# Patient Record
Sex: MALE | Race: WHITE | NOT HISPANIC OR LATINO | Employment: FULL TIME | URBAN - METROPOLITAN AREA
[De-identification: names, ages, dates, MRNs, and addresses within clinical notes are randomized per-mention and may not be internally consistent; named-entity substitution may affect disease eponyms.]

---

## 2017-01-09 ENCOUNTER — TELEPHONE (OUTPATIENT)
Dept: OPHTHALMOLOGY | Facility: CLINIC | Age: 52
End: 2017-01-09

## 2017-01-26 ENCOUNTER — TELEPHONE (OUTPATIENT)
Dept: INTERNAL MEDICINE | Facility: CLINIC | Age: 52
End: 2017-01-26

## 2017-01-26 DIAGNOSIS — M15.9 PRIMARY OSTEOARTHRITIS INVOLVING MULTIPLE JOINTS: ICD-10-CM

## 2017-01-26 DIAGNOSIS — F43.23 ADJUSTMENT DISORDER WITH MIXED ANXIETY AND DEPRESSED MOOD: ICD-10-CM

## 2017-01-26 RX ORDER — CELECOXIB 200 MG/1
200 CAPSULE ORAL DAILY
Qty: 90 CAPSULE | Refills: 1 | Status: SHIPPED | OUTPATIENT
Start: 2017-01-26 | End: 2017-09-25 | Stop reason: SDUPTHER

## 2017-01-26 RX ORDER — SERTRALINE HYDROCHLORIDE 25 MG/1
25 TABLET, FILM COATED ORAL DAILY
Qty: 90 TABLET | Refills: 3 | Status: SHIPPED | OUTPATIENT
Start: 2017-01-26 | End: 2017-12-29 | Stop reason: SDUPTHER

## 2017-01-26 NOTE — TELEPHONE ENCOUNTER
----- Message from Corazon Jones MD sent at 1/25/2017 12:40 PM CST -----  Hi  Will you please change my pharmacy information to the [facility] [address] and delete the [facility]?  Will you please also refill my Zoloft and Celebrex at the [facility] [address]  Thank you so much

## 2017-02-10 ENCOUNTER — HOSPITAL ENCOUNTER (OUTPATIENT)
Dept: RADIOLOGY | Facility: OTHER | Age: 52
Discharge: HOME | End: 2017-02-10
Attending: INTERNAL MEDICINE
Payer: COMMERCIAL

## 2017-02-10 DIAGNOSIS — R13.10 DYSPHAGIA: ICD-10-CM

## 2017-02-10 PROCEDURE — 74220 X-RAY XM ESOPHAGUS 1CNTRST: CPT | Mod: TC

## 2017-02-10 PROCEDURE — 74220 X-RAY XM ESOPHAGUS 1CNTRST: CPT | Mod: 26,,, | Performed by: INTERNAL MEDICINE

## 2017-05-28 RX ORDER — AZITHROMYCIN 250 MG/1
TABLET, FILM COATED ORAL
Qty: 6 TABLET | Refills: 0 | Status: SHIPPED | OUTPATIENT
Start: 2017-05-28 | End: 2018-04-17 | Stop reason: SDUPTHER

## 2017-09-25 ENCOUNTER — OFFICE VISIT (OUTPATIENT)
Dept: INTERNAL MEDICINE | Facility: CLINIC | Age: 52
End: 2017-09-25
Attending: INTERNAL MEDICINE
Payer: COMMERCIAL

## 2017-09-25 ENCOUNTER — TELEPHONE (OUTPATIENT)
Dept: INTERNAL MEDICINE | Facility: CLINIC | Age: 52
End: 2017-09-25

## 2017-09-25 VITALS
SYSTOLIC BLOOD PRESSURE: 124 MMHG | OXYGEN SATURATION: 98 % | HEIGHT: 70 IN | WEIGHT: 238.13 LBS | HEART RATE: 70 BPM | DIASTOLIC BLOOD PRESSURE: 86 MMHG | BODY MASS INDEX: 34.09 KG/M2

## 2017-09-25 DIAGNOSIS — M15.9 PRIMARY OSTEOARTHRITIS INVOLVING MULTIPLE JOINTS: ICD-10-CM

## 2017-09-25 DIAGNOSIS — K58.9 IRRITABLE BOWEL SYNDROME, UNSPECIFIED TYPE: ICD-10-CM

## 2017-09-25 DIAGNOSIS — K21.9 GASTROESOPHAGEAL REFLUX DISEASE WITHOUT ESOPHAGITIS: ICD-10-CM

## 2017-09-25 DIAGNOSIS — D72.820 LYMPHOCYTOSIS: ICD-10-CM

## 2017-09-25 DIAGNOSIS — M79.18 MUSCULOSKELETAL PAIN: ICD-10-CM

## 2017-09-25 DIAGNOSIS — Z00.00 ANNUAL PHYSICAL EXAM: Primary | ICD-10-CM

## 2017-09-25 DIAGNOSIS — R10.84 ABDOMINAL PAIN, GENERALIZED: Primary | ICD-10-CM

## 2017-09-25 DIAGNOSIS — E73.9 LACTOSE INTOLERANCE: ICD-10-CM

## 2017-09-25 PROCEDURE — 99214 OFFICE O/P EST MOD 30 MIN: CPT | Mod: S$GLB,,, | Performed by: INTERNAL MEDICINE

## 2017-09-25 PROCEDURE — 3008F BODY MASS INDEX DOCD: CPT | Mod: S$GLB,,, | Performed by: INTERNAL MEDICINE

## 2017-09-25 PROCEDURE — 99999 PR PBB SHADOW E&M-EST. PATIENT-LVL III: CPT | Mod: PBBFAC,,, | Performed by: INTERNAL MEDICINE

## 2017-09-25 RX ORDER — CELECOXIB 200 MG/1
200 CAPSULE ORAL DAILY
Qty: 90 CAPSULE | Refills: 1 | Status: SHIPPED | OUTPATIENT
Start: 2017-09-25 | End: 2017-12-29 | Stop reason: SDUPTHER

## 2017-09-25 NOTE — TELEPHONE ENCOUNTER
Right flank pain, on going for 1 month. Awaken pt at night. Denies any other concerning s/s. Pt scheduled for UC appt today. Patient has no further questions or concerns.

## 2017-09-25 NOTE — PROGRESS NOTES
"Subjective:       Patient ID: Corazon Jones is a 52 y.o. male.    Chief Complaint: Flank Pain     Corazon Jones is a 52 y.o.  male who presents for Flank Pain  .  Patient Active Problem List   Diagnosis    Abdominal pain, generalized    Constipation, acute    Lactose intolerance    Chronic familial neutropenia    OTTONIEL (obstructive sleep apnea)    Hyperlipemia    History of Clostridium difficile colitis     Right flank pain, 4 weeks ago, after lifting a desk at home.  Initially sharp pain for 24-48 hours.  Days later noticed pain in same area, worse with certain positions when supine. Currently 1-2/10. Occurs every night.  Worse after meals, especially fatty meals.  Hx of abdominal bloating and GERD.  Hx of treated h pylori years ago.      Knows foods to avoid but does not avoid them. Reports eating fatyt brisket last night and having pale and "fatty" loose stool  This morning. Not oily or floating. GI issues are long standing, no significant changes.    Has a history of intermittent musculoskeletal pain which he treats with prn celebrex.  He has tried ibuprofen, mobic and alleve but all caused GI upset.  His pain is controled with celebrex and he has no nausea or gastritis with this medication. He request a refill.      Health Maintenance       Date Due Completion Date    TETANUS VACCINE 09/13/1982 ---    Influenza Vaccine 08/01/2017 12/8/2014 (Done)    Override on 12/8/2014: Done    Lipid Panel 04/20/2020 4/20/2015    Colonoscopy 05/14/2025 5/14/2015          Review of Systems   Constitutional: Negative for chills, fever and unexpected weight change.   HENT: Negative for rhinorrhea and sore throat.    Respiratory: Negative for cough and shortness of breath.    Cardiovascular: Negative for chest pain and palpitations.   Gastrointestinal: Positive for abdominal distention and abdominal pain. Negative for blood in stool, constipation, diarrhea, nausea and vomiting.   Genitourinary: Positive for flank pain. Negative " "for dysuria and hematuria.   Musculoskeletal: Positive for back pain. Negative for arthralgias and joint swelling.   Skin: Negative for color change and rash.   Neurological: Negative for weakness and numbness.   Psychiatric/Behavioral: Negative for agitation and dysphoric mood.         Past Medical History:   Diagnosis Date    History of Clostridium difficile colitis     treated 2014    History of sarcoidosis     dx 1991, resolved    Hyperlipemia        Past Surgical History:   Procedure Laterality Date    APPENDECTOMY      HERNIA REPAIR      orthoscopy      shoulder and knee       Family History   Problem Relation Age of Onset    Diabetes Mellitus Maternal Grandmother     Diabetes Mellitus Maternal Uncle     Diabetes Mellitus Paternal Uncle     Prostate cancer Maternal Uncle     Endometrial cancer Paternal Grandmother        Social History   Substance Use Topics    Smoking status: Never Smoker    Smokeless tobacco: Never Used    Alcohol use No             Objective:   Blood pressure 124/86, pulse 70, height 5' 10" (1.778 m), weight 108 kg (238 lb 1.6 oz), SpO2 98 %.     Physical Exam   Constitutional: He is oriented to person, place, and time. He appears well-developed and well-nourished. No distress.   HENT:   Head: Normocephalic and atraumatic.   Right Ear: External ear normal.   Left Ear: External ear normal.   Eyes: Conjunctivae are normal. No scleral icterus.   Neck: No JVD present. No thyromegaly present.   Cardiovascular: Normal heart sounds.  Exam reveals no gallop and no friction rub.    No murmur heard.  Pulmonary/Chest: Effort normal and breath sounds normal. He has no wheezes. He has no rales.   Abdominal: Soft. Bowel sounds are normal. He exhibits no distension and no mass. There is tenderness (diffuse). There is no rebound and no guarding.   Musculoskeletal: He exhibits no edema or tenderness.   Lymphadenopathy:     He has no cervical adenopathy.   Neurological: He is alert and oriented " to person, place, and time.   Skin: Skin is warm and dry.   Psychiatric: He has a normal mood and affect. Thought content normal.       Prior labs reviewed  Assessment/Plan:        Corazon was seen today for flank pain.    Diagnoses and all orders for this visit:    Abdominal pain, generalized  -     US Abdomen Complete; Future  -     Ambulatory consult to Gastroenterology  Suspect IBS.Will check Labs and recommend compliance with dietary modifications.  Consider evaluation for specific food intolerance.    Gastroesophageal reflux disease without esophagitis  -     H. pylori antigen, stool; Future    Primary osteoarthritis involving multiple joints  -    refill celecoxib (CELEBREX) 200 MG capsule; Take 1 capsule (200 mg total) by mouth once daily.    Musculoskeletal pain  -    Suspect this is source of initial flank pain  - prn  celecoxib (CELEBREX) 200 MG capsule; Take 1 capsule (200 mg total) by mouth once daily.    Lactose intolerance  -     Ambulatory consult to Gastroenterology  Avoid dairy  Use of lactaid prn    Irritable bowel syndrome, unspecified type  -     Ambulatory consult to Gastroenterology

## 2017-09-26 ENCOUNTER — LAB VISIT (OUTPATIENT)
Dept: LAB | Facility: OTHER | Age: 52
End: 2017-09-26
Attending: INTERNAL MEDICINE
Payer: COMMERCIAL

## 2017-09-26 DIAGNOSIS — Z00.00 ANNUAL PHYSICAL EXAM: ICD-10-CM

## 2017-09-26 DIAGNOSIS — D72.820 LYMPHOCYTOSIS: ICD-10-CM

## 2017-09-26 LAB
ALBUMIN SERPL BCP-MCNC: 3.7 G/DL
ALP SERPL-CCNC: 78 U/L
ALT SERPL W/O P-5'-P-CCNC: 30 U/L
ANION GAP SERPL CALC-SCNC: 10 MMOL/L
AST SERPL-CCNC: 20 U/L
BASOPHILS # BLD AUTO: ABNORMAL K/UL
BASOPHILS NFR BLD: 0 %
BILIRUB SERPL-MCNC: 0.7 MG/DL
BUN SERPL-MCNC: 15 MG/DL
CALCIUM SERPL-MCNC: 9.6 MG/DL
CHLORIDE SERPL-SCNC: 105 MMOL/L
CHOLEST SERPL-MCNC: 269 MG/DL
CHOLEST/HDLC SERPL: 5.7 {RATIO}
CO2 SERPL-SCNC: 27 MMOL/L
COMPLEXED PSA SERPL-MCNC: 0.28 NG/ML
CREAT SERPL-MCNC: 0.9 MG/DL
DIFFERENTIAL METHOD: ABNORMAL
EOSINOPHIL # BLD AUTO: ABNORMAL K/UL
EOSINOPHIL NFR BLD: 3 %
ERYTHROCYTE [DISTWIDTH] IN BLOOD BY AUTOMATED COUNT: 13.5 %
ERYTHROCYTE [SEDIMENTATION RATE] IN BLOOD BY WESTERGREN METHOD: 3 MM/HR
EST. GFR  (AFRICAN AMERICAN): >60 ML/MIN/1.73 M^2
EST. GFR  (NON AFRICAN AMERICAN): >60 ML/MIN/1.73 M^2
ESTIMATED AVG GLUCOSE: 108 MG/DL
GLUCOSE SERPL-MCNC: 102 MG/DL
HBA1C MFR BLD HPLC: 5.4 %
HCT VFR BLD AUTO: 48.7 %
HDLC SERPL-MCNC: 47 MG/DL
HDLC SERPL: 17.5 %
HGB BLD-MCNC: 16.2 G/DL
LDLC SERPL CALC-MCNC: 200.8 MG/DL
LYMPHOCYTES # BLD AUTO: ABNORMAL K/UL
LYMPHOCYTES NFR BLD: 66 %
MCH RBC QN AUTO: 29.8 PG
MCHC RBC AUTO-ENTMCNC: 33.3 G/DL
MCV RBC AUTO: 90 FL
MONOCYTES # BLD AUTO: ABNORMAL K/UL
MONOCYTES NFR BLD: 13 %
NEUTROPHILS NFR BLD: 18 %
NONHDLC SERPL-MCNC: 222 MG/DL
NRBC BLD-RTO: 1 /100 WBC
PATH REV BLD -IMP: NORMAL
PLATELET # BLD AUTO: 226 K/UL
PLATELET BLD QL SMEAR: ABNORMAL
PMV BLD AUTO: 10.4 FL
POTASSIUM SERPL-SCNC: 4.6 MMOL/L
PROT SERPL-MCNC: 7.4 G/DL
RBC # BLD AUTO: 5.44 M/UL
SODIUM SERPL-SCNC: 142 MMOL/L
TRIGL SERPL-MCNC: 106 MG/DL
TSH SERPL DL<=0.005 MIU/L-ACNC: 1.92 UIU/ML
WBC # BLD AUTO: 3.28 K/UL

## 2017-09-26 PROCEDURE — 84153 ASSAY OF PSA TOTAL: CPT

## 2017-09-26 PROCEDURE — 80053 COMPREHEN METABOLIC PANEL: CPT

## 2017-09-26 PROCEDURE — 85651 RBC SED RATE NONAUTOMATED: CPT

## 2017-09-26 PROCEDURE — 36415 COLL VENOUS BLD VENIPUNCTURE: CPT

## 2017-09-26 PROCEDURE — 84443 ASSAY THYROID STIM HORMONE: CPT

## 2017-09-26 PROCEDURE — 80061 LIPID PANEL: CPT

## 2017-09-26 PROCEDURE — 83036 HEMOGLOBIN GLYCOSYLATED A1C: CPT

## 2017-09-26 PROCEDURE — 85025 COMPLETE CBC W/AUTO DIFF WBC: CPT

## 2017-09-26 PROCEDURE — 85060 BLOOD SMEAR INTERPRETATION: CPT | Mod: ,,, | Performed by: PATHOLOGY

## 2017-09-27 ENCOUNTER — HOSPITAL ENCOUNTER (OUTPATIENT)
Dept: RADIOLOGY | Facility: OTHER | Age: 52
Discharge: HOME | End: 2017-09-27
Attending: INTERNAL MEDICINE
Payer: COMMERCIAL

## 2017-09-27 DIAGNOSIS — R10.84 ABDOMINAL PAIN, GENERALIZED: ICD-10-CM

## 2017-09-27 LAB — PATH REV BLD -IMP: NORMAL

## 2017-09-27 PROCEDURE — 76700 US EXAM ABDOM COMPLETE: CPT | Mod: TC

## 2017-09-27 PROCEDURE — 76700 US EXAM ABDOM COMPLETE: CPT | Mod: 26,,, | Performed by: RADIOLOGY

## 2017-10-05 ENCOUNTER — TELEPHONE (OUTPATIENT)
Dept: INTERNAL MEDICINE | Facility: CLINIC | Age: 52
End: 2017-10-05

## 2017-10-09 ENCOUNTER — TELEPHONE (OUTPATIENT)
Dept: INTERNAL MEDICINE | Facility: CLINIC | Age: 52
End: 2017-10-09

## 2017-10-09 DIAGNOSIS — D72.820 LYMPHOCYTOSIS: Primary | ICD-10-CM

## 2017-10-16 ENCOUNTER — TELEPHONE (OUTPATIENT)
Dept: INTERNAL MEDICINE | Facility: CLINIC | Age: 52
End: 2017-10-16

## 2017-10-25 ENCOUNTER — LAB VISIT (OUTPATIENT)
Dept: LAB | Facility: OTHER | Age: 52
End: 2017-10-25
Attending: INTERNAL MEDICINE
Payer: COMMERCIAL

## 2017-10-25 DIAGNOSIS — M25.50 ARTHRALGIA, UNSPECIFIED JOINT: ICD-10-CM

## 2017-10-25 DIAGNOSIS — M25.50 ARTHRALGIA, UNSPECIFIED JOINT: Primary | ICD-10-CM

## 2017-10-25 LAB — URATE SERPL-MCNC: 5.7 MG/DL

## 2017-10-25 PROCEDURE — 84550 ASSAY OF BLOOD/URIC ACID: CPT

## 2017-10-25 PROCEDURE — 36415 COLL VENOUS BLD VENIPUNCTURE: CPT

## 2017-10-25 NOTE — PROGRESS NOTES
Called pt and left vm stating that  rec that pt completes an uric aci lab.  Left office number for pt to return call to schedule appt .

## 2017-11-22 ENCOUNTER — OFFICE VISIT (OUTPATIENT)
Dept: SPORTS MEDICINE | Facility: CLINIC | Age: 52
End: 2017-11-22
Payer: COMMERCIAL

## 2017-11-22 VITALS
BODY MASS INDEX: 34.09 KG/M2 | HEIGHT: 70 IN | WEIGHT: 238.13 LBS | DIASTOLIC BLOOD PRESSURE: 77 MMHG | SYSTOLIC BLOOD PRESSURE: 133 MMHG | HEART RATE: 82 BPM

## 2017-11-22 DIAGNOSIS — S83.282S ACUTE LATERAL MENISCUS TEAR OF LEFT KNEE, SEQUELA: Primary | ICD-10-CM

## 2017-11-22 PROCEDURE — 99024 POSTOP FOLLOW-UP VISIT: CPT | Mod: S$GLB,,, | Performed by: ORTHOPAEDIC SURGERY

## 2017-11-22 PROCEDURE — 99999 PR PBB SHADOW E&M-EST. PATIENT-LVL III: CPT | Mod: PBBFAC,,, | Performed by: ORTHOPAEDIC SURGERY

## 2017-11-22 NOTE — PROGRESS NOTES
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      + mechanical symptoms, no instability    Is affecting ADLs.      Review of Systems   Constitution: Negative. Negative for chills, fever and night sweats.   HENT: Negative for congestion and headaches.    Eyes: Negative for blurred vision, left vision loss and right vision loss.   Cardiovascular: Negative for chest pain and syncope.   Respiratory: Negative for cough and shortness of breath.    Endocrine: Negative for polydipsia, polyphagia and polyuria.   Hematologic/Lymphatic: Negative for bleeding problem. Does not bruise/bleed easily.   Skin: Negative for dry skin, itching and rash.   Musculoskeletal: Negative for falls. Positive for knee pain and muscle weakness.   Gastrointestinal: Negative for abdominal pain and bowel incontinence.   Genitourinary: Negative for bladder incontinence and nocturia.   Neurological: Negative for disturbances in coordination, loss of balance and seizures.   Psychiatric/Behavioral: Negative for depression. The patient does not have insomnia.    Allergic/Immunologic: Negative for hives and persistent infections.     PAST MEDICAL HISTORY:   Past Medical History:   Diagnosis Date    History of Clostridium difficile colitis     treated 2014    History of sarcoidosis     dx 1991, resolved    Hyperlipemia      PAST SURGICAL HISTORY:   Past Surgical History:   Procedure Laterality Date    APPENDECTOMY      HERNIA REPAIR      orthoscopy      shoulder and knee     FAMILY HISTORY:   Family History   Problem Relation Age of Onset    Diabetes Mellitus Maternal Grandmother     Diabetes Mellitus Maternal Uncle     Diabetes Mellitus Paternal Uncle     Prostate cancer Maternal Uncle     Endometrial cancer Paternal Grandmother      SOCIAL HISTORY:   Social History     Social History    Marital status:      Spouse name: N/A  "   Number of children: 3    Years of education: N/A     Occupational History    physician [employer]     Social History Main Topics    Smoking status: Never Smoker    Smokeless tobacco: Never Used    Alcohol use No    Drug use: No    Sexual activity: Yes     Partners: Female     Other Topics Concern    Not on file     Social History Narrative    Intermittent exercise       MEDICATIONS:   Current Outpatient Prescriptions:     azithromycin (Z-ANNY) 250 MG tablet, Take 2 tablets by mouth on day 1; Take 1 tablet by mouth on days 2-5, Disp: 6 tablet, Rfl: 0    celecoxib (CELEBREX) 200 MG capsule, Take 1 capsule (200 mg total) by mouth once daily., Disp: 90 capsule, Rfl: 1    metaxalone (SKELAXIN) 800 MG tablet, Take 1 tablet (800 mg total) by mouth 3 (three) times daily., Disp: 90 tablet, Rfl: 0    polyethylene glycol (GLYCOLAX) 17 gram PwPk, Take 17 g by mouth once daily. 17 g of powder (~1 heaping tablespoon) dissolved in 4-8 ounces of beverage, once daily; do not use for >1 week, Disp: 14 each, Rfl: 0    sertraline (ZOLOFT) 25 MG tablet, Take 1 tablet (25 mg total) by mouth once daily., Disp: 90 tablet, Rfl: 3    zolpidem (AMBIEN) 5 MG Tab, Take 1 tablet (5 mg total) by mouth nightly as needed., Disp: 30 tablet, Rfl: 0  ALLERGIES:   Review of patient's allergies indicates:   Allergen Reactions    Percocet [oxycodone-acetaminophen]     Adhesive Rash       VITAL SIGNS: /77   Pulse 82   Ht 5' 10" (1.778 m)   Wt 108 kg (238 lb 1.6 oz)   BMI 34.16 kg/m²      PHYSICAL EXAMINATION  VITAL SIGNS: /77   Pulse 82   Ht 5' 10" (1.778 m)   Wt 108 kg (238 lb 1.6 oz)   BMI 34.16 kg/m²    General:  The patient is alert and oriented x 3.  Mood is pleasant.  Observation of ears, eyes and nose reveal no gross abnormalities.  HEENT: NCAT, sclera nonicteric  Lungs: Respirations are equal and unlabored.    Left KNEE EXAMINATION     OBSERVATION / INSPECTION   Gait:   Nonantalgic "   Alignment:  Neutral   Scars:   None   Muscle atrophy: Mild  Effusion:  None   Warmth:  None   Discoloration:   none     TENDERNESS / CREPITUS (T / C):          T / C      T / C   Patella   - / -   Lateral joint line   + / -    Peripatellar medial  -  Medial joint line    + / -    Peripatellar lateral -  Medial plica   - / -    Patellar tendon -   Popliteal fossa  - / -    Quad tendon   -   Gastrocnemius   -   Prepatellar Bursa - / -   Quadricep   -   Tibial tubercle  -  Thigh/hamstring  -   Pes anserine/HS -  Fibula    -   ITB   - / -  Tibia     -   Tib/fib joint  - / -  LCL    -     MFC   - / -   MCL: Proximal  -    LFC   - / -    Distal   -          ROM: (* = pain)  PASSIVE   ACTIVE    Left :   5 / 0 / 145   5 / 0 / 145     Right :    5 / 0 / 145   5 / 0 / 145    Patellofemoral examination:  See above noted areas of tenderness.   Patella position    Subluxation / dislocation: Centered           Sup. / Inf;   Normal   Crepitus (PF):    Absent   Patellar Mobility:       Medial-lateral:   Normal    Superior-inferior:  Normal    Inferior tilt   Normal    Patellar tendon:  Normal   Lateral tilt:    Normal   J-sign:     None   Patellofemoral grind:   No pain       MENISCAL SIGNS:     Pain on terminal extension:  +  Pain on terminal flexion:  +  Geralds maneuver:  + for pain  Squat     + posterior joint pain    LIGAMENT EXAMINATION:  ACL / Lachman:  normal (-1 to 2mm)    PCL-Post.  drawer: normal 0 to 2mm  MCL- Valgus:  normal 0 to 2mm  LCL- Varus:  normal 0 to 2mm  Pivot shift: normal (Equal)   Dial Test: difference c/w other side   At 30° flexion: normal (< 5°)    At 90° flexion: normal (< 5°)   Reverse Pivot Shift:   normal (Equal)     STRENGTH: (* = with pain) PAINFUL SIDE   Quadricep   5/5   Hamstrin/5    EXTREMITY NEURO-VASCULAR EXAMINATION:   Sensation:  Grossly intact to light touch all dermatomal regions.   Motor Function:  Fully intact motor function at hip, knee, foot and ankle    DTRs;   quadriceps and  achilles 2+.  No clonus and downgoing Babinski.    Vascular status:  DP and PT pulses 2+, brisk capillary refill, symmetric.     Other Findings:       X-rays:  including standing, weight bearing AP and flexion bilateral knees, lateral and merchant views ordered and images reviewed by me show:  No fracture, dislocation     ASSESSMENT:    Left Knee  Probable Meniscus tear  medial and lateral       PLAN:   MRI Left knee  Hold out of sports until MRI  All questions were answered, pt will contact us for questions or concerns in the interim.

## 2017-11-24 ENCOUNTER — HOSPITAL ENCOUNTER (OUTPATIENT)
Dept: RADIOLOGY | Facility: OTHER | Age: 52
Discharge: HOME | End: 2017-11-24
Attending: ORTHOPAEDIC SURGERY
Payer: COMMERCIAL

## 2017-11-24 DIAGNOSIS — S83.282S ACUTE LATERAL MENISCUS TEAR OF LEFT KNEE, SEQUELA: ICD-10-CM

## 2017-11-24 DIAGNOSIS — M25.561 CHRONIC PAIN OF BOTH KNEES: Primary | ICD-10-CM

## 2017-11-24 DIAGNOSIS — M25.562 CHRONIC PAIN OF BOTH KNEES: Primary | ICD-10-CM

## 2017-11-24 DIAGNOSIS — G89.29 CHRONIC PAIN OF BOTH KNEES: Primary | ICD-10-CM

## 2017-11-24 PROCEDURE — 73721 MRI JNT OF LWR EXTRE W/O DYE: CPT | Mod: 26,LT,, | Performed by: RADIOLOGY

## 2017-11-24 PROCEDURE — 73721 MRI JNT OF LWR EXTRE W/O DYE: CPT | Mod: TC,LT

## 2017-11-27 ENCOUNTER — TELEPHONE (OUTPATIENT)
Dept: SPORTS MEDICINE | Facility: CLINIC | Age: 52
End: 2017-11-27

## 2017-11-27 NOTE — TELEPHONE ENCOUNTER
I spoke with the patient and he notes that his knee is feeling better and that he will call the office if and when he is ready to proceed with an injection

## 2017-11-27 NOTE — TELEPHONE ENCOUNTER
----- Message from Laura Wisdom MD sent at 11/26/2017  1:27 PM CST -----  MRI reviewed personally by me, and discussed:     Left knee MRI: no meniscus tear, + chondromalacia, + arthritis.    Proceed with cortisone injection and then possible synvisc series.

## 2017-12-29 ENCOUNTER — TELEPHONE (OUTPATIENT)
Dept: INTERNAL MEDICINE | Facility: CLINIC | Age: 52
End: 2017-12-29

## 2017-12-29 DIAGNOSIS — M79.18 MUSCULOSKELETAL PAIN: ICD-10-CM

## 2017-12-29 DIAGNOSIS — F43.23 ADJUSTMENT DISORDER WITH MIXED ANXIETY AND DEPRESSED MOOD: ICD-10-CM

## 2017-12-29 DIAGNOSIS — M15.9 PRIMARY OSTEOARTHRITIS INVOLVING MULTIPLE JOINTS: ICD-10-CM

## 2017-12-29 RX ORDER — CELECOXIB 200 MG/1
200 CAPSULE ORAL DAILY
Qty: 90 CAPSULE | Refills: 2 | Status: SHIPPED | OUTPATIENT
Start: 2017-12-29 | End: 2019-04-10

## 2017-12-29 RX ORDER — SERTRALINE HYDROCHLORIDE 25 MG/1
25 TABLET, FILM COATED ORAL DAILY
Qty: 90 TABLET | Refills: 3 | Status: SHIPPED | OUTPATIENT
Start: 2017-12-29 | End: 2017-12-29 | Stop reason: SDUPTHER

## 2017-12-29 RX ORDER — CELECOXIB 200 MG/1
200 CAPSULE ORAL DAILY
Qty: 90 CAPSULE | Refills: 2 | Status: SHIPPED | OUTPATIENT
Start: 2017-12-29 | End: 2017-12-29 | Stop reason: SDUPTHER

## 2017-12-29 RX ORDER — SERTRALINE HYDROCHLORIDE 25 MG/1
25 TABLET, FILM COATED ORAL DAILY
Qty: 90 TABLET | Refills: 3 | Status: SHIPPED | OUTPATIENT
Start: 2017-12-29 | End: 2019-04-12

## 2017-12-29 NOTE — TELEPHONE ENCOUNTER
----- Message from Tasha Santos MD sent at 12/29/2017  4:25 PM CST -----  [facility] call [facility] and cancel celebrex and zoloft- sent in error  Resent to [facility] per pt request

## 2018-03-06 ENCOUNTER — CLINICAL SUPPORT (OUTPATIENT)
Dept: OTOLARYNGOLOGY | Facility: CLINIC | Age: 53
End: 2018-03-06
Payer: COMMERCIAL

## 2018-03-06 ENCOUNTER — OFFICE VISIT (OUTPATIENT)
Dept: OTOLARYNGOLOGY | Facility: CLINIC | Age: 53
End: 2018-03-06
Payer: COMMERCIAL

## 2018-03-06 VITALS
BODY MASS INDEX: 34.09 KG/M2 | SYSTOLIC BLOOD PRESSURE: 112 MMHG | TEMPERATURE: 98 F | HEART RATE: 79 BPM | WEIGHT: 238.13 LBS | HEIGHT: 70 IN | DIASTOLIC BLOOD PRESSURE: 80 MMHG

## 2018-03-06 DIAGNOSIS — J30.2 ACUTE SEASONAL ALLERGIC RHINITIS, UNSPECIFIED TRIGGER: ICD-10-CM

## 2018-03-06 DIAGNOSIS — J34.2 NASAL SEPTAL DEVIATION: ICD-10-CM

## 2018-03-06 DIAGNOSIS — H93.13 TINNITUS OF BOTH EARS: Primary | ICD-10-CM

## 2018-03-06 DIAGNOSIS — H93.12 TINNITUS, LEFT: Primary | ICD-10-CM

## 2018-03-06 DIAGNOSIS — H69.93 DYSFUNCTION OF BOTH EUSTACHIAN TUBES: ICD-10-CM

## 2018-03-06 DIAGNOSIS — H90.3 SENSORINEURAL HEARING LOSS (SNHL) OF BOTH EARS: ICD-10-CM

## 2018-03-06 DIAGNOSIS — H90.A22 SENSORINEURAL HEARING LOSS (SNHL) OF LEFT EAR WITH RESTRICTED HEARING OF RIGHT EAR: ICD-10-CM

## 2018-03-06 DIAGNOSIS — G47.33 OSA (OBSTRUCTIVE SLEEP APNEA): ICD-10-CM

## 2018-03-06 PROCEDURE — 99204 OFFICE O/P NEW MOD 45 MIN: CPT | Mod: S$GLB,,, | Performed by: SPECIALIST

## 2018-03-06 PROCEDURE — 92567 TYMPANOMETRY: CPT | Mod: S$GLB,,, | Performed by: AUDIOLOGIST

## 2018-03-06 PROCEDURE — 92557 COMPREHENSIVE HEARING TEST: CPT | Mod: S$GLB,,, | Performed by: AUDIOLOGIST

## 2018-03-06 RX ORDER — FLUTICASONE PROPIONATE 50 MCG
2 SPRAY, SUSPENSION (ML) NASAL DAILY
Qty: 1 BOTTLE | Refills: 12 | Status: SHIPPED | OUTPATIENT
Start: 2018-03-06 | End: 2021-03-31

## 2018-03-06 NOTE — PATIENT INSTRUCTIONS
Tinnitus (Ringing in the Ears)     Treatment may include maskers and hearing aids.     Tinnitus is the term for a noise in your ear not caused by an outside sound. The noise might be a ringing, clicking, hiss, or roar. It can vary in pitch and may be soft or quite loud. For some people, tinnitus is a minor nuisance. But for others, the noise can make it hard to hear, work, and even sleep. When tinnitus can't be cured, a number of treatments may offer relief.  What causes tinnitus?  Loud noises, hearing loss, and ear wax can cause tinnitus. So can certain medicines. Large amounts of aspirin or caffeine are sometimes to blame. In many cases, the exact cause of tinnitus is unknown.  How is tinnitus treated?  Identifying and removing the cause is the best way to treat tinnitus. For that reason, your healthcare provider may refer you to an otolaryngologist (ear, nose, and throat doctor). Your hearing may also be checked by an audiologist (hearing specialist). If you have hearing loss, wearing a hearing aid may help your tinnitus. When the cause can't be found, the tinnitus itself may be treated. Some of the treatments are listed below, and your healthcare provider can tell you more about them:  · Maskers are small devices that look like hearing aids. They emit a pleasant sound that helps cover up the ringing in your ears. Hearing aids and maskers are sometimes used together.  · Medicines that treat anxiety and depression may ease tinnitus in some people.  · Hypnosis or relaxation therapy may help head noise seem less severe.  · Tinnitus retraining therapy combines counseling and maskers. Both can help take your mind off the tinnitus.  For more information  · American Speech-Hearing-Language Association [phone] www.andrea.org  · American Tinnitus Association [phone] www.jessica.org  · National [place] on Deafness and other Communication Disorders 471-667-7651 www.nidcd.nih.gov   Date Last Reviewed: 7/1/2016  ©  1006-1071 [employer]. [address] [place] [address], [place] [address]. All rights reserved. This information is not intended as a substitute for professional medical care. Always follow your healthcare professional's instructions.

## 2018-03-07 NOTE — PROGRESS NOTES
Subjective:       Patient ID: Corazon Jones is a 52 y.o. male.    Chief Complaint: Tinnitus and Dizziness    The patient is having high pitched electric static type tinnitus bilaterally.  He feels like it localizes to the left of midline in his head.  He does occasionally wake him up.  He has associated sensitivity to high pitched noise and loud noise.  He occasionally has some mild imbalance that is always associated with changes of position.  He has a teenage daughter who speaks in a very loud voice and he has difficulty understanding her more often not.  He does have some ALLERGY/sinus problems for which she takes Claritin.  He does have obstructive sleep apnea for which he uses CPAP with good results.      Review of Systems   Constitutional: Negative for activity change, appetite change, chills, fatigue, fever and unexpected weight change.   HENT: Positive for congestion, hearing loss, postnasal drip, sinus pressure, sore throat and tinnitus. Negative for ear discharge, ear pain, facial swelling, mouth sores, rhinorrhea, sinus pain, sneezing, trouble swallowing and voice change.    Eyes: Negative for photophobia, pain, discharge, redness, itching and visual disturbance.   Respiratory: Positive for cough. Negative for apnea, choking, shortness of breath and wheezing.    Cardiovascular: Negative for chest pain and palpitations.   Gastrointestinal: Negative for abdominal distention, abdominal pain, nausea and vomiting.   Musculoskeletal: Negative for arthralgias, myalgias, neck pain and neck stiffness.   Skin: Negative.  Negative for color change, pallor and rash.   Allergic/Immunologic: Negative for environmental allergies, food allergies and immunocompromised state.   Neurological: Positive for headaches. Negative for dizziness, facial asymmetry, speech difficulty, weakness, light-headedness and numbness.   Hematological: Negative for adenopathy. Does not bruise/bleed easily.   Psychiatric/Behavioral: Negative for  agitation, confusion, decreased concentration and sleep disturbance.       Objective:      Physical Exam   Constitutional: He is oriented to person, place, and time. He appears well-developed and well-nourished. He is cooperative.   HENT:   Head: Normocephalic.   Right Ear: Tympanic membrane, external ear and ear canal normal.   Left Ear: Tympanic membrane, external ear and ear canal normal.   Nose: Mucosal edema (cyanotic, boggy inferior turbinates bilaterally), rhinorrhea (clear mucus bilaterally) and septal deviation (to the right) present.   Mouth/Throat: Uvula is midline, oropharynx is clear and moist and mucous membranes are normal. No oral lesions.   Mouth-Monroe class II, long thick palate and uvula, enlarged base of tongue with narrow oropharyngeal inlet   Eyes: EOM and lids are normal. Pupils are equal, round, and reactive to light. Right eye exhibits no discharge and no exudate. Left eye exhibits no discharge and no exudate. Right conjunctiva is injected. Left conjunctiva is injected.   Neck: Trachea normal and normal range of motion. No muscular tenderness present. No tracheal deviation present. No thyroid mass and no thyromegaly present.   Cardiovascular: Normal rate, regular rhythm, normal heart sounds and normal pulses.    Pulmonary/Chest: Effort normal and breath sounds normal. No stridor. He has no wheezes. He has no rhonchi. He has no rales.   Abdominal: Soft. Bowel sounds are normal. There is no tenderness.   Musculoskeletal: Normal range of motion.   Lymphadenopathy:        Head (right side): No submental, no submandibular, no preauricular, no posterior auricular and no occipital adenopathy present.        Head (left side): No submental, no submandibular, no preauricular, no posterior auricular and no occipital adenopathy present.     He has no cervical adenopathy.   Neurological: He is alert and oriented to person, place, and time. He has normal strength. No cranial nerve deficit or sensory  deficit. Gait normal.   Skin: Skin is warm and dry. No petechiae and no rash noted. No cyanosis. Nails show no clubbing.   Psychiatric: He has a normal mood and affect. His speech is normal and behavior is normal. Judgment and thought content normal. Cognition and memory are normal.             Assessment:       1. Tinnitus of both ears    2. Acute seasonal allergic rhinitis, unspecified trigger    3. Nasal septal deviation    4. Sensorineural hearing loss (SNHL) of left ear with restricted hearing of right ear    5. Sensorineural hearing loss (SNHL) of both ears    6. Dysfunction of both eustachian tubes    7. OTTONIEL (obstructive sleep apnea)        Plan:       I will schedule patient for an MRI of the brain and internal auditory canals with without IV contrast.  I'm also starting him on daily use of nasal steroid in addition to the Claritin.  I will recheck him in 4 weeks.  If there are significant abnormalities on MRI have him come in sooner

## 2018-03-08 NOTE — PROGRESS NOTES
Normal hearing in the right ear with type A tympanogram.  Normal to moderate sensorineural hearing loss in the left ear with hypermobile tympanogram.

## 2018-03-12 DIAGNOSIS — H93.13 TINNITUS OF BOTH EARS: ICD-10-CM

## 2018-03-13 ENCOUNTER — HOSPITAL ENCOUNTER (OUTPATIENT)
Dept: RADIOLOGY | Facility: HOSPITAL | Age: 53
Discharge: HOME | End: 2018-03-13
Attending: SPECIALIST
Payer: COMMERCIAL

## 2018-03-13 DIAGNOSIS — H93.13 TINNITUS OF BOTH EARS: ICD-10-CM

## 2018-03-13 PROCEDURE — 25500020 PHARM REV CODE 255: Performed by: SPECIALIST

## 2018-03-13 PROCEDURE — 70553 MRI BRAIN STEM W/O & W/DYE: CPT | Mod: 26,,, | Performed by: RADIOLOGY

## 2018-03-13 PROCEDURE — A9585 GADOBUTROL INJECTION: HCPCS | Performed by: SPECIALIST

## 2018-03-13 PROCEDURE — 70553 MRI BRAIN STEM W/O & W/DYE: CPT | Mod: TC

## 2018-03-13 RX ORDER — GADOBUTROL 604.72 MG/ML
10 INJECTION INTRAVENOUS
Status: COMPLETED | OUTPATIENT
Start: 2018-03-13 | End: 2018-03-13

## 2018-03-13 RX ADMIN — GADOBUTROL 10 ML: 604.72 INJECTION INTRAVENOUS at 07:03

## 2018-04-17 ENCOUNTER — OFFICE VISIT (OUTPATIENT)
Dept: OTOLARYNGOLOGY | Facility: CLINIC | Age: 53
End: 2018-04-17
Payer: COMMERCIAL

## 2018-04-17 VITALS
TEMPERATURE: 97 F | HEIGHT: 70 IN | HEART RATE: 86 BPM | BODY MASS INDEX: 32.93 KG/M2 | SYSTOLIC BLOOD PRESSURE: 120 MMHG | DIASTOLIC BLOOD PRESSURE: 83 MMHG | WEIGHT: 230 LBS

## 2018-04-17 DIAGNOSIS — H93.13 TINNITUS OF BOTH EARS: ICD-10-CM

## 2018-04-17 DIAGNOSIS — J34.2 NASAL SEPTAL DEVIATION: ICD-10-CM

## 2018-04-17 DIAGNOSIS — R51.9 NONINTRACTABLE EPISODIC HEADACHE, UNSPECIFIED HEADACHE TYPE: ICD-10-CM

## 2018-04-17 DIAGNOSIS — J01.90 ACUTE SINUSITIS, RECURRENCE NOT SPECIFIED, UNSPECIFIED LOCATION: Primary | ICD-10-CM

## 2018-04-17 DIAGNOSIS — J30.2 ACUTE SEASONAL ALLERGIC RHINITIS, UNSPECIFIED TRIGGER: ICD-10-CM

## 2018-04-17 DIAGNOSIS — R05.9 COUGH: ICD-10-CM

## 2018-04-17 PROCEDURE — 99214 OFFICE O/P EST MOD 30 MIN: CPT | Mod: 25,S$GLB,, | Performed by: SPECIALIST

## 2018-04-17 PROCEDURE — 96372 THER/PROPH/DIAG INJ SC/IM: CPT | Mod: 59,S$GLB,, | Performed by: SPECIALIST

## 2018-04-17 RX ORDER — LIDOCAINE HYDROCHLORIDE 10 MG/ML
1 INJECTION INFILTRATION; PERINEURAL ONCE
Status: COMPLETED | OUTPATIENT
Start: 2018-04-17 | End: 2018-04-17

## 2018-04-17 RX ORDER — BETAMETHASONE SODIUM PHOSPHATE AND BETAMETHASONE ACETATE 3; 3 MG/ML; MG/ML
6 INJECTION, SUSPENSION INTRA-ARTICULAR; INTRALESIONAL; INTRAMUSCULAR; SOFT TISSUE
Status: COMPLETED | OUTPATIENT
Start: 2018-04-17 | End: 2018-04-17

## 2018-04-17 RX ORDER — CEFTRIAXONE 1 G/1
0.5 INJECTION, POWDER, FOR SOLUTION INTRAMUSCULAR; INTRAVENOUS ONCE
Status: COMPLETED | OUTPATIENT
Start: 2018-04-17 | End: 2018-04-17

## 2018-04-17 RX ORDER — AZITHROMYCIN 500 MG/1
500 TABLET, FILM COATED ORAL DAILY
Qty: 6 TABLET | Refills: 1 | Status: SHIPPED | OUTPATIENT
Start: 2018-04-17 | End: 2018-04-23

## 2018-04-17 RX ORDER — AZITHROMYCIN 250 MG/1
TABLET, FILM COATED ORAL
Qty: 6 TABLET | Refills: 0 | Status: SHIPPED | OUTPATIENT
Start: 2018-04-17 | End: 2018-04-17 | Stop reason: CLARIF

## 2018-04-17 RX ORDER — BENZONATATE 200 MG/1
200 CAPSULE ORAL 3 TIMES DAILY PRN
Qty: 30 CAPSULE | Refills: 3 | Status: SHIPPED | OUTPATIENT
Start: 2018-04-17 | End: 2018-04-27

## 2018-04-17 RX ADMIN — BETAMETHASONE SODIUM PHOSPHATE AND BETAMETHASONE ACETATE 6 MG: 3; 3 INJECTION, SUSPENSION INTRA-ARTICULAR; INTRALESIONAL; INTRAMUSCULAR; SOFT TISSUE at 04:04

## 2018-04-17 RX ADMIN — LIDOCAINE HYDROCHLORIDE 1 ML: 10 INJECTION INFILTRATION; PERINEURAL at 04:04

## 2018-04-17 RX ADMIN — CEFTRIAXONE 0.5 G: 1 INJECTION, POWDER, FOR SOLUTION INTRAMUSCULAR; INTRAVENOUS at 04:04

## 2018-04-18 NOTE — PROGRESS NOTES
Subjective:       Patient ID: Corazon Jones is a 52 y.o. male.    Chief Complaint: Cough    The patient is returning for a follow-up visit.  He continues to have tinnitus.  In the interim he is developed an upper respiratory infection that is cost him to have paroxysms of coughing.  He gets severe headaches during these coughing spells.  He has found that the tinnitus has increased significantly with the development of the upper respiratory infection.  He is not having fever.  Nasal secretions are light yellow.  His been using Claritin and Tylenol.  He has some Tylenol with Codeine that he use last night to control her coughing.  It was effective and he was able to rest better.      Review of Systems   Constitutional: Positive for fatigue. Negative for activity change, appetite change, chills, fever and unexpected weight change.   HENT: Positive for congestion, postnasal drip, sinus pressure, sore throat and tinnitus. Negative for drooling, ear discharge, ear pain, hearing loss, mouth sores, rhinorrhea, sinus pain, sneezing, trouble swallowing and voice change.    Eyes: Negative for photophobia, pain, discharge, redness, itching and visual disturbance.   Respiratory: Positive for cough and shortness of breath. Negative for apnea, choking and wheezing.    Cardiovascular: Negative for chest pain and palpitations.   Gastrointestinal: Negative for abdominal distention, abdominal pain, nausea and vomiting.   Musculoskeletal: Negative for arthralgias, myalgias, neck pain and neck stiffness.   Skin: Negative.  Negative for color change, pallor and rash.   Allergic/Immunologic: Negative for environmental allergies, food allergies and immunocompromised state.   Neurological: Positive for headaches. Negative for dizziness, seizures, facial asymmetry, speech difficulty, weakness, light-headedness and numbness.   Hematological: Negative for adenopathy. Does not bruise/bleed easily.   Psychiatric/Behavioral: Negative for  agitation, confusion, decreased concentration and sleep disturbance.       Objective:      Physical Exam   Constitutional: He is oriented to person, place, and time. He appears well-developed and well-nourished.   HENT:   Head: Normocephalic.   Right Ear: External ear and ear canal normal. Tympanic membrane is retracted. Tympanic membrane mobility is abnormal.   Left Ear: External ear and ear canal normal. Tympanic membrane is retracted. Tympanic membrane mobility is abnormal.   Nose: Mucosal edema (with inflamed turbinates bilaterall), rhinorrhea (yellow pus bilaterally) and septal deviation (the right) present. No nasal deformity.   Mouth/Throat: Uvula is midline and mucous membranes are normal. No oropharyngeal exudate (erythematurbid mucus from the nasopharynx) or posterior oropharyngeal erythema. No tonsillar exudate.   Eyes: EOM and lids are normal. Pupils are equal, round, and reactive to light. Right eye exhibits no discharge. Left eye exhibits no discharge. Right conjunctiva is injected. Left conjunctiva is injected.   Neck: Trachea normal, normal range of motion, full passive range of motion without pain and phonation normal. Neck supple. No neck rigidity. No thyroid mass and no thyromegaly present.   Cardiovascular: Normal rate, regular rhythm and normal heart sounds.    Pulmonary/Chest: No respiratory distress. He has decreased breath sounds ( Diffusely, coughs with deep inspiration ). He has no wheezes. He has no rhonchi. He has no rales.   Abdominal: Soft. Bowel sounds are normal. There is no tenderness.   Musculoskeletal: Normal range of motion.        Right shoulder: Normal.   Lymphadenopathy:        Head (right side): No occipital adenopathy present.        Head (left side): No occipital adenopathy present.     He has no cervical adenopathy.   Neurological: He is alert and oriented to person, place, and time. He has normal strength. No cranial nerve deficit or sensory deficit. Gait normal.   Skin:  Skin is warm and dry. No lesion, no petechiae and no rash noted. No cyanosis. Nails show no clubbing.   Psychiatric: He has a normal mood and affect. His speech is normal and behavior is normal. Cognition and memory are normal.       Assessment:       1. Acute sinusitis, recurrence not specified, unspecified location    2. Acute seasonal allergic rhinitis, unspecified trigger    3. Cough    4. Nonintractable episodic headache, unspecified headache type    5. Nasal septal deviation    6. Tinnitus of both ears        Plan:       I will recheck the patient in 1 week on an as-needed basis regarding the sinusitis.  I will recheck him in 1 month regarding the tinnitus.

## 2018-08-31 ENCOUNTER — OFFICE VISIT (OUTPATIENT)
Dept: SPORTS MEDICINE | Facility: CLINIC | Age: 53
End: 2018-08-31
Payer: COMMERCIAL

## 2018-08-31 DIAGNOSIS — M25.511 ACUTE PAIN OF RIGHT SHOULDER: Primary | ICD-10-CM

## 2018-08-31 DIAGNOSIS — F40.240 CLAUSTROPHOBIA: ICD-10-CM

## 2018-08-31 PROCEDURE — 99999 PR PBB SHADOW E&M-EST. PATIENT-LVL II: CPT | Mod: PBBFAC,,, | Performed by: ORTHOPAEDIC SURGERY

## 2018-08-31 PROCEDURE — 99214 OFFICE O/P EST MOD 30 MIN: CPT | Mod: S$GLB,,, | Performed by: ORTHOPAEDIC SURGERY

## 2018-08-31 RX ORDER — DIAZEPAM 5 MG/1
5 TABLET ORAL ONCE
Qty: 2 TABLET | Refills: 0 | Status: SHIPPED | OUTPATIENT
Start: 2018-08-31 | End: 2018-08-31

## 2018-08-31 NOTE — PROGRESS NOTES
CC: right Shoulder pain    52 y.o. Male reports that the pain is severe and not responding to any conservative care.      He reports that the pain is worse with overhead activity. It also bothers him at night.      PAST MEDICAL HISTORY:   Past Medical History:   Diagnosis Date    History of Clostridium difficile colitis     treated 2014    History of sarcoidosis     dx 1991, resolved    Hyperlipemia      PAST SURGICAL HISTORY:   Past Surgical History:   Procedure Laterality Date    APPENDECTOMY      HERNIA REPAIR      orthoscopy      shoulder and knee     FAMILY HISTORY:   Family History   Problem Relation Age of Onset    Diabetes Mellitus Maternal Grandmother     Diabetes Mellitus Maternal Uncle     Diabetes Mellitus Paternal Uncle     Prostate cancer Maternal Uncle     Endometrial cancer Paternal Grandmother      SOCIAL HISTORY:   Social History     Socioeconomic History    Marital status:      Spouse name: Not on file    Number of children: 3    Years of education: Not on file    Highest education level: Not on file   Social Needs    Financial resource strain: Not on file    Food insecurity - worry: Not on file    Food insecurity - inability: Not on file    Transportation needs - medical: Not on file    Transportation needs - non-medical: Not on file   Occupational History    Occupation: physician     Employer: [employer]   Tobacco Use    Smoking status: Never Smoker    Smokeless tobacco: Never Used   Substance and Sexual Activity    Alcohol use: No    Drug use: No    Sexual activity: Yes     Partners: Female   Other Topics Concern    Not on file   Social History Narrative    Intermittent exercise       MEDICATIONS:   Current Outpatient Medications:     celecoxib (CELEBREX) 200 MG capsule, Take 1 capsule (200 mg total) by mouth once daily., Disp: 90 capsule, Rfl: 2    fluticasone (FLONASE) 50 mcg/actuation nasal spray, 2 sprays (100 mcg total) by Each Nare  route once daily., Disp: 1 Bottle, Rfl: 12    sertraline (ZOLOFT) 25 MG tablet, Take 1 tablet (25 mg total) by mouth once daily., Disp: 90 tablet, Rfl: 3    zolpidem (AMBIEN) 5 MG Tab, Take 1 tablet (5 mg total) by mouth nightly as needed., Disp: 30 tablet, Rfl: 0  ALLERGIES:   Review of patient's allergies indicates:   Allergen Reactions    Percocet [oxycodone-acetaminophen]     Adhesive Rash       VITAL SIGNS: There were no vitals taken for this visit.     Review of Systems   Constitution: Negative. Negative for chills, fever and night sweats.   HENT: Negative for congestion and headaches.    Eyes: Negative for blurred vision, left vision loss and right vision loss.   Cardiovascular: Negative for chest pain and syncope.   Respiratory: Negative for cough and shortness of breath.    Endocrine: Negative for polydipsia, polyphagia and polyuria.   Hematologic/Lymphatic: Negative for bleeding problem. Does not bruise/bleed easily.   Skin: Negative for dry skin, itching and rash.   Musculoskeletal: Negative for falls and muscle weakness.   Gastrointestinal: Negative for abdominal pain and bowel incontinence.   Genitourinary: Negative for bladder incontinence and nocturia.   Neurological: Negative for disturbances in coordination, loss of balance and seizures.   Psychiatric/Behavioral: Negative for depression. The patient does not have insomnia.    Allergic/Immunologic: Negative for hives and persistent infections.       PHYSICAL EXAMINATION:  General:  The patient is alert and oriented x 3.  Mood is pleasant.  Observation of ears, eyes and nose reveal no gross abnormalities.  HEENT: NCAT, sclera nonicteric  Lungs: Respirations are equal and unlabored.  Gait is coordinated. Patient can toe walk and heel walk without difficulty.  Cardiovascular: There are no swelling or varicosities present.   Lymphatic: Negative for adenopathy       right Shoulder / Upper Extremity Exam    OBSERVATION:      Swelling  none  Deformity  none   Discoloration  none   Scapular winging none   Scars   none  Atrophy  none    TENDERNESS / CREPITUS (T/C):          T/C      T/C   Clavicle   -/-  SUPRAspinatus    -/-   AC Jt.    +/-  INFRAspinatus  -/-   SC Jt.    -/-  Deltoid    -/-   G. Tuberosity  -/-  LH BICEP groove  +/-   Acromion:  -/-  Midline Neck   -/-   Scapular Spine -/-  Trapezium   -/-   SMA Scapula  -/-  GH jt. line - post  -/-   Scapulothoracic  -/-         ROM: (* = with pain)  Right shoulder   Left shoulder        AROM (PROM)   AROM (PROM)   FE    170° (175°)     170° (175°)     ER at 0°    60°  (65°)    60°  (65°)   ER at 90° ABD  90°  (90°)    90°  (90°)   IR at 90°  ABD   NA  (40°)     NA  (40°)      IR (spine level)   T10     T10    STRENGTH: (* = with pain) RIGHT SHOULDER  LEFT SHOULDER   SCAPTION at 0   4/5    5/5    SCAPTION at 30   5/5    5/5    IR    5/5    5/5   ER    5/5    5/5   BICEPS   5/5    5/5   Deltoid    5/5    5/5     SIGNS:  Painful side       NEER   +    ODAISYS  +   WILSON   +    SPEEDS  Neg   DROP ARM   neg   BELLY PRESS Neg   Superior escape none    LIFT-OFF  Neg   X-Body ADD    neg    MOVING VALGUS Neg      STABILITY TESTING    RIGHT SHOULDER   LEFT SHOULDER       Translation    Anterior  up face     up face    Posterior  up face    up face    Sulcus   < 10mm    < 10 mm    Signs    Apprehension   neg      Neg    Relocation   no change     no change    Jerk test  neg     Neg      EXTREMITY NEURO-VASCULAR EXAM    Sensation grossly intact to light touch all dermatomal regions.    DTR 2+ Biceps, Triceps, BR and Negative Jamins sign   Grossly intact motor function at Elbow, Wrist and Hand   Distal pulses radial and ulnar 2+, brisk cap refill, symmetric.      NECK:  Painless FROM and spinous processes non-tender. Negative Spurlings sign.      OTHER FINDINGS:      1. Shoulder pain, right     Plan:       ASSESSMENT:  shoulder pain.    I do think that this is likely a rotator cuff  tear.    I have recommended we check an MRI of the shoulder to evaluate this.    Scapular dyskinesia  Scapular stabilization - [place] protocol, 1-3x/week x 6-8 weeks with HEP

## 2018-09-06 ENCOUNTER — HOSPITAL ENCOUNTER (OUTPATIENT)
Dept: RADIOLOGY | Facility: HOSPITAL | Age: 53
Discharge: HOME | End: 2018-09-06
Attending: ORTHOPAEDIC SURGERY
Payer: COMMERCIAL

## 2018-09-06 DIAGNOSIS — M25.511 ACUTE PAIN OF RIGHT SHOULDER: ICD-10-CM

## 2018-09-08 ENCOUNTER — CLINICAL SUPPORT (OUTPATIENT)
Dept: REHABILITATION | Facility: HOSPITAL | Age: 53
End: 2018-09-08
Attending: ORTHOPAEDIC SURGERY
Payer: COMMERCIAL

## 2018-09-08 DIAGNOSIS — M62.81 MUSCLE WEAKNESS OF RIGHT UPPER EXTREMITY: ICD-10-CM

## 2018-09-08 DIAGNOSIS — M25.611 DECREASED RANGE OF MOTION OF RIGHT SHOULDER: ICD-10-CM

## 2018-09-08 DIAGNOSIS — M25.511 ACUTE PAIN OF RIGHT SHOULDER: ICD-10-CM

## 2018-09-08 PROCEDURE — 97110 THERAPEUTIC EXERCISES: CPT | Performed by: PHYSICAL THERAPIST

## 2018-09-08 PROCEDURE — 97161 PT EVAL LOW COMPLEX 20 MIN: CPT | Performed by: PHYSICAL THERAPIST
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
## 2018-09-14 ENCOUNTER — TELEPHONE (OUTPATIENT)
Dept: PAIN MEDICINE | Facility: CLINIC | Age: 53
End: 2018-09-14

## 2018-11-27 ENCOUNTER — HOSPITAL ENCOUNTER (OUTPATIENT)
Dept: RADIOLOGY | Facility: OTHER | Age: 53
Discharge: HOME | End: 2018-11-27
Attending: PHYSICIAN ASSISTANT
Payer: COMMERCIAL

## 2018-11-27 DIAGNOSIS — M79.641 PAIN OF RIGHT HAND: ICD-10-CM

## 2018-11-27 DIAGNOSIS — M79.641 PAIN OF RIGHT HAND: Primary | ICD-10-CM

## 2018-11-27 PROCEDURE — 73130 X-RAY EXAM OF HAND: CPT | Mod: TC,FY,RT

## 2018-11-27 PROCEDURE — 73130 X-RAY EXAM OF HAND: CPT | Mod: 26,RT,, | Performed by: RADIOLOGY

## 2018-12-07 ENCOUNTER — HOSPITAL ENCOUNTER (OUTPATIENT)
Dept: RADIOLOGY | Facility: HOSPITAL | Age: 53
Discharge: HOME | End: 2018-12-07
Attending: PHYSICIAN ASSISTANT
Payer: COMMERCIAL

## 2018-12-07 ENCOUNTER — TELEPHONE (OUTPATIENT)
Dept: ORTHOPEDICS | Facility: CLINIC | Age: 53
End: 2018-12-07

## 2018-12-07 DIAGNOSIS — M79.641 RIGHT HAND PAIN: ICD-10-CM

## 2018-12-07 DIAGNOSIS — M79.641 RIGHT HAND PAIN: Primary | ICD-10-CM

## 2018-12-07 PROCEDURE — 73218 MRI UPPER EXTREMITY W/O DYE: CPT | Mod: 26,RT,, | Performed by: RADIOLOGY

## 2018-12-07 PROCEDURE — 73218 MRI UPPER EXTREMITY W/O DYE: CPT | Mod: TC,RT

## 2018-12-10 ENCOUNTER — TELEPHONE (OUTPATIENT)
Dept: ORTHOPEDICS | Facility: CLINIC | Age: 53
End: 2018-12-10

## 2019-02-08 ENCOUNTER — DOCUMENTATION ONLY (OUTPATIENT)
Dept: PAIN MEDICINE | Facility: CLINIC | Age: 54
End: 2019-02-08

## 2019-02-08 ENCOUNTER — HOSPITAL ENCOUNTER (OUTPATIENT)
Dept: RADIOLOGY | Facility: HOSPITAL | Age: 54
Discharge: HOME | End: 2019-02-08
Attending: NURSE PRACTITIONER
Payer: COMMERCIAL

## 2019-02-08 DIAGNOSIS — M25.561 ACUTE PAIN OF RIGHT KNEE: ICD-10-CM

## 2019-02-08 DIAGNOSIS — M25.461 SWELLING OF KNEE JOINT, RIGHT: Primary | ICD-10-CM

## 2019-02-08 DIAGNOSIS — M25.461 SWELLING OF KNEE JOINT, RIGHT: ICD-10-CM

## 2019-02-08 PROCEDURE — 73721 MRI KNEE WITHOUT CONTRAST RIGHT: ICD-10-PCS | Mod: 26,RT,, | Performed by: RADIOLOGY

## 2019-02-08 PROCEDURE — 73721 MRI JNT OF LWR EXTRE W/O DYE: CPT | Mod: TC,RT

## 2019-02-08 PROCEDURE — 73721 MRI JNT OF LWR EXTRE W/O DYE: CPT | Mod: 26,RT,, | Performed by: RADIOLOGY

## 2019-02-08 NOTE — PROGRESS NOTES
The patient reports right knee pain that has bee present for approximately 6 months.  He has performed guided PT exercises during this time with no benefit.  He has been taking Ibuprofen, Tylenol and Celebrex with limited benefit.  His pain is worsened with walking and activity.  It improves with rest.  Ice nor heat help.  He feels a clicking when he walks.  His physical exam reveals a positive Gerald's on the right.  There is mild swelling of the right knee.

## 2019-02-11 DIAGNOSIS — S83.203A OTHER TEAR OF MENISCUS OF RIGHT KNEE, UNSPECIFIED MENISCUS, UNSPECIFIED WHETHER OLD OR CURRENT TEAR, INITIAL ENCOUNTER: Primary | ICD-10-CM

## 2019-04-10 DIAGNOSIS — M79.18 MUSCULOSKELETAL PAIN: ICD-10-CM

## 2019-04-10 DIAGNOSIS — M15.9 PRIMARY OSTEOARTHRITIS INVOLVING MULTIPLE JOINTS: ICD-10-CM

## 2019-04-11 RX ORDER — CELECOXIB 200 MG/1
200 CAPSULE ORAL DAILY
Qty: 90 CAPSULE | Refills: 2 | Status: SHIPPED | OUTPATIENT
Start: 2019-04-11 | End: 2020-04-09

## 2019-04-12 DIAGNOSIS — F43.23 ADJUSTMENT DISORDER WITH MIXED ANXIETY AND DEPRESSED MOOD: ICD-10-CM

## 2019-04-15 RX ORDER — SERTRALINE HYDROCHLORIDE 25 MG/1
25 TABLET, FILM COATED ORAL DAILY
Qty: 90 TABLET | Refills: 3 | Status: SHIPPED | OUTPATIENT
Start: 2019-04-15 | End: 2019-11-06

## 2019-11-06 ENCOUNTER — HOSPITAL ENCOUNTER (OUTPATIENT)
Dept: CARDIOLOGY | Facility: OTHER | Age: 54
Discharge: HOME | End: 2019-11-06
Attending: INTERNAL MEDICINE
Payer: COMMERCIAL

## 2019-11-06 ENCOUNTER — OFFICE VISIT (OUTPATIENT)
Dept: INTERNAL MEDICINE | Facility: CLINIC | Age: 54
End: 2019-11-06
Attending: INTERNAL MEDICINE
Payer: COMMERCIAL

## 2019-11-06 VITALS
WEIGHT: 234.38 LBS | BODY MASS INDEX: 33.55 KG/M2 | DIASTOLIC BLOOD PRESSURE: 92 MMHG | HEART RATE: 80 BPM | OXYGEN SATURATION: 97 % | SYSTOLIC BLOOD PRESSURE: 128 MMHG | HEIGHT: 70 IN

## 2019-11-06 DIAGNOSIS — E78.5 HYPERLIPIDEMIA, UNSPECIFIED HYPERLIPIDEMIA TYPE: ICD-10-CM

## 2019-11-06 DIAGNOSIS — F43.23 ADJUSTMENT DISORDER WITH MIXED ANXIETY AND DEPRESSED MOOD: ICD-10-CM

## 2019-11-06 DIAGNOSIS — S16.1XXA STRAIN OF NECK MUSCLE, INITIAL ENCOUNTER: ICD-10-CM

## 2019-11-06 DIAGNOSIS — Z00.00 ANNUAL PHYSICAL EXAM: Primary | ICD-10-CM

## 2019-11-06 DIAGNOSIS — R03.0 ELEVATED BLOOD PRESSURE READING: ICD-10-CM

## 2019-11-06 DIAGNOSIS — Z00.00 ANNUAL PHYSICAL EXAM: ICD-10-CM

## 2019-11-06 DIAGNOSIS — Z91.89 AT RISK FOR CORONARY ARTERY DISEASE: ICD-10-CM

## 2019-11-06 PROCEDURE — 99999 PR PBB SHADOW E&M-EST. PATIENT-LVL IV: ICD-10-PCS | Mod: PBBFAC,,, | Performed by: INTERNAL MEDICINE

## 2019-11-06 PROCEDURE — 99396 PREV VISIT EST AGE 40-64: CPT | Mod: S$GLB,,, | Performed by: INTERNAL MEDICINE

## 2019-11-06 PROCEDURE — 93010 EKG 12-LEAD: ICD-10-PCS | Mod: ,,, | Performed by: INTERNAL MEDICINE

## 2019-11-06 PROCEDURE — 93005 ELECTROCARDIOGRAM TRACING: CPT

## 2019-11-06 PROCEDURE — 93010 ELECTROCARDIOGRAM REPORT: CPT | Mod: ,,, | Performed by: INTERNAL MEDICINE

## 2019-11-06 PROCEDURE — 99999 PR PBB SHADOW E&M-EST. PATIENT-LVL IV: CPT | Mod: PBBFAC,,, | Performed by: INTERNAL MEDICINE

## 2019-11-06 PROCEDURE — 99396 PR PREVENTIVE VISIT,EST,40-64: ICD-10-PCS | Mod: S$GLB,,, | Performed by: INTERNAL MEDICINE

## 2019-11-06 RX ORDER — PRAVASTATIN SODIUM 20 MG/1
20 TABLET ORAL DAILY
Qty: 90 TABLET | Refills: 3 | Status: SHIPPED | OUTPATIENT
Start: 2019-11-06 | End: 2019-11-18 | Stop reason: ALTCHOICE

## 2019-11-06 RX ORDER — CYCLOBENZAPRINE HCL 5 MG
5 TABLET ORAL 3 TIMES DAILY PRN
Qty: 90 TABLET | Refills: 1 | Status: SHIPPED | OUTPATIENT
Start: 2019-11-06 | End: 2020-11-12

## 2019-11-06 RX ORDER — SERTRALINE HYDROCHLORIDE 50 MG/1
50 TABLET, FILM COATED ORAL DAILY
Qty: 90 TABLET | Refills: 3 | Status: SHIPPED | OUTPATIENT
Start: 2019-11-06 | End: 2020-10-12 | Stop reason: SDUPTHER

## 2019-11-06 NOTE — PATIENT INSTRUCTIONS
Head Tilt / Upper Trapezius Stretch (Flexibility)    1. Sit up straight in a chair with your head and neck in a neutral position, ears in line with shoulders. Hold the edge of your chair seat with your right hand. Tuck your chin in slightly.  2. Tilt your head to the left, while looking straight ahead.  3. Put your left hand on the right side of your head. Gently pull your head to the left. Hold for 30 to 60 seconds. Use gentle pressure to increase the stretch. Dont force your head into position.  4. Return your head and neck to the neutral position.  5. Repeat this exercise 2 times, or as instructed.  6. Switch sides and repeat 2 times, or as instructed.  Challenge yourself  Tuck one end of a towel under your left arm. Then bring the other end over your right shoulder. Pull the towel down on your right shoulder with both hands as you side-bend your head to the left. Repeat with the other side.   Date Last Reviewed: 3/10/2016  © 8620-3016 [employer]. [address] [place] [id number]. All rights reserved. This information is not intended as a substitute for professional medical care. Always follow your healthcare professional's instructions.

## 2019-11-06 NOTE — PROGRESS NOTES
Subjective:       Patient ID: Corazon Jones is a 54 y.o. male.    Chief Complaint: Annual Exam     Corazon Jones is a 54 y.o.  male who presents for Annual Exam  .  Patient Active Problem List   Diagnosis    Abdominal pain, generalized    Constipation, acute    Lactose intolerance    Chronic familial neutropenia    OTTONIEL (obstructive sleep apnea)    Hyperlipemia    History of Clostridium difficile colitis    Tinnitus of both ears    Acute seasonal allergic rhinitis    Nasal septal deviation    Acute pain of right shoulder    Decreased range of motion of right shoulder    Muscle weakness of right upper extremity    Acute pain of right knee    Elevated blood pressure reading    Neck strain      Corazon Jones is a 54 y.o.  male who presents for Annual Exam  .  Pt reports hitting his head walking under a low hanging beam across his forehead about three months ago.  Suffered with some piosterior neck strain for about a week which he treated with celebrex. Resolved spontaneously within two weeks.     About three weeks ago started with posterior lower occipital headache extending to upper neck.  Spreading down to between shoulder blades and between scapula.  Worse at night, very bothersome in the morning.  Worse with turning head, worse on left.  No photophobia, unchanged tinnitus.  No fevers or chills.  Concerned for meningitis.   Pain does improve with two tylenol or a celebrex for about 12 hours but returns.  He did this for two weeks.  Stopped about 4-5 days ago.  + muscle strain in neck and traps.    Has worked out during these times and pain improved with movement. Did not worsen the pain. Wears a lead apron at work, which is about 6 weeks old. Worse when apron is pulling him forward. Pain is 3/10.     + personal stressors     Reports he usually has frontal sinus headaches during this time of year, dull mild.      Started intermittent fasting about three months ago.  Has been drinking more  caffeine lately. Stopped caffeine three days ago. No nsaids in three days either.     Taking his sertraline on a prn basis. States it makes him sleep well when he needs it. Wants to continue doing this despite knowledge of proper use of this medicaiton.    The 10-year ASCVD risk score (Jordy GOETZ Jr., et al., 2013) is: 8.9%    Values used to calculate the score:      Age: 54 years      Sex: Male      Is Non- : No      Diabetic: No      Tobacco smoker: No      Systolic Blood Pressure: 134 mmHg      Is BP treated: No      HDL Cholesterol: 47 mg/dL      Total Cholesterol: 269 mg/dL      Health Maintenance       Date Due Completion Date    TETANUS VACCINE 09/13/1982 ---    Pneumococcal Vaccine (Highest Risk) (1 of 3 - PCV13) 09/13/1983 ---    Shingles Vaccine (1 of 2) 09/13/2014 ---    Influenza Vaccine (1) 09/01/2019 ---    Lipid Panel 09/26/2022 9/26/2017    Colonoscopy 05/14/2025 5/14/2015          Review of Systems   Constitutional: Negative for chills, fever and unexpected weight change.   HENT: Positive for postnasal drip. Negative for rhinorrhea, sinus pressure, sinus pain and sore throat.    Eyes: Negative for photophobia and visual disturbance.   Respiratory: Negative for cough and shortness of breath.    Cardiovascular: Negative for chest pain and palpitations.   Gastrointestinal: Negative for blood in stool, constipation, nausea and vomiting.   Genitourinary: Negative for dysuria and hematuria.   Musculoskeletal: Positive for neck pain. Negative for arthralgias, back pain and neck stiffness.   Skin: Negative for color change and rash.   Neurological: Negative for weakness and numbness.   Psychiatric/Behavioral: Negative for agitation and dysphoric mood.         Past Medical History:   Diagnosis Date    History of Clostridium difficile colitis     treated 2014    History of sarcoidosis     dx 1991, resolved    Hyperlipemia        Past Surgical History:   Procedure Laterality Date     "APPENDECTOMY      HERNIA REPAIR      orthoscopy      shoulder and knee       Family History   Problem Relation Age of Onset    Diabetes Mellitus Maternal Grandmother     Diabetes Mellitus Maternal Uncle     Diabetes Mellitus Paternal Uncle     Prostate cancer Maternal Uncle     Endometrial cancer Paternal Grandmother        Social History     Tobacco Use    Smoking status: Never Smoker    Smokeless tobacco: Never Used   Substance Use Topics    Alcohol use: No    Drug use: No             Objective:   Blood pressure (!) 128/92, pulse 80, height 5' 10" (1.778 m), weight 106.3 kg (234 lb 5.6 oz), SpO2 97 %.     Physical Exam   Constitutional: He is oriented to person, place, and time. He appears well-developed and well-nourished. No distress.   HENT:   Head: Normocephalic and atraumatic.   Right Ear: External ear normal.   Left Ear: External ear normal.   Eyes: Conjunctivae are normal. No scleral icterus.   Neck: No JVD present. No thyromegaly present.   Cardiovascular: Normal heart sounds. Exam reveals no gallop and no friction rub.   No murmur heard.  Pulmonary/Chest: Effort normal and breath sounds normal. He has no wheezes. He has no rales.   Abdominal: Soft. Bowel sounds are normal. He exhibits no distension. There is no tenderness.   Musculoskeletal: He exhibits no edema or tenderness.   Lymphadenopathy:     He has no cervical adenopathy.   Neurological: He is alert and oriented to person, place, and time. No cranial nerve deficit. He exhibits normal muscle tone.   No neck stiffness or pain with flexion   Skin: Skin is warm and dry.   Psychiatric: He has a normal mood and affect. Thought content normal.       Prior labs reviewed  Assessment/Plan:        Corazon was seen today for annual exam.    Diagnoses and all orders for this visit:    Annual physical exam  -     Comprehensive metabolic panel; Future  -     Lipid panel; Future  -     PSA, Screening; Future  -     Hemoglobin A1c; Future  -     CBC auto " differential; Future  -     Sedimentation rate; Future  -     C-reactive protein; Future  -     Hepatitis panel, acute; Future  -     EKG 12-lead; Future  Recommend daily sunscreen, cardiovascular exercise min 30 min 5 days per week. Seatbelts routinely.    Hyperlipidemia, unspecified hyperlipidemia type  -     Lipid panel; Future  -     Comprehensive metabolic panel; Future  Increased 10 year risk of CVD  Check labs now and start low dose pravastatin, hx of increased lft's on lipitor  Heart healthy diet, exercise    At risk for coronary artery disease  -     CT Cardiac Scoring; Future    Adjustment disorder with mixed anxiety and depressed mood  -     Recommend regular dosing of sertraline (ZOLOFT) 50 MG tablet; Take 1 tablet (50 mg total) by mouth once daily.  Recommend counseling regarding personal stressors    Elevated blood pressure reading  Low sodium diet  Recheck in one week    Strain of neck muscle, initial encounter  -     Ambulatory consult to [facility]  Recommend gentle stretching, massage, proper posture and stress reduction  -     cyclobenzaprine (FLEXERIL) 5 MG tablet; Take 1 tablet (5 mg total) by mouth 3 (three) times daily as needed for Muscle spasms.    -     pravastatin (PRAVACHOL) 20 MG tablet; Take 1 tablet (20 mg total) by mouth once daily.        Medication List with Changes/Refills   New Medications    CYCLOBENZAPRINE (FLEXERIL) 5 MG TABLET    Take 1 tablet (5 mg total) by mouth 3 (three) times daily as needed for Muscle spasms.    PRAVASTATIN (PRAVACHOL) 20 MG TABLET    Take 1 tablet (20 mg total) by mouth once daily.   Current Medications    CELECOXIB (CELEBREX) 200 MG CAPSULE    Take 1 capsule (200 mg total) by mouth once daily.    DIAZEPAM (VALIUM) 5 MG TABLET     take 1 tablet 1 hour prior to study, may repeat if necessary 1 hour prior for 1 dose.    FLUTICASONE (FLONASE) 50 MCG/ACTUATION NASAL SPRAY    2 sprays (100 mcg total) by Each Nare route once daily.    ZOLPIDEM  (AMBIEN) 5 MG TAB    Take 1 tablet (5 mg total) by mouth nightly as needed.   Changed and/or Refilled Medications    Modified Medication Previous Medication    SERTRALINE (ZOLOFT) 50 MG TABLET sertraline (ZOLOFT) 25 MG tablet       Take 1 tablet (50 mg total) by mouth once daily.    Take 1 tablet (25 mg total) by mouth once daily.

## 2019-11-15 ENCOUNTER — HOSPITAL ENCOUNTER (OUTPATIENT)
Dept: RADIOLOGY | Facility: HOSPITAL | Age: 54
Discharge: HOME | End: 2019-11-15
Attending: INTERNAL MEDICINE
Payer: COMMERCIAL

## 2019-11-15 DIAGNOSIS — Z91.89 AT RISK FOR CORONARY ARTERY DISEASE: ICD-10-CM

## 2019-11-15 PROCEDURE — 75571 CT HRT W/O DYE W/CA TEST: CPT | Mod: 26,,, | Performed by: RADIOLOGY

## 2019-11-15 PROCEDURE — 75571 CT HRT W/O DYE W/CA TEST: CPT | Mod: TC

## 2019-11-15 PROCEDURE — 75571 CT CALCIUM SCORING CARDIAC: ICD-10-PCS | Mod: 26,,, | Performed by: RADIOLOGY

## 2019-11-18 ENCOUNTER — HOSPITAL ENCOUNTER (OUTPATIENT)
Dept: RADIOLOGY | Facility: OTHER | Age: 54
Discharge: HOME | End: 2019-11-18
Attending: NURSE PRACTITIONER
Payer: COMMERCIAL

## 2019-11-18 ENCOUNTER — PATIENT MESSAGE (OUTPATIENT)
Dept: INTERNAL MEDICINE | Facility: CLINIC | Age: 54
End: 2019-11-18

## 2019-11-18 DIAGNOSIS — R93.89 ABNORMAL CHEST CT: Primary | ICD-10-CM

## 2019-11-18 DIAGNOSIS — M54.2 NECK PAIN: ICD-10-CM

## 2019-11-18 DIAGNOSIS — M54.2 NECK PAIN: Primary | ICD-10-CM

## 2019-11-18 DIAGNOSIS — R91.1 LUNG NODULE: ICD-10-CM

## 2019-11-18 PROCEDURE — 72070 X-RAY EXAM THORAC SPINE 2VWS: CPT | Mod: 26,,, | Performed by: RADIOLOGY

## 2019-11-18 PROCEDURE — 72052 X-RAY EXAM NECK SPINE 6/>VWS: CPT | Mod: TC,FY

## 2019-11-18 PROCEDURE — 72070 X-RAY EXAM THORAC SPINE 2VWS: CPT | Mod: TC,FY

## 2019-11-18 PROCEDURE — 72052 X-RAY EXAM NECK SPINE 6/>VWS: CPT | Mod: 26,,, | Performed by: RADIOLOGY

## 2019-11-18 PROCEDURE — 72070 XR THORACIC SPINE AP LATERAL: ICD-10-PCS | Mod: 26,,, | Performed by: RADIOLOGY

## 2019-11-18 PROCEDURE — 72052 XR CERVICAL SPINE 5 VIEW WITH FLEX AND EXT: ICD-10-PCS | Mod: 26,,, | Performed by: RADIOLOGY

## 2019-11-18 RX ORDER — ROSUVASTATIN CALCIUM 40 MG/1
40 TABLET, COATED ORAL NIGHTLY
Qty: 90 TABLET | Refills: 3 | Status: SHIPPED | OUTPATIENT
Start: 2019-11-18 | End: 2020-02-17 | Stop reason: SDUPTHER

## 2019-11-20 ENCOUNTER — CLINICAL SUPPORT (OUTPATIENT)
Dept: REHABILITATION | Facility: OTHER | Age: 54
End: 2019-11-20
Attending: INTERNAL MEDICINE
Payer: COMMERCIAL

## 2019-11-20 DIAGNOSIS — M54.2 NECK PAIN: ICD-10-CM

## 2019-11-20 PROCEDURE — 97110 THERAPEUTIC EXERCISES: CPT

## 2019-11-20 PROCEDURE — 97161 PT EVAL LOW COMPLEX 20 MIN: CPT

## 2019-11-22 PROBLEM — M54.2 NECK PAIN: Status: ACTIVE | Noted: 2019-11-22
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
## 2019-11-25 ENCOUNTER — CLINICAL SUPPORT (OUTPATIENT)
Dept: REHABILITATION | Facility: OTHER | Age: 54
End: 2019-11-25
Attending: INTERNAL MEDICINE
Payer: COMMERCIAL

## 2019-11-25 DIAGNOSIS — M50.30 DDD (DEGENERATIVE DISC DISEASE), CERVICAL: Primary | ICD-10-CM

## 2019-11-25 PROCEDURE — 97110 THERAPEUTIC EXERCISES: CPT

## 2019-11-25 NOTE — PROGRESS NOTES
[facility]      Name: Corzaon Velez  Clinic Number: [id number]    Therapy Diagnosis: No diagnosis found.  Physician: Tasha Santos MD    Visit Date: 2019    Physician: Tasha Santos MD     Physician Orders: PT Eval and Treat   Medical Diagnosis from Referral:   S16.1XXA (ICD-10-CM) - Strain of neck muscle, initial encounter   Evaluation Date: 2019  Authorization Period Expiration: 2019   Plan of Care Expiration: 2019   Reassessment Due: 2019  Visit # / Visits authorized:      Time In: 4:05  Time Out: 5:15  Total Billable Time: 50 minutes     Precautions: Standard; L RTC repair       Pattern of pain determined: 1 REP         Subjective   Corazon reports minimal neck pain. He reports the shield around his neck has he wears during procedures gives him neck pain.     Patient reports tolerating previous visit well with minimal soreness.   Patient reports their pain to be 1/10 on a 0-10 scale with 0 being no pain and 10 being the worst pain imaginable.  Pain Location: B neck     Occupation: Pain management MD   Leisure: Going out on the water, jet ski       Pts goals: Not to wake up in pain        Objective     Baseline Isometric Testing on Med X equipment:  Testing administered by PT  Date of testin2019  ROM 12-87 deg   Max Peak Torque 374    Min Peak Torque 245    Flex/Ext Ratio 1.5:1   % below normative data +6%          Treatment    Pt was instructed in and performed the following:     Corazon received therapeutic exercises to develop/improved posture, cardiovascular endurance, muscular endurance, lumbar/cervical ROM, strength and muscular endurance for 70 minutes including the following exercises:       [facility] 2019   Visit Number 2   VAS Pain Rating 1   Treadmill Time (in min.) 10   Cervical Weight 199   Repetitions 18   Rating of Perceived Exertion 4   Ice - Z Lie (in min.) 10       Scapular Retractions  10x  Upper Trap stretch 5x hold 5-10 secs  Levator stretch 5x 5-10 sec  Cervical Retractions 10x    Peripheral muscle strengthening which included 1 set of 15-20 repetitions at a slow, controlled 10-13 second per rep pace focused on strengthening supporting musculature for improved body mechanics and functional mobility.  Pt and therapist focused on proper form during treatment to ensure optimal strengthening of each targeted muscle group.  Machines were utilized including torso rotation, leg extension, leg curl, chest press, upright row. Tricep extension, bicep curl, leg press, and hip abduction added visit 3    Corazon received the following manual therapy techniques: STM and stretch was applied to cervical area in neutral, side bend and rotation for 10 minutes.         Home Exercises Provided and Patient Education Provided     Education provided:   - Educated pt on the importance of daily stretch to increase the benefit of program and positive results.   Educated him on importance of use of lumbar roll when sitting.  Written Home Exercises Provided: Patient instructed to cont prior HEP.  Exercises were reviewed and Corazon was able to demonstrate them prior to the end of the session.  Corazon demonstrated good  understanding of the education provided.     See EMR under Patient Instructions for exercises provided prior visit.          Assessment   Pt with minimal neck paint that decrease with session.  Reviewed HEP with mod vc for tech. Introduced pt to Scapular Retractions he can do at work. Educated him on importance of use of lumbar roll when sitting. Pt understood. Pt demo well. Pt tolerated the cervical medx machine with starting weight less than 75% due to  pts max peak torque high with no c/o pain. Pt felt starting weight was appropriate at 199in lbs and RPE a 4. Pt tolerated the medx machines well to the upright row with no c/o increased cervical pain or any limb pain.        Patient is making good progress  towards established goals.  Pt will continue to benefit from skilled outpatient physical therapy to address the deficits stated in the impairment chart, provide pt/family education and to maximize pt's level of independence in the home and community environment.     Anticipated Barriers for therapy: none  Pt's spiritual, cultural and educational needs considered and pt agreeable to plan of care and goals as stated below:             Goals:     GOALS: Pt is in agreement with the following goals.     Short term goals: 6 weeks or 10 visits   1.  Pt will demonstratte increased cervical ROM as measured by med ex by 10 degrees from initial test which results in improved  ROM of neck for ease with ADLs and driving  2. Pt will demonstrate independence with reducing or controlling symptoms with ther ex, movement, or position independently, able to reduce pain 1-2 points on pain scale using strategies taught in therapy  3. Pt will demonstrate increased maximum isometric torque value by 10% when compared to the initial value resulting in improved ability to perform bending, lifting, and carrying activities safely, confidently.           Long term goals: 10 weeks or 20 visits  1. Pt will demonstratte increased cervical ROM as measured by med ex by 18 degrees from initial test which results in functional ROM of neck for ease with ADLs and driving  2. Pt will demonstrate increased isometric torque by 15% from initial test to improve ability to lift and carry, and sustain good posture while performing ADL's  3.Pt will demonstrate reduced pain and improved functional outcomes as reported on the FOTO by reaching a score of CH = 0 % impaired, limited or restricted or less in order to demonstrate subjective improvement in pt's condition.    4. Pt will demonstrate independence with reducing or controlling symptoms with ther ex, movement, or position independently, able to reduce pain 2-4 points on pain scale using strategies taught in  therapy  5. Pt will demonstrate independence with the HEP at discharge.   6.  Pt will present with subjective reports of increased neck strength.     Plan   Continue with established Plan of Care towards established PT goals.

## 2019-11-29 ENCOUNTER — CLINICAL SUPPORT (OUTPATIENT)
Dept: REHABILITATION | Facility: OTHER | Age: 54
End: 2019-11-29
Attending: INTERNAL MEDICINE
Payer: COMMERCIAL

## 2019-11-29 DIAGNOSIS — M54.2 NECK PAIN: ICD-10-CM

## 2019-11-29 PROCEDURE — 97110 THERAPEUTIC EXERCISES: CPT

## 2019-11-29 NOTE — PROGRESS NOTES
[facility]      Name: Corazon Maldonado [facility]  Clinic Number: [id number]    Therapy Diagnosis:   Encounter Diagnosis   Name Primary?    Neck pain      Physician: Tasha Santos MD    Visit Date: 2019    Physician: Tasha Santos MD     Physician Orders: PT Eval and Treat   Medical Diagnosis from Referral:   S16.1XXA (ICD-10-CM) - Strain of neck muscle, initial encounter   Evaluation Date: 2019  Authorization Period Expiration: 2019   Plan of Care Expiration: 2019   Reassessment Due: 2019  Visit # / Visits authorized: 3/ 20     Time In: 740  Time Out: 840  Total Billable Time: 50 minutes     Precautions: Standard; L RTC repair       Pattern of pain determined: 1 REP         Subjective   Corazon reports he is not feeling any neck pain today.   Patient reports tolerating previous visit well with minimal soreness.   Patient reports their pain to be 1/10 on a 0-10 scale with 0 being no pain and 10 being the worst pain imaginable.  Pain Location: B neck     Occupation: Pain management MD   Leisure: Going out on the water, jet ski       Pts goals: Not to wake up in pain        Objective     Baseline Isometric Testing on Med X equipment:  Testing administered by PT  Date of testin2019  ROM 12-87 deg   Max Peak Torque 374    Min Peak Torque 245    Flex/Ext Ratio 1.5:1   % below normative data +6%          Treatment    Pt was instructed in and performed the following:     Corazon received therapeutic exercises to develop/improved posture, cardiovascular endurance, muscular endurance, lumbar/cervical ROM, strength and muscular endurance for 70 minutes including the following exercises:   [facility] 2019   Visit Number 3   VAS Pain Rating 1   Treadmill Time (in min.) 10   Cervical Stretches - Retraction In Lying -   Retraction in Sitting -   Retraction with Extension -   Flexion -   Sidebending -   Rotation -   Scapular Retraction -    Cervical Extension Seat Pad -   Seat Adjustment -   Top Dead Center -   Counterweight -   Cervical Flexion -   Cervical Extension -   Cervical Peak Torque -   Cervical Weight 198   Repetitions 20   Rating of Perceived Exertion 4   Ice - Z Lie (in min.) 10       Exercises Completed today:  Scapular Retractions 10x  Upper Trap stretch 5x hold 5-10 secs  Levator stretch 5x 5-10 sec  Cervical Retractions 10x    Peripheral muscle strengthening which included 1 set of 15-20 repetitions at a slow, controlled 10-13 second per rep pace focused on strengthening supporting musculature for improved body mechanics and functional mobility.  Pt and therapist focused on proper form during treatment to ensure optimal strengthening of each targeted muscle group.  Machines were utilized including torso rotation, leg extension, leg curl, chest press, upright row. Tricep extension, bicep curl, leg press, and hip abduction added visit 3    Corazon received the following manual therapy techniques: STM and stretch was applied to cervical area in neutral, side bend and rotation for 10 minutes.         Home Exercises Provided and Patient Education Provided     Education provided:   - Educated pt on the importance of daily stretch to increase the benefit of program and positive results.   Educated him on importance of use of lumbar roll when sitting.  Written Home Exercises Provided: Patient instructed to cont prior HEP.  Exercises were reviewed and Corazon was able to demonstrate them prior to the end of the session.  Corazon demonstrated good  understanding of the education provided.     See EMR under Patient Instructions for exercises provided prior visit.          Assessment   Pt able to complete all components of session with no adverse effects. Increase resistance next session secondary to pt ability to complete 20 repetitions on medx with appropriate RPE. Improvements with retractions observed today.   Patient is making good progress  towards established goals.  Pt will continue to benefit from skilled outpatient physical therapy to address the deficits stated in the impairment chart, provide pt/family education and to maximize pt's level of independence in the home and community environment.     Anticipated Barriers for therapy: none  Pt's spiritual, cultural and educational needs considered and pt agreeable to plan of care and goals as stated below:             Goals:     GOALS: Pt is in agreement with the following goals.     Short term goals: 6 weeks or 10 visits   1.  Pt will demonstratte increased cervical ROM as measured by med ex by 10 degrees from initial test which results in improved  ROM of neck for ease with ADLs and driving  2. Pt will demonstrate independence with reducing or controlling symptoms with ther ex, movement, or position independently, able to reduce pain 1-2 points on pain scale using strategies taught in therapy  3. Pt will demonstrate increased maximum isometric torque value by 10% when compared to the initial value resulting in improved ability to perform bending, lifting, and carrying activities safely, confidently.           Long term goals: 10 weeks or 20 visits  1. Pt will demonstratte increased cervical ROM as measured by med ex by 18 degrees from initial test which results in functional ROM of neck for ease with ADLs and driving  2. Pt will demonstrate increased isometric torque by 15% from initial test to improve ability to lift and carry, and sustain good posture while performing ADL's  3.Pt will demonstrate reduced pain and improved functional outcomes as reported on the FOTO by reaching a score of CH = 0 % impaired, limited or restricted or less in order to demonstrate subjective improvement in pt's condition.    4. Pt will demonstrate independence with reducing or controlling symptoms with ther ex, movement, or position independently, able to reduce pain 2-4 points on pain scale using strategies taught in  therapy  5. Pt will demonstrate independence with the HEP at discharge.   6.  Pt will present with subjective reports of increased neck strength.     Plan   Continue with established Plan of Care towards established PT goals.

## 2019-12-04 ENCOUNTER — CLINICAL SUPPORT (OUTPATIENT)
Dept: REHABILITATION | Facility: OTHER | Age: 54
End: 2019-12-04
Attending: INTERNAL MEDICINE
Payer: COMMERCIAL

## 2019-12-04 DIAGNOSIS — M54.2 NECK PAIN: ICD-10-CM

## 2019-12-04 PROCEDURE — 97110 THERAPEUTIC EXERCISES: CPT

## 2019-12-04 NOTE — PROGRESS NOTES
[facility]      Name: Corazon Velez  Clinic Number: [id number]    Therapy Diagnosis:   Encounter Diagnosis   Name Primary?    Neck pain      Physician: Tasha Santos MD    Visit Date: 2019    Physician: Tasha Santos MD     Physician Orders: PT Eval and Treat   Medical Diagnosis from Referral:   S16.1XXA (ICD-10-CM) - Strain of neck muscle, initial encounter   Evaluation Date: 2019  Authorization Period Expiration: 2019   Plan of Care Expiration: 2019   Reassessment Due: 2019  Visit # / Visits authorized:      Time In: 420  Time Out: 520  Total Billable Time: 50 minutes     Precautions: Standard; L RTC repair       Pattern of pain determined: 1 REP         Subjective   Corazon reports he is feeling minimal discomfort today   Patient reports tolerating previous visit well with minimal soreness.   Patient reports their pain to be n/a/10 on a 0-10 scale with 0 being no pain and 10 being the worst pain imaginable.  Pain Location: B neck     Occupation: Pain management MD   Leisure: Going out on the water, jet ski       Pts goals: Not to wake up in pain        Objective     Baseline Isometric Testing on Med X equipment:  Testing administered by PT  Date of testin2019  ROM 12-87 deg   Max Peak Torque 374    Min Peak Torque 245    Flex/Ext Ratio 1.5:1   % below normative data +6%          Treatment    Pt was instructed in and performed the following:     Corazon received therapeutic exercises to develop/improved posture, cardiovascular endurance, muscular endurance, lumbar/cervical ROM, strength and muscular endurance for 70 minutes including the following exercises:   [facility] 2019   Visit Number 4   VAS Pain Rating 0   Treadmill Time (in min.) 10   Cervical Stretches - Retraction In Lying -   Retraction in Sitting -   Retraction with Extension -   Flexion -   Sidebending -   Rotation -   Scapular Retraction -    Cervical Extension Seat Pad -   Seat Adjustment -   Top Dead Center -   Counterweight -   Cervical Flexion -   Cervical Extension -   Cervical Peak Torque -   Cervical Weight 210   Repetitions 20   Rating of Perceived Exertion 3   Ice - Z Lie (in min.) 10     Exercises Completed today:  Scapular Retractions 10x  Upper Trap stretch 5x hold 5-10 secs  Levator stretch 5x 5-10 sec  Cervical Retractions 10x    Peripheral muscle strengthening which included 1 set of 15-20 repetitions at a slow, controlled 10-13 second per rep pace focused on strengthening supporting musculature for improved body mechanics and functional mobility.  Pt and therapist focused on proper form during treatment to ensure optimal strengthening of each targeted muscle group.  Machines were utilized including torso rotation, leg extension, leg curl, chest press, upright row. Tricep extension, bicep curl, leg press, and hip abduction added visit 3    Corazon received the following manual therapy techniques: STM and stretch was applied to cervical area in neutral, side bend and rotation for 10 minutes.         Home Exercises Provided and Patient Education Provided     Education provided:   - Educated pt on the importance of daily stretch to increase the benefit of program and positive results.   Educated him on importance of use of lumbar roll when sitting.  Written Home Exercises Provided: Patient instructed to cont prior HEP.  Exercises were reviewed and Corazon was able to demonstrate them prior to the end of the session.  Corazon demonstrated good  understanding of the education provided.     See EMR under Patient Instructions for exercises provided prior visit.    Assessment   Pt able to complete all components of session with no adverse effects. Increased resistance, pt remains able to complete 20 repetitions with appropriate RPE. Will continue to progress as tolerated.   Patient is making good progress towards established goals.  Pt will continue  to benefit from skilled outpatient physical therapy to address the deficits stated in the impairment chart, provide pt/family education and to maximize pt's level of independence in the home and community environment.     Anticipated Barriers for therapy: none  Pt's spiritual, cultural and educational needs considered and pt agreeable to plan of care and goals as stated below:             Goals:     GOALS: Pt is in agreement with the following goals.     Short term goals: 6 weeks or 10 visits   1.  Pt will demonstratte increased cervical ROM as measured by med ex by 10 degrees from initial test which results in improved  ROM of neck for ease with ADLs and driving  2. Pt will demonstrate independence with reducing or controlling symptoms with ther ex, movement, or position independently, able to reduce pain 1-2 points on pain scale using strategies taught in therapy  3. Pt will demonstrate increased maximum isometric torque value by 10% when compared to the initial value resulting in improved ability to perform bending, lifting, and carrying activities safely, confidently.           Long term goals: 10 weeks or 20 visits  1. Pt will demonstratte increased cervical ROM as measured by med ex by 18 degrees from initial test which results in functional ROM of neck for ease with ADLs and driving  2. Pt will demonstrate increased isometric torque by 15% from initial test to improve ability to lift and carry, and sustain good posture while performing ADL's  3.Pt will demonstrate reduced pain and improved functional outcomes as reported on the FOTO by reaching a score of CH = 0 % impaired, limited or restricted or less in order to demonstrate subjective improvement in pt's condition.    4. Pt will demonstrate independence with reducing or controlling symptoms with ther ex, movement, or position independently, able to reduce pain 2-4 points on pain scale using strategies taught in therapy  5. Pt will demonstrate independence  with the HEP at discharge.   6.  Pt will present with subjective reports of increased neck strength.     Plan   Continue with established Plan of Care towards established PT goals.

## 2019-12-06 ENCOUNTER — CLINICAL SUPPORT (OUTPATIENT)
Dept: REHABILITATION | Facility: OTHER | Age: 54
End: 2019-12-06
Attending: INTERNAL MEDICINE
Payer: COMMERCIAL

## 2019-12-06 DIAGNOSIS — M54.2 NECK PAIN: ICD-10-CM

## 2019-12-06 PROCEDURE — 97110 THERAPEUTIC EXERCISES: CPT

## 2019-12-06 NOTE — PROGRESS NOTES
[facility]      Name: Corazon Maldonado [facility]  Clinic Number: [id number]    Therapy Diagnosis:   Encounter Diagnosis   Name Primary?    Neck pain      Physician: Tasha Santos MD    Visit Date: 2019    Physician: Tasha Santos MD     Physician Orders: PT Eval and Treat   Medical Diagnosis from Referral:   S16.1XXA (ICD-10-CM) - Strain of neck muscle, initial encounter   Evaluation Date: 2019  Authorization Period Expiration: 2019   Plan of Care Expiration: 2019   Reassessment Due: 2019  Visit # / Visits authorized:      Time In: 740  Time Out: 830  Total Billable Time: 50 minutes     Precautions: Standard; L RTC repair       Pattern of pain determined: 1 REP         Subjective   Corazon reports he is not feeling any pain currently   Patient reports tolerating previous visit well with minimal soreness.   Patient reports their pain to be n/a/10 on a 0-10 scale with 0 being no pain and 10 being the worst pain imaginable.  Pain Location: B neck     Occupation: Pain management MD   Leisure: Going out on the water, jet ski       Pts goals: Not to wake up in pain        Objective     Baseline Isometric Testing on Med X equipment:  Testing administered by PT  Date of testin2019  ROM 12-87 deg   Max Peak Torque 374    Min Peak Torque 245    Flex/Ext Ratio 1.5:1   % below normative data +6%          Treatment    Pt was instructed in and performed the following:     Corazon received therapeutic exercises to develop/improved posture, cardiovascular endurance, muscular endurance, lumbar/cervical ROM, strength and muscular endurance for 70 minutes including the following exercises:     [facility] 2019   Visit Number 5   VAS Pain Rating 0   Treadmill Time (in min.) 10   Cervical Stretches - Retraction In Lying -   Retraction in Sitting -   Retraction with Extension -   Flexion -   Sidebending -   Rotation -   Scapular Retraction -    Cervical Extension Seat Pad -   Seat Adjustment -   Top Dead Center -   Counterweight -   Cervical Flexion -   Cervical Extension -   Cervical Peak Torque -   Cervical Weight 222   Repetitions 20   Rating of Perceived Exertion 3   Ice - Z Lie (in min.) -     Exercises Completed today:  Scapular Retractions 10x  Upper Trap stretch 5x hold 5-10 secs  Levator stretch 5x 5-10 sec  Cervical Retractions 10x  OB 10x    Peripheral muscle strengthening which included 1 set of 15-20 repetitions at a slow, controlled 10-13 second per rep pace focused on strengthening supporting musculature for improved body mechanics and functional mobility.  Pt and therapist focused on proper form during treatment to ensure optimal strengthening of each targeted muscle group.  Machines were utilized including torso rotation, leg extension, leg curl, chest press, upright row. Tricep extension, bicep curl, leg press, and hip abduction added visit 3    Corazon received the following manual therapy techniques: STM and stretch was applied to cervical area in neutral, side bend and rotation for 0 minutes.         Home Exercises Provided and Patient Education Provided     Education provided:   - Educated pt on the importance of daily stretch to increase the benefit of program and positive results.   Educated him on importance of use of lumbar roll when sitting.  Written Home Exercises Provided: Patient instructed to cont prior HEP.  Exercises were reviewed and Corazon was able to demonstrate them prior to the end of the session.  Corazon demonstrated good  understanding of the education provided.     See EMR under Patient Instructions for exercises provided prior visit.    Assessment   Pt able to complete all components of session with no adverse effects. Increased resistance secondary to performance at previous session. Able to complete 20 repetitions with appropriate RPE. Patient is making good progress towards established goals.  Pt will continue to  benefit from skilled outpatient physical therapy to address the deficits stated in the impairment chart, provide pt/family education and to maximize pt's level of independence in the home and community environment.     Anticipated Barriers for therapy: none  Pt's spiritual, cultural and educational needs considered and pt agreeable to plan of care and goals as stated below:             Goals:     GOALS: Pt is in agreement with the following goals.     Short term goals: 6 weeks or 10 visits   1.  Pt will demonstratte increased cervical ROM as measured by med ex by 10 degrees from initial test which results in improved  ROM of neck for ease with ADLs and driving  2. Pt will demonstrate independence with reducing or controlling symptoms with ther ex, movement, or position independently, able to reduce pain 1-2 points on pain scale using strategies taught in therapy  3. Pt will demonstrate increased maximum isometric torque value by 10% when compared to the initial value resulting in improved ability to perform bending, lifting, and carrying activities safely, confidently.           Long term goals: 10 weeks or 20 visits  1. Pt will demonstratte increased cervical ROM as measured by med ex by 18 degrees from initial test which results in functional ROM of neck for ease with ADLs and driving  2. Pt will demonstrate increased isometric torque by 15% from initial test to improve ability to lift and carry, and sustain good posture while performing ADL's  3.Pt will demonstrate reduced pain and improved functional outcomes as reported on the FOTO by reaching a score of CH = 0 % impaired, limited or restricted or less in order to demonstrate subjective improvement in pt's condition.    4. Pt will demonstrate independence with reducing or controlling symptoms with ther ex, movement, or position independently, able to reduce pain 2-4 points on pain scale using strategies taught in therapy  5. Pt will demonstrate independence  with the HEP at discharge.   6.  Pt will present with subjective reports of increased neck strength.     Plan   Continue with established Plan of Care towards established PT goals.

## 2019-12-10 ENCOUNTER — CLINICAL SUPPORT (OUTPATIENT)
Dept: REHABILITATION | Facility: OTHER | Age: 54
End: 2019-12-10
Attending: INTERNAL MEDICINE
Payer: COMMERCIAL

## 2019-12-10 DIAGNOSIS — M54.2 NECK PAIN: ICD-10-CM

## 2019-12-10 PROCEDURE — 97110 THERAPEUTIC EXERCISES: CPT

## 2019-12-10 NOTE — PROGRESS NOTES
[facility]      Name: Corazon Maldonado [facility]  Clinic Number: [id number]    Therapy Diagnosis:   Encounter Diagnosis   Name Primary?    Neck pain      Physician: Tasha Santos MD    Visit Date: 12/10/2019    Physician: Tasha Santos MD     Physician Orders: PT Eval and Treat   Medical Diagnosis from Referral:   S16.1XXA (ICD-10-CM) - Strain of neck muscle, initial encounter   Evaluation Date: 2019  Authorization Period Expiration: 2019   Plan of Care Expiration: 2019   Reassessment Due: 2019  Visit # / Visits authorized:      Time In: 300  Time Out: 400  Total Billable Time: 50 minutes     Precautions: Standard; L RTC repair       Pattern of pain determined: 1 REP         Subjective   Corazon reports he is experiencing minimal pain recently. Able to work out at home with no pain. States he's no longer experiencing any pain on R side of neck, intermittent pain on L side of neck  Patient reports tolerating previous visit well with minimal soreness.   Patient reports their pain to be n/a/10 on a 0-10 scale with 0 being no pain and 10 being the worst pain imaginable.  Pain Location: B neck     Occupation: Pain management MD   Leisure: Going out on the water, jet ski       Pts goals: Not to wake up in pain        Objective     Baseline Isometric Testing on Med X equipment:  Testing administered by PT  Date of testin2019  ROM 12-87 deg   Max Peak Torque 374    Min Peak Torque 245    Flex/Ext Ratio 1.5:1   % below normative data +6%          Treatment    Pt was instructed in and performed the following:     Corazon received therapeutic exercises to develop/improved posture, cardiovascular endurance, muscular endurance, lumbar/cervical ROM, strength and muscular endurance for 70 minutes including the following exercises:     [facility] 12/10/2019   Visit Number 6   VAS Pain Rating 0   Treadmill Time (in min.) 10   Cervical Stretches -  Retraction In Lying -   Retraction in Sitting -   Retraction with Extension -   Flexion -   Sidebending -   Rotation -   Scapular Retraction -   Cervical Extension Seat Pad -   Seat Adjustment -   Top Dead Center -   Counterweight -   Cervical Flexion -   Cervical Extension -   Cervical Peak Torque -   Cervical Weight 230   Repetitions 20   Rating of Perceived Exertion 3   Ice - Z Lie (in min.) 10       Exercises Completed today:  Scapular Retractions 10x  Upper Trap stretch 5x hold 5-10 secs  Levator stretch 5x 5-10 sec  Cervical Retractions 10x  OB 10x    Peripheral muscle strengthening which included 1 set of 15-20 repetitions at a slow, controlled 10-13 second per rep pace focused on strengthening supporting musculature for improved body mechanics and functional mobility.  Pt and therapist focused on proper form during treatment to ensure optimal strengthening of each targeted muscle group.  Machines were utilized including torso rotation, leg extension, leg curl, chest press, upright row. Tricep extension, bicep curl, leg press, and hip abduction added visit 3    Corazon received the following manual therapy techniques: STM and stretch was applied to cervical area in neutral, side bend and rotation for 0 minutes.         Home Exercises Provided and Patient Education Provided     Education provided:   - Educated pt on the importance of daily stretch to increase the benefit of program and positive results.   Educated him on importance of use of lumbar roll when sitting.  Written Home Exercises Provided: Patient instructed to cont prior HEP.  Exercises were reviewed and Corazon was able to demonstrate them prior to the end of the session.  Corazon demonstrated good  understanding of the education provided.     See EMR under Patient Instructions for exercises provided prior visit.    Assessment   Pt able to complete all components of session with no adverse effects. Increased resistance secondary to performance at  previous session. Able to complete 20 repetitions with appropriate RPE. Patient is making excellent progress towards established goals.  Pt will continue to benefit from skilled outpatient physical therapy to address the deficits stated in the impairment chart, provide pt/family education and to maximize pt's level of independence in the home and community environment.     Anticipated Barriers for therapy: none  Pt's spiritual, cultural and educational needs considered and pt agreeable to plan of care and goals as stated below:             Goals:     GOALS: Pt is in agreement with the following goals.     Short term goals: 6 weeks or 10 visits   1.  Pt will demonstratte increased cervical ROM as measured by med ex by 10 degrees from initial test which results in improved  ROM of neck for ease with ADLs and driving  2. Pt will demonstrate independence with reducing or controlling symptoms with ther ex, movement, or position independently, able to reduce pain 1-2 points on pain scale using strategies taught in therapy  3. Pt will demonstrate increased maximum isometric torque value by 10% when compared to the initial value resulting in improved ability to perform bending, lifting, and carrying activities safely, confidently.           Long term goals: 10 weeks or 20 visits  1. Pt will demonstratte increased cervical ROM as measured by med ex by 18 degrees from initial test which results in functional ROM of neck for ease with ADLs and driving  2. Pt will demonstrate increased isometric torque by 15% from initial test to improve ability to lift and carry, and sustain good posture while performing ADL's  3.Pt will demonstrate reduced pain and improved functional outcomes as reported on the FOTO by reaching a score of CH = 0 % impaired, limited or restricted or less in order to demonstrate subjective improvement in pt's condition.    4. Pt will demonstrate independence with reducing or controlling symptoms with ther ex,  movement, or position independently, able to reduce pain 2-4 points on pain scale using strategies taught in therapy  5. Pt will demonstrate independence with the HEP at discharge.   6.  Pt will present with subjective reports of increased neck strength.     Plan   Continue with established Plan of Care towards established PT goals.

## 2019-12-13 ENCOUNTER — CLINICAL SUPPORT (OUTPATIENT)
Dept: REHABILITATION | Facility: OTHER | Age: 54
End: 2019-12-13
Attending: INTERNAL MEDICINE
Payer: COMMERCIAL

## 2019-12-13 DIAGNOSIS — M54.2 NECK PAIN: ICD-10-CM

## 2019-12-13 PROCEDURE — 97110 THERAPEUTIC EXERCISES: CPT

## 2019-12-13 NOTE — PROGRESS NOTES
[facility]      Name: Corazon Velez  Clinic Number: [id number]    Therapy Diagnosis:   Encounter Diagnosis   Name Primary?    Neck pain      Physician: Tasha Santos MD    Visit Date: 2019    Physician: Tasha Santos MD     Physician Orders: PT Eval and Treat   Medical Diagnosis from Referral:   S16.1XXA (ICD-10-CM) - Strain of neck muscle, initial encounter   Evaluation Date: 2019  Authorization Period Expiration: 2019   Plan of Care Expiration: 2019   Reassessment Due: 2019  Visit # / Visits authorized:      Time In: 830  Time Out: 915  Total Billable Time: 45 minutes     Precautions: Standard; L RTC repair       Pattern of pain determined: 1 REP         Subjective   Corazon reports no pain today. States he is no longer feeling pain .   Patient reports tolerating previous visit well with minimal soreness.   Patient reports their pain to be n/a/10 on a 0-10 scale with 0 being no pain and 10 being the worst pain imaginable.  Pain Location: B neck     Occupation: Pain management MD   Leisure: Going out on the water, jet ski       Pts goals: Not to wake up in pain        Objective     Baseline Isometric Testing on Med X equipment:  Testing administered by PT  Date of testin2019  ROM 12-87 deg   Max Peak Torque 374    Min Peak Torque 245    Flex/Ext Ratio 1.5:1   % below normative data +6%          Treatment    Pt was instructed in and performed the following:     Corazon received therapeutic exercises to develop/improved posture, cardiovascular endurance, muscular endurance, lumbar/cervical ROM, strength and muscular endurance for 70 minutes including the following exercises:     [facility] 2019   Visit Number 7   VAS Pain Rating 0   Treadmill Time (in min.) 10   Cervical Stretches - Retraction In Lying -   Retraction in Sitting -   Retraction with Extension -   Flexion -   Sidebending -   Rotation -   Scapular  Retraction -   Cervical Extension Seat Pad -   Seat Adjustment -   Top Dead Center -   Counterweight -   Cervical Flexion -   Cervical Extension -   Cervical Peak Torque -   Cervical Weight 252   Repetitions 20   Rating of Perceived Exertion 4   Ice - Z Lie (in min.) -       Exercises Completed today:  Scapular Retractions 10x  Upper Trap stretch 5x hold 5-10 secs  Levator stretch 5x 5-10 sec  Cervical Retractions 10x  OB 10x    Peripheral muscle strengthening which included 1 set of 15-20 repetitions at a slow, controlled 10-13 second per rep pace focused on strengthening supporting musculature for improved body mechanics and functional mobility.  Pt and therapist focused on proper form during treatment to ensure optimal strengthening of each targeted muscle group.  Machines were utilized including torso rotation, leg extension, leg curl, chest press, upright row. Tricep extension, bicep curl, leg press, and hip abduction added visit 3    Corazon received the following manual therapy techniques: STM and stretch was applied to cervical area in neutral, side bend and rotation for 0 minutes.         Home Exercises Provided and Patient Education Provided     Education provided:   - Educated pt on the importance of daily stretch to increase the benefit of program and positive results.   Educated him on importance of use of lumbar roll when sitting.  Written Home Exercises Provided: Patient instructed to cont prior HEP.  Exercises were reviewed and Corazon was able to demonstrate them prior to the end of the session.  Corazon demonstrated good  understanding of the education provided.     See EMR under Patient Instructions for exercises provided prior visit.    Assessment   Pt able to complete all components of session with no adverse effects. Increased resistance secondary to performance at previous session. Able to complete 20 repetitions with appropriate RPE. Pt cervical ROM is improved in all planes today, with no  limitations due to pain. Patient is making excellent progress towards established goals.  Pt will continue to benefit from skilled outpatient physical therapy to address the deficits stated in the impairment chart, provide pt/family education and to maximize pt's level of independence in the home and community environment.     Anticipated Barriers for therapy: none  Pt's spiritual, cultural and educational needs considered and pt agreeable to plan of care and goals as stated below:             Goals:     GOALS: Pt is in agreement with the following goals.     Short term goals: 6 weeks or 10 visits   1.  Pt will demonstratte increased cervical ROM as measured by med ex by 10 degrees from initial test which results in improved  ROM of neck for ease with ADLs and driving  2. Pt will demonstrate independence with reducing or controlling symptoms with ther ex, movement, or position independently, able to reduce pain 1-2 points on pain scale using strategies taught in therapy  3. Pt will demonstrate increased maximum isometric torque value by 10% when compared to the initial value resulting in improved ability to perform bending, lifting, and carrying activities safely, confidently.           Long term goals: 10 weeks or 20 visits  1. Pt will demonstratte increased cervical ROM as measured by med ex by 18 degrees from initial test which results in functional ROM of neck for ease with ADLs and driving  2. Pt will demonstrate increased isometric torque by 15% from initial test to improve ability to lift and carry, and sustain good posture while performing ADL's  3.Pt will demonstrate reduced pain and improved functional outcomes as reported on the FOTO by reaching a score of CH = 0 % impaired, limited or restricted or less in order to demonstrate subjective improvement in pt's condition.    4. Pt will demonstrate independence with reducing or controlling symptoms with ther ex, movement, or position independently, able to  reduce pain 2-4 points on pain scale using strategies taught in therapy  5. Pt will demonstrate independence with the HEP at discharge.   6.  Pt will present with subjective reports of increased neck strength.     Plan   Continue with established Plan of Care towards established PT goals.

## 2019-12-19 ENCOUNTER — OFFICE VISIT (OUTPATIENT)
Dept: PULMONOLOGY | Facility: CLINIC | Age: 54
End: 2019-12-19
Payer: COMMERCIAL

## 2019-12-19 VITALS
WEIGHT: 237.19 LBS | HEIGHT: 70 IN | SYSTOLIC BLOOD PRESSURE: 124 MMHG | DIASTOLIC BLOOD PRESSURE: 88 MMHG | OXYGEN SATURATION: 95 % | HEART RATE: 83 BPM | BODY MASS INDEX: 33.96 KG/M2

## 2019-12-19 DIAGNOSIS — D86.2 SARCOIDOSIS OF LUNG WITH SARCOIDOSIS OF LYMPH NODES: ICD-10-CM

## 2019-12-19 DIAGNOSIS — R91.1 LUNG NODULE: ICD-10-CM

## 2019-12-19 DIAGNOSIS — R93.89 ABNORMAL CHEST CT: Primary | ICD-10-CM

## 2019-12-19 PROCEDURE — 99999 PR PBB SHADOW E&M-EST. PATIENT-LVL III: ICD-10-PCS | Mod: PBBFAC,,, | Performed by: INTERNAL MEDICINE

## 2019-12-19 PROCEDURE — 99204 OFFICE O/P NEW MOD 45 MIN: CPT | Mod: S$GLB,,, | Performed by: INTERNAL MEDICINE

## 2019-12-19 PROCEDURE — 3008F BODY MASS INDEX DOCD: CPT | Mod: CPTII,S$GLB,, | Performed by: INTERNAL MEDICINE

## 2019-12-19 PROCEDURE — 3008F PR BODY MASS INDEX (BMI) DOCUMENTED: ICD-10-PCS | Mod: CPTII,S$GLB,, | Performed by: INTERNAL MEDICINE

## 2019-12-19 PROCEDURE — 99204 PR OFFICE/OUTPT VISIT, NEW, LEVL IV, 45-59 MIN: ICD-10-PCS | Mod: S$GLB,,, | Performed by: INTERNAL MEDICINE

## 2019-12-19 PROCEDURE — 99999 PR PBB SHADOW E&M-EST. PATIENT-LVL III: CPT | Mod: PBBFAC,,, | Performed by: INTERNAL MEDICINE

## 2019-12-19 NOTE — LETTER
December 30, 2019      Tasha Santos MD  [address]  [facility] [address]  [facility] [address]           [facility]  [address]  [facility] [address]  Phone: [phone]          Patient: Corazon Jones   MR Number: [id number]   YOB: 1965   Date of Visit: 12/19/2019       Dear Dr. Tasha Santos:    Thank you for referring Corazon Jones to me for evaluation. Attached you will find relevant portions of my assessment and plan of care.    If you have questions, please do not hesitate to call me. I look forward to following Corazon Jones along with you.    Sincerely,    Jeremiah Kamara MD    Enclosure  CC:  No Recipients    If you would like to receive this communication electronically, please contact [email or web address] or [phone] to request more information on [employer] access.    For providers and/or their staff who would like to refer a patient to [facility], please contact us through our one-stop-shop provider referral line, [facility], at [phone].    If you feel you have received this communication in error or would no longer like to receive these types of communications, please e-mail [email or web address]

## 2019-12-19 NOTE — ASSESSMENT & PLAN NOTE
Mr. Jones presents to clinic with incidental finding of left upper lobe lung nodule -0.8cm. He reports asymptomatic from pulmonary standpoint. Reports hx of sarcoidosis- no reoccurring issue in years. 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 Was able to review previous CTA of chest from 2005 for comparison to cardiac scoring CT. The CTA from 2005 suggested the presence of left upper lobe nodules at that time which appears to be similar in size. It is reasonable to consider no further imaging, as patient is low risk. An alternative conservative approach is repeat non contrast CT in 1 year.

## 2019-12-20 ENCOUNTER — CLINICAL SUPPORT (OUTPATIENT)
Dept: REHABILITATION | Facility: OTHER | Age: 54
End: 2019-12-20
Attending: INTERNAL MEDICINE
Payer: COMMERCIAL

## 2019-12-20 DIAGNOSIS — M54.2 NECK PAIN: ICD-10-CM

## 2019-12-20 PROCEDURE — 97110 THERAPEUTIC EXERCISES: CPT

## 2019-12-20 NOTE — PROGRESS NOTES
[facility]      Name: Corazon Velez  Clinic Number: [id number]    Therapy Diagnosis:   Encounter Diagnosis   Name Primary?    Neck pain      Physician: Tasha Santos MD    Visit Date: 2019    Physician: Tasha Santos MD     Physician Orders: PT Eval and Treat   Medical Diagnosis from Referral:   S16.1XXA (ICD-10-CM) - Strain of neck muscle, initial encounter   Evaluation Date: 2019  Authorization Period Expiration: 2019   Plan of Care Expiration: 2019   Reassessment Due: 2019  Visit # / Visits authorized:      Time In: 800  Time Out: 900   Total Billable Time: 45 minutes     Precautions: Standard; L RTC repair       Pattern of pain determined: 1 REP         Subjective   Corazon reports no pain today. States he is no longer feeling pain. Is interested in DC today and beginning Wellness   Patient reports tolerating previous visit well with minimal soreness.   Patient reports their pain to be n/a/10 on a 0-10 scale with 0 being no pain and 10 being the worst pain imaginable.  Pain Location: B neck     Occupation: Pain management MD   Leisure: Going out on the water, jet ski       Pts goals: Not to wake up in pain        Objective     Baseline Isometric Testing on Med X equipment:  Testing administered by PT  Date of testin2019  ROM 12-87 deg   Max Peak Torque 374    Min Peak Torque 245    Flex/Ext Ratio 1.5:1   % below normative data +6%          Treatment    Pt was instructed in and performed the following:     Corazon received therapeutic exercises to develop/improved posture, cardiovascular endurance, muscular endurance, lumbar/cervical ROM, strength and muscular endurance for 70 minutes including the following exercises:     [facility] 2019   Visit Number 8   VAS Pain Rating 0   Treadmill Time (in min.) 10   Cervical Stretches - Retraction In Lying -   Retraction in Sitting -   Retraction with Extension -    Flexion -   Sidebending -   Rotation -   Scapular Retraction -   Cervical Extension Seat Pad -   Seat Adjustment -   Top Dead Center -   Counterweight -   Cervical Flexion 87   Cervical Extension 12   Cervical Peak Torque 574   Cervical Weight -   Repetitions -   Rating of Perceived Exertion -   Ice - Z Lie (in min.) -     Exercises Completed today:  Scapular Retractions 10x  Upper Trap stretch 5x hold 5-10 secs  Levator stretch 5x 5-10 sec  Cervical Retractions 10x  OB 10x    Peripheral muscle strengthening which included 1 set of 15-20 repetitions at a slow, controlled 10-13 second per rep pace focused on strengthening supporting musculature for improved body mechanics and functional mobility.  Pt and therapist focused on proper form during treatment to ensure optimal strengthening of each targeted muscle group.  Machines were utilized including torso rotation, leg extension, leg curl, chest press, upright row. Tricep extension, bicep curl, leg press, and hip abduction added visit 3    Corazon received the following manual therapy techniques: STM and stretch was applied to cervical area in neutral, side bend and rotation for 0 minutes.         Home Exercises Provided and Patient Education Provided     Education provided:   - Educated pt on the importance of daily stretch to increase the benefit of program and positive results.   Educated him on importance of use of lumbar roll when sitting.  Written Home Exercises Provided: Patient instructed to cont prior HEP.  Exercises were reviewed and Corazon was able to demonstrate them prior to the end of the session.  Corazon demonstrated good  understanding of the education provided.     See EMR under Patient Instructions for exercises provided prior visit.    Assessment   Outpatient Physical Therapy Discharge Summary    Date: 12/20/2019      Date of PT eval: 11/20/2019  Number of PT visits: 8  Date of last visit: 12/20/2019    Assessment:  Pt demonstrates independence with  HEP, improved cervical ROM in all planes, increased cervical extension strength and decreased pain. Pt is appropriate for DC at this time and transition into wellness to maintain and continue progression of current gains.     Plan: Discharge from outpatient physical therapy due to max potential reached.       Anticipated Barriers for therapy: none  Pt's spiritual, cultural and educational needs considered and pt agreeable to plan of care and goals as stated below:       Goals:     GOALS: Pt is in agreement with the following goals.     Short term goals: 6 weeks or 10 visits   1.  Pt will demonstratte increased cervical ROM as measured by med ex by 10 degrees from initial test which results in improved  ROM of neck for ease with ADLs and driving Not MET  2. Pt will demonstrate independence with reducing or controlling symptoms with ther ex, movement, or position independently, able to reduce pain 1-2 points on pain scale using strategies taught in therapy  MET  3. Pt will demonstrate increased maximum isometric torque value by 10% when compared to the initial value resulting in improved ability to perform bending, lifting, and carrying activities safely, confidently. MET         Long term goals: 10 weeks or 20 visits  1. Pt will demonstratte increased cervical ROM as measured by med ex by 18 degrees from initial test which results in functional ROM of neck for ease with ADLs and driving Not MET  2. Pt will demonstrate increased isometric torque by 15% from initial test to improve ability to lift and carry, and sustain good posture while performing ADL's MET  3.Pt will demonstrate reduced pain and improved functional outcomes as reported on the FOTO by reaching a score of CH = 0 % impaired, limited or restricted or less in order to demonstrate subjective improvement in pt's condition.  MET  4. Pt will demonstrate independence with reducing or controlling symptoms with ther ex, movement, or position independently, able  to reduce pain 2-4 points on pain scale using strategies taught in therapyMET  5. Pt will demonstrate independence with the HEP at discharge. MET  6.  Pt will present with subjective reports of increased neck strength. MET    Plan   DC pt with most recently provided HEP. Pt is interested in continuing with Wellness.

## 2019-12-30 PROBLEM — D86.2 SARCOIDOSIS OF LUNG WITH SARCOIDOSIS OF LYMPH NODES: Status: ACTIVE | Noted: 2019-12-30

## 2019-12-30 PROBLEM — R93.89 ABNORMAL CHEST CT: Status: ACTIVE | Noted: 2019-12-30

## 2020-01-20 ENCOUNTER — PATIENT OUTREACH (OUTPATIENT)
Dept: ADMINISTRATIVE | Facility: HOSPITAL | Age: 55
End: 2020-01-20

## 2020-01-21 ENCOUNTER — PATIENT MESSAGE (OUTPATIENT)
Dept: INTERNAL MEDICINE | Facility: CLINIC | Age: 55
End: 2020-01-21

## 2020-01-21 ENCOUNTER — TELEPHONE (OUTPATIENT)
Dept: PHARMACY | Facility: CLINIC | Age: 55
End: 2020-01-21

## 2020-02-03 ENCOUNTER — OFFICE VISIT (OUTPATIENT)
Dept: INTERNAL MEDICINE | Facility: CLINIC | Age: 55
End: 2020-02-03
Attending: INTERNAL MEDICINE
Payer: COMMERCIAL

## 2020-02-03 ENCOUNTER — LAB VISIT (OUTPATIENT)
Dept: LAB | Facility: OTHER | Age: 55
End: 2020-02-03
Attending: INTERNAL MEDICINE
Payer: COMMERCIAL

## 2020-02-03 VITALS
DIASTOLIC BLOOD PRESSURE: 64 MMHG | SYSTOLIC BLOOD PRESSURE: 118 MMHG | HEIGHT: 70 IN | HEART RATE: 65 BPM | WEIGHT: 236.56 LBS | OXYGEN SATURATION: 96 % | BODY MASS INDEX: 33.87 KG/M2

## 2020-02-03 DIAGNOSIS — E78.2 MIXED HYPERLIPIDEMIA: ICD-10-CM

## 2020-02-03 DIAGNOSIS — F43.23 ADJUSTMENT DISORDER WITH MIXED ANXIETY AND DEPRESSED MOOD: ICD-10-CM

## 2020-02-03 DIAGNOSIS — E78.2 MIXED HYPERLIPIDEMIA: Primary | ICD-10-CM

## 2020-02-03 LAB
ALBUMIN SERPL BCP-MCNC: 4.4 G/DL (ref 3.5–5.2)
ALP SERPL-CCNC: 76 U/L (ref 55–135)
ALT SERPL W/O P-5'-P-CCNC: 33 U/L (ref 10–44)
AST SERPL-CCNC: 21 U/L (ref 10–40)
BILIRUB DIRECT SERPL-MCNC: 0.2 MG/DL (ref 0.1–0.3)
BILIRUB SERPL-MCNC: 0.6 MG/DL (ref 0.1–1)
CHOLEST SERPL-MCNC: 165 MG/DL (ref 120–199)
CHOLEST/HDLC SERPL: 3.6 {RATIO} (ref 2–5)
HDLC SERPL-MCNC: 46 MG/DL (ref 40–75)
HDLC SERPL: 27.9 % (ref 20–50)
LDLC SERPL CALC-MCNC: 103.4 MG/DL (ref 63–159)
NONHDLC SERPL-MCNC: 119 MG/DL
PROT SERPL-MCNC: 7.6 G/DL (ref 6–8.4)
TRIGL SERPL-MCNC: 78 MG/DL (ref 30–150)

## 2020-02-03 PROCEDURE — 99214 PR OFFICE/OUTPT VISIT, EST, LEVL IV, 30-39 MIN: ICD-10-PCS | Mod: S$GLB,,, | Performed by: INTERNAL MEDICINE

## 2020-02-03 PROCEDURE — 3008F BODY MASS INDEX DOCD: CPT | Mod: CPTII,S$GLB,, | Performed by: INTERNAL MEDICINE

## 2020-02-03 PROCEDURE — 36415 COLL VENOUS BLD VENIPUNCTURE: CPT

## 2020-02-03 PROCEDURE — 80076 HEPATIC FUNCTION PANEL: CPT

## 2020-02-03 PROCEDURE — 99999 PR PBB SHADOW E&M-EST. PATIENT-LVL III: ICD-10-PCS | Mod: PBBFAC,,, | Performed by: INTERNAL MEDICINE

## 2020-02-03 PROCEDURE — 3008F PR BODY MASS INDEX (BMI) DOCUMENTED: ICD-10-PCS | Mod: CPTII,S$GLB,, | Performed by: INTERNAL MEDICINE

## 2020-02-03 PROCEDURE — 80061 LIPID PANEL: CPT

## 2020-02-03 PROCEDURE — 99214 OFFICE O/P EST MOD 30 MIN: CPT | Mod: S$GLB,,, | Performed by: INTERNAL MEDICINE

## 2020-02-03 PROCEDURE — 99999 PR PBB SHADOW E&M-EST. PATIENT-LVL III: CPT | Mod: PBBFAC,,, | Performed by: INTERNAL MEDICINE

## 2020-02-03 NOTE — PROGRESS NOTES
Subjective:       Patient ID: Corazon Jones is a 54 y.o. male.    Chief Complaint: Follow-up     Corazon Jones is a 54 y.o.  male who presents for Follow-up  .  Patient Active Problem List   Diagnosis    Abdominal pain, generalized    Constipation, acute    Lactose intolerance    Chronic familial neutropenia    OTTONIEL (obstructive sleep apnea)    Hyperlipemia    History of Clostridium difficile colitis    Tinnitus of both ears    Acute seasonal allergic rhinitis    Nasal septal deviation    Acute pain of right shoulder    Decreased range of motion of right shoulder    Muscle weakness of right upper extremity    Acute pain of right knee    Elevated blood pressure reading    Neck strain    Neck pain    Lung nodule    Abnormal chest CT    Sarcoidosis of lung with sarcoidosis of lymph nodes    History of sarcoidosis    Adjustment disorder with mixed anxiety and depressed mood      Corazon Jones is a 54 y.o.  male who presents for Follow-up  .  Has been taking his zoloft routinely and his mood has improved.  Getting along well with family. Dynamic has changed with all of them which he feels may be realted tho his behavior change. Sleeping better.   Started crestor.   Patient has a history of hyperlipidemia. Pt is complaint with his medication. Denies muscle cramping and weakness. Pt attempts to follow a low cholesterol diet and exercise but has not been consistently successful. Has been intermittent fasting. No CP, SOB, orthopnea or PND.       Follow-up   Pertinent negatives include no arthralgias, chest pain, chills, coughing, fever, nausea, numbness, rash, sore throat, vomiting or weakness.     Health Maintenance       Date Due Completion Date    HIV Screening 09/13/1979 ---    Pneumococcal Vaccine (Highest Risk) (1 of 3 - PCV13) 09/13/1983 ---    Shingles Vaccine (1 of 2) 09/13/2014 ---    TETANUS VACCINE 11/15/2017 11/15/2007    PROSTATE-SPECIFIC ANTIGEN 11/06/2020 11/6/2019    Lipid  "Panel 11/06/2024 11/6/2019    Colonoscopy 05/14/2025 5/14/2015          Review of Systems   Constitutional: Negative for chills and fever.   HENT: Negative for rhinorrhea and sore throat.    Respiratory: Negative for cough and shortness of breath.    Cardiovascular: Negative for chest pain and palpitations.   Gastrointestinal: Negative for nausea and vomiting.   Genitourinary: Negative for dysuria and hematuria.   Musculoskeletal: Negative for arthralgias and back pain.   Skin: Negative for color change and rash.   Neurological: Negative for weakness and numbness.   Psychiatric/Behavioral: Negative for agitation and dysphoric mood.         Past Medical History:   Diagnosis Date    History of Clostridium difficile colitis     treated 2014    History of sarcoidosis     dx 1991, resolved    Hyperlipemia        Past Surgical History:   Procedure Laterality Date    APPENDECTOMY      HERNIA REPAIR      orthoscopy      shoulder and knee       Family History   Problem Relation Age of Onset    Diabetes Mellitus Maternal Grandmother     Diabetes Mellitus Maternal Uncle     Diabetes Mellitus Paternal Uncle     Prostate cancer Maternal Uncle     Endometrial cancer Paternal Grandmother        Social History     Tobacco Use    Smoking status: Never Smoker    Smokeless tobacco: Never Used   Substance Use Topics    Alcohol use: No    Drug use: No             Objective:   Blood pressure 118/64, pulse 65, height 5' 10" (1.778 m), weight 107.3 kg (236 lb 8.9 oz), SpO2 96 %.     Physical Exam   Constitutional: He is oriented to person, place, and time. He appears well-developed and well-nourished.   HENT:   Head: Normocephalic and atraumatic.   Eyes: Right eye exhibits no discharge. Left eye exhibits no discharge.   Cardiovascular: Normal rate and regular rhythm.   Pulmonary/Chest: Effort normal and breath sounds normal.   Neurological: He is alert and oriented to person, place, and time.   Skin: Skin is warm and dry.    "    Prior labs reviewed  Assessment/Plan:        Corazon was seen today for follow-up.    Diagnoses and all orders for this visit:    Mixed hyperlipidemia  -     Lipid panel; Future  -     Hepatic function panel; Future  Labs today  - cont current medication  - recommend low cholesterol diet and regular cardiovascular exercise to reduce risk of cardiovascular events  - repeat lipid profile and CMP annually    Adjustment disorder with mixed anxiety and depressed mood  Improved  Cont current medication  Recommend cont exercise, rest and dialog with family      Medication List with Changes/Refills   Current Medications    CELECOXIB (CELEBREX) 200 MG CAPSULE    Take 1 capsule (200 mg total) by mouth once daily.    DIAZEPAM (VALIUM) 5 MG TABLET     take 1 tablet 1 hour prior to study, may repeat if necessary 1 hour prior for 1 dose.    FLUTICASONE (FLONASE) 50 MCG/ACTUATION NASAL SPRAY    2 sprays (100 mcg total) by Each Nare route once daily.    ROSUVASTATIN (CRESTOR) 40 MG TAB    Take 1 tablet (40 mg total) by mouth every evening.    SERTRALINE (ZOLOFT) 50 MG TABLET    Take 1 tablet (50 mg total) by mouth once daily.    ZOLPIDEM (AMBIEN) 5 MG TAB    Take 1 tablet (5 mg total) by mouth nightly as needed.

## 2020-02-03 NOTE — Clinical Note
Please get his colonoscopy results from [facility] gi and link in , thanks!!

## 2020-02-04 ENCOUNTER — PATIENT OUTREACH (OUTPATIENT)
Dept: ADMINISTRATIVE | Facility: HOSPITAL | Age: 55
End: 2020-02-04

## 2020-02-09 ENCOUNTER — PATIENT MESSAGE (OUTPATIENT)
Dept: INTERNAL MEDICINE | Facility: CLINIC | Age: 55
End: 2020-02-09

## 2020-02-16 ENCOUNTER — PATIENT MESSAGE (OUTPATIENT)
Dept: INTERNAL MEDICINE | Facility: CLINIC | Age: 55
End: 2020-02-16

## 2020-02-16 DIAGNOSIS — E78.2 MIXED HYPERLIPIDEMIA: Primary | ICD-10-CM

## 2020-02-17 ENCOUNTER — PATIENT MESSAGE (OUTPATIENT)
Dept: INTERNAL MEDICINE | Facility: CLINIC | Age: 55
End: 2020-02-17

## 2020-02-17 RX ORDER — ROSUVASTATIN CALCIUM 40 MG/1
40 TABLET, COATED ORAL NIGHTLY
Qty: 90 TABLET | Refills: 3 | Status: SHIPPED | OUTPATIENT
Start: 2020-02-17 | End: 2021-05-06 | Stop reason: SDUPTHER

## 2020-03-05 ENCOUNTER — TELEPHONE (OUTPATIENT)
Dept: INTERNAL MEDICINE | Facility: CLINIC | Age: 55
End: 2020-03-05

## 2020-03-05 ENCOUNTER — CLINICAL SUPPORT (OUTPATIENT)
Dept: INTERNAL MEDICINE | Facility: CLINIC | Age: 55
End: 2020-03-05
Payer: COMMERCIAL

## 2020-03-05 VITALS — TEMPERATURE: 98 F

## 2020-03-05 DIAGNOSIS — R50.9 FEVER AND CHILLS: ICD-10-CM

## 2020-03-05 DIAGNOSIS — R52 BODY ACHES: ICD-10-CM

## 2020-03-05 DIAGNOSIS — J02.9 SORE THROAT: Primary | ICD-10-CM

## 2020-03-05 LAB
CTP QC/QA: YES
CTP QC/QA: YES
FLUAV AG NPH QL: NEGATIVE
FLUBV AG NPH QL: NEGATIVE
S PYO RRNA THROAT QL PROBE: NEGATIVE

## 2020-03-05 PROCEDURE — 87804 POCT INFLUENZA A/B: ICD-10-PCS | Mod: QW,S$GLB,, | Performed by: INTERNAL MEDICINE

## 2020-03-05 PROCEDURE — 87880 STREP A ASSAY W/OPTIC: CPT | Mod: QW,S$GLB,, | Performed by: INTERNAL MEDICINE

## 2020-03-05 PROCEDURE — 87804 INFLUENZA ASSAY W/OPTIC: CPT | Mod: QW,S$GLB,, | Performed by: INTERNAL MEDICINE

## 2020-03-05 PROCEDURE — 87880 POCT RAPID STREP A: ICD-10-PCS | Mod: QW,S$GLB,, | Performed by: INTERNAL MEDICINE

## 2020-03-05 NOTE — Clinical Note
Pt came in for a POCT strep testHe is negative and was notified. Pt complained that he feels he has a fever, has body aches.He has been taking tyelenol and celebrex. V/S Temp 98.0 FPt was administered a POCT influenza and is negative.

## 2020-03-05 NOTE — TELEPHONE ENCOUNTER
Called pt to tell him he is negative for flu based on POCT influenza,Pt has no concerns or questions. Pt was told to contact us if he has any concerns.

## 2020-03-05 NOTE — PROGRESS NOTES
Pt came in for a POCT strep test  He is negative and was notified.   Pt complained that he feels he has a fever, has body aches.  He has been taking tyelenol and celebrex. V/S Temp 98.0 F  Pt was administered a POCT influenza and is negative.

## 2020-04-09 DIAGNOSIS — M15.9 PRIMARY OSTEOARTHRITIS INVOLVING MULTIPLE JOINTS: ICD-10-CM

## 2020-04-09 DIAGNOSIS — M79.18 MUSCULOSKELETAL PAIN: ICD-10-CM

## 2020-04-10 RX ORDER — CELECOXIB 200 MG/1
200 CAPSULE ORAL DAILY
Qty: 90 CAPSULE | Refills: 2 | Status: SHIPPED | OUTPATIENT
Start: 2020-04-10 | End: 2021-03-30 | Stop reason: SDUPTHER

## 2020-04-21 DIAGNOSIS — Z01.84 ANTIBODY RESPONSE EXAMINATION: ICD-10-CM

## 2020-04-23 ENCOUNTER — LAB VISIT (OUTPATIENT)
Dept: LAB | Facility: OTHER | Age: 55
End: 2020-04-23
Attending: INTERNAL MEDICINE
Payer: COMMERCIAL

## 2020-04-23 DIAGNOSIS — Z01.84 ANTIBODY RESPONSE EXAMINATION: ICD-10-CM

## 2020-04-23 LAB — SARS-COV-2 IGG SERPL QL IA: NEGATIVE

## 2020-04-23 PROCEDURE — 36415 COLL VENOUS BLD VENIPUNCTURE: CPT

## 2020-04-23 PROCEDURE — 86769 SARS-COV-2 COVID-19 ANTIBODY: CPT

## 2020-10-12 DIAGNOSIS — F43.23 ADJUSTMENT DISORDER WITH MIXED ANXIETY AND DEPRESSED MOOD: ICD-10-CM

## 2020-10-12 RX ORDER — SERTRALINE HYDROCHLORIDE 50 MG/1
50 TABLET, FILM COATED ORAL DAILY
Qty: 90 TABLET | Refills: 3 | Status: SHIPPED | OUTPATIENT
Start: 2020-10-12 | End: 2021-11-04 | Stop reason: SDUPTHER

## 2020-11-11 ENCOUNTER — PATIENT OUTREACH (OUTPATIENT)
Dept: ADMINISTRATIVE | Facility: HOSPITAL | Age: 55
End: 2020-11-11

## 2020-11-19 ENCOUNTER — PATIENT OUTREACH (OUTPATIENT)
Dept: ADMINISTRATIVE | Facility: HOSPITAL | Age: 55
End: 2020-11-19

## 2020-11-20 LAB — HBV SURFACE AB SER-ACNC: NEGATIVE M[IU]/ML

## 2021-01-05 ENCOUNTER — IMMUNIZATION (OUTPATIENT)
Dept: INTERNAL MEDICINE | Facility: CLINIC | Age: 56
End: 2021-01-05
Payer: COMMERCIAL

## 2021-01-05 DIAGNOSIS — Z23 NEED FOR VACCINATION: ICD-10-CM

## 2021-01-05 PROCEDURE — 91300 COVID-19, MRNA, LNP-S, PF, 30 MCG/0.3 ML DOSE VACCINE: CPT | Mod: PBBFAC | Performed by: INTERNAL MEDICINE

## 2021-01-09 ENCOUNTER — TELEPHONE (OUTPATIENT)
Dept: PRIMARY CARE CLINIC | Facility: OTHER | Age: 56
End: 2021-01-09

## 2021-01-09 ENCOUNTER — CLINICAL SUPPORT (OUTPATIENT)
Dept: URGENT CARE | Facility: CLINIC | Age: 56
End: 2021-01-09
Payer: COMMERCIAL

## 2021-01-09 DIAGNOSIS — R51.9 NONINTRACTABLE HEADACHE, UNSPECIFIED CHRONICITY PATTERN, UNSPECIFIED HEADACHE TYPE: ICD-10-CM

## 2021-01-09 DIAGNOSIS — R23.1 CLAMMY SKIN: ICD-10-CM

## 2021-01-09 DIAGNOSIS — J02.9 SORE THROAT: Primary | ICD-10-CM

## 2021-01-09 DIAGNOSIS — J02.9 SORE THROAT: ICD-10-CM

## 2021-01-09 LAB
CTP QC/QA: YES
SARS-COV-2 RDRP RESP QL NAA+PROBE: NEGATIVE

## 2021-01-09 PROCEDURE — U0002 COVID-19 LAB TEST NON-CDC: HCPCS | Mod: QW,S$GLB,, | Performed by: INTERNAL MEDICINE

## 2021-01-09 PROCEDURE — U0002: ICD-10-PCS | Mod: QW,S$GLB,, | Performed by: INTERNAL MEDICINE

## 2021-01-26 ENCOUNTER — IMMUNIZATION (OUTPATIENT)
Dept: INTERNAL MEDICINE | Facility: CLINIC | Age: 56
End: 2021-01-26
Payer: COMMERCIAL

## 2021-01-26 DIAGNOSIS — Z23 NEED FOR VACCINATION: Primary | ICD-10-CM

## 2021-01-26 PROCEDURE — 0002A COVID-19, MRNA, LNP-S, PF, 30 MCG/0.3 ML DOSE VACCINE: CPT | Mod: PBBFAC | Performed by: INTERNAL MEDICINE

## 2021-01-26 PROCEDURE — 91300 COVID-19, MRNA, LNP-S, PF, 30 MCG/0.3 ML DOSE VACCINE: CPT | Mod: PBBFAC | Performed by: INTERNAL MEDICINE

## 2021-03-18 ENCOUNTER — PATIENT MESSAGE (OUTPATIENT)
Dept: RESEARCH | Facility: HOSPITAL | Age: 56
End: 2021-03-18

## 2021-03-26 ENCOUNTER — PATIENT MESSAGE (OUTPATIENT)
Dept: RESEARCH | Facility: HOSPITAL | Age: 56
End: 2021-03-26

## 2021-03-30 DIAGNOSIS — M79.18 MUSCULOSKELETAL PAIN: ICD-10-CM

## 2021-03-30 DIAGNOSIS — M15.9 PRIMARY OSTEOARTHRITIS INVOLVING MULTIPLE JOINTS: ICD-10-CM

## 2021-03-31 RX ORDER — CELECOXIB 200 MG/1
200 CAPSULE ORAL DAILY
Qty: 90 CAPSULE | Refills: 0 | Status: SHIPPED | OUTPATIENT
Start: 2021-03-31 | End: 2021-07-22 | Stop reason: SDUPTHER

## 2021-04-05 ENCOUNTER — PATIENT MESSAGE (OUTPATIENT)
Dept: INTERNAL MEDICINE | Facility: CLINIC | Age: 56
End: 2021-04-05

## 2021-04-05 ENCOUNTER — PATIENT MESSAGE (OUTPATIENT)
Dept: ADMINISTRATIVE | Facility: HOSPITAL | Age: 56
End: 2021-04-05

## 2021-04-13 DIAGNOSIS — M77.01 GOLFER'S ELBOW, RIGHT: Primary | ICD-10-CM

## 2021-04-20 ENCOUNTER — CLINICAL SUPPORT (OUTPATIENT)
Dept: REHABILITATION | Facility: HOSPITAL | Age: 56
End: 2021-04-20
Payer: COMMERCIAL

## 2021-04-20 DIAGNOSIS — R29.898 DECREASED GRIP STRENGTH OF RIGHT HAND: ICD-10-CM

## 2021-04-20 DIAGNOSIS — M25.521 ELBOW PAIN, RIGHT: ICD-10-CM

## 2021-04-20 DIAGNOSIS — M77.01 GOLFER'S ELBOW, RIGHT: ICD-10-CM

## 2021-04-20 PROCEDURE — 97165 OT EVAL LOW COMPLEX 30 MIN: CPT

## 2021-04-20 PROCEDURE — 97035 APP MDLTY 1+ULTRASOUND EA 15: CPT

## 2021-04-20 PROCEDURE — 97110 THERAPEUTIC EXERCISES: CPT

## 2021-04-27 ENCOUNTER — CLINICAL SUPPORT (OUTPATIENT)
Dept: REHABILITATION | Facility: HOSPITAL | Age: 56
End: 2021-04-27
Payer: COMMERCIAL

## 2021-04-27 DIAGNOSIS — M25.521 ELBOW PAIN, RIGHT: ICD-10-CM

## 2021-04-27 DIAGNOSIS — R29.898 DECREASED GRIP STRENGTH OF RIGHT HAND: ICD-10-CM

## 2021-04-27 PROCEDURE — 97035 APP MDLTY 1+ULTRASOUND EA 15: CPT

## 2021-04-27 PROCEDURE — 97140 MANUAL THERAPY 1/> REGIONS: CPT

## 2021-04-27 PROCEDURE — 97110 THERAPEUTIC EXERCISES: CPT

## 2021-05-04 ENCOUNTER — CLINICAL SUPPORT (OUTPATIENT)
Dept: REHABILITATION | Facility: HOSPITAL | Age: 56
End: 2021-05-04
Payer: COMMERCIAL

## 2021-05-04 DIAGNOSIS — M25.521 ELBOW PAIN, RIGHT: ICD-10-CM

## 2021-05-04 DIAGNOSIS — R29.898 DECREASED GRIP STRENGTH OF RIGHT HAND: ICD-10-CM

## 2021-05-04 DIAGNOSIS — E78.2 MIXED HYPERLIPIDEMIA: ICD-10-CM

## 2021-05-04 PROCEDURE — 97140 MANUAL THERAPY 1/> REGIONS: CPT

## 2021-05-04 PROCEDURE — 97035 APP MDLTY 1+ULTRASOUND EA 15: CPT

## 2021-05-04 RX ORDER — ROSUVASTATIN CALCIUM 40 MG/1
40 TABLET, COATED ORAL NIGHTLY
Qty: 90 TABLET | Refills: 3 | OUTPATIENT
Start: 2021-05-04 | End: 2022-04-29

## 2021-05-05 ENCOUNTER — PATIENT MESSAGE (OUTPATIENT)
Dept: INTERNAL MEDICINE | Facility: CLINIC | Age: 56
End: 2021-05-05

## 2021-05-05 DIAGNOSIS — E78.2 MIXED HYPERLIPIDEMIA: ICD-10-CM

## 2021-05-06 ENCOUNTER — LAB VISIT (OUTPATIENT)
Dept: LAB | Facility: OTHER | Age: 56
End: 2021-05-06
Attending: INTERNAL MEDICINE
Payer: COMMERCIAL

## 2021-05-06 ENCOUNTER — OFFICE VISIT (OUTPATIENT)
Dept: INTERNAL MEDICINE | Facility: CLINIC | Age: 56
End: 2021-05-06
Attending: INTERNAL MEDICINE
Payer: COMMERCIAL

## 2021-05-06 VITALS — OXYGEN SATURATION: 94 % | SYSTOLIC BLOOD PRESSURE: 120 MMHG | DIASTOLIC BLOOD PRESSURE: 91 MMHG | HEART RATE: 62 BPM

## 2021-05-06 DIAGNOSIS — E78.49 FAMILIAL HYPERLIPIDEMIA, HIGH LDL: ICD-10-CM

## 2021-05-06 DIAGNOSIS — Z00.00 ANNUAL PHYSICAL EXAM: ICD-10-CM

## 2021-05-06 DIAGNOSIS — E78.2 MIXED HYPERLIPIDEMIA: ICD-10-CM

## 2021-05-06 DIAGNOSIS — F43.23 ADJUSTMENT DISORDER WITH MIXED ANXIETY AND DEPRESSED MOOD: ICD-10-CM

## 2021-05-06 DIAGNOSIS — Z00.00 ANNUAL PHYSICAL EXAM: Primary | ICD-10-CM

## 2021-05-06 DIAGNOSIS — Z86.2 HISTORY OF SARCOIDOSIS: ICD-10-CM

## 2021-05-06 DIAGNOSIS — R93.89 ABNORMAL CHEST CT: ICD-10-CM

## 2021-05-06 DIAGNOSIS — R91.1 SOLITARY PULMONARY NODULE: ICD-10-CM

## 2021-05-06 LAB
ALBUMIN SERPL BCP-MCNC: 4.2 G/DL (ref 3.5–5.2)
ALP SERPL-CCNC: 77 U/L (ref 55–135)
ALT SERPL W/O P-5'-P-CCNC: 28 U/L (ref 10–44)
ANION GAP SERPL CALC-SCNC: 7 MMOL/L (ref 8–16)
AST SERPL-CCNC: 20 U/L (ref 10–40)
BASOPHILS # BLD AUTO: 0.02 K/UL (ref 0–0.2)
BASOPHILS NFR BLD: 0.7 % (ref 0–1.9)
BILIRUB SERPL-MCNC: 0.5 MG/DL (ref 0.1–1)
BUN SERPL-MCNC: 16 MG/DL (ref 6–20)
CALCIUM SERPL-MCNC: 9.5 MG/DL (ref 8.7–10.5)
CHLORIDE SERPL-SCNC: 105 MMOL/L (ref 95–110)
CHOLEST SERPL-MCNC: 162 MG/DL (ref 120–199)
CHOLEST/HDLC SERPL: 3.7 {RATIO} (ref 2–5)
CO2 SERPL-SCNC: 30 MMOL/L (ref 23–29)
COMPLEXED PSA SERPL-MCNC: 0.24 NG/ML (ref 0–4)
CREAT SERPL-MCNC: 0.8 MG/DL (ref 0.5–1.4)
DIFFERENTIAL METHOD: ABNORMAL
EOSINOPHIL # BLD AUTO: 0 K/UL (ref 0–0.5)
EOSINOPHIL NFR BLD: 1.4 % (ref 0–8)
ERYTHROCYTE [DISTWIDTH] IN BLOOD BY AUTOMATED COUNT: 13.2 % (ref 11.5–14.5)
EST. GFR  (AFRICAN AMERICAN): >60 ML/MIN/1.73 M^2
EST. GFR  (NON AFRICAN AMERICAN): >60 ML/MIN/1.73 M^2
ESTIMATED AVG GLUCOSE: 111 MG/DL (ref 68–131)
GLUCOSE SERPL-MCNC: 95 MG/DL (ref 70–110)
HBA1C MFR BLD: 5.5 % (ref 4–5.6)
HCT VFR BLD AUTO: 46.2 % (ref 40–54)
HDLC SERPL-MCNC: 44 MG/DL (ref 40–75)
HDLC SERPL: 27.2 % (ref 20–50)
HGB BLD-MCNC: 15.1 G/DL (ref 14–18)
IMM GRANULOCYTES # BLD AUTO: 0 K/UL (ref 0–0.04)
IMM GRANULOCYTES NFR BLD AUTO: 0 % (ref 0–0.5)
LDLC SERPL CALC-MCNC: 104.6 MG/DL (ref 63–159)
LYMPHOCYTES # BLD AUTO: 1.4 K/UL (ref 1–4.8)
LYMPHOCYTES NFR BLD: 47.8 % (ref 18–48)
MCH RBC QN AUTO: 29.8 PG (ref 27–31)
MCHC RBC AUTO-ENTMCNC: 32.7 G/DL (ref 32–36)
MCV RBC AUTO: 91 FL (ref 82–98)
MONOCYTES # BLD AUTO: 0.4 K/UL (ref 0.3–1)
MONOCYTES NFR BLD: 13.8 % (ref 4–15)
NEUTROPHILS # BLD AUTO: 1.1 K/UL (ref 1.8–7.7)
NEUTROPHILS NFR BLD: 36.3 % (ref 38–73)
NONHDLC SERPL-MCNC: 118 MG/DL
NRBC BLD-RTO: 0 /100 WBC
PLATELET # BLD AUTO: 233 K/UL (ref 150–450)
PMV BLD AUTO: 10.4 FL (ref 9.2–12.9)
POTASSIUM SERPL-SCNC: 3.9 MMOL/L (ref 3.5–5.1)
PROT SERPL-MCNC: 7.5 G/DL (ref 6–8.4)
RBC # BLD AUTO: 5.07 M/UL (ref 4.6–6.2)
SODIUM SERPL-SCNC: 142 MMOL/L (ref 136–145)
TRIGL SERPL-MCNC: 67 MG/DL (ref 30–150)
TSH SERPL DL<=0.005 MIU/L-ACNC: 1.17 UIU/ML (ref 0.4–4)
WBC # BLD AUTO: 2.89 K/UL (ref 3.9–12.7)

## 2021-05-06 PROCEDURE — 99396 PR PREVENTIVE VISIT,EST,40-64: ICD-10-PCS | Mod: S$GLB,,, | Performed by: INTERNAL MEDICINE

## 2021-05-06 PROCEDURE — 36415 COLL VENOUS BLD VENIPUNCTURE: CPT | Performed by: INTERNAL MEDICINE

## 2021-05-06 PROCEDURE — 84153 ASSAY OF PSA TOTAL: CPT | Performed by: INTERNAL MEDICINE

## 2021-05-06 PROCEDURE — 99396 PREV VISIT EST AGE 40-64: CPT | Mod: S$GLB,,, | Performed by: INTERNAL MEDICINE

## 2021-05-06 PROCEDURE — 80061 LIPID PANEL: CPT | Performed by: INTERNAL MEDICINE

## 2021-05-06 PROCEDURE — 86703 HIV-1/HIV-2 1 RESULT ANTBDY: CPT | Performed by: INTERNAL MEDICINE

## 2021-05-06 PROCEDURE — 99999 PR PBB SHADOW E&M-EST. PATIENT-LVL III: CPT | Mod: PBBFAC,,, | Performed by: INTERNAL MEDICINE

## 2021-05-06 PROCEDURE — 80053 COMPREHEN METABOLIC PANEL: CPT | Performed by: INTERNAL MEDICINE

## 2021-05-06 PROCEDURE — 83036 HEMOGLOBIN GLYCOSYLATED A1C: CPT | Performed by: INTERNAL MEDICINE

## 2021-05-06 PROCEDURE — 84443 ASSAY THYROID STIM HORMONE: CPT | Performed by: INTERNAL MEDICINE

## 2021-05-06 PROCEDURE — 1125F AMNT PAIN NOTED PAIN PRSNT: CPT | Mod: S$GLB,,, | Performed by: INTERNAL MEDICINE

## 2021-05-06 PROCEDURE — 99999 PR PBB SHADOW E&M-EST. PATIENT-LVL III: ICD-10-PCS | Mod: PBBFAC,,, | Performed by: INTERNAL MEDICINE

## 2021-05-06 PROCEDURE — 1125F PR PAIN SEVERITY QUANTIFIED, PAIN PRESENT: ICD-10-PCS | Mod: S$GLB,,, | Performed by: INTERNAL MEDICINE

## 2021-05-06 PROCEDURE — 85025 COMPLETE CBC W/AUTO DIFF WBC: CPT | Performed by: INTERNAL MEDICINE

## 2021-05-06 RX ORDER — ROSUVASTATIN CALCIUM 40 MG/1
40 TABLET, COATED ORAL NIGHTLY
Qty: 90 TABLET | Refills: 3 | Status: CANCELLED | OUTPATIENT
Start: 2021-05-06 | End: 2022-05-01

## 2021-05-06 RX ORDER — ROSUVASTATIN CALCIUM 40 MG/1
40 TABLET, COATED ORAL NIGHTLY
Qty: 90 TABLET | Refills: 0 | Status: CANCELLED | OUTPATIENT
Start: 2021-05-06 | End: 2022-05-01

## 2021-05-07 LAB — HIV 1+2 AB+HIV1 P24 AG SERPL QL IA: NEGATIVE

## 2021-05-07 RX ORDER — ROSUVASTATIN CALCIUM 40 MG/1
40 TABLET, COATED ORAL NIGHTLY
Qty: 90 TABLET | Refills: 3 | Status: SHIPPED | OUTPATIENT
Start: 2021-05-07 | End: 2022-01-06 | Stop reason: SDUPTHER

## 2021-05-11 ENCOUNTER — HOSPITAL ENCOUNTER (OUTPATIENT)
Dept: RADIOLOGY | Facility: OTHER | Age: 56
Discharge: HOME | End: 2021-05-11
Attending: INTERNAL MEDICINE
Payer: COMMERCIAL

## 2021-05-11 ENCOUNTER — TELEPHONE (OUTPATIENT)
Dept: INTERNAL MEDICINE | Facility: CLINIC | Age: 56
End: 2021-05-11

## 2021-05-11 ENCOUNTER — CLINICAL SUPPORT (OUTPATIENT)
Dept: INTERNAL MEDICINE | Facility: CLINIC | Age: 56
End: 2021-05-11
Payer: COMMERCIAL

## 2021-05-11 VITALS — DIASTOLIC BLOOD PRESSURE: 83 MMHG | SYSTOLIC BLOOD PRESSURE: 115 MMHG

## 2021-05-11 DIAGNOSIS — R91.1 SOLITARY PULMONARY NODULE: ICD-10-CM

## 2021-05-11 PROCEDURE — 71250 CT THORAX DX C-: CPT | Mod: TC

## 2021-05-11 PROCEDURE — 99999 PR PBB SHADOW E&M-EST. PATIENT-LVL I: ICD-10-PCS | Mod: PBBFAC,,,

## 2021-05-11 PROCEDURE — 99999 PR PBB SHADOW E&M-EST. PATIENT-LVL I: CPT | Mod: PBBFAC,,,

## 2021-05-11 PROCEDURE — 71250 CT CHEST WITHOUT CONTRAST: ICD-10-PCS | Mod: 26,,, | Performed by: RADIOLOGY

## 2021-05-11 PROCEDURE — 71250 CT THORAX DX C-: CPT | Mod: 26,,, | Performed by: RADIOLOGY

## 2021-05-26 ENCOUNTER — PATIENT MESSAGE (OUTPATIENT)
Dept: INTERNAL MEDICINE | Facility: CLINIC | Age: 56
End: 2021-05-26

## 2021-07-22 DIAGNOSIS — M79.18 MUSCULOSKELETAL PAIN: ICD-10-CM

## 2021-07-22 DIAGNOSIS — M15.9 PRIMARY OSTEOARTHRITIS INVOLVING MULTIPLE JOINTS: ICD-10-CM

## 2021-07-22 RX ORDER — CELECOXIB 200 MG/1
200 CAPSULE ORAL DAILY
Qty: 90 CAPSULE | Refills: 0 | Status: SHIPPED | OUTPATIENT
Start: 2021-07-22 | End: 2021-11-04 | Stop reason: SDUPTHER

## 2021-08-26 ENCOUNTER — OFFICE VISIT (OUTPATIENT)
Dept: SLEEP MEDICINE | Facility: CLINIC | Age: 56
End: 2021-08-26
Payer: COMMERCIAL

## 2021-08-26 VITALS
SYSTOLIC BLOOD PRESSURE: 120 MMHG | HEART RATE: 65 BPM | DIASTOLIC BLOOD PRESSURE: 90 MMHG | WEIGHT: 231 LBS | HEIGHT: 71 IN | BODY MASS INDEX: 32.34 KG/M2

## 2021-08-26 DIAGNOSIS — G47.33 OSA (OBSTRUCTIVE SLEEP APNEA): Primary | ICD-10-CM

## 2021-08-26 DIAGNOSIS — D86.2 SARCOIDOSIS OF LUNG WITH SARCOIDOSIS OF LYMPH NODES: ICD-10-CM

## 2021-08-26 PROCEDURE — 3080F PR MOST RECENT DIASTOLIC BLOOD PRESSURE >= 90 MM HG: ICD-10-PCS | Mod: CPTII,S$GLB,, | Performed by: INTERNAL MEDICINE

## 2021-08-26 PROCEDURE — 3044F PR MOST RECENT HEMOGLOBIN A1C LEVEL <7.0%: ICD-10-PCS | Mod: CPTII,S$GLB,, | Performed by: INTERNAL MEDICINE

## 2021-08-26 PROCEDURE — 1160F RVW MEDS BY RX/DR IN RCRD: CPT | Mod: CPTII,S$GLB,, | Performed by: INTERNAL MEDICINE

## 2021-08-26 PROCEDURE — 99202 OFFICE O/P NEW SF 15 MIN: CPT | Mod: S$GLB,,, | Performed by: INTERNAL MEDICINE

## 2021-08-26 PROCEDURE — 99202 PR OFFICE/OUTPT VISIT, NEW, LEVL II, 15-29 MIN: ICD-10-PCS | Mod: S$GLB,,, | Performed by: INTERNAL MEDICINE

## 2021-08-26 PROCEDURE — 1159F PR MEDICATION LIST DOCUMENTED IN MEDICAL RECORD: ICD-10-PCS | Mod: CPTII,S$GLB,, | Performed by: INTERNAL MEDICINE

## 2021-08-26 PROCEDURE — 1160F PR REVIEW ALL MEDS BY PRESCRIBER/CLIN PHARMACIST DOCUMENTED: ICD-10-PCS | Mod: CPTII,S$GLB,, | Performed by: INTERNAL MEDICINE

## 2021-08-26 PROCEDURE — 99999 PR PBB SHADOW E&M-EST. PATIENT-LVL III: ICD-10-PCS | Mod: PBBFAC,,, | Performed by: INTERNAL MEDICINE

## 2021-08-26 PROCEDURE — 3008F PR BODY MASS INDEX (BMI) DOCUMENTED: ICD-10-PCS | Mod: CPTII,S$GLB,, | Performed by: INTERNAL MEDICINE

## 2021-08-26 PROCEDURE — 3008F BODY MASS INDEX DOCD: CPT | Mod: CPTII,S$GLB,, | Performed by: INTERNAL MEDICINE

## 2021-08-26 PROCEDURE — 1126F AMNT PAIN NOTED NONE PRSNT: CPT | Mod: CPTII,S$GLB,, | Performed by: INTERNAL MEDICINE

## 2021-08-26 PROCEDURE — 3044F HG A1C LEVEL LT 7.0%: CPT | Mod: CPTII,S$GLB,, | Performed by: INTERNAL MEDICINE

## 2021-08-26 PROCEDURE — 1159F MED LIST DOCD IN RCRD: CPT | Mod: CPTII,S$GLB,, | Performed by: INTERNAL MEDICINE

## 2021-08-26 PROCEDURE — 3074F PR MOST RECENT SYSTOLIC BLOOD PRESSURE < 130 MM HG: ICD-10-PCS | Mod: CPTII,S$GLB,, | Performed by: INTERNAL MEDICINE

## 2021-08-26 PROCEDURE — 1126F PR PAIN SEVERITY QUANTIFIED, NO PAIN PRESENT: ICD-10-PCS | Mod: CPTII,S$GLB,, | Performed by: INTERNAL MEDICINE

## 2021-08-26 PROCEDURE — 3074F SYST BP LT 130 MM HG: CPT | Mod: CPTII,S$GLB,, | Performed by: INTERNAL MEDICINE

## 2021-08-26 PROCEDURE — 3080F DIAST BP >= 90 MM HG: CPT | Mod: CPTII,S$GLB,, | Performed by: INTERNAL MEDICINE

## 2021-08-26 PROCEDURE — 99999 PR PBB SHADOW E&M-EST. PATIENT-LVL III: CPT | Mod: PBBFAC,,, | Performed by: INTERNAL MEDICINE

## 2021-09-08 ENCOUNTER — TELEPHONE (OUTPATIENT)
Dept: SLEEP MEDICINE | Facility: OTHER | Age: 56
End: 2021-09-08

## 2021-09-14 ENCOUNTER — HOSPITAL ENCOUNTER (OUTPATIENT)
Dept: SLEEP MEDICINE | Facility: OTHER | Age: 56
Discharge: HOME | End: 2021-09-14
Attending: INTERNAL MEDICINE
Payer: COMMERCIAL

## 2021-09-14 DIAGNOSIS — G47.33 OSA (OBSTRUCTIVE SLEEP APNEA): ICD-10-CM

## 2021-09-14 PROCEDURE — 95800 SLP STDY UNATTENDED: CPT

## 2021-09-16 ENCOUNTER — PATIENT MESSAGE (OUTPATIENT)
Dept: SLEEP MEDICINE | Facility: CLINIC | Age: 56
End: 2021-09-16

## 2021-10-29 ENCOUNTER — IMMUNIZATION (OUTPATIENT)
Dept: INTERNAL MEDICINE | Facility: CLINIC | Age: 56
End: 2021-10-29
Payer: COMMERCIAL

## 2021-10-29 DIAGNOSIS — Z23 NEED FOR VACCINATION: Primary | ICD-10-CM

## 2021-10-29 PROCEDURE — 91300 COVID-19, MRNA, LNP-S, PF, 30 MCG/0.3 ML DOSE VACCINE: CPT | Mod: PBBFAC | Performed by: INTERNAL MEDICINE

## 2021-10-29 PROCEDURE — 0003A COVID-19, MRNA, LNP-S, PF, 30 MCG/0.3 ML DOSE VACCINE: CPT | Mod: PBBFAC | Performed by: INTERNAL MEDICINE

## 2021-11-04 DIAGNOSIS — F43.23 ADJUSTMENT DISORDER WITH MIXED ANXIETY AND DEPRESSED MOOD: ICD-10-CM

## 2021-11-04 DIAGNOSIS — M15.9 PRIMARY OSTEOARTHRITIS INVOLVING MULTIPLE JOINTS: ICD-10-CM

## 2021-11-04 DIAGNOSIS — M79.18 MUSCULOSKELETAL PAIN: ICD-10-CM

## 2021-11-04 RX ORDER — SERTRALINE HYDROCHLORIDE 50 MG/1
50 TABLET, FILM COATED ORAL DAILY
Qty: 90 TABLET | Refills: 3 | Status: SHIPPED | OUTPATIENT
Start: 2021-11-04 | End: 2022-01-06 | Stop reason: SDUPTHER

## 2021-11-04 RX ORDER — CELECOXIB 200 MG/1
200 CAPSULE ORAL DAILY
Qty: 90 CAPSULE | Refills: 0 | Status: SHIPPED | OUTPATIENT
Start: 2021-11-04 | End: 2022-01-06 | Stop reason: SDUPTHER

## 2021-11-11 RX ORDER — SCOLOPAMINE TRANSDERMAL SYSTEM 1 MG/1
1 PATCH, EXTENDED RELEASE TRANSDERMAL
Qty: 10 PATCH | Refills: 0 | Status: SHIPPED | OUTPATIENT
Start: 2021-11-11 | End: 2021-12-16 | Stop reason: SDUPTHER

## 2022-01-06 ENCOUNTER — TELEPHONE (OUTPATIENT)
Dept: INTERNAL MEDICINE | Facility: CLINIC | Age: 57
End: 2022-01-06
Payer: COMMERCIAL

## 2022-01-06 DIAGNOSIS — M79.18 MUSCULOSKELETAL PAIN: ICD-10-CM

## 2022-01-06 DIAGNOSIS — R42 VERTIGO: ICD-10-CM

## 2022-01-06 DIAGNOSIS — E78.2 MIXED HYPERLIPIDEMIA: ICD-10-CM

## 2022-01-06 DIAGNOSIS — T75.3XXS MOTION SICKNESS, SEQUELA: ICD-10-CM

## 2022-01-06 DIAGNOSIS — K21.9 GASTROESOPHAGEAL REFLUX DISEASE, UNSPECIFIED WHETHER ESOPHAGITIS PRESENT: Primary | ICD-10-CM

## 2022-01-06 DIAGNOSIS — M15.9 PRIMARY OSTEOARTHRITIS INVOLVING MULTIPLE JOINTS: ICD-10-CM

## 2022-01-06 DIAGNOSIS — I10 PRIMARY HYPERTENSION: ICD-10-CM

## 2022-01-06 DIAGNOSIS — F43.23 ADJUSTMENT DISORDER WITH MIXED ANXIETY AND DEPRESSED MOOD: ICD-10-CM

## 2022-01-06 RX ORDER — LOSARTAN POTASSIUM AND HYDROCHLOROTHIAZIDE 12.5; 1 MG/1; MG/1
1 TABLET ORAL DAILY
Qty: 30 TABLET | Refills: 3 | Status: SHIPPED | OUTPATIENT
Start: 2022-01-06 | End: 2022-03-24 | Stop reason: SDUPTHER

## 2022-01-06 RX ORDER — CELECOXIB 200 MG/1
200 CAPSULE ORAL DAILY
Qty: 90 CAPSULE | Refills: 0 | Status: SHIPPED | OUTPATIENT
Start: 2022-01-06 | End: 2022-06-15 | Stop reason: SDUPTHER

## 2022-01-06 RX ORDER — ROSUVASTATIN CALCIUM 40 MG/1
40 TABLET, COATED ORAL NIGHTLY
Qty: 90 TABLET | Refills: 3 | Status: SHIPPED | OUTPATIENT
Start: 2022-01-06 | End: 2022-12-27 | Stop reason: SDUPTHER

## 2022-01-06 RX ORDER — SERTRALINE HYDROCHLORIDE 50 MG/1
50 TABLET, FILM COATED ORAL DAILY
Qty: 90 TABLET | Refills: 3 | Status: SHIPPED | OUTPATIENT
Start: 2022-01-06 | End: 2022-12-27 | Stop reason: SDUPTHER

## 2022-01-06 RX ORDER — SCOLOPAMINE TRANSDERMAL SYSTEM 1 MG/1
PATCH, EXTENDED RELEASE TRANSDERMAL
Qty: 8 PATCH | Refills: 0 | Status: SHIPPED | OUTPATIENT
Start: 2022-01-06 | End: 2022-03-24

## 2022-01-06 RX ORDER — OMEPRAZOLE 40 MG/1
CAPSULE, DELAYED RELEASE ORAL
Qty: 90 CAPSULE | Refills: 3 | Status: SHIPPED | OUTPATIENT
Start: 2022-01-06 | End: 2022-12-27 | Stop reason: SDUPTHER

## 2022-01-06 NOTE — TELEPHONE ENCOUNTER
Spoke to Mr. Jones and informed him per Dr. 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  Patient states understanding.

## 2022-01-06 NOTE — TELEPHONE ENCOUNTER
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    JL

## 2022-01-20 ENCOUNTER — CLINICAL SUPPORT (OUTPATIENT)
Dept: INTERNAL MEDICINE | Facility: CLINIC | Age: 57
End: 2022-01-20
Payer: COMMERCIAL

## 2022-01-20 VITALS — DIASTOLIC BLOOD PRESSURE: 82 MMHG | OXYGEN SATURATION: 96 % | SYSTOLIC BLOOD PRESSURE: 114 MMHG | HEART RATE: 73 BPM

## 2022-01-20 PROCEDURE — 99999 PR PBB SHADOW E&M-EST. PATIENT-LVL I: CPT | Mod: PBBFAC,,,

## 2022-01-20 PROCEDURE — 99999 PR PBB SHADOW E&M-EST. PATIENT-LVL I: ICD-10-PCS | Mod: PBBFAC,,,

## 2022-01-20 NOTE — Clinical Note
Does patient have record of home blood pressure readings yes. Readings have been averaging N/A.  Last dose of blood pressure medication was taken at N/A. Patient is asymptomatic.  Complains of N/A.  BP: 114/82 , Pulse: 73 .

## 2022-01-20 NOTE — PROGRESS NOTES
Corazon Jones 56 y.o. male is here today for Blood Pressure check.   History of HTN yes.    Review of patient's allergies indicates:   Allergen Reactions    Percocet [oxycodone-acetaminophen]     Suture, silk     Adhesive Rash     Creatinine   Date Value Ref Range Status   05/06/2021 0.8 0.5 - 1.4 mg/dL Final     Sodium   Date Value Ref Range Status   05/06/2021 142 136 - 145 mmol/L Final     Potassium   Date Value Ref Range Status   05/06/2021 3.9 3.5 - 5.1 mmol/L Final   ]  Patient verifies taking blood pressure medications on a regular basis at the same time of the day.     Current Outpatient Medications:     betamethasone dipropionate (DIPROLENE) 0.05 % ointment, Apply to affected area twice daily for up to 2 weeks, Disp: 45 g, Rfl: 0    celecoxib (CELEBREX) 200 MG capsule, Take 1 capsule (200 mg total) by mouth once daily., Disp: 90 capsule, Rfl: 0    lidocaine 3 % Crea, APPLY RECTALLY EVERY 4 HOURS AS NEEDED, Disp: 28.35 g, Rfl: 3    losartan-hydrochlorothiazide 100-12.5 mg (HYZAAR) 100-12.5 mg Tab, Take 1 tablet by mouth once daily., Disp: 30 tablet, Rfl: 3    meclizine (ANTIVERT) 25 mg tablet, Take 1 tablet (25 mg total) by mouth 3 (three) times daily as needed., Disp: 30 tablet, Rfl: 0    omeprazole (PRILOSEC) 40 MG capsule, TAKE 1 CAPSULE BY MOUTH EVERY MORNING AS DIRECTED, Disp: 90 capsule, Rfl: 3    rosuvastatin (CRESTOR) 40 MG Tab, Take 1 tablet (40 mg total) by mouth every evening., Disp: 90 tablet, Rfl: 3    scopolamine (TRANSDERM-SCOP) 1.3-1.5 mg (1 mg over 3 days), Place 1 patch onto the skin every 3 days, Disp: 8 patch, Rfl: 0    sertraline (ZOLOFT) 50 MG tablet, Take 1 tablet (50 mg total) by mouth once daily., Disp: 90 tablet, Rfl: 3  Does patient have record of home blood pressure readings yes. Readings have been averaging N/A.   Last dose of blood pressure medication was taken at N/A.  Patient is asymptomatic.   Complains of N/A.    BP: 114/82 , Pulse: 73 .    Dr. Santos  notified.

## 2022-03-21 ENCOUNTER — TELEPHONE (OUTPATIENT)
Dept: INTERNAL MEDICINE | Facility: CLINIC | Age: 57
End: 2022-03-21
Payer: COMMERCIAL

## 2022-03-21 ENCOUNTER — PATIENT MESSAGE (OUTPATIENT)
Dept: INTERNAL MEDICINE | Facility: CLINIC | Age: 57
End: 2022-03-21
Payer: COMMERCIAL

## 2022-03-21 DIAGNOSIS — R10.9 FLANK PAIN: ICD-10-CM

## 2022-03-21 DIAGNOSIS — Z00.00 ANNUAL PHYSICAL EXAM: Primary | ICD-10-CM

## 2022-03-21 DIAGNOSIS — R10.32 LEFT LOWER QUADRANT ABDOMINAL PAIN: ICD-10-CM

## 2022-03-21 NOTE — TELEPHONE ENCOUNTER
Needs urgent care spot Today, or as available    And establish care appt at first available    -JL

## 2022-03-23 ENCOUNTER — LAB VISIT (OUTPATIENT)
Dept: LAB | Facility: OTHER | Age: 57
End: 2022-03-23
Payer: COMMERCIAL

## 2022-03-23 DIAGNOSIS — Z86.2 HISTORY OF SARCOIDOSIS: ICD-10-CM

## 2022-03-23 DIAGNOSIS — Z00.00 ENCOUNTER FOR ANNUAL PHYSICAL EXAM: ICD-10-CM

## 2022-03-23 DIAGNOSIS — E78.5 HYPERLIPIDEMIA, UNSPECIFIED HYPERLIPIDEMIA TYPE: ICD-10-CM

## 2022-03-23 DIAGNOSIS — E78.5 HYPERLIPIDEMIA, UNSPECIFIED HYPERLIPIDEMIA TYPE: Primary | ICD-10-CM

## 2022-03-23 LAB
ALBUMIN SERPL BCP-MCNC: 4.2 G/DL (ref 3.5–5.2)
ALP SERPL-CCNC: 69 U/L (ref 55–135)
ALT SERPL W/O P-5'-P-CCNC: 33 U/L (ref 10–44)
ANION GAP SERPL CALC-SCNC: 10 MMOL/L (ref 8–16)
AST SERPL-CCNC: 23 U/L (ref 10–40)
BASOPHILS # BLD AUTO: 0.02 K/UL (ref 0–0.2)
BASOPHILS NFR BLD: 0.6 % (ref 0–1.9)
BILIRUB SERPL-MCNC: 0.6 MG/DL (ref 0.1–1)
BUN SERPL-MCNC: 17 MG/DL (ref 6–20)
CALCIUM SERPL-MCNC: 9.6 MG/DL (ref 8.7–10.5)
CHLORIDE SERPL-SCNC: 104 MMOL/L (ref 95–110)
CHOLEST SERPL-MCNC: 167 MG/DL (ref 120–199)
CHOLEST/HDLC SERPL: 3.7 {RATIO} (ref 2–5)
CO2 SERPL-SCNC: 28 MMOL/L (ref 23–29)
COMPLEXED PSA SERPL-MCNC: 0.33 NG/ML (ref 0–4)
CREAT SERPL-MCNC: 0.8 MG/DL (ref 0.5–1.4)
CRP SERPL-MCNC: 1.7 MG/L (ref 0–8.2)
DIFFERENTIAL METHOD: ABNORMAL
EOSINOPHIL # BLD AUTO: 0 K/UL (ref 0–0.5)
EOSINOPHIL NFR BLD: 1.1 % (ref 0–8)
ERYTHROCYTE [DISTWIDTH] IN BLOOD BY AUTOMATED COUNT: 13.1 % (ref 11.5–14.5)
ERYTHROCYTE [SEDIMENTATION RATE] IN BLOOD: 5 MM/HR (ref 0–10)
EST. GFR  (AFRICAN AMERICAN): >60 ML/MIN/1.73 M^2
EST. GFR  (NON AFRICAN AMERICAN): >60 ML/MIN/1.73 M^2
GLUCOSE SERPL-MCNC: 105 MG/DL (ref 70–110)
HCT VFR BLD AUTO: 47.3 % (ref 40–54)
HDLC SERPL-MCNC: 45 MG/DL (ref 40–75)
HDLC SERPL: 26.9 % (ref 20–50)
HGB BLD-MCNC: 15.8 G/DL (ref 14–18)
IMM GRANULOCYTES # BLD AUTO: 0 K/UL (ref 0–0.04)
IMM GRANULOCYTES NFR BLD AUTO: 0 % (ref 0–0.5)
LDLC SERPL CALC-MCNC: 105.6 MG/DL (ref 63–159)
LYMPHOCYTES # BLD AUTO: 1.6 K/UL (ref 1–4.8)
LYMPHOCYTES NFR BLD: 45.8 % (ref 18–48)
MCH RBC QN AUTO: 30.5 PG (ref 27–31)
MCHC RBC AUTO-ENTMCNC: 33.4 G/DL (ref 32–36)
MCV RBC AUTO: 91 FL (ref 82–98)
MONOCYTES # BLD AUTO: 0.5 K/UL (ref 0.3–1)
MONOCYTES NFR BLD: 13.4 % (ref 4–15)
NEUTROPHILS # BLD AUTO: 1.4 K/UL (ref 1.8–7.7)
NEUTROPHILS NFR BLD: 39.1 % (ref 38–73)
NONHDLC SERPL-MCNC: 122 MG/DL
NRBC BLD-RTO: 0 /100 WBC
PLATELET # BLD AUTO: 253 K/UL (ref 150–450)
PMV BLD AUTO: 9.6 FL (ref 9.2–12.9)
POTASSIUM SERPL-SCNC: 4.4 MMOL/L (ref 3.5–5.1)
PROT SERPL-MCNC: 7.4 G/DL (ref 6–8.4)
RBC # BLD AUTO: 5.18 M/UL (ref 4.6–6.2)
SODIUM SERPL-SCNC: 142 MMOL/L (ref 136–145)
TRIGL SERPL-MCNC: 82 MG/DL (ref 30–150)
WBC # BLD AUTO: 3.58 K/UL (ref 3.9–12.7)

## 2022-03-23 PROCEDURE — 85025 COMPLETE CBC W/AUTO DIFF WBC: CPT | Performed by: NURSE PRACTITIONER

## 2022-03-23 PROCEDURE — 85651 RBC SED RATE NONAUTOMATED: CPT | Performed by: NURSE PRACTITIONER

## 2022-03-23 PROCEDURE — 80053 COMPREHEN METABOLIC PANEL: CPT | Performed by: NURSE PRACTITIONER

## 2022-03-23 PROCEDURE — 80061 LIPID PANEL: CPT | Performed by: NURSE PRACTITIONER

## 2022-03-23 PROCEDURE — 36415 COLL VENOUS BLD VENIPUNCTURE: CPT | Performed by: NURSE PRACTITIONER

## 2022-03-23 PROCEDURE — 84153 ASSAY OF PSA TOTAL: CPT | Performed by: NURSE PRACTITIONER

## 2022-03-23 PROCEDURE — 86140 C-REACTIVE PROTEIN: CPT | Performed by: NURSE PRACTITIONER

## 2022-03-24 ENCOUNTER — HOSPITAL ENCOUNTER (OUTPATIENT)
Dept: RADIOLOGY | Facility: OTHER | Age: 57
Discharge: HOME | End: 2022-03-24
Attending: STUDENT IN AN ORGANIZED HEALTH CARE EDUCATION/TRAINING PROGRAM
Payer: COMMERCIAL

## 2022-03-24 ENCOUNTER — OFFICE VISIT (OUTPATIENT)
Dept: INTERNAL MEDICINE | Facility: CLINIC | Age: 57
End: 2022-03-24
Payer: COMMERCIAL

## 2022-03-24 VITALS
BODY MASS INDEX: 33.64 KG/M2 | WEIGHT: 240.31 LBS | DIASTOLIC BLOOD PRESSURE: 72 MMHG | HEART RATE: 67 BPM | SYSTOLIC BLOOD PRESSURE: 112 MMHG | HEIGHT: 71 IN | OXYGEN SATURATION: 94 %

## 2022-03-24 DIAGNOSIS — I10 PRIMARY HYPERTENSION: ICD-10-CM

## 2022-03-24 DIAGNOSIS — R10.32 LEFT LOWER QUADRANT PAIN: ICD-10-CM

## 2022-03-24 DIAGNOSIS — R11.0 NAUSEA: ICD-10-CM

## 2022-03-24 DIAGNOSIS — R10.9 ABDOMINAL PAIN, UNSPECIFIED ABDOMINAL LOCATION: Primary | ICD-10-CM

## 2022-03-24 PROCEDURE — 3008F PR BODY MASS INDEX (BMI) DOCUMENTED: ICD-10-PCS | Mod: CPTII,S$GLB,, | Performed by: STUDENT IN AN ORGANIZED HEALTH CARE EDUCATION/TRAINING PROGRAM

## 2022-03-24 PROCEDURE — 99999 PR PBB SHADOW E&M-EST. PATIENT-LVL III: ICD-10-PCS | Mod: PBBFAC,,, | Performed by: STUDENT IN AN ORGANIZED HEALTH CARE EDUCATION/TRAINING PROGRAM

## 2022-03-24 PROCEDURE — 3074F SYST BP LT 130 MM HG: CPT | Mod: CPTII,S$GLB,, | Performed by: STUDENT IN AN ORGANIZED HEALTH CARE EDUCATION/TRAINING PROGRAM

## 2022-03-24 PROCEDURE — 1159F PR MEDICATION LIST DOCUMENTED IN MEDICAL RECORD: ICD-10-PCS | Mod: CPTII,S$GLB,, | Performed by: STUDENT IN AN ORGANIZED HEALTH CARE EDUCATION/TRAINING PROGRAM

## 2022-03-24 PROCEDURE — 74177 CT ABD & PELVIS W/CONTRAST: CPT | Mod: TC

## 2022-03-24 PROCEDURE — 3078F PR MOST RECENT DIASTOLIC BLOOD PRESSURE < 80 MM HG: ICD-10-PCS | Mod: CPTII,S$GLB,, | Performed by: STUDENT IN AN ORGANIZED HEALTH CARE EDUCATION/TRAINING PROGRAM

## 2022-03-24 PROCEDURE — 74177 CT ABD & PELVIS W/CONTRAST: CPT | Mod: 26,,, | Performed by: RADIOLOGY

## 2022-03-24 PROCEDURE — 74177 CT ABDOMEN PELVIS WITH CONTRAST: ICD-10-PCS | Mod: 26,,, | Performed by: RADIOLOGY

## 2022-03-24 PROCEDURE — 99214 OFFICE O/P EST MOD 30 MIN: CPT | Mod: S$GLB,,, | Performed by: STUDENT IN AN ORGANIZED HEALTH CARE EDUCATION/TRAINING PROGRAM

## 2022-03-24 PROCEDURE — 25500020 PHARM REV CODE 255: Performed by: STUDENT IN AN ORGANIZED HEALTH CARE EDUCATION/TRAINING PROGRAM

## 2022-03-24 PROCEDURE — 3008F BODY MASS INDEX DOCD: CPT | Mod: CPTII,S$GLB,, | Performed by: STUDENT IN AN ORGANIZED HEALTH CARE EDUCATION/TRAINING PROGRAM

## 2022-03-24 PROCEDURE — 99214 PR OFFICE/OUTPT VISIT, EST, LEVL IV, 30-39 MIN: ICD-10-PCS | Mod: S$GLB,,, | Performed by: STUDENT IN AN ORGANIZED HEALTH CARE EDUCATION/TRAINING PROGRAM

## 2022-03-24 PROCEDURE — 3078F DIAST BP <80 MM HG: CPT | Mod: CPTII,S$GLB,, | Performed by: STUDENT IN AN ORGANIZED HEALTH CARE EDUCATION/TRAINING PROGRAM

## 2022-03-24 PROCEDURE — 99999 PR PBB SHADOW E&M-EST. PATIENT-LVL III: CPT | Mod: PBBFAC,,, | Performed by: STUDENT IN AN ORGANIZED HEALTH CARE EDUCATION/TRAINING PROGRAM

## 2022-03-24 PROCEDURE — A9698 NON-RAD CONTRAST MATERIALNOC: HCPCS | Performed by: STUDENT IN AN ORGANIZED HEALTH CARE EDUCATION/TRAINING PROGRAM

## 2022-03-24 PROCEDURE — 3074F PR MOST RECENT SYSTOLIC BLOOD PRESSURE < 130 MM HG: ICD-10-PCS | Mod: CPTII,S$GLB,, | Performed by: STUDENT IN AN ORGANIZED HEALTH CARE EDUCATION/TRAINING PROGRAM

## 2022-03-24 PROCEDURE — 1159F MED LIST DOCD IN RCRD: CPT | Mod: CPTII,S$GLB,, | Performed by: STUDENT IN AN ORGANIZED HEALTH CARE EDUCATION/TRAINING PROGRAM

## 2022-03-24 RX ORDER — LOSARTAN POTASSIUM AND HYDROCHLOROTHIAZIDE 12.5; 1 MG/1; MG/1
1 TABLET ORAL DAILY
Qty: 90 TABLET | Refills: 3 | Status: SHIPPED | OUTPATIENT
Start: 2022-03-24 | End: 2022-12-27 | Stop reason: SDUPTHER

## 2022-03-24 RX ADMIN — IOHEXOL 100 ML: 350 INJECTION, SOLUTION INTRAVENOUS at 07:03

## 2022-03-24 RX ADMIN — IOHEXOL 1000 ML: 12 SOLUTION ORAL at 06:03

## 2022-03-24 NOTE — PROGRESS NOTES
[facility]    Subjective:       Patient ID: Corazon Jones is a 56 y.o. male.    Chief Complaint: Back Pain      History was obtained from the patient and supplemented through chart review.  This patient is normally followed by a colleague of [facility], who is unavailable today.  The patient is new to me.    Pt is planning to establish care with me in June.    HPI:    Patient is a 56 y.o. male who presents for left flank pain.    Onset: approx 3-4 with onset after work-out and then big night out at restaurants in [place] with rich food  Location/radiation: posterior left lower back pain  Duration: constant, worse prior to BMs, better after; affecting positioning   Associated sx: mild nausea  Attempted treatments: miralax foir clean-out  Pain Scale 0-10/10:  4-6 at the worst,  2-3  Prior occurrences:     Salad, eat large meals with intermittent fasting   miralax clean out this past week; feels better generally, but still pain  No clear MSK component for ROM at hip    ESR/CRP normal  CBC normal  Lytes normal    Prior imaging with hepatic steatosis    HTN  Controlled    Medical History  Past Medical History:   Diagnosis Date    History of Clostridium difficile colitis     treated 2014    History of sarcoidosis     dx 1991, resolved    Hyperlipemia        Review of Systems   Constitutional: Negative for appetite change and diaphoresis.   HENT: Negative for trouble swallowing.    Respiratory: Negative for shortness of breath.    Cardiovascular: Negative for chest pain.   Gastrointestinal: Positive for abdominal pain and nausea (low grade). Negative for diarrhea and vomiting.   Musculoskeletal: Positive for back pain (left back pain). Negative for gait problem.   Neurological: Negative for dizziness and seizures.   Psychiatric/Behavioral: Negative for hallucinations.         Surgical hx, family hx, social hx   Have been reviewed      Current Outpatient Medications:     celecoxib (CELEBREX) 200 MG  "capsule, Take 1 capsule (200 mg total) by mouth once daily., Disp: 90 capsule, Rfl: 0    omeprazole (PRILOSEC) 40 MG capsule, TAKE 1 CAPSULE BY MOUTH EVERY MORNING AS DIRECTED, Disp: 90 capsule, Rfl: 3    rosuvastatin (CRESTOR) 40 MG Tab, Take 1 tablet (40 mg total) by mouth every evening., Disp: 90 tablet, Rfl: 3    sertraline (ZOLOFT) 50 MG tablet, Take 1 tablet (50 mg total) by mouth once daily., Disp: 90 tablet, Rfl: 3    betamethasone dipropionate (DIPROLENE) 0.05 % ointment, Apply to affected area twice daily for up to 2 weeks, Disp: 45 g, Rfl: 0    lidocaine 3 % Crea, APPLY RECTALLY EVERY 4 HOURS AS NEEDED, Disp: 28.35 g, Rfl: 3    losartan-hydrochlorothiazide 100-12.5 mg (HYZAAR) 100-12.5 mg Tab, Take 1 tablet by mouth once daily., Disp: 90 tablet, Rfl: 3    meclizine (ANTIVERT) 25 mg tablet, Take 1 tablet (25 mg total) by mouth 3 (three) times daily as needed., Disp: 30 tablet, Rfl: 0    scopolamine (TRANSDERM-SCOP) 1.3-1.5 mg (1 mg over 3 days), Place 1 patch onto the skin every 3 days, Disp: 8 patch, Rfl: 0    Objective:        Body mass index is 33.52 kg/m².  Vitals:    03/24/22 1542   BP: 112/72   Pulse: 67   SpO2: (!) 94%   Weight: 109 kg (240 lb 4.8 oz)   Height: 5' 11" (1.803 m)   PainSc:   3   PainLoc: Back     Physical Exam  Vitals and nursing note reviewed.   Constitutional:       General: He is not in acute distress.     Appearance: Normal appearance. He is not ill-appearing.   HENT:      Head: Normocephalic and atraumatic.      Nose:      Comments: Wearing a mask  Eyes:      General: No scleral icterus.  Cardiovascular:      Rate and Rhythm: Normal rate and regular rhythm.      Heart sounds: Normal heart sounds.   Pulmonary:      Effort: Pulmonary effort is normal.   Abdominal:      General: There is no distension.      Palpations: There is no mass.      Tenderness: There is no abdominal tenderness. There is no guarding or rebound.      Hernia: No hernia is present.      Comments: " Sensation of discomfort in left back when palpating left LLQ, left flank.      Increase in nausea   Musculoskeletal:         General: No deformity.      Cervical back: Normal range of motion.      Comments: Left mid-lower back mild TTP   Skin:     General: Skin is warm and dry.   Neurological:      Mental Status: He is alert and oriented to person, place, and time.   Psychiatric:         Behavior: Behavior normal.           Lab Results   Component Value Date    WBC 3.58 (L) 03/23/2022    HGB 15.8 03/23/2022    HCT 47.3 03/23/2022     03/23/2022    CHOL 167 03/23/2022    TRIG 82 03/23/2022    HDL 45 03/23/2022    ALT 33 03/23/2022    AST 23 03/23/2022     03/23/2022    K 4.4 03/23/2022     03/23/2022    CREATININE 0.8 03/23/2022    BUN 17 03/23/2022    CO2 28 03/23/2022    TSH 1.174 05/06/2021    PSA 0.33 03/23/2022    HGBA1C 5.5 05/06/2021       The 10-year ASCVD risk score (Glendaletiara GOETZ Jr., et al., 2013) is: 5.6%    Values used to calculate the score:      Age: 56 years      Sex: Male      Is Non- : No      Diabetic: No      Tobacco smoker: No      Systolic Blood Pressure: 112 mmHg      Is BP treated: Yes      HDL Cholesterol: 45 mg/dL      Total Cholesterol: 167 mg/dL    (Imaging have been independently reviewed)      Assessment:         1. Abdominal pain, unspecified abdominal location    2. Left lower quadrant pain    3. Nausea    4. Primary hypertension          Plan:     Corazon was seen today for back pain.    Diagnoses and all orders for this visit:    Abdominal pain, unspecified abdominal location  -     Lipase; Future  -     Urinalysis, Reflex to Urine Culture Urine, Clean Catch; Future    Left lower quadrant pain  -     CT Abdomen Pelvis With Contrast; Future  -     Urinalysis, Reflex to Urine Culture Urine, Clean Catch; Future    Nausea  -     Lipase; Future    Primary hypertension  -     losartan-hydrochlorothiazide 100-12.5 mg (HYZAAR) 100-12.5 mg Tab; Take 1 tablet  by mouth once daily.      Follow up if symptoms worsen or fail to improve, for and .      30-39 min were used in chart review, evaluation and counseling of patient, documentation and review of results on same day of service     All medications were reviewed including potential side effects and risks/benefits.  Pt was counseled to call back if anything worsens or if questions arise.    Baldomero Schwab MD  Family Medicine  [facility]  [address] [address]  Phone [phone]  Fax [phone]

## 2022-05-30 ENCOUNTER — PATIENT MESSAGE (OUTPATIENT)
Dept: INTERNAL MEDICINE | Facility: CLINIC | Age: 57
End: 2022-05-30
Payer: COMMERCIAL

## 2022-05-30 DIAGNOSIS — L08.9 SKIN INFECTION: Primary | ICD-10-CM

## 2022-05-30 RX ORDER — DOXYCYCLINE 100 MG/1
100 CAPSULE ORAL 2 TIMES DAILY
Qty: 10 CAPSULE | Refills: 0 | Status: SHIPPED | OUTPATIENT
Start: 2022-05-30 | End: 2022-06-05

## 2022-05-30 RX ORDER — MUPIROCIN 20 MG/G
OINTMENT TOPICAL 2 TIMES DAILY
Qty: 22 G | Refills: 0 | Status: SHIPPED | OUTPATIENT
Start: 2022-05-30 | End: 2022-06-07

## 2022-05-31 ENCOUNTER — PATIENT MESSAGE (OUTPATIENT)
Dept: INTERNAL MEDICINE | Facility: CLINIC | Age: 57
End: 2022-05-31
Payer: COMMERCIAL

## 2022-06-07 ENCOUNTER — LAB VISIT (OUTPATIENT)
Dept: LAB | Facility: OTHER | Age: 57
End: 2022-06-07
Attending: STUDENT IN AN ORGANIZED HEALTH CARE EDUCATION/TRAINING PROGRAM
Payer: COMMERCIAL

## 2022-06-07 ENCOUNTER — PATIENT MESSAGE (OUTPATIENT)
Dept: INTERNAL MEDICINE | Facility: CLINIC | Age: 57
End: 2022-06-07

## 2022-06-07 ENCOUNTER — OFFICE VISIT (OUTPATIENT)
Dept: INTERNAL MEDICINE | Facility: CLINIC | Age: 57
End: 2022-06-07
Payer: COMMERCIAL

## 2022-06-07 VITALS
SYSTOLIC BLOOD PRESSURE: 109 MMHG | WEIGHT: 236.31 LBS | HEART RATE: 62 BPM | HEIGHT: 71 IN | OXYGEN SATURATION: 96 % | BODY MASS INDEX: 33.08 KG/M2 | DIASTOLIC BLOOD PRESSURE: 59 MMHG

## 2022-06-07 DIAGNOSIS — D70.0: Primary | ICD-10-CM

## 2022-06-07 DIAGNOSIS — E78.49 FAMILIAL HYPERLIPIDEMIA, HIGH LDL: ICD-10-CM

## 2022-06-07 DIAGNOSIS — R91.1 LUNG NODULE: ICD-10-CM

## 2022-06-07 DIAGNOSIS — Z91.89 AT RISK FOR SIDE EFFECT OF MEDICATION: ICD-10-CM

## 2022-06-07 DIAGNOSIS — R73.03 PREDIABETES: ICD-10-CM

## 2022-06-07 DIAGNOSIS — Z86.19 HISTORY OF CLOSTRIDIUM DIFFICILE COLITIS: ICD-10-CM

## 2022-06-07 DIAGNOSIS — I10 PRIMARY HYPERTENSION: ICD-10-CM

## 2022-06-07 DIAGNOSIS — Z86.2 HISTORY OF SARCOIDOSIS: ICD-10-CM

## 2022-06-07 PROBLEM — D86.2 SARCOIDOSIS OF LUNG WITH SARCOIDOSIS OF LYMPH NODES: Status: RESOLVED | Noted: 2019-12-30 | Resolved: 2022-06-07

## 2022-06-07 LAB
25(OH)D3+25(OH)D2 SERPL-MCNC: 19 NG/ML (ref 30–96)
ESTIMATED AVG GLUCOSE: 114 MG/DL (ref 68–131)
HBA1C MFR BLD: 5.6 % (ref 4–5.6)
VIT B12 SERPL-MCNC: 579 PG/ML (ref 210–950)

## 2022-06-07 PROCEDURE — 83036 HEMOGLOBIN GLYCOSYLATED A1C: CPT | Performed by: STUDENT IN AN ORGANIZED HEALTH CARE EDUCATION/TRAINING PROGRAM

## 2022-06-07 PROCEDURE — 82607 VITAMIN B-12: CPT | Performed by: STUDENT IN AN ORGANIZED HEALTH CARE EDUCATION/TRAINING PROGRAM

## 2022-06-07 PROCEDURE — 3044F PR MOST RECENT HEMOGLOBIN A1C LEVEL <7.0%: ICD-10-PCS | Mod: CPTII,S$GLB,, | Performed by: STUDENT IN AN ORGANIZED HEALTH CARE EDUCATION/TRAINING PROGRAM

## 2022-06-07 PROCEDURE — 3078F PR MOST RECENT DIASTOLIC BLOOD PRESSURE < 80 MM HG: ICD-10-PCS | Mod: CPTII,S$GLB,, | Performed by: STUDENT IN AN ORGANIZED HEALTH CARE EDUCATION/TRAINING PROGRAM

## 2022-06-07 PROCEDURE — 99999 PR PBB SHADOW E&M-EST. PATIENT-LVL III: ICD-10-PCS | Mod: PBBFAC,,, | Performed by: STUDENT IN AN ORGANIZED HEALTH CARE EDUCATION/TRAINING PROGRAM

## 2022-06-07 PROCEDURE — 99396 PREV VISIT EST AGE 40-64: CPT | Mod: S$GLB,,, | Performed by: STUDENT IN AN ORGANIZED HEALTH CARE EDUCATION/TRAINING PROGRAM

## 2022-06-07 PROCEDURE — 3078F DIAST BP <80 MM HG: CPT | Mod: CPTII,S$GLB,, | Performed by: STUDENT IN AN ORGANIZED HEALTH CARE EDUCATION/TRAINING PROGRAM

## 2022-06-07 PROCEDURE — 3044F HG A1C LEVEL LT 7.0%: CPT | Mod: CPTII,S$GLB,, | Performed by: STUDENT IN AN ORGANIZED HEALTH CARE EDUCATION/TRAINING PROGRAM

## 2022-06-07 PROCEDURE — 3008F BODY MASS INDEX DOCD: CPT | Mod: CPTII,S$GLB,, | Performed by: STUDENT IN AN ORGANIZED HEALTH CARE EDUCATION/TRAINING PROGRAM

## 2022-06-07 PROCEDURE — 3008F PR BODY MASS INDEX (BMI) DOCUMENTED: ICD-10-PCS | Mod: CPTII,S$GLB,, | Performed by: STUDENT IN AN ORGANIZED HEALTH CARE EDUCATION/TRAINING PROGRAM

## 2022-06-07 PROCEDURE — 99999 PR PBB SHADOW E&M-EST. PATIENT-LVL III: CPT | Mod: PBBFAC,,, | Performed by: STUDENT IN AN ORGANIZED HEALTH CARE EDUCATION/TRAINING PROGRAM

## 2022-06-07 PROCEDURE — 99396 PR PREVENTIVE VISIT,EST,40-64: ICD-10-PCS | Mod: S$GLB,,, | Performed by: STUDENT IN AN ORGANIZED HEALTH CARE EDUCATION/TRAINING PROGRAM

## 2022-06-07 PROCEDURE — 3074F SYST BP LT 130 MM HG: CPT | Mod: CPTII,S$GLB,, | Performed by: STUDENT IN AN ORGANIZED HEALTH CARE EDUCATION/TRAINING PROGRAM

## 2022-06-07 PROCEDURE — 82306 VITAMIN D 25 HYDROXY: CPT | Performed by: STUDENT IN AN ORGANIZED HEALTH CARE EDUCATION/TRAINING PROGRAM

## 2022-06-07 PROCEDURE — 36415 COLL VENOUS BLD VENIPUNCTURE: CPT | Performed by: STUDENT IN AN ORGANIZED HEALTH CARE EDUCATION/TRAINING PROGRAM

## 2022-06-07 PROCEDURE — 3074F PR MOST RECENT SYSTOLIC BLOOD PRESSURE < 130 MM HG: ICD-10-PCS | Mod: CPTII,S$GLB,, | Performed by: STUDENT IN AN ORGANIZED HEALTH CARE EDUCATION/TRAINING PROGRAM

## 2022-06-07 NOTE — PROGRESS NOTES
[facility]    Subjective:       Patient ID: Corazon Jones is a 56 y.o. male.    Chief Complaint: Establish Care      History was obtained from the patient and supplemented through chart review.  This patient is known to me.    HPI:    Patient is a 56 y.o. male who presents to [facility]. I have seen him prior for urgent care appt prior to [facility] [place]    March abdom pain/back pain  Constipation/gas pain  S/p EGD and colonoscopy with MGA - reported     Sarcoidosis in 1991  Resolved, potentially erroneous diagnosis  Also was treated as tb at time time  Environmental exposure in [place]  CXR 9/12/12    HTN  Well controlled   Cont losartan 100-12.5  No CP/HA/SOB/Vision changes  Check BMP for K, Cr    Prediabetes  Nuts, sometimes coke zero  No juice  Loves rice, loves bread- not eating much  Once/twice a week with bread  No snacks  Prior A1C after jacob gras    Cat bite  Needed hospitalization  CXR showed improved LAD    Salad, eat large meals with intermittent fasting   miralax clean out this past week; feels better generally, but still pain  No clear MSK component for ROM at hip    5 mm Stone in left kidney  Prior imaging with hepatic steatosis    Fatigue with abx with recent fishing knife injury    Recent bilat epicondylitis  S/p injections doing well    Regular SOB with exercise  Component of deconditioning  Can run up stairs at home  Declines stress test for now, will monitor    Wt Readings from Last 15 Encounters:   06/07/22 107.2 kg (236 lb 5.3 oz)   03/24/22 109 kg (240 lb 4.8 oz)   08/26/21 104.8 kg (231 lb)   08/27/20 104.3 kg (230 lb)   02/03/20 107.3 kg (236 lb 8.9 oz)   12/19/19 107.6 kg (237 lb 3.4 oz)   11/06/19 106.3 kg (234 lb 5.6 oz)   04/17/18 104.3 kg (230 lb)   03/06/18 108 kg (238 lb 1.6 oz)   11/22/17 108 kg (238 lb 1.6 oz)   09/25/17 108 kg (238 lb 1.6 oz)   04/23/15 106.9 kg (235 lb 9.6 oz)   09/05/14 104.6 kg (230 lb 9.6 oz)   04/07/14 106.3 kg (234 lb 4.8 oz)   02/11/14  "106.7 kg (235 lb 3.2 oz)        Medical History  Past Medical History:   Diagnosis Date    History of Clostridium difficile colitis     treated 2014    History of sarcoidosis     dx 1991, resolved    Hyperlipemia        Review of Systems   Constitutional: Negative for appetite change and diaphoresis.   HENT: Negative for trouble swallowing.    Respiratory: Positive for shortness of breath.    Cardiovascular: Negative for chest pain.   Gastrointestinal: Negative for abdominal pain, diarrhea, nausea (low grade) and vomiting.   Musculoskeletal: Negative for back pain (left back pain) and gait problem.   Neurological: Negative for dizziness and seizures.   Psychiatric/Behavioral: Negative for hallucinations.         Surgical hx, family hx, social hx   Have been reviewed      Current Outpatient Medications:     celecoxib (CELEBREX) 200 MG capsule, Take 1 capsule (200 mg total) by mouth once daily., Disp: 90 capsule, Rfl: 0    losartan-hydrochlorothiazide 100-12.5 mg (HYZAAR) 100-12.5 mg Tab, Take 1 tablet by mouth once daily., Disp: 90 tablet, Rfl: 3    omeprazole (PRILOSEC) 40 MG capsule, TAKE 1 CAPSULE BY MOUTH EVERY MORNING AS DIRECTED, Disp: 90 capsule, Rfl: 3    rosuvastatin (CRESTOR) 40 MG Tab, Take 1 tablet (40 mg total) by mouth every evening., Disp: 90 tablet, Rfl: 3    sertraline (ZOLOFT) 50 MG tablet, Take 1 tablet (50 mg total) by mouth once daily., Disp: 90 tablet, Rfl: 3    Objective:        Body mass index is 32.96 kg/m².  Vitals:    06/07/22 1503   BP: (!) 109/59   Pulse: 62   SpO2: 96%   Weight: 107.2 kg (236 lb 5.3 oz)   Height: 5' 11" (1.803 m)     Physical Exam  Vitals and nursing note reviewed.   Constitutional:       General: He is not in acute distress.     Appearance: Normal appearance. He is not ill-appearing.   HENT:      Head: Normocephalic and atraumatic.      Nose:      Comments: Wearing a mask  Eyes:      General: No scleral icterus.  Cardiovascular:      Rate and Rhythm: Normal rate " and regular rhythm.      Heart sounds: Normal heart sounds.   Pulmonary:      Effort: Pulmonary effort is normal.   Abdominal:      General: There is no distension.      Palpations: There is no mass.      Tenderness: There is no abdominal tenderness. There is no guarding or rebound.      Hernia: No hernia is present.   Musculoskeletal:         General: No deformity.      Cervical back: Normal range of motion.   Skin:     General: Skin is warm and dry.   Neurological:      Mental Status: He is alert and oriented to person, place, and time.   Psychiatric:         Behavior: Behavior normal.           Lab Results   Component Value Date    WBC 3.58 (L) 03/23/2022    HGB 15.8 03/23/2022    HCT 47.3 03/23/2022     03/23/2022    CHOL 167 03/23/2022    TRIG 82 03/23/2022    HDL 45 03/23/2022    ALT 33 03/23/2022    AST 23 03/23/2022     03/23/2022    K 4.4 03/23/2022     03/23/2022    CREATININE 0.8 03/23/2022    BUN 17 03/23/2022    CO2 28 03/23/2022    TSH 2.540 03/24/2022    PSA 0.33 03/23/2022    HGBA1C 5.8 (H) 03/24/2022       The 10-year ASCVD risk score (Jordy GOETZ Jr., et al., 2013) is: 5.3%    Values used to calculate the score:      Age: 56 years      Sex: Male      Is Non- : No      Diabetic: No      Tobacco smoker: No      Systolic Blood Pressure: 109 mmHg      Is BP treated: Yes      HDL Cholesterol: 45 mg/dL      Total Cholesterol: 167 mg/dL    On statin    (Imaging have been independently reviewed)      Assessment:         1. Chronic familial neutropenia    2. History of sarcoidosis    3. History of Clostridium difficile colitis    4. Familial hyperlipidemia, high LDL    5. Lung nodule    6. At risk for side effect of medication    7. Prediabetes    8. Primary hypertension          Plan:     Corazon was seen today for establish care.    Diagnoses and all orders for this visit:    Chronic familial neutropenia    History of sarcoidosis    History of Clostridium difficile  colitis    Familial hyperlipidemia, high LDL    Lung nodule    At risk for side effect of medication  -     Vitamin B12; Future  -     Vitamin D; Future    Prediabetes  -     Hemoglobin A1C; Future    Primary hypertension  -     Basic Metabolic Panel; Future        Health Maintenance  - Lipids: ordered  - A1C: ordered  - Colon Ca Screen:  MGA request  - Immunizations:  - PSA normal :       Follow up in about 6 months (around 12/7/2022).      30-39 min were used in chart review, evaluation and counseling of patient, documentation and review of results on same day of service     All medications were reviewed including potential side effects and risks/benefits.  Pt was counseled to call back if anything worsens or if questions arise.    Baldomero Schwab MD  Family Medicine  [facility]  [address] [address]  Phone [phone]  Fax [phone]

## 2022-06-08 DIAGNOSIS — E55.9 VITAMIN D INSUFFICIENCY: Primary | ICD-10-CM

## 2022-06-08 RX ORDER — ERGOCALCIFEROL 1.25 MG/1
50000 CAPSULE ORAL
Qty: 6 CAPSULE | Refills: 0 | Status: SHIPPED | OUTPATIENT
Start: 2022-06-08 | End: 2022-07-12 | Stop reason: SDUPTHER

## 2022-06-14 ENCOUNTER — PATIENT OUTREACH (OUTPATIENT)
Dept: INTERNAL MEDICINE | Facility: CLINIC | Age: 57
End: 2022-06-14
Payer: COMMERCIAL

## 2022-06-15 ENCOUNTER — TELEPHONE (OUTPATIENT)
Dept: INTERNAL MEDICINE | Facility: CLINIC | Age: 57
End: 2022-06-15
Payer: COMMERCIAL

## 2022-06-15 DIAGNOSIS — M15.9 PRIMARY OSTEOARTHRITIS INVOLVING MULTIPLE JOINTS: ICD-10-CM

## 2022-06-15 DIAGNOSIS — M79.18 MUSCULOSKELETAL PAIN: ICD-10-CM

## 2022-06-15 RX ORDER — CELECOXIB 200 MG/1
200 CAPSULE ORAL DAILY
Qty: 90 CAPSULE | Refills: 3 | Status: SHIPPED | OUTPATIENT
Start: 2022-06-15 | End: 2023-08-02 | Stop reason: SDUPTHER

## 2022-06-15 NOTE — TELEPHONE ENCOUNTER
----- Message from Baldomero Schwab MD sent at 6/7/2022  5:20 PM CDT -----  Mind calling lab to add on the BMP to labs drawn today please?  Sorry and thank you!

## 2022-06-16 ENCOUNTER — LAB VISIT (OUTPATIENT)
Dept: LAB | Facility: OTHER | Age: 57
End: 2022-06-16
Attending: STUDENT IN AN ORGANIZED HEALTH CARE EDUCATION/TRAINING PROGRAM
Payer: COMMERCIAL

## 2022-06-16 DIAGNOSIS — I10 PRIMARY HYPERTENSION: ICD-10-CM

## 2022-06-16 LAB
ANION GAP SERPL CALC-SCNC: 10 MMOL/L (ref 8–16)
BUN SERPL-MCNC: 21 MG/DL (ref 6–20)
CALCIUM SERPL-MCNC: 9.6 MG/DL (ref 8.7–10.5)
CHLORIDE SERPL-SCNC: 103 MMOL/L (ref 95–110)
CO2 SERPL-SCNC: 28 MMOL/L (ref 23–29)
CREAT SERPL-MCNC: 1.2 MG/DL (ref 0.5–1.4)
EST. GFR  (AFRICAN AMERICAN): >60 ML/MIN/1.73 M^2
EST. GFR  (NON AFRICAN AMERICAN): >60 ML/MIN/1.73 M^2
GLUCOSE SERPL-MCNC: 105 MG/DL (ref 70–110)
POTASSIUM SERPL-SCNC: 4.2 MMOL/L (ref 3.5–5.1)
SODIUM SERPL-SCNC: 141 MMOL/L (ref 136–145)

## 2022-06-16 PROCEDURE — 80048 BASIC METABOLIC PNL TOTAL CA: CPT | Performed by: STUDENT IN AN ORGANIZED HEALTH CARE EDUCATION/TRAINING PROGRAM

## 2022-06-16 PROCEDURE — 36415 COLL VENOUS BLD VENIPUNCTURE: CPT | Performed by: STUDENT IN AN ORGANIZED HEALTH CARE EDUCATION/TRAINING PROGRAM

## 2022-06-28 ENCOUNTER — PATIENT OUTREACH (OUTPATIENT)
Dept: INTERNAL MEDICINE | Facility: CLINIC | Age: 57
End: 2022-06-28
Payer: COMMERCIAL

## 2022-07-12 DIAGNOSIS — E55.9 VITAMIN D INSUFFICIENCY: ICD-10-CM

## 2022-07-13 RX ORDER — ERGOCALCIFEROL 1.25 MG/1
50000 CAPSULE ORAL
Qty: 12 CAPSULE | Refills: 0 | Status: SHIPPED | OUTPATIENT
Start: 2022-07-13 | End: 2022-09-30 | Stop reason: SDUPTHER

## 2022-07-13 NOTE — TELEPHONE ENCOUNTER
Refill Routing Note   Medication(s) are not appropriate for processing by [facility] for the following reason(s):      - Outside of protocol    [facility] action(s):  Route          Medication reconciliation completed: No     Appointments  past 12m or future 3m with PCP    Date Provider   Last Visit   6/7/2022 Baldomero Schwab MD   Next Visit   12/7/2022 Baldomero Schwab MD   ED visits in past 90 days: 0        Note composed:10:51 AM 07/13/2022

## 2022-07-28 ENCOUNTER — OFFICE VISIT (OUTPATIENT)
Dept: INTERNAL MEDICINE | Facility: CLINIC | Age: 57
End: 2022-07-28
Payer: COMMERCIAL

## 2022-07-28 ENCOUNTER — LAB VISIT (OUTPATIENT)
Dept: LAB | Facility: OTHER | Age: 57
End: 2022-07-28
Attending: STUDENT IN AN ORGANIZED HEALTH CARE EDUCATION/TRAINING PROGRAM
Payer: COMMERCIAL

## 2022-07-28 ENCOUNTER — PATIENT MESSAGE (OUTPATIENT)
Dept: INTERNAL MEDICINE | Facility: CLINIC | Age: 57
End: 2022-07-28

## 2022-07-28 VITALS
HEIGHT: 71 IN | WEIGHT: 235.44 LBS | OXYGEN SATURATION: 95 % | HEART RATE: 65 BPM | DIASTOLIC BLOOD PRESSURE: 88 MMHG | BODY MASS INDEX: 32.96 KG/M2 | SYSTOLIC BLOOD PRESSURE: 122 MMHG

## 2022-07-28 DIAGNOSIS — Z77.29 ENCOUNTER FOR OCCUPATIONAL HEALTH EXAMINATION INVOLVING HANDLING OF HAZARDOUS MATERIALS: ICD-10-CM

## 2022-07-28 DIAGNOSIS — J30.2 SEASONAL ALLERGIES: ICD-10-CM

## 2022-07-28 DIAGNOSIS — I10 PRIMARY HYPERTENSION: ICD-10-CM

## 2022-07-28 DIAGNOSIS — Z02.89 ENCOUNTER FOR OCCUPATIONAL HEALTH EXAMINATION INVOLVING HANDLING OF HAZARDOUS MATERIALS: ICD-10-CM

## 2022-07-28 DIAGNOSIS — N34.2 URETHRITIS: Primary | ICD-10-CM

## 2022-07-28 DIAGNOSIS — J02.9 SORE THROAT: ICD-10-CM

## 2022-07-28 PROCEDURE — 99999 PR PBB SHADOW E&M-EST. PATIENT-LVL III: CPT | Mod: PBBFAC,,, | Performed by: STUDENT IN AN ORGANIZED HEALTH CARE EDUCATION/TRAINING PROGRAM

## 2022-07-28 PROCEDURE — 1159F MED LIST DOCD IN RCRD: CPT | Mod: CPTII,S$GLB,, | Performed by: STUDENT IN AN ORGANIZED HEALTH CARE EDUCATION/TRAINING PROGRAM

## 2022-07-28 PROCEDURE — 80074 ACUTE HEPATITIS PANEL: CPT | Performed by: STUDENT IN AN ORGANIZED HEALTH CARE EDUCATION/TRAINING PROGRAM

## 2022-07-28 PROCEDURE — 87389 HIV-1 AG W/HIV-1&-2 AB AG IA: CPT | Performed by: STUDENT IN AN ORGANIZED HEALTH CARE EDUCATION/TRAINING PROGRAM

## 2022-07-28 PROCEDURE — 87880 POCT RAPID STREP A: ICD-10-PCS | Mod: QW,S$GLB,, | Performed by: STUDENT IN AN ORGANIZED HEALTH CARE EDUCATION/TRAINING PROGRAM

## 2022-07-28 PROCEDURE — 99214 PR OFFICE/OUTPT VISIT, EST, LEVL IV, 30-39 MIN: ICD-10-PCS | Mod: S$GLB,,, | Performed by: STUDENT IN AN ORGANIZED HEALTH CARE EDUCATION/TRAINING PROGRAM

## 2022-07-28 PROCEDURE — 3079F DIAST BP 80-89 MM HG: CPT | Mod: CPTII,S$GLB,, | Performed by: STUDENT IN AN ORGANIZED HEALTH CARE EDUCATION/TRAINING PROGRAM

## 2022-07-28 PROCEDURE — 87070 CULTURE OTHR SPECIMN AEROBIC: CPT | Performed by: STUDENT IN AN ORGANIZED HEALTH CARE EDUCATION/TRAINING PROGRAM

## 2022-07-28 PROCEDURE — 3074F SYST BP LT 130 MM HG: CPT | Mod: CPTII,S$GLB,, | Performed by: STUDENT IN AN ORGANIZED HEALTH CARE EDUCATION/TRAINING PROGRAM

## 2022-07-28 PROCEDURE — 3044F PR MOST RECENT HEMOGLOBIN A1C LEVEL <7.0%: ICD-10-PCS | Mod: CPTII,S$GLB,, | Performed by: STUDENT IN AN ORGANIZED HEALTH CARE EDUCATION/TRAINING PROGRAM

## 2022-07-28 PROCEDURE — 99214 OFFICE O/P EST MOD 30 MIN: CPT | Mod: S$GLB,,, | Performed by: STUDENT IN AN ORGANIZED HEALTH CARE EDUCATION/TRAINING PROGRAM

## 2022-07-28 PROCEDURE — 3008F BODY MASS INDEX DOCD: CPT | Mod: CPTII,S$GLB,, | Performed by: STUDENT IN AN ORGANIZED HEALTH CARE EDUCATION/TRAINING PROGRAM

## 2022-07-28 PROCEDURE — 1159F PR MEDICATION LIST DOCUMENTED IN MEDICAL RECORD: ICD-10-PCS | Mod: CPTII,S$GLB,, | Performed by: STUDENT IN AN ORGANIZED HEALTH CARE EDUCATION/TRAINING PROGRAM

## 2022-07-28 PROCEDURE — 3079F PR MOST RECENT DIASTOLIC BLOOD PRESSURE 80-89 MM HG: ICD-10-PCS | Mod: CPTII,S$GLB,, | Performed by: STUDENT IN AN ORGANIZED HEALTH CARE EDUCATION/TRAINING PROGRAM

## 2022-07-28 PROCEDURE — 36415 COLL VENOUS BLD VENIPUNCTURE: CPT | Performed by: STUDENT IN AN ORGANIZED HEALTH CARE EDUCATION/TRAINING PROGRAM

## 2022-07-28 PROCEDURE — 87880 STREP A ASSAY W/OPTIC: CPT | Mod: QW,S$GLB,, | Performed by: STUDENT IN AN ORGANIZED HEALTH CARE EDUCATION/TRAINING PROGRAM

## 2022-07-28 PROCEDURE — 3008F PR BODY MASS INDEX (BMI) DOCUMENTED: ICD-10-PCS | Mod: CPTII,S$GLB,, | Performed by: STUDENT IN AN ORGANIZED HEALTH CARE EDUCATION/TRAINING PROGRAM

## 2022-07-28 PROCEDURE — 3044F HG A1C LEVEL LT 7.0%: CPT | Mod: CPTII,S$GLB,, | Performed by: STUDENT IN AN ORGANIZED HEALTH CARE EDUCATION/TRAINING PROGRAM

## 2022-07-28 PROCEDURE — 3074F PR MOST RECENT SYSTOLIC BLOOD PRESSURE < 130 MM HG: ICD-10-PCS | Mod: CPTII,S$GLB,, | Performed by: STUDENT IN AN ORGANIZED HEALTH CARE EDUCATION/TRAINING PROGRAM

## 2022-07-28 PROCEDURE — 99999 PR PBB SHADOW E&M-EST. PATIENT-LVL III: ICD-10-PCS | Mod: PBBFAC,,, | Performed by: STUDENT IN AN ORGANIZED HEALTH CARE EDUCATION/TRAINING PROGRAM

## 2022-07-28 PROCEDURE — 86592 SYPHILIS TEST NON-TREP QUAL: CPT | Performed by: STUDENT IN AN ORGANIZED HEALTH CARE EDUCATION/TRAINING PROGRAM

## 2022-07-28 NOTE — PROGRESS NOTES
[facility]    Subjective:       Patient ID: Corazon Jones is a 56 y.o. male.    Chief Complaint: Dry Eye and Sore Throat      History was obtained from the patient and supplemented through chart review.  This patient is known to me.    HPI:    Patient is a 56 y.o. male who presents for dry eyes, sore throat and urethritis symptoms    Diarrhea  Sore throat, irritation of urethra after urination  No blood, no skin lesions  Took allegra today, encouraged more hydration, visine eye drops  Encounter with hazardous material  Rapid strep test, and throat culture  Check labs and urine testing    HTN  Elevated slightly today likely due to symptoms  monitor      Not addressed  March abdom pain/back pain  Constipation/gas pain  S/p EGD and colonoscopy with MGA - reported     Sarcoidosis in 1991  Resolved, potentially erroneous diagnosis  Also was treated as tb at time time  Environmental exposure in [place]  CXR 9/12/12    HTN  Well controlled   Cont losartan 100-12.5  No CP/HA/SOB/Vision changes  Check BMP for K, Cr    Prediabetes  5.8 -> 5.6  Nuts, sometimes coke zero  No juice  Loves rice, loves bread- not eating much  Once/twice a week with bread  No snacks  Prior A1C after [place] gr[place]    Cat bite  Needed hospitalization  CXR showed improved LAD    Salad, eat large meals with intermittent fasting   miralax clean out this past week; feels better generally, but still pain  No clear MSK component for ROM at hip    5 mm Stone in left kidney  Prior imaging with hepatic steatosis    Fatigue with abx with recent fishing knife injury    Recent bilat epicondylitis  S/p injections doing well    Regular SOB with exercise  Component of deconditioning  Can run up stairs at home  Declines stress test for now, will monitor    Wt Readings from Last 15 Encounters:   07/28/22 106.8 kg (235 lb 7.2 oz)   06/07/22 107.2 kg (236 lb 5.3 oz)   03/24/22 109 kg (240 lb 4.8 oz)   08/26/21 104.8 kg (231 lb)   08/27/20 104.3 kg (230  "lb)   02/03/20 107.3 kg (236 lb 8.9 oz)   12/19/19 107.6 kg (237 lb 3.4 oz)   11/06/19 106.3 kg (234 lb 5.6 oz)   04/17/18 104.3 kg (230 lb)   03/06/18 108 kg (238 lb 1.6 oz)   11/22/17 108 kg (238 lb 1.6 oz)   09/25/17 108 kg (238 lb 1.6 oz)   04/23/15 106.9 kg (235 lb 9.6 oz)   09/05/14 104.6 kg (230 lb 9.6 oz)   04/07/14 106.3 kg (234 lb 4.8 oz)        Medical History  Past Medical History:   Diagnosis Date    History of Clostridium difficile colitis     treated 2014    History of sarcoidosis     dx 1991, resolved    Hyperlipemia        Review of Systems   HENT: Positive for sore throat.    Eyes: Positive for redness.        Dry eyes   Genitourinary:        Urethral irritation         Surgical hx, family hx, social hx   Have been reviewed      Current Outpatient Medications:     celecoxib (CELEBREX) 200 MG capsule, Take 1 capsule (200 mg total) by mouth once daily., Disp: 90 capsule, Rfl: 3    ergocalciferol (VITAMIN D2) 50,000 unit Cap, Take 1 capsule (50,000 Units total) by mouth every 7 days., Disp: 12 capsule, Rfl: 0    losartan-hydrochlorothiazide 100-12.5 mg (HYZAAR) 100-12.5 mg Tab, Take 1 tablet by mouth once daily., Disp: 90 tablet, Rfl: 3    omeprazole (PRILOSEC) 40 MG capsule, TAKE 1 CAPSULE BY MOUTH EVERY MORNING AS DIRECTED, Disp: 90 capsule, Rfl: 3    rosuvastatin (CRESTOR) 40 MG Tab, Take 1 tablet (40 mg total) by mouth every evening., Disp: 90 tablet, Rfl: 3    sertraline (ZOLOFT) 50 MG tablet, Take 1 tablet (50 mg total) by mouth once daily., Disp: 90 tablet, Rfl: 3    methylPREDNISolone (MEDROL DOSEPACK) 4 mg tablet, use as directed (Patient not taking: Reported on 7/28/2022), Disp: 21 each, Rfl: 0    Objective:        Body mass index is 32.84 kg/m².  Vitals:    07/28/22 1535   BP: 122/88   Pulse: 65   SpO2: 95%   Weight: 106.8 kg (235 lb 7.2 oz)   Height: 5' 11" (1.803 m)   PainSc: 0-No pain     Physical Exam  Vitals and nursing note reviewed.   Constitutional:       General: He is " not in acute distress.     Appearance: Normal appearance. He is not ill-appearing.   HENT:      Head: Normocephalic and atraumatic.      Nose:      Comments: Wearing a mask     Mouth/Throat:      Mouth: Mucous membranes are moist.      Pharynx: Oropharynx is clear. No oropharyngeal exudate or posterior oropharyngeal erythema.   Eyes:      General: No scleral icterus.  Pulmonary:      Effort: Pulmonary effort is normal.   Abdominal:      General: There is no distension.   Genitourinary:     Penis: Normal.       Comments: No lesion, no erythema, no discharge  Musculoskeletal:         General: No deformity.      Cervical back: Normal range of motion.   Skin:     General: Skin is warm and dry.   Neurological:      Mental Status: He is alert and oriented to person, place, and time.   Psychiatric:         Behavior: Behavior normal.           Lab Results   Component Value Date    WBC 3.58 (L) 03/23/2022    HGB 15.8 03/23/2022    HCT 47.3 03/23/2022     03/23/2022    CHOL 167 03/23/2022    TRIG 82 03/23/2022    HDL 45 03/23/2022    ALT 33 03/23/2022    AST 23 03/23/2022     06/16/2022    K 4.2 06/16/2022     06/16/2022    CREATININE 1.2 06/16/2022    BUN 21 (H) 06/16/2022    CO2 28 06/16/2022    TSH 2.540 03/24/2022    PSA 0.33 03/23/2022    HGBA1C 5.6 06/07/2022       The 10-year ASCVD risk score (Cougar SOFY Jr., et al., 2013) is: 6.5%    Values used to calculate the score:      Age: 56 years      Sex: Male      Is Non- : No      Diabetic: No      Tobacco smoker: No      Systolic Blood Pressure: 122 mmHg      Is BP treated: Yes      HDL Cholesterol: 45 mg/dL      Total Cholesterol: 167 mg/dL    On statin    (Imaging have been independently reviewed)      Assessment:         1. Urethritis    2. Encounter for occupational health examination involving handling of hazardous materials    3. Seasonal allergies    4. Primary hypertension    5. Sore throat          Plan:     Corazon was seen  today for dry eye and sore throat.    Diagnoses and all orders for this visit:    Urethritis  -     C. trachomatis/N. gonorrhoeae by AMP DNA [facility]; Urine; Future  -     Urinalysis; Future  -     Urine Culture High Risk; Future    Encounter for occupational health examination involving handling of hazardous materials  -     RPR; Future  -     HIV 1/2 Ag/Ab (4th Gen); Future  -     HEPATITIS PANEL, ACUTE; Future    Seasonal allergies    Primary hypertension    Sore throat  -     POCT Rapid Strep A  -     Throat culture        Health Maintenance  - Lipids:   - A1C:   - Colon Ca Screen:  2020 due 10 years  - Immunizations:  - PSA normal :       Follow up if symptoms worsen or fail to improve.      Dec f/u BP    30 min were used in chart review, evaluation and counseling of patient, counseling, differential, analysis of UA, documentation and review of results on same day of service     All medications were reviewed including potential side effects and risks/benefits.  Pt was counseled to call back if anything worsens or if questions arise.    Baldomero Schwab MD  Family Medicine  [facility]  [facility]  [address]  [place] [address]  Phone [phone]  Fax [phone]

## 2022-07-28 NOTE — ADDENDUM NOTE
Addended by: MILKA CARRANZA on: 7/28/2022 04:27 PM     Modules accepted: Orders     Please call patient and schedule nurse visit for \"Shinrix\" , 2nd dose- thank you.

## 2022-07-29 LAB
CTP QC/QA: YES
HAV IGM SERPL QL IA: NEGATIVE
HBV CORE IGM SERPL QL IA: NEGATIVE
HBV SURFACE AG SERPL QL IA: NEGATIVE
HCV AB SERPL QL IA: NEGATIVE
HIV 1+2 AB+HIV1 P24 AG SERPL QL IA: NEGATIVE
RPR SER QL: NORMAL
S PYO RRNA THROAT QL PROBE: NEGATIVE

## 2022-08-01 LAB — BACTERIA THROAT CULT: NORMAL

## 2022-09-02 ENCOUNTER — PATIENT MESSAGE (OUTPATIENT)
Dept: SLEEP MEDICINE | Facility: CLINIC | Age: 57
End: 2022-09-02
Payer: COMMERCIAL

## 2022-09-11 ENCOUNTER — PATIENT MESSAGE (OUTPATIENT)
Dept: SLEEP MEDICINE | Facility: CLINIC | Age: 57
End: 2022-09-11
Payer: COMMERCIAL

## 2022-09-30 ENCOUNTER — PATIENT MESSAGE (OUTPATIENT)
Dept: INTERNAL MEDICINE | Facility: CLINIC | Age: 57
End: 2022-09-30
Payer: COMMERCIAL

## 2022-09-30 ENCOUNTER — LAB VISIT (OUTPATIENT)
Dept: LAB | Facility: OTHER | Age: 57
End: 2022-09-30
Attending: STUDENT IN AN ORGANIZED HEALTH CARE EDUCATION/TRAINING PROGRAM
Payer: COMMERCIAL

## 2022-09-30 ENCOUNTER — TELEPHONE (OUTPATIENT)
Dept: INTERNAL MEDICINE | Facility: CLINIC | Age: 57
End: 2022-09-30
Payer: COMMERCIAL

## 2022-09-30 DIAGNOSIS — E55.9 VITAMIN D INSUFFICIENCY: ICD-10-CM

## 2022-09-30 DIAGNOSIS — E55.9 VITAMIN D INSUFFICIENCY: Primary | ICD-10-CM

## 2022-09-30 LAB — 25(OH)D3+25(OH)D2 SERPL-MCNC: 25 NG/ML (ref 30–96)

## 2022-09-30 PROCEDURE — 82306 VITAMIN D 25 HYDROXY: CPT | Performed by: STUDENT IN AN ORGANIZED HEALTH CARE EDUCATION/TRAINING PROGRAM

## 2022-09-30 PROCEDURE — 36415 COLL VENOUS BLD VENIPUNCTURE: CPT | Performed by: STUDENT IN AN ORGANIZED HEALTH CARE EDUCATION/TRAINING PROGRAM

## 2022-09-30 RX ORDER — ERGOCALCIFEROL 1.25 MG/1
50000 CAPSULE ORAL
Qty: 12 CAPSULE | Refills: 0 | Status: SHIPPED | OUTPATIENT
Start: 2022-09-30 | End: 2022-12-08 | Stop reason: SDUPTHER

## 2022-09-30 RX ORDER — ERGOCALCIFEROL 1.25 MG/1
50000 CAPSULE ORAL
Qty: 12 CAPSULE | Refills: 0 | OUTPATIENT
Start: 2022-09-30

## 2022-09-30 NOTE — TELEPHONE ENCOUNTER
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 Schwab to have Vit D lab done, but no answer.  LVM to call the office.

## 2022-10-17 ENCOUNTER — PATIENT MESSAGE (OUTPATIENT)
Dept: INTERNAL MEDICINE | Facility: CLINIC | Age: 57
End: 2022-10-17
Payer: COMMERCIAL

## 2022-10-18 ENCOUNTER — OFFICE VISIT (OUTPATIENT)
Dept: INTERNAL MEDICINE | Facility: CLINIC | Age: 57
End: 2022-10-18
Attending: INTERNAL MEDICINE
Payer: COMMERCIAL

## 2022-10-18 VITALS
HEART RATE: 60 BPM | WEIGHT: 243.19 LBS | OXYGEN SATURATION: 96 % | BODY MASS INDEX: 34.05 KG/M2 | SYSTOLIC BLOOD PRESSURE: 126 MMHG | DIASTOLIC BLOOD PRESSURE: 68 MMHG | HEIGHT: 71 IN

## 2022-10-18 DIAGNOSIS — M25.50 POLYARTHRALGIA: Primary | ICD-10-CM

## 2022-10-18 PROCEDURE — 3074F PR MOST RECENT SYSTOLIC BLOOD PRESSURE < 130 MM HG: ICD-10-PCS | Mod: CPTII,S$GLB,, | Performed by: INTERNAL MEDICINE

## 2022-10-18 PROCEDURE — 99213 OFFICE O/P EST LOW 20 MIN: CPT | Mod: S$GLB,,, | Performed by: INTERNAL MEDICINE

## 2022-10-18 PROCEDURE — 1160F PR REVIEW ALL MEDS BY PRESCRIBER/CLIN PHARMACIST DOCUMENTED: ICD-10-PCS | Mod: CPTII,S$GLB,, | Performed by: INTERNAL MEDICINE

## 2022-10-18 PROCEDURE — 99999 PR PBB SHADOW E&M-EST. PATIENT-LVL III: CPT | Mod: PBBFAC,,, | Performed by: INTERNAL MEDICINE

## 2022-10-18 PROCEDURE — 3078F PR MOST RECENT DIASTOLIC BLOOD PRESSURE < 80 MM HG: ICD-10-PCS | Mod: CPTII,S$GLB,, | Performed by: INTERNAL MEDICINE

## 2022-10-18 PROCEDURE — 99213 PR OFFICE/OUTPT VISIT, EST, LEVL III, 20-29 MIN: ICD-10-PCS | Mod: S$GLB,,, | Performed by: INTERNAL MEDICINE

## 2022-10-18 PROCEDURE — 3044F PR MOST RECENT HEMOGLOBIN A1C LEVEL <7.0%: ICD-10-PCS | Mod: CPTII,S$GLB,, | Performed by: INTERNAL MEDICINE

## 2022-10-18 PROCEDURE — 1159F PR MEDICATION LIST DOCUMENTED IN MEDICAL RECORD: ICD-10-PCS | Mod: CPTII,S$GLB,, | Performed by: INTERNAL MEDICINE

## 2022-10-18 PROCEDURE — 3074F SYST BP LT 130 MM HG: CPT | Mod: CPTII,S$GLB,, | Performed by: INTERNAL MEDICINE

## 2022-10-18 PROCEDURE — 99999 PR PBB SHADOW E&M-EST. PATIENT-LVL III: ICD-10-PCS | Mod: PBBFAC,,, | Performed by: INTERNAL MEDICINE

## 2022-10-18 PROCEDURE — 3078F DIAST BP <80 MM HG: CPT | Mod: CPTII,S$GLB,, | Performed by: INTERNAL MEDICINE

## 2022-10-18 PROCEDURE — 1159F MED LIST DOCD IN RCRD: CPT | Mod: CPTII,S$GLB,, | Performed by: INTERNAL MEDICINE

## 2022-10-18 PROCEDURE — 1160F RVW MEDS BY RX/DR IN RCRD: CPT | Mod: CPTII,S$GLB,, | Performed by: INTERNAL MEDICINE

## 2022-10-18 PROCEDURE — 3044F HG A1C LEVEL LT 7.0%: CPT | Mod: CPTII,S$GLB,, | Performed by: INTERNAL MEDICINE

## 2022-10-18 NOTE — PROGRESS NOTES
"Subjective:       Patient ID: Corazon Jones is a 57 y.o. male.    Chief Complaint: Malaise (" I was not feeling very good, I have had diarrhea since last week that got worse over the weekend. I have had had swelling to arms and hands since weekend. Pain and stiffness to hands, elbows, shoulders")    Here for urgent visit    Not feeling well  Cramping in calves on week 2-3 weeks prior.  Elbow shoulders and legs followed by bilateral distal finger stiffness and tightness. 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 Mild URI symptoms 2-3 weeks prior.        Review of Systems   Constitutional:  Negative for appetite change, chills, fever and unexpected weight change.   HENT:  Negative for hearing loss, sore throat and trouble swallowing.    Eyes:  Negative for visual disturbance.   Respiratory:  Negative for cough, chest tightness and shortness of breath.    Cardiovascular:  Negative for chest pain and leg swelling.   Gastrointestinal:  Negative for abdominal pain, blood in stool, constipation, diarrhea, nausea and vomiting.   Endocrine: Negative for polydipsia and polyuria.   Genitourinary:  Negative for decreased urine volume, difficulty urinating, dysuria, frequency and urgency.   Musculoskeletal:  Positive for arthralgias and myalgias. Negative for gait problem.   Skin:  Negative for rash.   Neurological:  Negative for dizziness and numbness.   Psychiatric/Behavioral:  The patient is not nervous/anxious.      Objective:      Vitals:    10/18/22 1612   BP: 126/68   BP Location: Left arm   Patient Position: Sitting   BP Method: Large (Manual)   Pulse: 60   SpO2: 96%   Weight: 110.3 kg (243 lb 2.7 oz)   Height: 5' 11" (1.803 m)      Physical Exam  Vitals and nursing note reviewed.   Constitutional:       General: He is not in acute distress.     Appearance: Normal appearance. He is well-developed.   HENT:      Head: Normocephalic and atraumatic.      Mouth/Throat:      Pharynx: No " oropharyngeal exudate.   Eyes:      General: No scleral icterus.     Conjunctiva/sclera: Conjunctivae normal.      Pupils: Pupils are equal, round, and reactive to light.   Neck:      Thyroid: No thyromegaly.   Cardiovascular:      Rate and Rhythm: Normal rate and regular rhythm.      Heart sounds: Normal heart sounds. No murmur heard.  Pulmonary:      Effort: Pulmonary effort is normal.      Breath sounds: Normal breath sounds. No wheezing or rales.   Abdominal:      General: There is no distension.   Musculoskeletal:         General: No tenderness.      Right wrist: No swelling, tenderness or bony tenderness.      Left wrist: No swelling, tenderness or bony tenderness.      Right hand: No tenderness or bony tenderness.      Left hand: No tenderness or bony tenderness.   Lymphadenopathy:      Cervical: No cervical adenopathy.   Skin:     General: Skin is warm and dry.   Neurological:      Mental Status: He is alert and oriented to person, place, and time.   Psychiatric:         Behavior: Behavior normal.       Assessment:       1. Polyarthralgia        Plan:       Corazon was seen today for malaise.    Diagnoses and all orders for this visit:    Polyarthralgia     Temporally this represent a resolving enthesopathy post viral illness. 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   Office and Emergency Department prompts discussed. Thanks for choosing [facility].     RTC prn      Guido Nguyen MD  Internal Medicine-[facility]        Side effects of medication(s) were discussed in detail and patient voiced understanding.  Patient will call back for any issues or complications.

## 2022-11-01 ENCOUNTER — PATIENT MESSAGE (OUTPATIENT)
Dept: INTERNAL MEDICINE | Facility: CLINIC | Age: 57
End: 2022-11-01
Payer: COMMERCIAL

## 2022-11-01 ENCOUNTER — CLINICAL SUPPORT (OUTPATIENT)
Dept: INTERNAL MEDICINE | Facility: CLINIC | Age: 57
End: 2022-11-01
Payer: COMMERCIAL

## 2022-11-01 DIAGNOSIS — R50.9 FEVER, UNSPECIFIED FEVER CAUSE: Primary | ICD-10-CM

## 2022-11-01 LAB
INFLUENZA A, MOLECULAR: NEGATIVE
INFLUENZA B, MOLECULAR: NEGATIVE
SPECIMEN SOURCE: NORMAL

## 2022-11-01 PROCEDURE — 87502 INFLUENZA DNA AMP PROBE: CPT | Performed by: STUDENT IN AN ORGANIZED HEALTH CARE EDUCATION/TRAINING PROGRAM

## 2022-11-09 RX ORDER — AZITHROMYCIN 500 MG/1
500 TABLET, FILM COATED ORAL DAILY
Qty: 12 TABLET | Refills: 1 | Status: SHIPPED | OUTPATIENT
Start: 2022-11-09 | End: 2022-12-03

## 2022-12-07 ENCOUNTER — LAB VISIT (OUTPATIENT)
Dept: LAB | Facility: OTHER | Age: 57
End: 2022-12-07
Attending: STUDENT IN AN ORGANIZED HEALTH CARE EDUCATION/TRAINING PROGRAM
Payer: COMMERCIAL

## 2022-12-07 ENCOUNTER — OFFICE VISIT (OUTPATIENT)
Dept: INTERNAL MEDICINE | Facility: CLINIC | Age: 57
End: 2022-12-07
Payer: COMMERCIAL

## 2022-12-07 VITALS
HEART RATE: 63 BPM | WEIGHT: 242.31 LBS | OXYGEN SATURATION: 95 % | SYSTOLIC BLOOD PRESSURE: 114 MMHG | DIASTOLIC BLOOD PRESSURE: 86 MMHG | HEIGHT: 71 IN | BODY MASS INDEX: 33.92 KG/M2

## 2022-12-07 DIAGNOSIS — G47.33 OSA (OBSTRUCTIVE SLEEP APNEA): ICD-10-CM

## 2022-12-07 DIAGNOSIS — F43.23 ADJUSTMENT DISORDER WITH MIXED ANXIETY AND DEPRESSED MOOD: ICD-10-CM

## 2022-12-07 DIAGNOSIS — D70.0: ICD-10-CM

## 2022-12-07 DIAGNOSIS — I10 PRIMARY HYPERTENSION: Primary | ICD-10-CM

## 2022-12-07 DIAGNOSIS — R73.03 PREDIABETES: ICD-10-CM

## 2022-12-07 DIAGNOSIS — Z86.2 HISTORY OF SARCOIDOSIS: ICD-10-CM

## 2022-12-07 DIAGNOSIS — R93.89 ABNORMAL CHEST CT: ICD-10-CM

## 2022-12-07 DIAGNOSIS — Z11.3 SCREENING EXAMINATION FOR STD (SEXUALLY TRANSMITTED DISEASE): ICD-10-CM

## 2022-12-07 DIAGNOSIS — E55.9 VITAMIN D INSUFFICIENCY: ICD-10-CM

## 2022-12-07 DIAGNOSIS — E78.49 FAMILIAL HYPERLIPIDEMIA, HIGH LDL: ICD-10-CM

## 2022-12-07 DIAGNOSIS — Z86.19 HISTORY OF CLOSTRIDIUM DIFFICILE COLITIS: ICD-10-CM

## 2022-12-07 DIAGNOSIS — R91.8 MULTIPLE PULMONARY NODULES DETERMINED BY COMPUTED TOMOGRAPHY OF LUNG: ICD-10-CM

## 2022-12-07 DIAGNOSIS — E73.9 LACTOSE INTOLERANCE: ICD-10-CM

## 2022-12-07 PROCEDURE — 3008F BODY MASS INDEX DOCD: CPT | Mod: CPTII,S$GLB,, | Performed by: STUDENT IN AN ORGANIZED HEALTH CARE EDUCATION/TRAINING PROGRAM

## 2022-12-07 PROCEDURE — 87491 CHLMYD TRACH DNA AMP PROBE: CPT | Performed by: STUDENT IN AN ORGANIZED HEALTH CARE EDUCATION/TRAINING PROGRAM

## 2022-12-07 PROCEDURE — 3074F PR MOST RECENT SYSTOLIC BLOOD PRESSURE < 130 MM HG: ICD-10-PCS | Mod: CPTII,S$GLB,, | Performed by: STUDENT IN AN ORGANIZED HEALTH CARE EDUCATION/TRAINING PROGRAM

## 2022-12-07 PROCEDURE — 99999 PR PBB SHADOW E&M-EST. PATIENT-LVL III: CPT | Mod: PBBFAC,,, | Performed by: STUDENT IN AN ORGANIZED HEALTH CARE EDUCATION/TRAINING PROGRAM

## 2022-12-07 PROCEDURE — 3044F PR MOST RECENT HEMOGLOBIN A1C LEVEL <7.0%: ICD-10-PCS | Mod: CPTII,S$GLB,, | Performed by: STUDENT IN AN ORGANIZED HEALTH CARE EDUCATION/TRAINING PROGRAM

## 2022-12-07 PROCEDURE — 1159F PR MEDICATION LIST DOCUMENTED IN MEDICAL RECORD: ICD-10-PCS | Mod: CPTII,S$GLB,, | Performed by: STUDENT IN AN ORGANIZED HEALTH CARE EDUCATION/TRAINING PROGRAM

## 2022-12-07 PROCEDURE — 99214 OFFICE O/P EST MOD 30 MIN: CPT | Mod: S$GLB,,, | Performed by: STUDENT IN AN ORGANIZED HEALTH CARE EDUCATION/TRAINING PROGRAM

## 2022-12-07 PROCEDURE — 99999 PR PBB SHADOW E&M-EST. PATIENT-LVL III: ICD-10-PCS | Mod: PBBFAC,,, | Performed by: STUDENT IN AN ORGANIZED HEALTH CARE EDUCATION/TRAINING PROGRAM

## 2022-12-07 PROCEDURE — 1159F MED LIST DOCD IN RCRD: CPT | Mod: CPTII,S$GLB,, | Performed by: STUDENT IN AN ORGANIZED HEALTH CARE EDUCATION/TRAINING PROGRAM

## 2022-12-07 PROCEDURE — 3008F PR BODY MASS INDEX (BMI) DOCUMENTED: ICD-10-PCS | Mod: CPTII,S$GLB,, | Performed by: STUDENT IN AN ORGANIZED HEALTH CARE EDUCATION/TRAINING PROGRAM

## 2022-12-07 PROCEDURE — 3079F DIAST BP 80-89 MM HG: CPT | Mod: CPTII,S$GLB,, | Performed by: STUDENT IN AN ORGANIZED HEALTH CARE EDUCATION/TRAINING PROGRAM

## 2022-12-07 PROCEDURE — 3079F PR MOST RECENT DIASTOLIC BLOOD PRESSURE 80-89 MM HG: ICD-10-PCS | Mod: CPTII,S$GLB,, | Performed by: STUDENT IN AN ORGANIZED HEALTH CARE EDUCATION/TRAINING PROGRAM

## 2022-12-07 PROCEDURE — 3044F HG A1C LEVEL LT 7.0%: CPT | Mod: CPTII,S$GLB,, | Performed by: STUDENT IN AN ORGANIZED HEALTH CARE EDUCATION/TRAINING PROGRAM

## 2022-12-07 PROCEDURE — 99214 PR OFFICE/OUTPT VISIT, EST, LEVL IV, 30-39 MIN: ICD-10-PCS | Mod: S$GLB,,, | Performed by: STUDENT IN AN ORGANIZED HEALTH CARE EDUCATION/TRAINING PROGRAM

## 2022-12-07 PROCEDURE — 87591 N.GONORRHOEAE DNA AMP PROB: CPT | Performed by: STUDENT IN AN ORGANIZED HEALTH CARE EDUCATION/TRAINING PROGRAM

## 2022-12-07 PROCEDURE — 3074F SYST BP LT 130 MM HG: CPT | Mod: CPTII,S$GLB,, | Performed by: STUDENT IN AN ORGANIZED HEALTH CARE EDUCATION/TRAINING PROGRAM

## 2022-12-07 NOTE — PROGRESS NOTES
[facility]    Subjective:       Patient ID: Corazon Jones is a 57 y.o. male.    Chief Complaint: Hypertension (F/u)      History was obtained from the patient and supplemented through chart review.  This patient is known to me.    HPI:    Patient is a 57 y.o. male who presents for f/u of HTN, HLD, prediabetes, abn chest CT imaging who presents for HTN f/u    HTN  -Well controlled   -Cont hyzaar 100-12.5  -No CP/HA/SOB/Vision changes  -Lytes stable    Hx of Sarcoidosis diagnosis in 1991  -Resolved, potentially erroneous diagnosis  -Also was treated as tb at time time  -Environmental exposure in [place]  -Abn chest CT findings stable    Prediabetes  -stable, counseled  -pt intermittent fasts, poor diet in evenings    Vit D insufficiency  -taking supplement    Hx of 5 mm Stone in left kidney  Hx of Prior imaging with possible hepatic steatosis    Recent bilat epicondylitis  S/p injections doing well    Wt Readings from Last 15 Encounters:   12/07/22 109.9 kg (242 lb 4.6 oz)   10/18/22 110.3 kg (243 lb 2.7 oz)   07/28/22 106.8 kg (235 lb 7.2 oz)   06/07/22 107.2 kg (236 lb 5.3 oz)   03/24/22 109 kg (240 lb 4.8 oz)   08/26/21 104.8 kg (231 lb)   08/27/20 104.3 kg (230 lb)   02/03/20 107.3 kg (236 lb 8.9 oz)   12/19/19 107.6 kg (237 lb 3.4 oz)   11/06/19 106.3 kg (234 lb 5.6 oz)   04/17/18 104.3 kg (230 lb)   03/06/18 108 kg (238 lb 1.6 oz)   11/22/17 108 kg (238 lb 1.6 oz)   09/25/17 108 kg (238 lb 1.6 oz)   04/23/15 106.9 kg (235 lb 9.6 oz)        Medical History  Past Medical History:   Diagnosis Date    History of Clostridium difficile colitis     treated 2014    History of sarcoidosis     dx 1991, resolved    Hyperlipemia        Review of Systems   Constitutional:  Negative for appetite change and diaphoresis.   HENT:  Negative for trouble swallowing.    Respiratory:  Negative for shortness of breath.    Cardiovascular:  Negative for chest pain.   Gastrointestinal:  Negative for diarrhea, nausea and  "vomiting.   Musculoskeletal:  Negative for gait problem.   Neurological:  Negative for dizziness and seizures.   Psychiatric/Behavioral:  Negative for hallucinations.        Surgical hx, family hx, social hx   Have been reviewed      Current Outpatient Medications:     celecoxib (CELEBREX) 200 MG capsule, Take 1 capsule (200 mg total) by mouth once daily., Disp: 90 capsule, Rfl: 3    losartan-hydrochlorothiazide 100-12.5 mg (HYZAAR) 100-12.5 mg Tab, Take 1 tablet by mouth once daily., Disp: 90 tablet, Rfl: 3    omeprazole (PRILOSEC) 40 MG capsule, TAKE 1 CAPSULE BY MOUTH EVERY MORNING AS DIRECTED, Disp: 90 capsule, Rfl: 3    rosuvastatin (CRESTOR) 40 MG Tab, Take 1 tablet (40 mg total) by mouth every evening., Disp: 90 tablet, Rfl: 3    sertraline (ZOLOFT) 50 MG tablet, Take 1 tablet (50 mg total) by mouth once daily., Disp: 90 tablet, Rfl: 3    ergocalciferol (VITAMIN D2) 50,000 unit Cap, Take 1 capsule (50,000 Units total) by mouth every 7 days., Disp: 12 capsule, Rfl: 0    varicella-zoster [id number], PF, (SHINGRIX, PF,) 50 mcg/0.5 mL injection, Inject 0.5 mLs into the muscle once. for 1 dose, Disp: 1 each, Rfl: 1    Objective:        Body mass index is 33.79 kg/m².  Vitals:    12/07/22 1540 12/07/22 1603   BP: 118/86 114/86   Pulse: 63    SpO2: 95%    Weight: 109.9 kg (242 lb 4.6 oz)    Height: 5' 11" (1.803 m)    PainSc: 0-No pain        Physical Exam  Vitals and nursing note reviewed.   Constitutional:       General: He is not in acute distress.     Appearance: Normal appearance. He is not ill-appearing.   HENT:      Head: Normocephalic and atraumatic.   Eyes:      General: No scleral icterus.  Cardiovascular:      Rate and Rhythm: Normal rate and regular rhythm.      Heart sounds: Normal heart sounds.   Pulmonary:      Effort: Pulmonary effort is normal.   Abdominal:      General: There is no distension.      Palpations: There is no mass.      Tenderness: There is no abdominal tenderness. There is no guarding " or rebound.      Hernia: No hernia is present.   Musculoskeletal:         General: No deformity.      Cervical back: Normal range of motion.   Skin:     General: Skin is warm and dry.   Neurological:      Mental Status: He is alert and oriented to person, place, and time.   Psychiatric:         Behavior: Behavior normal.         Lab Results   Component Value Date    WBC 3.58 (L) 03/23/2022    HGB 15.8 03/23/2022    HCT 47.3 03/23/2022     03/23/2022    CHOL 156 12/07/2022    TRIG 69 12/07/2022    HDL 46 12/07/2022    ALT 39 12/07/2022    AST 27 12/07/2022     12/07/2022    K 4.0 12/07/2022     12/07/2022    CREATININE 0.9 12/07/2022    BUN 10 12/07/2022    CO2 26 12/07/2022    TSH 2.540 03/24/2022    PSA 0.33 03/23/2022    HGBA1C 5.7 (H) 12/07/2022       The 10-year ASCVD risk score (Davina BARILLAS, et al., 2019) is: 5.8%    Values used to calculate the score:      Age: 57 years      Sex: Male      Is Non- : No      Diabetic: No      Tobacco smoker: No      Systolic Blood Pressure: 114 mmHg      Is BP treated: Yes      HDL Cholesterol: 46 mg/dL      Total Cholesterol: 156 mg/dL    On crestor 40    (Imaging have been independently reviewed)      Assessment:         1. Primary hypertension    2. Familial hyperlipidemia, high LDL    3. OTTONIEL (obstructive sleep apnea)    4. Multiple pulmonary nodules determined by computed tomography of lung    5. Vitamin D insufficiency    6. Prediabetes    7. Screening examination for STD (sexually transmitted disease)    8. History of sarcoidosis    9. Adjustment disorder with mixed anxiety and depressed mood    10. Abnormal chest CT    11. Lactose intolerance    12. Chronic familial neutropenia    13. History of Clostridium difficile colitis          Plan:     Corazon was seen today for hypertension.    Diagnoses and all orders for this visit:    Primary hypertension  -     COMPREHENSIVE METABOLIC PANEL; Future    Familial hyperlipidemia, high  LDL  -     Lipid Panel; Future    OTTONIEL (obstructive sleep apnea)    Multiple pulmonary nodules determined by computed tomography of lung  -     CT Chest Without Contrast; Future    Vitamin D insufficiency  -     Vitamin D; Future  -     ergocalciferol (VITAMIN D2) 50,000 unit Cap; Take 1 capsule (50,000 Units total) by mouth every 7 days.    Prediabetes  -     Hemoglobin A1C; Future    Screening examination for STD (sexually transmitted disease)  -     RPR; Future  -     HIV 1/2 Ag/Ab (4th Gen); Future  -     C. trachomatis/N. gonorrhoeae by AMP DNA [facility]; Urine; Future    History of sarcoidosis    Adjustment disorder with mixed anxiety and depressed mood    Abnormal chest CT    Lactose intolerance    Chronic familial neutropenia    History of Clostridium difficile colitis      Health Maintenance  - Lipids: normal on crestor  - A1C: elevated   - Colon Ca Screen:  11/2020 [facility], repeat 10 years  - Immunizations: encouraged PNA, shingles, booster  - PSA normal : normal 3/2022    All of your core healthy metrics are met.    Follow up in about 6 months (around 6/7/2023) for annual or sooner if needed.          All medications were reviewed including potential side effects and risks/benefits.  Pt was counseled to call back if anything worsens or if questions arise.    Baldomero Schwab MD  Family Medicine  [facility]  [address] [address]  Phone [phone]  Fax [phone]

## 2022-12-08 ENCOUNTER — PATIENT MESSAGE (OUTPATIENT)
Dept: INTERNAL MEDICINE | Facility: CLINIC | Age: 57
End: 2022-12-08
Payer: COMMERCIAL

## 2022-12-08 ENCOUNTER — HOSPITAL ENCOUNTER (OUTPATIENT)
Dept: RADIOLOGY | Facility: OTHER | Age: 57
Discharge: HOME | End: 2022-12-08
Attending: STUDENT IN AN ORGANIZED HEALTH CARE EDUCATION/TRAINING PROGRAM
Payer: COMMERCIAL

## 2022-12-08 DIAGNOSIS — R91.8 MULTIPLE PULMONARY NODULES DETERMINED BY COMPUTED TOMOGRAPHY OF LUNG: ICD-10-CM

## 2022-12-08 LAB
C TRACH DNA SPEC QL NAA+PROBE: NOT DETECTED
N GONORRHOEA DNA SPEC QL NAA+PROBE: NOT DETECTED

## 2022-12-08 PROCEDURE — 71250 CT THORAX DX C-: CPT | Mod: 26,,, | Performed by: RADIOLOGY

## 2022-12-08 PROCEDURE — 71250 CT CHEST WITHOUT CONTRAST: ICD-10-PCS | Mod: 26,,, | Performed by: RADIOLOGY

## 2022-12-08 PROCEDURE — 71250 CT THORAX DX C-: CPT | Mod: TC

## 2022-12-08 RX ORDER — ERGOCALCIFEROL 1.25 MG/1
50000 CAPSULE ORAL
Qty: 12 CAPSULE | Refills: 0 | Status: SHIPPED | OUTPATIENT
Start: 2022-12-08 | End: 2022-12-27 | Stop reason: SDUPTHER

## 2022-12-09 ENCOUNTER — IMMUNIZATION (OUTPATIENT)
Dept: PHARMACY | Facility: CLINIC | Age: 57
End: 2022-12-09
Payer: COMMERCIAL

## 2022-12-09 PROBLEM — M25.521 ELBOW PAIN, RIGHT: Status: RESOLVED | Noted: 2021-04-20 | Resolved: 2022-12-09

## 2022-12-09 PROBLEM — M25.611 DECREASED RANGE OF MOTION OF RIGHT SHOULDER: Status: RESOLVED | Noted: 2018-09-08 | Resolved: 2022-12-09

## 2022-12-09 PROBLEM — M62.81 MUSCLE WEAKNESS OF RIGHT UPPER EXTREMITY: Status: RESOLVED | Noted: 2018-09-08 | Resolved: 2022-12-09

## 2022-12-09 PROBLEM — R29.898 DECREASED GRIP STRENGTH OF RIGHT HAND: Status: RESOLVED | Noted: 2021-04-20 | Resolved: 2022-12-09

## 2022-12-09 PROBLEM — S16.1XXA NECK STRAIN: Status: RESOLVED | Noted: 2019-11-06 | Resolved: 2022-12-09

## 2022-12-09 PROBLEM — M25.561 ACUTE PAIN OF RIGHT KNEE: Status: RESOLVED | Noted: 2019-02-08 | Resolved: 2022-12-09

## 2022-12-09 PROBLEM — M25.511 ACUTE PAIN OF RIGHT SHOULDER: Status: RESOLVED | Noted: 2018-09-08 | Resolved: 2022-12-09

## 2022-12-09 PROBLEM — M54.2 NECK PAIN: Status: RESOLVED | Noted: 2019-11-22 | Resolved: 2022-12-09

## 2022-12-14 ENCOUNTER — IMMUNIZATION (OUTPATIENT)
Dept: INTERNAL MEDICINE | Facility: CLINIC | Age: 57
End: 2022-12-14
Payer: COMMERCIAL

## 2022-12-14 DIAGNOSIS — Z23 NEED FOR VACCINATION: Primary | ICD-10-CM

## 2022-12-14 PROCEDURE — 91312 COVID-19, MRNA, LNP-S, BIVALENT BOOSTER, PF, 30 MCG/0.3 ML DOSE: CPT | Mod: S$GLB,,, | Performed by: INTERNAL MEDICINE

## 2022-12-14 PROCEDURE — 91312 COVID-19, MRNA, LNP-S, BIVALENT BOOSTER, PF, 30 MCG/0.3 ML DOSE: ICD-10-PCS | Mod: S$GLB,,, | Performed by: INTERNAL MEDICINE

## 2022-12-14 PROCEDURE — 0124A COVID-19, MRNA, LNP-S, BIVALENT BOOSTER, PF, 30 MCG/0.3 ML DOSE: CPT | Mod: PBBFAC | Performed by: INTERNAL MEDICINE

## 2022-12-27 DIAGNOSIS — F43.23 ADJUSTMENT DISORDER WITH MIXED ANXIETY AND DEPRESSED MOOD: ICD-10-CM

## 2022-12-27 DIAGNOSIS — E78.2 MIXED HYPERLIPIDEMIA: ICD-10-CM

## 2022-12-27 DIAGNOSIS — I10 PRIMARY HYPERTENSION: ICD-10-CM

## 2022-12-27 DIAGNOSIS — K21.9 GASTROESOPHAGEAL REFLUX DISEASE, UNSPECIFIED WHETHER ESOPHAGITIS PRESENT: ICD-10-CM

## 2022-12-27 DIAGNOSIS — E55.9 VITAMIN D INSUFFICIENCY: ICD-10-CM

## 2022-12-27 RX ORDER — LOSARTAN POTASSIUM AND HYDROCHLOROTHIAZIDE 12.5; 1 MG/1; MG/1
1 TABLET ORAL DAILY
Qty: 90 TABLET | Refills: 3 | Status: SHIPPED | OUTPATIENT
Start: 2022-12-27 | End: 2023-07-20

## 2022-12-27 RX ORDER — OMEPRAZOLE 40 MG/1
CAPSULE, DELAYED RELEASE ORAL
Qty: 90 CAPSULE | Refills: 3 | Status: SHIPPED | OUTPATIENT
Start: 2022-12-27 | End: 2024-01-01 | Stop reason: SDUPTHER

## 2022-12-27 RX ORDER — SERTRALINE HYDROCHLORIDE 50 MG/1
50 TABLET, FILM COATED ORAL DAILY
Qty: 90 TABLET | Refills: 3 | Status: SHIPPED | OUTPATIENT
Start: 2022-12-27 | End: 2024-01-01 | Stop reason: SDUPTHER

## 2022-12-27 RX ORDER — ERGOCALCIFEROL 1.25 MG/1
50000 CAPSULE ORAL
Qty: 12 CAPSULE | Refills: 0 | Status: SHIPPED | OUTPATIENT
Start: 2022-12-27 | End: 2023-07-23 | Stop reason: SDUPTHER

## 2022-12-27 RX ORDER — ROSUVASTATIN CALCIUM 40 MG/1
40 TABLET, COATED ORAL NIGHTLY
Qty: 90 TABLET | Refills: 3 | Status: SHIPPED | OUTPATIENT
Start: 2022-12-27 | End: 2024-01-01 | Stop reason: SDUPTHER

## 2022-12-27 NOTE — TELEPHONE ENCOUNTER
Refill Decision Note   Corazon Jones  is requesting a refill authorization.  Brief Assessment and Rationale for Refill:  Approve     Medication Therapy Plan:       Medication Reconciliation Completed: No   Comments:     No Care Gaps recommended.     Note composed:4:51 PM 12/27/2022

## 2022-12-27 NOTE — TELEPHONE ENCOUNTER
Refill Routing Note   Medication(s) are not appropriate for processing by [facility] for the following reason(s):      - Outside of protocol    [facility] action(s):  Route          Medication reconciliation completed: No     Appointments  past 12m or future 3m with PCP    Date Provider   Last Visit   12/7/2022 Baldomero Schwab MD   Next Visit   12/27/2022 Baldomero Schwab MD   ED visits in past 90 days: 0        Note composed:1:45 PM 12/27/2022

## 2022-12-27 NOTE — TELEPHONE ENCOUNTER
No new care gaps identified.  [facility] Embedded Care Gaps. Reference number: [id number]. 12/27/2022   12:18:15 PM CST

## 2022-12-29 ENCOUNTER — PATIENT MESSAGE (OUTPATIENT)
Dept: INTERNAL MEDICINE | Facility: CLINIC | Age: 57
End: 2022-12-29
Payer: COMMERCIAL

## 2022-12-29 DIAGNOSIS — B34.9 VIRAL SYNDROME: Primary | ICD-10-CM

## 2023-01-11 ENCOUNTER — TELEPHONE (OUTPATIENT)
Dept: INTERNAL MEDICINE | Facility: CLINIC | Age: 58
End: 2023-01-11
Payer: COMMERCIAL

## 2023-01-11 DIAGNOSIS — N52.9 ERECTILE DYSFUNCTION, UNSPECIFIED ERECTILE DYSFUNCTION TYPE: Primary | ICD-10-CM

## 2023-01-11 RX ORDER — TADALAFIL 20 MG/1
20 TABLET ORAL DAILY
Qty: 30 TABLET | Refills: 11 | Status: SHIPPED | OUTPATIENT
Start: 2023-01-11 | End: 2024-02-03 | Stop reason: SDUPTHER

## 2023-01-17 ENCOUNTER — PATIENT MESSAGE (OUTPATIENT)
Dept: INTERNAL MEDICINE | Facility: CLINIC | Age: 58
End: 2023-01-17
Payer: COMMERCIAL

## 2023-01-17 VITALS — DIASTOLIC BLOOD PRESSURE: 74 MMHG | SYSTOLIC BLOOD PRESSURE: 108 MMHG

## 2023-03-19 ENCOUNTER — PATIENT MESSAGE (OUTPATIENT)
Dept: INTERNAL MEDICINE | Facility: CLINIC | Age: 58
End: 2023-03-19
Payer: COMMERCIAL

## 2023-03-22 ENCOUNTER — PATIENT MESSAGE (OUTPATIENT)
Dept: INTERNAL MEDICINE | Facility: CLINIC | Age: 58
End: 2023-03-22
Payer: COMMERCIAL

## 2023-04-03 ENCOUNTER — PATIENT MESSAGE (OUTPATIENT)
Dept: INTERNAL MEDICINE | Facility: CLINIC | Age: 58
End: 2023-04-03
Payer: COMMERCIAL

## 2023-04-03 NOTE — TELEPHONE ENCOUNTER
Spoke to patient and informed him that Dr. Schwab is out of the office on Mondays and the covering provider advised him to go to the ED. Patient stated that his drinking more liquids and feeling ok now. Patient declined an appointment with another provider stating that he'll call the office on tomorrow if he's not feeling better.

## 2023-04-03 NOTE — TELEPHONE ENCOUNTER
The patient has diarrhea 50 times for the past 24 HOURS, with vomiting.     Positive for severe abdominal pain (pain of 4)  Positive for lightheadedness  Positive for weakness  Negative for high fevers  Negative for blood in stool or vomit.   Pt has orally taken amounts clear fluids frequently, soups, juices, and water. No Gatorade.    Pt hasn't gone to the ED or an urgent care. He is going to the ED now.

## 2023-06-09 ENCOUNTER — TELEPHONE (OUTPATIENT)
Dept: INTERNAL MEDICINE | Facility: CLINIC | Age: 58
End: 2023-06-09
Payer: COMMERCIAL

## 2023-06-09 DIAGNOSIS — E55.9 VITAMIN D INSUFFICIENCY: ICD-10-CM

## 2023-06-09 RX ORDER — ERGOCALCIFEROL 1.25 MG/1
50000 CAPSULE ORAL
Qty: 12 CAPSULE | Refills: 0 | Status: CANCELLED | OUTPATIENT
Start: 2023-06-09

## 2023-06-09 NOTE — TELEPHONE ENCOUNTER
Refill Encounter    PCP Visits: Recent Visits  Date Type Provider Dept   12/07/22 Office Visit Baldomero Schwab MD [facility]   07/28/22 Office Visit Baldomero Schwab MD [facility]   Showing recent visits within past 360 days and meeting all other requirements  Future Appointments  Date Type Provider Dept   09/26/23 Appointment Baldomero Schwab MD [facility]   Showing future appointments within next 720 days and meeting all other requirements     Last 3 Blood Pressure:   BP Readings from Last 3 Encounters:   01/17/23 108/74   12/07/22 114/86   10/18/22 126/68     Preferred Pharmacy:   [facility] [place] [address] - [place] - [address]  [address]  [place] [address]  Phone: [phone] Fax: [phone]    [facility] [address] [address]  Phone: [phone] Fax: [phone]    [id number] - [place], [place] - [address] AT [facility] [address] [facility]  [address]  [place] [address]  Phone: [phone] Fax: [phone]    Requested RX:  Requested Prescriptions     Pending Prescriptions Disp Refills    ergocalciferol (VITAMIN D2) 50,000 unit Cap 12 capsule 0     Sig: Take 1 capsule (50,000 Units total) by mouth every 7 days.      RX Route: Normal

## 2023-06-12 NOTE — TELEPHONE ENCOUNTER
Recommend over the counter 1000 units until we recheck labs please     Informed pt that Dr. Schwab would like him to take vit D3 1000 units daily until labs are rechecked. Pt verbalized understanding. Pt request appt to be moved to July with Dr. Schwab and to cancel the September appt. Appt moved to 7/20/23 with Dr. Schwab per pt's request.

## 2023-06-13 ENCOUNTER — PATIENT MESSAGE (OUTPATIENT)
Dept: INTERNAL MEDICINE | Facility: CLINIC | Age: 58
End: 2023-06-13
Payer: COMMERCIAL

## 2023-06-19 ENCOUNTER — PATIENT MESSAGE (OUTPATIENT)
Dept: INTERNAL MEDICINE | Facility: CLINIC | Age: 58
End: 2023-06-19
Payer: COMMERCIAL

## 2023-07-20 ENCOUNTER — PATIENT MESSAGE (OUTPATIENT)
Dept: INTERNAL MEDICINE | Facility: CLINIC | Age: 58
End: 2023-07-20

## 2023-07-20 ENCOUNTER — LAB VISIT (OUTPATIENT)
Dept: LAB | Facility: OTHER | Age: 58
End: 2023-07-20
Attending: STUDENT IN AN ORGANIZED HEALTH CARE EDUCATION/TRAINING PROGRAM
Payer: COMMERCIAL

## 2023-07-20 ENCOUNTER — OFFICE VISIT (OUTPATIENT)
Dept: INTERNAL MEDICINE | Facility: CLINIC | Age: 58
End: 2023-07-20
Payer: COMMERCIAL

## 2023-07-20 VITALS
HEART RATE: 73 BPM | WEIGHT: 236.31 LBS | BODY MASS INDEX: 33.08 KG/M2 | DIASTOLIC BLOOD PRESSURE: 76 MMHG | OXYGEN SATURATION: 95 % | HEIGHT: 71 IN | SYSTOLIC BLOOD PRESSURE: 104 MMHG

## 2023-07-20 DIAGNOSIS — Z00.00 ANNUAL PHYSICAL EXAM: ICD-10-CM

## 2023-07-20 DIAGNOSIS — R73.03 PREDIABETES: ICD-10-CM

## 2023-07-20 DIAGNOSIS — G47.33 OSA (OBSTRUCTIVE SLEEP APNEA): ICD-10-CM

## 2023-07-20 DIAGNOSIS — E55.9 VITAMIN D INSUFFICIENCY: ICD-10-CM

## 2023-07-20 DIAGNOSIS — Z12.5 SCREENING FOR MALIGNANT NEOPLASM OF PROSTATE: ICD-10-CM

## 2023-07-20 DIAGNOSIS — Z11.3 ROUTINE SCREENING FOR STI (SEXUALLY TRANSMITTED INFECTION): ICD-10-CM

## 2023-07-20 DIAGNOSIS — I10 PRIMARY HYPERTENSION: ICD-10-CM

## 2023-07-20 DIAGNOSIS — Z86.19 HISTORY OF CLOSTRIDIUM DIFFICILE COLITIS: ICD-10-CM

## 2023-07-20 DIAGNOSIS — D70.0: ICD-10-CM

## 2023-07-20 DIAGNOSIS — Z00.00 ANNUAL PHYSICAL EXAM: Primary | ICD-10-CM

## 2023-07-20 PROBLEM — F43.23 ADJUSTMENT DISORDER WITH MIXED ANXIETY AND DEPRESSED MOOD: Status: RESOLVED | Noted: 2020-02-03 | Resolved: 2023-07-20

## 2023-07-20 PROBLEM — R93.89 ABNORMAL CHEST CT: Status: RESOLVED | Noted: 2019-12-30 | Resolved: 2023-07-20

## 2023-07-20 LAB
25(OH)D3+25(OH)D2 SERPL-MCNC: 24 NG/ML (ref 30–96)
ALBUMIN SERPL BCP-MCNC: 4.2 G/DL (ref 3.5–5.2)
ALP SERPL-CCNC: 72 U/L (ref 55–135)
ALT SERPL W/O P-5'-P-CCNC: 35 U/L (ref 10–44)
ANION GAP SERPL CALC-SCNC: 9 MMOL/L (ref 8–16)
AST SERPL-CCNC: 26 U/L (ref 10–40)
BASOPHILS # BLD AUTO: 0.02 K/UL (ref 0–0.2)
BASOPHILS NFR BLD: 0.6 % (ref 0–1.9)
BILIRUB SERPL-MCNC: 0.6 MG/DL (ref 0.1–1)
BUN SERPL-MCNC: 16 MG/DL (ref 6–20)
CALCIUM SERPL-MCNC: 10.2 MG/DL (ref 8.7–10.5)
CHLORIDE SERPL-SCNC: 103 MMOL/L (ref 95–110)
CHOLEST SERPL-MCNC: 182 MG/DL (ref 120–199)
CHOLEST/HDLC SERPL: 3.7 {RATIO} (ref 2–5)
CO2 SERPL-SCNC: 28 MMOL/L (ref 23–29)
COMPLEXED PSA SERPL-MCNC: 0.25 NG/ML (ref 0–4)
CREAT SERPL-MCNC: 0.9 MG/DL (ref 0.5–1.4)
DIFFERENTIAL METHOD: ABNORMAL
EOSINOPHIL # BLD AUTO: 0 K/UL (ref 0–0.5)
EOSINOPHIL NFR BLD: 1.1 % (ref 0–8)
ERYTHROCYTE [DISTWIDTH] IN BLOOD BY AUTOMATED COUNT: 13 % (ref 11.5–14.5)
EST. GFR  (NO RACE VARIABLE): >60 ML/MIN/1.73 M^2
ESTIMATED AVG GLUCOSE: 111 MG/DL (ref 68–131)
GLUCOSE SERPL-MCNC: 88 MG/DL (ref 70–110)
HBA1C MFR BLD: 5.5 % (ref 4–5.6)
HCT VFR BLD AUTO: 47.5 % (ref 40–54)
HCV AB SERPL QL IA: NORMAL
HDLC SERPL-MCNC: 49 MG/DL (ref 40–75)
HDLC SERPL: 26.9 % (ref 20–50)
HGB BLD-MCNC: 15.8 G/DL (ref 14–18)
HIV 1+2 AB+HIV1 P24 AG SERPL QL IA: NORMAL
IMM GRANULOCYTES # BLD AUTO: 0 K/UL (ref 0–0.04)
IMM GRANULOCYTES NFR BLD AUTO: 0 % (ref 0–0.5)
LDLC SERPL CALC-MCNC: 116 MG/DL (ref 63–159)
LYMPHOCYTES # BLD AUTO: 1.8 K/UL (ref 1–4.8)
LYMPHOCYTES NFR BLD: 49 % (ref 18–48)
MCH RBC QN AUTO: 29.6 PG (ref 27–31)
MCHC RBC AUTO-ENTMCNC: 33.3 G/DL (ref 32–36)
MCV RBC AUTO: 89 FL (ref 82–98)
MONOCYTES # BLD AUTO: 0.6 K/UL (ref 0.3–1)
MONOCYTES NFR BLD: 15.4 % (ref 4–15)
NEUTROPHILS # BLD AUTO: 1.2 K/UL (ref 1.8–7.7)
NEUTROPHILS NFR BLD: 33.9 % (ref 38–73)
NONHDLC SERPL-MCNC: 133 MG/DL
NRBC BLD-RTO: 0 /100 WBC
PLATELET # BLD AUTO: 247 K/UL (ref 150–450)
PMV BLD AUTO: 9.9 FL (ref 9.2–12.9)
POTASSIUM SERPL-SCNC: 4.3 MMOL/L (ref 3.5–5.1)
PROT SERPL-MCNC: 7.7 G/DL (ref 6–8.4)
RBC # BLD AUTO: 5.34 M/UL (ref 4.6–6.2)
RPR SER QL: NORMAL
SODIUM SERPL-SCNC: 140 MMOL/L (ref 136–145)
TRIGL SERPL-MCNC: 85 MG/DL (ref 30–150)
TSH SERPL DL<=0.005 MIU/L-ACNC: 1.39 UIU/ML (ref 0.4–4)
WBC # BLD AUTO: 3.57 K/UL (ref 3.9–12.7)

## 2023-07-20 PROCEDURE — 83036 HEMOGLOBIN GLYCOSYLATED A1C: CPT | Performed by: STUDENT IN AN ORGANIZED HEALTH CARE EDUCATION/TRAINING PROGRAM

## 2023-07-20 PROCEDURE — 87661 TRICHOMONAS VAGINALIS AMPLIF: CPT | Performed by: STUDENT IN AN ORGANIZED HEALTH CARE EDUCATION/TRAINING PROGRAM

## 2023-07-20 PROCEDURE — 80061 LIPID PANEL: CPT | Performed by: STUDENT IN AN ORGANIZED HEALTH CARE EDUCATION/TRAINING PROGRAM

## 2023-07-20 PROCEDURE — 3078F DIAST BP <80 MM HG: CPT | Mod: CPTII,S$GLB,, | Performed by: STUDENT IN AN ORGANIZED HEALTH CARE EDUCATION/TRAINING PROGRAM

## 2023-07-20 PROCEDURE — 36415 COLL VENOUS BLD VENIPUNCTURE: CPT | Performed by: STUDENT IN AN ORGANIZED HEALTH CARE EDUCATION/TRAINING PROGRAM

## 2023-07-20 PROCEDURE — 86803 HEPATITIS C AB TEST: CPT | Performed by: STUDENT IN AN ORGANIZED HEALTH CARE EDUCATION/TRAINING PROGRAM

## 2023-07-20 PROCEDURE — 4010F PR ACE/ARB THEARPY RXD/TAKEN: ICD-10-PCS | Mod: CPTII,S$GLB,, | Performed by: STUDENT IN AN ORGANIZED HEALTH CARE EDUCATION/TRAINING PROGRAM

## 2023-07-20 PROCEDURE — 99999 PR PBB SHADOW E&M-EST. PATIENT-LVL III: CPT | Mod: PBBFAC,,, | Performed by: STUDENT IN AN ORGANIZED HEALTH CARE EDUCATION/TRAINING PROGRAM

## 2023-07-20 PROCEDURE — 3008F PR BODY MASS INDEX (BMI) DOCUMENTED: ICD-10-PCS | Mod: CPTII,S$GLB,, | Performed by: STUDENT IN AN ORGANIZED HEALTH CARE EDUCATION/TRAINING PROGRAM

## 2023-07-20 PROCEDURE — 87389 HIV-1 AG W/HIV-1&-2 AB AG IA: CPT | Performed by: STUDENT IN AN ORGANIZED HEALTH CARE EDUCATION/TRAINING PROGRAM

## 2023-07-20 PROCEDURE — 99396 PR PREVENTIVE VISIT,EST,40-64: ICD-10-PCS | Mod: S$GLB,,, | Performed by: STUDENT IN AN ORGANIZED HEALTH CARE EDUCATION/TRAINING PROGRAM

## 2023-07-20 PROCEDURE — 82306 VITAMIN D 25 HYDROXY: CPT | Performed by: STUDENT IN AN ORGANIZED HEALTH CARE EDUCATION/TRAINING PROGRAM

## 2023-07-20 PROCEDURE — 1159F MED LIST DOCD IN RCRD: CPT | Mod: CPTII,S$GLB,, | Performed by: STUDENT IN AN ORGANIZED HEALTH CARE EDUCATION/TRAINING PROGRAM

## 2023-07-20 PROCEDURE — 3008F BODY MASS INDEX DOCD: CPT | Mod: CPTII,S$GLB,, | Performed by: STUDENT IN AN ORGANIZED HEALTH CARE EDUCATION/TRAINING PROGRAM

## 2023-07-20 PROCEDURE — 99999 PR PBB SHADOW E&M-EST. PATIENT-LVL III: ICD-10-PCS | Mod: PBBFAC,,, | Performed by: STUDENT IN AN ORGANIZED HEALTH CARE EDUCATION/TRAINING PROGRAM

## 2023-07-20 PROCEDURE — 3078F PR MOST RECENT DIASTOLIC BLOOD PRESSURE < 80 MM HG: ICD-10-PCS | Mod: CPTII,S$GLB,, | Performed by: STUDENT IN AN ORGANIZED HEALTH CARE EDUCATION/TRAINING PROGRAM

## 2023-07-20 PROCEDURE — 86592 SYPHILIS TEST NON-TREP QUAL: CPT | Performed by: STUDENT IN AN ORGANIZED HEALTH CARE EDUCATION/TRAINING PROGRAM

## 2023-07-20 PROCEDURE — 99214 OFFICE O/P EST MOD 30 MIN: CPT | Mod: 25,S$GLB,, | Performed by: STUDENT IN AN ORGANIZED HEALTH CARE EDUCATION/TRAINING PROGRAM

## 2023-07-20 PROCEDURE — 99396 PREV VISIT EST AGE 40-64: CPT | Mod: S$GLB,,, | Performed by: STUDENT IN AN ORGANIZED HEALTH CARE EDUCATION/TRAINING PROGRAM

## 2023-07-20 PROCEDURE — 99214 PR OFFICE/OUTPT VISIT, EST, LEVL IV, 30-39 MIN: ICD-10-PCS | Mod: 25,S$GLB,, | Performed by: STUDENT IN AN ORGANIZED HEALTH CARE EDUCATION/TRAINING PROGRAM

## 2023-07-20 PROCEDURE — 84443 ASSAY THYROID STIM HORMONE: CPT | Performed by: STUDENT IN AN ORGANIZED HEALTH CARE EDUCATION/TRAINING PROGRAM

## 2023-07-20 PROCEDURE — 3074F PR MOST RECENT SYSTOLIC BLOOD PRESSURE < 130 MM HG: ICD-10-PCS | Mod: CPTII,S$GLB,, | Performed by: STUDENT IN AN ORGANIZED HEALTH CARE EDUCATION/TRAINING PROGRAM

## 2023-07-20 PROCEDURE — 80053 COMPREHEN METABOLIC PANEL: CPT | Performed by: STUDENT IN AN ORGANIZED HEALTH CARE EDUCATION/TRAINING PROGRAM

## 2023-07-20 PROCEDURE — 4010F ACE/ARB THERAPY RXD/TAKEN: CPT | Mod: CPTII,S$GLB,, | Performed by: STUDENT IN AN ORGANIZED HEALTH CARE EDUCATION/TRAINING PROGRAM

## 2023-07-20 PROCEDURE — 3074F SYST BP LT 130 MM HG: CPT | Mod: CPTII,S$GLB,, | Performed by: STUDENT IN AN ORGANIZED HEALTH CARE EDUCATION/TRAINING PROGRAM

## 2023-07-20 PROCEDURE — 85025 COMPLETE CBC W/AUTO DIFF WBC: CPT | Performed by: STUDENT IN AN ORGANIZED HEALTH CARE EDUCATION/TRAINING PROGRAM

## 2023-07-20 PROCEDURE — 84153 ASSAY OF PSA TOTAL: CPT | Performed by: STUDENT IN AN ORGANIZED HEALTH CARE EDUCATION/TRAINING PROGRAM

## 2023-07-20 PROCEDURE — 1159F PR MEDICATION LIST DOCUMENTED IN MEDICAL RECORD: ICD-10-PCS | Mod: CPTII,S$GLB,, | Performed by: STUDENT IN AN ORGANIZED HEALTH CARE EDUCATION/TRAINING PROGRAM

## 2023-07-20 RX ORDER — OLMESARTAN MEDOXOMIL 40 MG/1
40 TABLET ORAL DAILY
Qty: 30 TABLET | Refills: 3 | Status: SHIPPED | OUTPATIENT
Start: 2023-07-20 | End: 2023-08-03

## 2023-07-20 NOTE — PROGRESS NOTES
[facility]    Subjective:       Patient ID: Corazon Jones is a 57 y.o. male.    Chief Complaint: Annual Exam        History was obtained from the patient and supplemented through chart review.  This patient is known to me.    HPI:    Patient is a 57 y.o. male who presents for f/u of HTN, HLD, prediabetes, presents for annual    HTN  -some symptoms of orthostasis, change from combo to ARB alone  -Stop hyzaar 100-12.5, olmesartan 40 mg  -No CP/HA/SOB/Vision changes    Hx of Sarcoidosis diagnosis in 1991  -Resolved, potentially erroneous diagnosis  -Also was treated as tb at time time  -Environmental exposure in [place]  -Abn chest CT findings stable    Dysphagia, esophageal spasm  -Follows with [facility] Dr. Monahan, due repeat EGD/c-scope 2025  -stable     Carpal tunnel   -Has braces, only intermittent symptoms  -rests, encourage more     Prediabetes  -stable, counseled  -pt intermittent fasts, poor diet in evenings    Vit D insufficiency  -taking supplement 2000 mg  recheck    Hx of 5 mm Stone in left kidney  Hx of Prior imaging with possible hepatic steatosis    Recent bilat epicondylitis  S/p injections doing well    Wt Readings from Last 15 Encounters:   07/20/23 107.2 kg (236 lb 5.3 oz)   12/07/22 109.9 kg (242 lb 4.6 oz)   10/18/22 110.3 kg (243 lb 2.7 oz)   07/28/22 106.8 kg (235 lb 7.2 oz)   06/07/22 107.2 kg (236 lb 5.3 oz)   03/24/22 109 kg (240 lb 4.8 oz)   08/26/21 104.8 kg (231 lb)   08/27/20 104.3 kg (230 lb)   02/03/20 107.3 kg (236 lb 8.9 oz)   12/19/19 107.6 kg (237 lb 3.4 oz)   11/06/19 106.3 kg (234 lb 5.6 oz)   04/17/18 104.3 kg (230 lb)   03/06/18 108 kg (238 lb 1.6 oz)   11/22/17 108 kg (238 lb 1.6 oz)   09/25/17 108 kg (238 lb 1.6 oz)        Medical History  Past Medical History:   Diagnosis Date    History of Clostridium difficile colitis     treated 2014    History of sarcoidosis     dx 1991, resolved    Hyperlipemia        Review of Systems   Constitutional:  Negative for  "appetite change and diaphoresis.   HENT:  Negative for trouble swallowing.    Respiratory:  Negative for shortness of breath.    Cardiovascular:  Positive for palpitations. Negative for chest pain.   Gastrointestinal:  Negative for diarrhea, nausea and vomiting.   Musculoskeletal:  Negative for gait problem.   Neurological:  Positive for light-headedness and numbness. Negative for dizziness and seizures.   Psychiatric/Behavioral:  Negative for hallucinations.        Surgical hx, family hx, social hx   Have been reviewed      Current Outpatient Medications:     celecoxib (CELEBREX) 200 MG capsule, Take 1 capsule (200 mg total) by mouth once daily., Disp: 90 capsule, Rfl: 3    omeprazole (PRILOSEC) 40 MG capsule, TAKE 1 CAPSULE BY MOUTH EVERY MORNING AS DIRECTED, Disp: 90 capsule, Rfl: 3    rosuvastatin (CRESTOR) 40 MG Tab, Take 1 tablet (40 mg total) by mouth every evening., Disp: 90 tablet, Rfl: 3    sertraline (ZOLOFT) 50 MG tablet, Take 1 tablet (50 mg total) by mouth once daily., Disp: 90 tablet, Rfl: 3    tadalafiL (CIALIS) 20 MG Tab, Take 1 tablet (20 mg total) by mouth once daily., Disp: 30 tablet, Rfl: 11    ergocalciferol (VITAMIN D2) 50,000 unit Cap, Take 1 capsule (50,000 Units total) by mouth every 7 days. (Patient not taking: Reported on 7/20/2023), Disp: 12 capsule, Rfl: 0    olmesartan (BENICAR) 40 MG tablet, Take 1 tablet (40 mg total) by mouth once daily., Disp: 30 tablet, Rfl: 3    Objective:        Body mass index is 32.96 kg/m².  Vitals:    07/20/23 1434   BP: 104/76   Pulse: 73   SpO2: 95%   Weight: 107.2 kg (236 lb 5.3 oz)   Height: 5' 11" (1.803 m)   PainSc: 0-No pain       Physical Exam  Vitals and nursing note reviewed.   Constitutional:       General: He is not in acute distress.     Appearance: Normal appearance. He is not ill-appearing.   HENT:      Head: Normocephalic and atraumatic.   Eyes:      General: No scleral icterus.  Cardiovascular:      Rate and Rhythm: Normal rate and " regular rhythm.      Heart sounds: Normal heart sounds.   Pulmonary:      Effort: Pulmonary effort is normal.   Abdominal:      General: There is no distension.      Palpations: There is no mass.      Tenderness: There is no abdominal tenderness. There is no guarding or rebound.      Hernia: No hernia is present.   Musculoskeletal:         General: No deformity.      Cervical back: Normal range of motion.   Skin:     General: Skin is warm and dry.   Neurological:      Mental Status: He is alert and oriented to person, place, and time.   Psychiatric:         Behavior: Behavior normal.         Lab Results   Component Value Date    WBC 3.57 (L) 07/20/2023    HGB 15.8 07/20/2023    HCT 47.5 07/20/2023     07/20/2023    CHOL 156 12/07/2022    TRIG 69 12/07/2022    HDL 46 12/07/2022    ALT 35 07/20/2023    AST 26 07/20/2023     07/20/2023    K 4.3 07/20/2023     07/20/2023    CREATININE 0.9 07/20/2023    BUN 16 07/20/2023    CO2 28 07/20/2023    TSH 1.394 07/20/2023    PSA 0.33 03/23/2022    HGBA1C 5.7 (H) 12/07/2022       The 10-year ASCVD risk score (Davina BARILLAS, et al., 2019) is: 4.9%    Values used to calculate the score:      Age: 57 years      Sex: Male      Is Non- : No      Diabetic: No      Tobacco smoker: No      Systolic Blood Pressure: 104 mmHg      Is BP treated: Yes      HDL Cholesterol: 46 mg/dL      Total Cholesterol: 156 mg/dL    On crestor 40    (Imaging have been independently reviewed)    Reviewed CT scan from Dec      Assessment:         1. Annual physical exam    2. Screening for malignant neoplasm of prostate    3. Vitamin D insufficiency    4. Primary hypertension    5. Routine screening for STI (sexually transmitted infection)    6. Chronic familial neutropenia    7. OTTONIEL (obstructive sleep apnea)    8. History of Clostridium difficile colitis    9. Prediabetes            Plan:     Corazon was seen today for annual exam.    Diagnoses and all orders for this  visit:    Annual physical exam  -     Comprehensive Metabolic Panel; Future  -     Lipid Panel; Future  -     TSH; Future  -     CBC Auto Differential; Future  -     Hemoglobin A1C; Future    Screening for malignant neoplasm of prostate  -     PSA, Screening; Future    Vitamin D insufficiency  -     Vitamin D; Future    Primary hypertension  -     olmesartan (BENICAR) 40 MG tablet; Take 1 tablet (40 mg total) by mouth once daily.    Routine screening for STI (sexually transmitted infection)  -     HIV 1/2 Ag/Ab (4th Gen); Future  -     RPR; Future  -     C. trachomatis/N. gonorrhoeae by AMP DNA [facility]; Urine; Future  -     Hepatitis C Antibody; Future  -     Trichomonas vaginalis, RNA, Qual, Urine; Future    Chronic familial neutropenia    OTTONIEL (obstructive sleep apnea)    History of Clostridium difficile colitis    Prediabetes          Health Maintenance  - Lipids: normal on crestor 40  - A1C: 5.7 12/2022   - Colon Ca Screen:  11/2020 [facility], repeat 5 years  - Immunizations: encouraged PNA, shingles  - PSA normal : normal 3/2022, repeat     All of your core healthy metrics are met.    Follow up in about 6 months (around 1/20/2024) for htn follow-up.        Changed HTN meds due to orthostasis in addition to annual    All medications were reviewed including potential side effects and risks/benefits.  Pt was counseled to call back if anything worsens or if questions arise.    Baldomero Schwab MD  Family Medicine  [facility]  [address] [place] [address] [address] [place] [address]  Phone [phone]  Fax [phone]

## 2023-07-23 DIAGNOSIS — E55.9 VITAMIN D INSUFFICIENCY: ICD-10-CM

## 2023-07-23 RX ORDER — ERGOCALCIFEROL 1.25 MG/1
50000 CAPSULE ORAL
Qty: 12 CAPSULE | Refills: 1 | Status: SHIPPED | OUTPATIENT
Start: 2023-07-23

## 2023-07-24 LAB
SPECIMEN SOURCE: NORMAL
T VAGINALIS RRNA SPEC QL NAA+PROBE: NEGATIVE

## 2023-08-02 DIAGNOSIS — M15.9 PRIMARY OSTEOARTHRITIS INVOLVING MULTIPLE JOINTS: ICD-10-CM

## 2023-08-02 DIAGNOSIS — M79.18 MUSCULOSKELETAL PAIN: ICD-10-CM

## 2023-08-02 RX ORDER — CELECOXIB 200 MG/1
200 CAPSULE ORAL DAILY
Qty: 90 CAPSULE | Refills: 3 | Status: SHIPPED | OUTPATIENT
Start: 2023-08-02

## 2023-08-03 ENCOUNTER — CLINICAL SUPPORT (OUTPATIENT)
Dept: INTERNAL MEDICINE | Facility: CLINIC | Age: 58
End: 2023-08-03
Payer: COMMERCIAL

## 2023-08-03 ENCOUNTER — TELEPHONE (OUTPATIENT)
Dept: INTERNAL MEDICINE | Facility: CLINIC | Age: 58
End: 2023-08-03

## 2023-08-03 VITALS — SYSTOLIC BLOOD PRESSURE: 108 MMHG | HEART RATE: 74 BPM | DIASTOLIC BLOOD PRESSURE: 68 MMHG | OXYGEN SATURATION: 96 %

## 2023-08-03 DIAGNOSIS — I10 PRIMARY HYPERTENSION: Primary | ICD-10-CM

## 2023-08-03 DIAGNOSIS — R07.9 CHEST PAIN, UNSPECIFIED TYPE: ICD-10-CM

## 2023-08-03 DIAGNOSIS — Z01.30 BP CHECK: Primary | ICD-10-CM

## 2023-08-03 PROCEDURE — 99999 PR PBB SHADOW E&M-EST. PATIENT-LVL III: ICD-10-PCS | Mod: PBBFAC,,,

## 2023-08-03 PROCEDURE — 99999 PR PBB SHADOW E&M-EST. PATIENT-LVL III: CPT | Mod: PBBFAC,,,

## 2023-08-03 RX ORDER — LOSARTAN POTASSIUM 25 MG/1
25 TABLET ORAL DAILY
Qty: 30 TABLET | Refills: 3 | Status: SHIPPED | OUTPATIENT
Start: 2023-08-03 | End: 2023-08-08

## 2023-08-03 NOTE — TELEPHONE ENCOUNTER
Called Mr. Chandra. The listed message from Dr. Schwab was given to him. He states would like a Stress test and would like the [place] to be a MD visit instead. Informed will send to his MD

## 2023-08-03 NOTE — TELEPHONE ENCOUNTER
Corazon Jones 57 y.o. male is here for Blood Pressure check. in person    Manual Blood pressure reading was  108/68, Pulse 74. (Checked at the end of the visit)    If high, was it repeated after 15 minutes? no  Pt's Home blood pressure machine read in office NO Pulse No.   Diagnosed with Hypertension yes.  Patient took blood pressure medication today no.  Last dose of blood pressure medication was taken at 9PM last night ( takes medication at night). Patient took Olmesartan 40 mg daily.   All Medications and OTC medication updated yes  Does patient have record of home blood pressure readings / Blood Pressure Log no. States wife took BP the other day 112/70    Does the pt have any complaints today in regards to their blood pressure medication? yes. Complains of 1. Ongoing dizziness  when changing position and drowsiness 2. This weekend had an episode of tightness across chest and SOB . Patient is asymptomatic.   Were you sitting still for 5-10 minutes prior to taking your Blood pressure? yes Today in clinic  Does not require a BP log with BP ranges  Has your blood pressure monitor ever been checked? no  No BP cuff When was last time we checked your blood pressure monitor? No  Updated vitals yes  Follow up date is 01/25/2024.     Dr. Schwab  notified.     Creatinine   Date Value Ref Range Status   07/20/2023 0.9 0.5 - 1.4 mg/dL Final     Sodium   Date Value Ref Range Status   07/20/2023 140 136 - 145 mmol/L Final     Potassium   Date Value Ref Range Status   07/20/2023 4.3 3.5 - 5.1 mmol/L Final

## 2023-08-03 NOTE — TELEPHONE ENCOUNTER
Recommend stopping olmesartan due to light-headedness, we can try losartan 25 instead  Needs 2 week nurse visit in office to assess bp please    Would need appt to better assess SOB and chest tightness. Stress test may be warranted.  Notify us if wants to move forward with it.    jl

## 2023-08-04 NOTE — TELEPHONE ENCOUNTER
Spoke to Dr. Jones to schedule test, but patient refused soonest.   Patient states he need appt on Fridays and after 2pm, nothing found after 2pm  Spoke to echo department and states that she is new and do not know how to schedule asked to call her supervisor on Monday Anibal [phone] to schedule Stress test.  Patient was scheduled on 08/21/23 at 8 and 9am.

## 2023-08-08 ENCOUNTER — OFFICE VISIT (OUTPATIENT)
Dept: INTERNAL MEDICINE | Facility: CLINIC | Age: 58
End: 2023-08-08
Payer: COMMERCIAL

## 2023-08-08 VITALS
DIASTOLIC BLOOD PRESSURE: 69 MMHG | HEART RATE: 56 BPM | BODY MASS INDEX: 34.29 KG/M2 | WEIGHT: 244.94 LBS | SYSTOLIC BLOOD PRESSURE: 123 MMHG | OXYGEN SATURATION: 95 % | HEIGHT: 71 IN

## 2023-08-08 DIAGNOSIS — R00.2 PALPITATIONS: ICD-10-CM

## 2023-08-08 DIAGNOSIS — I95.1 ORTHOSTASIS: ICD-10-CM

## 2023-08-08 DIAGNOSIS — K21.9 GASTROESOPHAGEAL REFLUX DISEASE, UNSPECIFIED WHETHER ESOPHAGITIS PRESENT: Primary | ICD-10-CM

## 2023-08-08 DIAGNOSIS — R07.89 CHEST PRESSURE: ICD-10-CM

## 2023-08-08 DIAGNOSIS — I10 PRIMARY HYPERTENSION: ICD-10-CM

## 2023-08-08 PROCEDURE — 99999 PR PBB SHADOW E&M-EST. PATIENT-LVL III: CPT | Mod: PBBFAC,,, | Performed by: STUDENT IN AN ORGANIZED HEALTH CARE EDUCATION/TRAINING PROGRAM

## 2023-08-08 PROCEDURE — 3008F PR BODY MASS INDEX (BMI) DOCUMENTED: ICD-10-PCS | Mod: CPTII,S$GLB,, | Performed by: STUDENT IN AN ORGANIZED HEALTH CARE EDUCATION/TRAINING PROGRAM

## 2023-08-08 PROCEDURE — 99214 PR OFFICE/OUTPT VISIT, EST, LEVL IV, 30-39 MIN: ICD-10-PCS | Mod: S$GLB,,, | Performed by: STUDENT IN AN ORGANIZED HEALTH CARE EDUCATION/TRAINING PROGRAM

## 2023-08-08 PROCEDURE — 4010F ACE/ARB THERAPY RXD/TAKEN: CPT | Mod: CPTII,S$GLB,, | Performed by: STUDENT IN AN ORGANIZED HEALTH CARE EDUCATION/TRAINING PROGRAM

## 2023-08-08 PROCEDURE — 99214 OFFICE O/P EST MOD 30 MIN: CPT | Mod: S$GLB,,, | Performed by: STUDENT IN AN ORGANIZED HEALTH CARE EDUCATION/TRAINING PROGRAM

## 2023-08-08 PROCEDURE — 1159F MED LIST DOCD IN RCRD: CPT | Mod: CPTII,S$GLB,, | Performed by: STUDENT IN AN ORGANIZED HEALTH CARE EDUCATION/TRAINING PROGRAM

## 2023-08-08 PROCEDURE — 3044F HG A1C LEVEL LT 7.0%: CPT | Mod: CPTII,S$GLB,, | Performed by: STUDENT IN AN ORGANIZED HEALTH CARE EDUCATION/TRAINING PROGRAM

## 2023-08-08 PROCEDURE — 3074F SYST BP LT 130 MM HG: CPT | Mod: CPTII,S$GLB,, | Performed by: STUDENT IN AN ORGANIZED HEALTH CARE EDUCATION/TRAINING PROGRAM

## 2023-08-08 PROCEDURE — 99999 PR PBB SHADOW E&M-EST. PATIENT-LVL III: ICD-10-PCS | Mod: PBBFAC,,, | Performed by: STUDENT IN AN ORGANIZED HEALTH CARE EDUCATION/TRAINING PROGRAM

## 2023-08-08 PROCEDURE — 3078F DIAST BP <80 MM HG: CPT | Mod: CPTII,S$GLB,, | Performed by: STUDENT IN AN ORGANIZED HEALTH CARE EDUCATION/TRAINING PROGRAM

## 2023-08-08 PROCEDURE — 3074F PR MOST RECENT SYSTOLIC BLOOD PRESSURE < 130 MM HG: ICD-10-PCS | Mod: CPTII,S$GLB,, | Performed by: STUDENT IN AN ORGANIZED HEALTH CARE EDUCATION/TRAINING PROGRAM

## 2023-08-08 PROCEDURE — 3008F BODY MASS INDEX DOCD: CPT | Mod: CPTII,S$GLB,, | Performed by: STUDENT IN AN ORGANIZED HEALTH CARE EDUCATION/TRAINING PROGRAM

## 2023-08-08 PROCEDURE — 1159F PR MEDICATION LIST DOCUMENTED IN MEDICAL RECORD: ICD-10-PCS | Mod: CPTII,S$GLB,, | Performed by: STUDENT IN AN ORGANIZED HEALTH CARE EDUCATION/TRAINING PROGRAM

## 2023-08-08 PROCEDURE — 3078F PR MOST RECENT DIASTOLIC BLOOD PRESSURE < 80 MM HG: ICD-10-PCS | Mod: CPTII,S$GLB,, | Performed by: STUDENT IN AN ORGANIZED HEALTH CARE EDUCATION/TRAINING PROGRAM

## 2023-08-08 PROCEDURE — 4010F PR ACE/ARB THEARPY RXD/TAKEN: ICD-10-PCS | Mod: CPTII,S$GLB,, | Performed by: STUDENT IN AN ORGANIZED HEALTH CARE EDUCATION/TRAINING PROGRAM

## 2023-08-08 PROCEDURE — 3044F PR MOST RECENT HEMOGLOBIN A1C LEVEL <7.0%: ICD-10-PCS | Mod: CPTII,S$GLB,, | Performed by: STUDENT IN AN ORGANIZED HEALTH CARE EDUCATION/TRAINING PROGRAM

## 2023-08-08 RX ORDER — SUCRALFATE 1 G/1
1 TABLET ORAL 4 TIMES DAILY PRN
Qty: 20 TABLET | Refills: 0 | Status: SHIPPED | OUTPATIENT
Start: 2023-08-08 | End: 2024-03-15

## 2023-08-08 NOTE — PROGRESS NOTES
[facility]    Subjective:       Patient ID: Corazon Jones is a 57 y.o. male.    Chief Complaint: Shortness of Breath and Chest Pain        History was obtained from the patient and supplemented through chart review.  This patient is known to me.    HPI:    Patient is a 57 y.o. male who presents for f/u of HTN, HLD, prediabetes, presents for annual      Positional chest pressure, radiated into jaw few times  Intermittent for approx 2 weeks, first episode was 2 weeks ago after traveling, high stress, very big meal  Not exertional  Palpitation feeling in chest, but heart rate is regular  Pt thinks GI related  Not SOB with most of the occasions, did have SOB after big meal  Echo, EKG, stress test upcoming Fri  Can consider holter  Also recommend EGD with shazia armstrong (Dr. Vinson) (trial sucralfate)  Hx of Dysphagia, esophageal spasm  Follows with [facility] Dr. Monahan, due repeat EGD/c-scope 2025    HTN  -orthostasis  -stopped other meds, now also stopping losartan 25  -light-headedness  -monitor off meds      Not addressed    Hx of Sarcoidosis diagnosis in 1991  -Resolved, potentially erroneous diagnosis  -Also was treated as tb at time time  -Environmental exposure in [place]  -Abn chest CT findings stable        Carpal tunnel   -Has braces, only intermittent symptoms  -rests, encourage more     Prediabetes  -stable, counseled  -pt intermittent fasts, poor diet in evenings    Vit D insufficiency  -taking supplement 2000 mg  recheck    Hx of 5 mm Stone in left kidney  Hx of Prior imaging with possible hepatic steatosis    Recent bilat epicondylitis  S/p injections doing well    Wt Readings from Last 15 Encounters:   08/08/23 111.1 kg (244 lb 14.9 oz)   07/20/23 107.2 kg (236 lb 5.3 oz)   12/07/22 109.9 kg (242 lb 4.6 oz)   10/18/22 110.3 kg (243 lb 2.7 oz)   07/28/22 106.8 kg (235 lb 7.2 oz)   06/07/22 107.2 kg (236 lb 5.3 oz)   03/24/22 109 kg (240 lb 4.8 oz)   08/26/21 104.8 kg (231 lb)   08/27/20 104.3 kg (230  lb)   02/03/20 107.3 kg (236 lb 8.9 oz)   12/19/19 107.6 kg (237 lb 3.4 oz)   11/06/19 106.3 kg (234 lb 5.6 oz)   04/17/18 104.3 kg (230 lb)   03/06/18 108 kg (238 lb 1.6 oz)   11/22/17 108 kg (238 lb 1.6 oz)        Medical History  Past Medical History:   Diagnosis Date    History of Clostridium difficile colitis     treated 2014    History of sarcoidosis     dx 1991, resolved    Hyperlipemia        Review of Systems   Constitutional:  Negative for appetite change and diaphoresis.   HENT:  Negative for trouble swallowing.    Respiratory:  Positive for chest tightness. Negative for shortness of breath.    Cardiovascular:  Positive for palpitations. Negative for chest pain.   Gastrointestinal:  Negative for diarrhea, nausea and vomiting.   Musculoskeletal:  Negative for gait problem.   Neurological:  Positive for light-headedness and numbness. Negative for dizziness and seizures.   Psychiatric/Behavioral:  Negative for hallucinations.          Surgical hx, family hx, social hx   Have been reviewed      Current Outpatient Medications:     celecoxib (CELEBREX) 200 MG capsule, Take 1 capsule (200 mg total) by mouth once daily., Disp: 90 capsule, Rfl: 3    ergocalciferol (VITAMIN D2) 50,000 unit Cap, Take 1 capsule (50,000 Units total) by mouth every 7 days., Disp: 12 capsule, Rfl: 1    omeprazole (PRILOSEC) 40 MG capsule, TAKE 1 CAPSULE BY MOUTH EVERY MORNING AS DIRECTED, Disp: 90 capsule, Rfl: 3    rosuvastatin (CRESTOR) 40 MG Tab, Take 1 tablet (40 mg total) by mouth every evening., Disp: 90 tablet, Rfl: 3    sertraline (ZOLOFT) 50 MG tablet, Take 1 tablet (50 mg total) by mouth once daily., Disp: 90 tablet, Rfl: 3    tadalafiL (CIALIS) 20 MG Tab, Take 1 tablet (20 mg total) by mouth once daily., Disp: 30 tablet, Rfl: 11    sucralfate (CARAFATE) 1 gram tablet, Take 1 tablet (1 g total) by mouth 4 (four) times daily as needed (GERD)., Disp: 20 tablet, Rfl: 0    Objective:        Body mass index is 34.16  "kg/m².  Vitals:    08/08/23 1504   BP: 123/69   Pulse: (!) 56   SpO2: 95%   Weight: 111.1 kg (244 lb 14.9 oz)   Height: 5' 11" (1.803 m)   PainSc: 0-No pain         Physical Exam  Vitals and nursing note reviewed.   Constitutional:       General: He is not in acute distress.     Appearance: Normal appearance. He is not ill-appearing.   HENT:      Head: Normocephalic and atraumatic.   Eyes:      General: No scleral icterus.  Cardiovascular:      Rate and Rhythm: Normal rate and regular rhythm.      Heart sounds: Normal heart sounds.   Pulmonary:      Effort: Pulmonary effort is normal.   Musculoskeletal:         General: No deformity.   Neurological:      Mental Status: He is alert and oriented to person, place, and time.   Psychiatric:         Behavior: Behavior normal.         Lab Results   Component Value Date    WBC 3.57 (L) 07/20/2023    HGB 15.8 07/20/2023    HCT 47.5 07/20/2023     07/20/2023    CHOL 182 07/20/2023    TRIG 85 07/20/2023    HDL 49 07/20/2023    ALT 35 07/20/2023    AST 26 07/20/2023     07/20/2023    K 4.3 07/20/2023     07/20/2023    CREATININE 0.9 07/20/2023    BUN 16 07/20/2023    CO2 28 07/20/2023    TSH 1.394 07/20/2023    PSA 0.25 07/20/2023    HGBA1C 5.5 07/20/2023       The 10-year ASCVD risk score (Davina DK, et al., 2019) is: 6.3%    Values used to calculate the score:      Age: 57 years      Sex: Male      Is Non- : No      Diabetic: No      Tobacco smoker: No      Systolic Blood Pressure: 123 mmHg      Is BP treated: No      HDL Cholesterol: 49 mg/dL      Total Cholesterol: 182 mg/dL    On crestor 40    (Imaging have been independently reviewed)    Reviewed CT scan from Dec      Assessment:         1. Gastroesophageal reflux disease, unspecified whether esophagitis present    2. Chest pressure    3. Palpitations    4. Orthostasis    5. Primary hypertension              Plan:     Corazon was seen today for shortness of breath and chest " pain.    Diagnoses and all orders for this visit:    Gastroesophageal reflux disease, unspecified whether esophagitis present  -     sucralfate (CARAFATE) 1 gram tablet; Take 1 tablet (1 g total) by mouth 4 (four) times daily as needed (GERD).    Chest pressure    Palpitations    Orthostasis    Primary hypertension            Health Maintenance  - Lipids: normal on crestor 40  - A1C: 5.7 12/2022   - Colon Ca Screen:  11/2020 [facility], repeat 5 years  - Immunizations: encouraged PNA, shingles  - PSA normal : normal 3/2022, repeat     All of your core healthy metrics are met.    No follow-ups on file.          All medications were reviewed including potential side effects and risks/benefits.  Pt was counseled to call back if anything worsens or if questions arise.    Baldomero Schwab MD  Family Medicine  [facility]  [facility] [address] [address]  Phone [phone]  Fax [phone]

## 2023-08-10 ENCOUNTER — PATIENT MESSAGE (OUTPATIENT)
Dept: INTERNAL MEDICINE | Facility: CLINIC | Age: 58
End: 2023-08-10
Payer: COMMERCIAL

## 2023-08-11 ENCOUNTER — HOSPITAL ENCOUNTER (OUTPATIENT)
Dept: CARDIOLOGY | Facility: OTHER | Age: 58
Discharge: HOME | End: 2023-08-11
Attending: STUDENT IN AN ORGANIZED HEALTH CARE EDUCATION/TRAINING PROGRAM
Payer: COMMERCIAL

## 2023-08-11 ENCOUNTER — HOSPITAL ENCOUNTER (OUTPATIENT)
Dept: RADIOLOGY | Facility: HOSPITAL | Age: 58
Discharge: HOME | End: 2023-08-11
Attending: INTERNAL MEDICINE
Payer: COMMERCIAL

## 2023-08-11 ENCOUNTER — OFFICE VISIT (OUTPATIENT)
Dept: CARDIOLOGY | Facility: CLINIC | Age: 58
End: 2023-08-11
Payer: COMMERCIAL

## 2023-08-11 VITALS
SYSTOLIC BLOOD PRESSURE: 123 MMHG | DIASTOLIC BLOOD PRESSURE: 69 MMHG | BODY MASS INDEX: 34.16 KG/M2 | HEIGHT: 71 IN | WEIGHT: 244 LBS

## 2023-08-11 VITALS
HEIGHT: 71 IN | BODY MASS INDEX: 34.16 KG/M2 | HEART RATE: 65 BPM | WEIGHT: 244 LBS | DIASTOLIC BLOOD PRESSURE: 82 MMHG | SYSTOLIC BLOOD PRESSURE: 118 MMHG

## 2023-08-11 VITALS
OXYGEN SATURATION: 96 % | HEART RATE: 71 BPM | HEIGHT: 71 IN | WEIGHT: 241 LBS | BODY MASS INDEX: 33.74 KG/M2 | SYSTOLIC BLOOD PRESSURE: 122 MMHG | DIASTOLIC BLOOD PRESSURE: 82 MMHG

## 2023-08-11 DIAGNOSIS — R07.2 PRECORDIAL PAIN: Primary | ICD-10-CM

## 2023-08-11 DIAGNOSIS — R07.9 CHEST PAIN, UNSPECIFIED TYPE: ICD-10-CM

## 2023-08-11 DIAGNOSIS — Z13.6 ENCOUNTER FOR SCREENING FOR CARDIOVASCULAR DISORDERS: ICD-10-CM

## 2023-08-11 LAB
AORTIC ROOT ANNULUS: 2.77 CM
ASCENDING AORTA: 2.43 CM
AV INDEX (PROSTH): 0.69
AV MEAN GRADIENT: 4 MMHG
AV PEAK GRADIENT: 8 MMHG
AV VALVE AREA BY VELOCITY RATIO: 2.03 CM²
AV VALVE AREA: 2.21 CM²
AV VELOCITY RATIO: 0.64
BSA FOR ECHO PROCEDURE: 2.35 M2
CV ECHO LV RWT: 0.33 CM
CV STRESS BASE HR: 65 BPM
DIASTOLIC BLOOD PRESSURE: 82 MMHG
DOP CALC AO PEAK VEL: 1.37 M/S
DOP CALC AO VTI: 29.4 CM
DOP CALC LVOT AREA: 3.2 CM2
DOP CALC LVOT DIAMETER: 2.02 CM
DOP CALC LVOT PEAK VEL: 0.87 M/S
DOP CALC LVOT STROKE VOLUME: 65.02 CM3
DOP CALCLVOT PEAK VEL VTI: 20.3 CM
E WAVE DECELERATION TIME: 232.5 MSEC
E/A RATIO: 1.32
E/E' RATIO: 8.82 M/S
ECHO LV POSTERIOR WALL: 0.82 CM (ref 0.6–1.1)
FRACTIONAL SHORTENING: 47 % (ref 28–44)
INTERVENTRICULAR SEPTUM: 1.13 CM (ref 0.6–1.1)
IVC DIAMETER: 1.68 CM
IVRT: 65.65 MSEC
LA MAJOR: 6.6 CM
LA MINOR: 6.23 CM
LA WIDTH: 4.2 CM
LEFT ATRIUM AREA SYSTOLIC (APICAL 2 CHAMBER): 21.2 CM2
LEFT ATRIUM AREA SYSTOLIC (APICAL 4 CHAMBER): 25.7 CM2
LEFT ATRIUM SIZE: 3.83 CM
LEFT ATRIUM VOLUME INDEX MOD: 31.4 ML/M2
LEFT ATRIUM VOLUME INDEX: 38.3 ML/M2
LEFT ATRIUM VOLUME MOD: 71.97 CM3
LEFT ATRIUM VOLUME: 87.64 CM3
LEFT INTERNAL DIMENSION IN SYSTOLE: 2.67 CM (ref 2.1–4)
LEFT VENTRICLE DIASTOLIC VOLUME INDEX: 52.59 ML/M2
LEFT VENTRICLE DIASTOLIC VOLUME: 120.43 ML
LEFT VENTRICLE MASS INDEX: 78 G/M2
LEFT VENTRICLE SYSTOLIC VOLUME INDEX: 11.4 ML/M2
LEFT VENTRICLE SYSTOLIC VOLUME: 26.19 ML
LEFT VENTRICULAR INTERNAL DIMENSION IN DIASTOLE: 5.04 CM (ref 3.5–6)
LEFT VENTRICULAR MASS: 178.24 G
LV LATERAL E/E' RATIO: 7.5 M/S
LV SEPTAL E/E' RATIO: 10.71 M/S
LVOT MG: 1.88 MMHG
LVOT MV: 0.66 CM/S
MV PEAK A VEL: 0.57 M/S
MV PEAK E VEL: 0.75 M/S
OHS CV CPX 85 PERCENT MAX PREDICTED HEART RATE MALE: 138
OHS CV CPX ESTIMATED METS: 9
OHS CV CPX MAX PREDICTED HEART RATE: 162
OHS CV CPX PATIENT IS FEMALE: 0
OHS CV CPX PATIENT IS MALE: 1
OHS CV CPX PEAK DIASTOLIC BLOOD PRESSURE: 71 MMHG
OHS CV CPX PEAK HEAR RATE: 139 BPM
OHS CV CPX PEAK RATE PRESSURE PRODUCT: NORMAL
OHS CV CPX PEAK SYSTOLIC BLOOD PRESSURE: 164 MMHG
OHS CV CPX PERCENT MAX PREDICTED HEART RATE ACHIEVED: 86
OHS CV CPX RATE PRESSURE PRODUCT PRESENTING: 7670
PISA MRMAX VEL: 1.77 M/S
PISA TR MAX VEL: 1.49 M/S
PV MV: 0.72 M/S
PV PEAK GRADIENT: 4 MMHG
PV PEAK VELOCITY: 1.01 M/S
RA MAJOR: 5.08 CM
RA PRESSURE ESTIMATED: 3 MMHG
RA WIDTH: 3.4 CM
RIGHT VENTRICULAR END-DIASTOLIC DIMENSION: 2.36 CM
RV TB RVSP: 4 MMHG
SINUS: 2.5 CM
STJ: 2.64 CM
STRESS ECHO POST EXERCISE DUR MIN: 6 MINUTES
STRESS ECHO POST EXERCISE DUR SEC: 30 SECONDS
SYSTOLIC BLOOD PRESSURE: 118 MMHG
TDI LATERAL: 0.1 M/S
TDI SEPTAL: 0.07 M/S
TDI: 0.09 M/S
TR MAX PG: 9 MMHG
TRICUSPID ANNULAR PLANE SYSTOLIC EXCURSION: 2 CM
TV REST PULMONARY ARTERY PRESSURE: 12 MMHG
Z-SCORE OF LEFT VENTRICULAR DIMENSION IN END DIASTOLE: -5.51
Z-SCORE OF LEFT VENTRICULAR DIMENSION IN END SYSTOLE: -5.39

## 2023-08-11 PROCEDURE — 3074F PR MOST RECENT SYSTOLIC BLOOD PRESSURE < 130 MM HG: ICD-10-PCS | Mod: CPTII,S$GLB,, | Performed by: INTERNAL MEDICINE

## 2023-08-11 PROCEDURE — 93018 EXERCISE STRESS - EKG (CUPID ONLY): ICD-10-PCS | Mod: ,,, | Performed by: INTERNAL MEDICINE

## 2023-08-11 PROCEDURE — 93306 TTE W/DOPPLER COMPLETE: CPT | Mod: 26,,, | Performed by: INTERNAL MEDICINE

## 2023-08-11 PROCEDURE — 93016 CV STRESS TEST SUPVJ ONLY: CPT | Mod: ,,, | Performed by: INTERNAL MEDICINE

## 2023-08-11 PROCEDURE — 3074F SYST BP LT 130 MM HG: CPT | Mod: CPTII,S$GLB,, | Performed by: INTERNAL MEDICINE

## 2023-08-11 PROCEDURE — 93306 TTE W/DOPPLER COMPLETE: CPT

## 2023-08-11 PROCEDURE — 99204 OFFICE O/P NEW MOD 45 MIN: CPT | Mod: 25,S$GLB,, | Performed by: INTERNAL MEDICINE

## 2023-08-11 PROCEDURE — 3079F PR MOST RECENT DIASTOLIC BLOOD PRESSURE 80-89 MM HG: ICD-10-PCS | Mod: CPTII,S$GLB,, | Performed by: INTERNAL MEDICINE

## 2023-08-11 PROCEDURE — 99999 PR PBB SHADOW E&M-EST. PATIENT-LVL IV: CPT | Mod: PBBFAC,,, | Performed by: INTERNAL MEDICINE

## 2023-08-11 PROCEDURE — 93018 CV STRESS TEST I&R ONLY: CPT | Mod: ,,, | Performed by: INTERNAL MEDICINE

## 2023-08-11 PROCEDURE — 93010 EKG 12-LEAD: ICD-10-PCS | Mod: 59,,, | Performed by: INTERNAL MEDICINE

## 2023-08-11 PROCEDURE — 3008F BODY MASS INDEX DOCD: CPT | Mod: CPTII,S$GLB,, | Performed by: INTERNAL MEDICINE

## 2023-08-11 PROCEDURE — 4010F PR ACE/ARB THEARPY RXD/TAKEN: ICD-10-PCS | Mod: CPTII,S$GLB,, | Performed by: INTERNAL MEDICINE

## 2023-08-11 PROCEDURE — 3044F PR MOST RECENT HEMOGLOBIN A1C LEVEL <7.0%: ICD-10-PCS | Mod: CPTII,S$GLB,, | Performed by: INTERNAL MEDICINE

## 2023-08-11 PROCEDURE — 93016 EXERCISE STRESS - EKG (CUPID ONLY): ICD-10-PCS | Mod: ,,, | Performed by: INTERNAL MEDICINE

## 2023-08-11 PROCEDURE — 93010 ELECTROCARDIOGRAM REPORT: CPT | Mod: 59,,, | Performed by: INTERNAL MEDICINE

## 2023-08-11 PROCEDURE — 1159F MED LIST DOCD IN RCRD: CPT | Mod: CPTII,S$GLB,, | Performed by: INTERNAL MEDICINE

## 2023-08-11 PROCEDURE — 93005 ELECTROCARDIOGRAM TRACING: CPT

## 2023-08-11 PROCEDURE — 1160F RVW MEDS BY RX/DR IN RCRD: CPT | Mod: CPTII,S$GLB,, | Performed by: INTERNAL MEDICINE

## 2023-08-11 PROCEDURE — 3079F DIAST BP 80-89 MM HG: CPT | Mod: CPTII,S$GLB,, | Performed by: INTERNAL MEDICINE

## 2023-08-11 PROCEDURE — 75571 CT HRT W/O DYE W/CA TEST: CPT | Mod: TC

## 2023-08-11 PROCEDURE — 93017 CV STRESS TEST TRACING ONLY: CPT

## 2023-08-11 PROCEDURE — 3044F HG A1C LEVEL LT 7.0%: CPT | Mod: CPTII,S$GLB,, | Performed by: INTERNAL MEDICINE

## 2023-08-11 PROCEDURE — 93306 ECHO (CUPID ONLY): ICD-10-PCS | Mod: 26,,, | Performed by: INTERNAL MEDICINE

## 2023-08-11 PROCEDURE — 1160F PR REVIEW ALL MEDS BY PRESCRIBER/CLIN PHARMACIST DOCUMENTED: ICD-10-PCS | Mod: CPTII,S$GLB,, | Performed by: INTERNAL MEDICINE

## 2023-08-11 PROCEDURE — 1159F PR MEDICATION LIST DOCUMENTED IN MEDICAL RECORD: ICD-10-PCS | Mod: CPTII,S$GLB,, | Performed by: INTERNAL MEDICINE

## 2023-08-11 PROCEDURE — 4010F ACE/ARB THERAPY RXD/TAKEN: CPT | Mod: CPTII,S$GLB,, | Performed by: INTERNAL MEDICINE

## 2023-08-11 PROCEDURE — 3008F PR BODY MASS INDEX (BMI) DOCUMENTED: ICD-10-PCS | Mod: CPTII,S$GLB,, | Performed by: INTERNAL MEDICINE

## 2023-08-11 PROCEDURE — 99204 PR OFFICE/OUTPT VISIT, NEW, LEVL IV, 45-59 MIN: ICD-10-PCS | Mod: 25,S$GLB,, | Performed by: INTERNAL MEDICINE

## 2023-08-11 PROCEDURE — 99999 PR PBB SHADOW E&M-EST. PATIENT-LVL IV: ICD-10-PCS | Mod: PBBFAC,,, | Performed by: INTERNAL MEDICINE

## 2023-08-11 NOTE — PROGRESS NOTES
[facility]    Chief Complaint  Chief Complaint   Patient presents with    Chest Pain       HPI:    Patient presents for follow-up of his stress test.  About 2 weeks ago he returned from out of town.  He had a heavy meal and lie down in bed.  Began experienced chest tightness.  Radiated to his jaws.  Since then he has noted his heart racing when he exerts himself but no exertional recurrence of his chest discomfort.  Has had recurrence but not as severe of his chest discomfort when lying down.    Medications  Current Outpatient Medications   Medication Sig Dispense Refill    celecoxib (CELEBREX) 200 MG capsule Take 1 capsule (200 mg total) by mouth once daily. 90 capsule 3    ergocalciferol (VITAMIN D2) 50,000 unit Cap Take 1 capsule (50,000 Units total) by mouth every 7 days. 12 capsule 1    omeprazole (PRILOSEC) 40 MG capsule TAKE 1 CAPSULE BY MOUTH EVERY MORNING AS DIRECTED 90 capsule 3    rosuvastatin (CRESTOR) 40 MG Tab Take 1 tablet (40 mg total) by mouth every evening. 90 tablet 3    sertraline (ZOLOFT) 50 MG tablet Take 1 tablet (50 mg total) by mouth once daily. 90 tablet 3    sucralfate (CARAFATE) 1 gram tablet Take 1 tablet (1 g total) by mouth 4 (four) times daily as needed (GERD). 20 tablet 0    tadalafiL (CIALIS) 20 MG Tab Take 1 tablet (20 mg total) by mouth once daily. 30 tablet 11     No current facility-administered medications for this visit.        History  Past Medical History:   Diagnosis Date    History of Clostridium difficile colitis     treated 2014    History of sarcoidosis     dx 1991, resolved    Hyperlipemia      Past Surgical History:   Procedure Laterality Date    APPENDECTOMY      EYE SURGERY  2006 Lasik    HERNIA REPAIR      orthoscopy      shoulder and knee     Social History     Socioeconomic History    Marital status:     Number of children: 3   Occupational History    Occupation: physician     Employer: [employer]   Tobacco Use     Smoking status: Never    Smokeless tobacco: Never   Substance and Sexual Activity    Alcohol use: No    Drug use: No    Sexual activity: Yes     Partners: Female   Social History Narrative    Intermittent exercise     Family History   Problem Relation Age of Onset    Hyperlipidemia Mother     Vision loss Mother     Atrial fibrillation Mother     Hyperlipidemia Father     Stroke Father         hemorrhagic/ also with trauma, unknown    Hyperlipidemia Brother     Diabetes Mellitus Maternal Grandmother     Endometrial cancer Paternal Grandmother     ADD / ADHD Child     Diabetes Mellitus Maternal Uncle     Prostate cancer Maternal Uncle     Diabetes Mellitus Paternal Uncle     Colon cancer Neg Hx         Allergies  Review of patient's allergies indicates:   Allergen Reactions    Percocet [oxycodone-acetaminophen]     Suture, silk     Adhesive Rash       Review of Systems   Review of Systems   Constitutional: Negative for malaise/fatigue, weight gain and weight loss.   Eyes:  Negative for visual disturbance.   Cardiovascular:  Positive for chest pain and palpitations. Negative for claudication, cyanosis, dyspnea on exertion, irregular heartbeat, leg swelling, near-syncope, orthopnea, paroxysmal nocturnal dyspnea and syncope.   Respiratory:  Negative for cough, hemoptysis, shortness of breath, sleep disturbances due to breathing and wheezing.    Hematologic/Lymphatic: Negative for bleeding problem. Does not bruise/bleed easily.   Skin:  Negative for poor wound healing.   Musculoskeletal:  Negative for muscle cramps and myalgias.   Gastrointestinal:  Positive for heartburn. Negative for abdominal pain, anorexia, diarrhea, hematemesis, hematochezia, melena, nausea and vomiting.   Genitourinary:  Negative for hematuria and nocturia.   Neurological:  Negative for excessive daytime sleepiness, dizziness, focal weakness, light-headedness and weakness.       Physical Exam  Vitals:    08/11/23 0927   BP: 122/82   Pulse: 71     Wt  Readings from Last 1 Encounters:   08/11/23 109.3 kg (241 lb)     Physical Exam  Vitals and nursing note reviewed.   Constitutional:       General: He is not in acute distress.     Appearance: He is not toxic-appearing or diaphoretic.   HENT:      Head: Normocephalic and atraumatic.      Mouth/Throat:      Lips: Pink.      Mouth: Mucous membranes are moist.   Eyes:      General: No scleral icterus.     Conjunctiva/sclera: Conjunctivae normal.   Neck:      Thyroid: No thyromegaly.      Vascular: No carotid bruit, hepatojugular reflux or JVD.      Trachea: Trachea normal.   Cardiovascular:      Rate and Rhythm: Normal rate and regular rhythm. No extrasystoles are present.     Chest Wall: PMI is not displaced.      Pulses:           Carotid pulses are 2+ on the right side and 2+ on the left side.       Radial pulses are 2+ on the right side and 2+ on the left side.        Dorsalis pedis pulses are 2+ on the right side and 2+ on the left side.        Posterior tibial pulses are 2+ on the right side and 2+ on the left side.      Heart sounds: S1 normal and S2 normal. No murmur heard.     No friction rub. No S3 or S4 sounds.   Pulmonary:      Effort: Pulmonary effort is normal. No tachypnea, bradypnea, accessory muscle usage or respiratory distress.      Breath sounds: Normal breath sounds and air entry. No decreased breath sounds, wheezing, rhonchi or rales.   Abdominal:      General: Bowel sounds are normal. There is no distension or abdominal bruit.      Palpations: Abdomen is soft. There is no hepatomegaly, splenomegaly or pulsatile mass.      Tenderness: There is no abdominal tenderness.   Musculoskeletal:         General: No tenderness or deformity.      Right lower leg: No edema.      Left lower leg: No edema.   Skin:     General: Skin is warm and dry.      Capillary Refill: Capillary refill takes less than 2 seconds.      Coloration: Skin is not cyanotic or pale.      Nails: There is no clubbing.   Neurological:       General: No focal deficit present.      Mental Status: He is alert and oriented to person, place, and time.   Psychiatric:         Attention and Perception: Attention normal.         Mood and Affect: Mood normal.         Speech: Speech normal.         Behavior: Behavior normal. Behavior is cooperative.         Labs  Hospital Outpatient Visit on 08/11/2023   Component Date Value Ref Range Status    85% Max Predicted HR 08/11/2023 138   In process    Max Predicted HR 08/11/2023 162   In process    [facility] CV CPX PATIENT IS MALE 08/11/2023 1.0   In process    [facility] CV CPX PATIENT IS FEMALE 08/11/2023 0.0   In process    Systolic blood pressure 08/11/2023 118  mmHg In process    Diastolic blood pressure 08/11/2023 82  mmHg In process    HR at rest 08/11/2023 65  bpm In process    RPP 08/11/2023 7,670   In process    Exercise duration (min) 08/11/2023 6  minutes In process    Exercise duration (sec) 08/11/2023 30  seconds In process    Peak Systolic BP 08/11/2023 164  mmHg In process    Peak Diatolic BP 08/11/2023 71  mmHg In process    Peak HR 08/11/2023 139  bpm In process    Estimated METs 08/11/2023 9   In process    % Max HR Achieved 08/11/2023 86   In process    Peak RPP 08/11/2023 22,796   In process   Hospital Outpatient Visit on 08/11/2023   Component Date Value Ref Range Status    BSA 08/11/2023 2.35  m2 Final    LVOT stroke volume 08/11/2023 65.02  cm3 Final    LVIDd 08/11/2023 5.04  3.5 - 6.0 cm Final    LV Systolic Volume 08/11/2023 26.19  mL Final    LV Systolic Volume Index 08/11/2023 11.4  mL/m2 Final    LVIDs 08/11/2023 2.67  2.1 - 4.0 cm Final    LV Diastolic Volume 08/11/2023 120.43  mL Final    LV Diastolic Volume Index 08/11/2023 52.59  mL/m2 Final    IVS 08/11/2023 1.13 (A)  0.6 - 1.1 cm Final    LVOT diameter 08/11/2023 2.02  cm Final    LVOT area 08/11/2023 3.2  cm2 Final    FS 08/11/2023 47 (A)  28 - 44 % Final    Left Ventricle Relative Wall Thick* 08/11/2023 0.33  cm Final    Posterior Wall  08/11/2023 0.82  0.6 - 1.1 cm Final    LV mass 08/11/2023 178.24  g Final    LV Mass Index 08/11/2023 78  g/m2 Final    MV Peak E Sheng 08/11/2023 0.75  m/s Final    TDI LATERAL 08/11/2023 0.10  m/s Final    TDI SEPTAL 08/11/2023 0.07  m/s Final    E/E' ratio 08/11/2023 8.82  m/s Final    MV Peak A Sheng 08/11/2023 0.57  m/s Final    TR Max Sheng 08/11/2023 1.49  m/s Final    E/A ratio 08/11/2023 1.32   Final    IVRT 08/11/2023 65.65  msec Final    E wave deceleration time 08/11/2023 232.50  msec Final    LV SEPTAL E/E' RATIO 08/11/2023 10.71  m/s Final    LV LATERAL E/E' RATIO 08/11/2023 7.50  m/s Final    LVOT peak sheng 08/11/2023 0.87  m/s Final    Left Ventricular Outflow Tract Juliana* 08/11/2023 0.66  cm/s Final    Left Ventricular Outflow Tract Juliana* 08/11/2023 1.88  mmHg Final    LA volume (mod) 08/11/2023 71.97  cm3 Final    LA Volume Index (Mod) 08/11/2023 31.4  mL/m2 Final    LA size 08/11/2023 3.83  cm Final    Left Atrium Major Axis 08/11/2023 6.60  cm Final    Left Atrium Minor Axis 08/11/2023 6.23  cm Final    RA Major [place] 08/11/2023 5.08  cm Final    AV mean gradient 08/11/2023 4  mmHg Final    AV peak gradient 08/11/2023 8  mmHg Final    Ao peak sheng 08/11/2023 1.37  m/s Final    Ao VTI 08/11/2023 29.40  cm Final    LVOT peak VTI 08/11/2023 20.30  cm Final    AV valve area 08/11/2023 2.21  cm² Final    AV Velocity Ratio 08/11/2023 0.64   Final    AV index (prosthetic) 08/11/2023 0.69   Final    FRANK by Velocity Ratio 08/11/2023 2.03  cm² Final    Mr max sheng 08/11/2023 1.77  m/s Final    Triscuspid Valve Regurgitation Pea* 08/11/2023 9  mmHg Final    PV PEAK VELOCITY 08/11/2023 1.01  m/s Final    PV peak gradient 08/11/2023 4  mmHg Final    Pulmonary Valve Mean Velocity 08/11/2023 0.72  m/s Final    Ao root annulus 08/11/2023 2.77  cm Final    STJ 08/11/2023 2.64  cm Final    Ascending aorta 08/11/2023 2.43  cm Final    IVC diameter 08/11/2023 1.68  cm Final    Mean e' 08/11/2023 0.09  m/s Final    ZLVIDS  08/11/2023 -5.39   Final    ZLVIDD 08/11/2023 -5.51   Final    RVDD 08/11/2023 2.36  cm Final    LA Volume Index 08/11/2023 38.3  mL/m2 Final    LA volume 08/11/2023 87.64  cm3 Final    LA area A2C 08/11/2023 21.2  cm2 Final    LA area A4C 08/11/2023 25.7  cm2 Final    LA WIDTH 08/11/2023 4.2  cm Final    TAPSE 08/11/2023 2.00  cm Final    RA Width 08/11/2023 3.4  cm Final    Sinus 08/11/2023 2.5  cm Final    TV resting pulmonary artery pressu* 08/11/2023 12  mmHg Final    RV TB RVSP 08/11/2023 4  mmHg Final    Est. RA pres 08/11/2023 3  mmHg Final   Lab Visit on 07/20/2023   Component Date Value Ref Range Status    Chlamydia, Amplified DNA 07/20/2023 Not Detected  Not Detected Final    N gonorrhoeae, amplified DNA 07/20/2023 Not Detected  Not Detected Final   Lab Visit on 07/20/2023   Component Date Value Ref Range Status    Sodium 07/20/2023 140  136 - 145 mmol/L Final    Potassium 07/20/2023 4.3  3.5 - 5.1 mmol/L Final    Chloride 07/20/2023 103  95 - 110 mmol/L Final    CO2 07/20/2023 28  23 - 29 mmol/L Final    Glucose 07/20/2023 88  70 - 110 mg/dL Final    BUN 07/20/2023 16  6 - 20 mg/dL Final    Creatinine 07/20/2023 0.9  0.5 - 1.4 mg/dL Final    Calcium 07/20/2023 10.2  8.7 - 10.5 mg/dL Final    Total Protein 07/20/2023 7.7  6.0 - 8.4 g/dL Final    Albumin 07/20/2023 4.2  3.5 - 5.2 g/dL Final    Total Bilirubin 07/20/2023 0.6  0.1 - 1.0 mg/dL Final    Comment: For infants and newborns, interpretation of results should be based  on gestational age, weight and in agreement with clinical  observations.    Premature Infant recommended reference ranges:  Up to 24 hours.............<8.0 mg/dL  Up to 48 hours............<12.0 mg/dL  3-5 days..................<15.0 mg/dL  6-29 days.................<15.0 mg/dL      Alkaline Phosphatase 07/20/2023 72  55 - 135 U/L Final    AST 07/20/2023 26  10 - 40 U/L Final    ALT 07/20/2023 35  10 - 44 U/L Final    eGFR 07/20/2023 >60  >60 mL/min/1.73 m^2 Final    Anion Gap  07/20/2023 9  8 - 16 mmol/L Final    Cholesterol 07/20/2023 182  120 - 199 mg/dL Final    Comment: The National Cholesterol Education Program (NCEP) has set the  following guidelines (reference ranges) for Cholesterol:  Optimal.....................<200 mg/dL  Borderline High.............200-239 mg/dL  High........................> or = 240 mg/dL      Triglycerides 07/20/2023 85  30 - 150 mg/dL Final    Comment: The National Cholesterol Education Program (NCEP) has set the  following guidelines (reference values) for triglycerides:  Normal......................<150 mg/dL  Borderline High.............150-199 mg/dL  High........................200-499 mg/dL      HDL 07/20/2023 49  40 - 75 mg/dL Final    Comment: The National Cholesterol Education Program (NCEP) has set the  following guidelines (reference values) for HDL Cholesterol:  Low...............<40 mg/dL  Optimal...........>60 mg/dL      LDL Cholesterol 07/20/2023 116.0  63.0 - 159.0 mg/dL Final    Comment: The National Cholesterol Education Program (NCEP) has set the  following guidelines (reference values) for LDL Cholesterol:  Optimal.......................<130 mg/dL  Borderline High...............130-159 mg/dL  High..........................160-189 mg/dL  Very High.....................>190 mg/dL      HDL/Cholesterol Ratio 07/20/2023 26.9  20.0 - 50.0 % Final    Total Cholesterol/HDL Ratio 07/20/2023 3.7  2.0 - 5.0 Final    Non-HDL Cholesterol 07/20/2023 133  mg/dL Final    Comment: Risk category and Non-HDL cholesterol goals:  Coronary heart disease (CHD)or equivalent (10-year risk of CHD >20%):  Non-HDL cholesterol goal     <130 mg/dL  Two or more CHD risk factors and 10-year risk of CHD <= 20%:  Non-HDL cholesterol goal     <160 mg/dL  0 to 1 CHD risk factor:  Non-HDL cholesterol goal     <190 mg/dL      TSH 07/20/2023 1.394  0.400 - 4.000 uIU/mL Final    WBC 07/20/2023 3.57 (L)  3.90 - 12.70 K/uL Final    RBC 07/20/2023 5.34  4.60 - 6.20 M/uL Final     Hemoglobin 07/20/2023 15.8  14.0 - 18.0 g/dL Final    Hematocrit 07/20/2023 47.5  40.0 - 54.0 % Final    MCV 07/20/2023 89  82 - 98 fL Final    MCH 07/20/2023 29.6  27.0 - 31.0 pg Final    MCHC 07/20/2023 33.3  32.0 - 36.0 g/dL Final    RDW 07/20/2023 13.0  11.5 - 14.5 % Final    Platelets 07/20/2023 247  150 - 450 K/uL Final    MPV 07/20/2023 9.9  9.2 - 12.9 fL Final    Immature Granulocytes 07/20/2023 0.0  0.0 - 0.5 % Final    Gran # (ANC) 07/20/2023 1.2 (L)  1.8 - 7.7 K/uL Final    Immature Grans (Abs) 07/20/2023 0.00  0.00 - 0.04 K/uL Final    Comment: Mild elevation in immature granulocytes is non specific and   can be seen in a variety of conditions including stress response,   acute inflammation, trauma and pregnancy. Correlation with other   laboratory and clinical findings is essential.      Lymph # 07/20/2023 1.8  1.0 - 4.8 K/uL Final    Mono # 07/20/2023 0.6  0.3 - 1.0 K/uL Final    Eos # 07/20/2023 0.0  0.0 - 0.5 K/uL Final    Baso # 07/20/2023 0.02  0.00 - 0.20 K/uL Final    nRBC 07/20/2023 0  0 /100 WBC Final    Gran % 07/20/2023 33.9 (L)  38.0 - 73.0 % Final    Lymph % 07/20/2023 49.0 (H)  18.0 - 48.0 % Final    Mono % 07/20/2023 15.4 (H)  4.0 - 15.0 % Final    Eosinophil % 07/20/2023 1.1  0.0 - 8.0 % Final    Basophil % 07/20/2023 0.6  0.0 - 1.9 % Final    Differential Method 07/20/2023 Automated   Final    Hemoglobin A1C 07/20/2023 5.5  4.0 - 5.6 % Final    Comment: ADA Screening Guidelines:  5.7-6.4%  Consistent with prediabetes  >or=6.5%  Consistent with diabetes    High levels of fetal hemoglobin interfere with the HbA1C  assay. Heterozygous hemoglobin variants (HbS, HgC, etc)do  not significantly interfere with this assay.   However, presence of multiple variants may affect accuracy.      Estimated Avg Glucose 07/20/2023 111  68 - 131 mg/dL Final    Vit D, 25-Hydroxy 07/20/2023 24 (L)  30 - 96 ng/mL Final    Comment: Vitamin D deficiency.........<10 ng/mL                              Vitamin D  insufficiency......10-29 ng/mL       Vitamin D sufficiency........> or equal to 30 ng/mL  Vitamin D toxicity............>100 ng/mL      PSA, Screen 07/20/2023 0.25  0.00 - 4.00 ng/mL Final    Comment: The testing method is a chemiluminescent microparticle immunoassay   manufactured by [employer] and performed on the [employer]   or   [employer] system. Values obtained with different assay manufacturers   for   methods may be different and cannot be used interchangeably.  PSA Expected levels:  Hormonal Therapy: <0.05 ng/ml  Prostatectomy: <0.01 ng/ml  Radiation Therapy: <1.00 ng/ml      HIV 1/2 Ag/Ab 07/20/2023 Non-reactive  Non-reactive Final    RPR 07/20/2023 Non-reactive  Non-reactive Final    Hepatitis C Ab 07/20/2023 Non-reactive  Non-reactive Final    Trichomonas vaginalis RNA, Qual, s* 07/20/2023 Urine   Final    Trichomonas vaginalis, QL, TMA 07/20/2023 Negative  Negative Final    Comment: -------------------ADDITIONAL INFORMATION-------------------  This test has been modified from the 's   instructions. Its performance characteristics were   determined by [facility] in a manner consistent with CLIA   requirements. This test has not been cleared or approved by   the U.S. Food and Drug Administration.    Test Performed by:  [facility] [place] [address]  : Chapo Henao M.D. Ph.D.; CLIA# [id number]         Imaging  Echo    Result Date: 8/11/2023    Left Ventricle: There is normal systolic function with a visually estimated ejection fraction of 60 - 65%.   Right Ventricle: Systolic function is normal.       Assessment  1. Precordial pain  Likely GI in etiology.    2. Encounter for screening for cardiovascular disorders  - CT Cardiac Scoring; Future      Plan and Discussion    His stress test today was negative for ischemia.  So at this point would observe and treated for a GI etiology.  However he has  multiple cardiac risk factors.  Would get a calcium score to see if he would benefit from better lipid control.  Had PVCs during stress test which are likely what he is experiencing with exertion.    The 10-year ASCVD risk score (Davina BARILLAS, et al., 2019) is: 6.2%    Values used to calculate the score:      Age: 57 years      Sex: Male      Is Non- : No      Diabetic: No      Tobacco smoker: No      Systolic Blood Pressure: 122 mmHg      Is BP treated: No      HDL Cholesterol: 49 mg/dL      Total Cholesterol: 182 mg/dL     Follow Up  Follow up for test results.      Reg Munson MD

## 2023-08-16 ENCOUNTER — PATIENT MESSAGE (OUTPATIENT)
Dept: INTERNAL MEDICINE | Facility: CLINIC | Age: 58
End: 2023-08-16
Payer: COMMERCIAL

## 2023-08-16 LAB — EGD FOLLOW UP EXTERNAL: NORMAL

## 2023-08-17 ENCOUNTER — PATIENT OUTREACH (OUTPATIENT)
Dept: ADMINISTRATIVE | Facility: HOSPITAL | Age: 58
End: 2023-08-17
Payer: COMMERCIAL

## 2023-08-24 ENCOUNTER — PATIENT MESSAGE (OUTPATIENT)
Dept: CARDIOLOGY | Facility: CLINIC | Age: 58
End: 2023-08-24
Payer: COMMERCIAL

## 2023-08-24 ENCOUNTER — PATIENT MESSAGE (OUTPATIENT)
Dept: INTERNAL MEDICINE | Facility: CLINIC | Age: 58
End: 2023-08-24
Payer: COMMERCIAL

## 2023-08-24 VITALS — SYSTOLIC BLOOD PRESSURE: 150 MMHG | DIASTOLIC BLOOD PRESSURE: 100 MMHG

## 2023-08-29 ENCOUNTER — PATIENT MESSAGE (OUTPATIENT)
Dept: INTERNAL MEDICINE | Facility: CLINIC | Age: 58
End: 2023-08-29
Payer: COMMERCIAL

## 2023-08-29 VITALS — SYSTOLIC BLOOD PRESSURE: 134 MMHG | DIASTOLIC BLOOD PRESSURE: 78 MMHG

## 2023-09-21 ENCOUNTER — PATIENT MESSAGE (OUTPATIENT)
Dept: INTERNAL MEDICINE | Facility: CLINIC | Age: 58
End: 2023-09-21
Payer: COMMERCIAL

## 2023-09-21 VITALS — SYSTOLIC BLOOD PRESSURE: 134 MMHG | DIASTOLIC BLOOD PRESSURE: 86 MMHG

## 2023-09-27 ENCOUNTER — OFFICE VISIT (OUTPATIENT)
Dept: SLEEP MEDICINE | Facility: CLINIC | Age: 58
End: 2023-09-27
Payer: COMMERCIAL

## 2023-09-27 DIAGNOSIS — G47.33 OSA (OBSTRUCTIVE SLEEP APNEA): Primary | ICD-10-CM

## 2023-09-27 PROCEDURE — 3044F PR MOST RECENT HEMOGLOBIN A1C LEVEL <7.0%: ICD-10-PCS | Mod: CPTII,95,, | Performed by: PHYSICIAN ASSISTANT

## 2023-09-27 PROCEDURE — 4010F PR ACE/ARB THEARPY RXD/TAKEN: ICD-10-PCS | Mod: CPTII,95,, | Performed by: PHYSICIAN ASSISTANT

## 2023-09-27 PROCEDURE — 4010F ACE/ARB THERAPY RXD/TAKEN: CPT | Mod: CPTII,95,, | Performed by: PHYSICIAN ASSISTANT

## 2023-09-27 PROCEDURE — 99214 PR OFFICE/OUTPT VISIT, EST, LEVL IV, 30-39 MIN: ICD-10-PCS | Mod: 95,,, | Performed by: PHYSICIAN ASSISTANT

## 2023-09-27 PROCEDURE — 1160F PR REVIEW ALL MEDS BY PRESCRIBER/CLIN PHARMACIST DOCUMENTED: ICD-10-PCS | Mod: CPTII,95,, | Performed by: PHYSICIAN ASSISTANT

## 2023-09-27 PROCEDURE — 1159F PR MEDICATION LIST DOCUMENTED IN MEDICAL RECORD: ICD-10-PCS | Mod: CPTII,95,, | Performed by: PHYSICIAN ASSISTANT

## 2023-09-27 PROCEDURE — 99214 OFFICE O/P EST MOD 30 MIN: CPT | Mod: 95,,, | Performed by: PHYSICIAN ASSISTANT

## 2023-09-27 PROCEDURE — 3044F HG A1C LEVEL LT 7.0%: CPT | Mod: CPTII,95,, | Performed by: PHYSICIAN ASSISTANT

## 2023-09-27 PROCEDURE — 1160F RVW MEDS BY RX/DR IN RCRD: CPT | Mod: CPTII,95,, | Performed by: PHYSICIAN ASSISTANT

## 2023-09-27 PROCEDURE — 1159F MED LIST DOCD IN RCRD: CPT | Mod: CPTII,95,, | Performed by: PHYSICIAN ASSISTANT

## 2023-09-27 NOTE — PROGRESS NOTES
The chief complaint leading to consultation is: sleep problems    Visit type: audiovisual     15 minutes of total time spent on the encounter, which includes face to face time and non-face to face time preparing to see the patient (eg, review of tests), Obtaining and/or reviewing separately obtained history, Documenting clinical information in the electronic or other health record, Independently interpreting results (not separately reported) and communicating results to the patient/family/caregiver, or Care coordination (not separately reported).     Each patient to whom he or she provides medical services by telemedicine is:  (1) informed of the relationship between the physician and patient and the respective role of any other health care provider with respect to management of the patient; and (2) notified that he or she may decline to receive medical services by telemedicine and may withdraw from such care at any time.      The patient location is: [place]    Referred by Self, Aaareferral     ESTABLISHED PATIENT VISIT    Corazon Jones  is a pleasant 58 y.o. male  with PMH significant for HTN, hypercholesterolemia, hx sarcoidosis, hx Cdiff, preDM, AR, OTTONIEL      Here today for: CPAP follow-up     PLAN last visit 8/26/21:   Due to listed symptoms, a HST is recommended and ordered.   Description of procedure given to patient.  CPAP ordered. Patient using nightly.      Since last visit:   Reports doing well on CPAP since last visit. Reports mask interface and pressure settings comfortable. Feels sx are well controlled. No issues at this time. Here for annual visit and for supply orders.       PAP history   Problems    Mask Nasal pillows   Pressure 5-11cwp   DME HME   Machine age AirSense 10 10/22/21   Download 9/27/23: 27/30 x 7hrs 9mins, 5-11cwp (9.7/10.9/10.9), leak (0/3/35.4), AHI 4.8         Past Medical History:   Diagnosis Date    History of Clostridium difficile colitis     treated 2014    History of sarcoidosis      dx 1991, resolved    Hyperlipemia      Patient Active Problem List   Diagnosis    Lactose intolerance    Chronic familial neutropenia    OTTONIEL (obstructive sleep apnea)    Pure hypercholesterolemia    History of Clostridium difficile colitis    Tinnitus of both ears    Acute seasonal allergic rhinitis    Nasal septal deviation    Primary hypertension    Lung nodule    History of sarcoidosis    Prediabetes       Current Outpatient Medications:     celecoxib (CELEBREX) 200 MG capsule, Take 1 capsule (200 mg total) by mouth once daily., Disp: 90 capsule, Rfl: 3    ergocalciferol (VITAMIN D2) 50,000 unit Cap, Take 1 capsule (50,000 Units total) by mouth every 7 days., Disp: 12 capsule, Rfl: 1    omeprazole (PRILOSEC) 40 MG capsule, TAKE 1 CAPSULE BY MOUTH EVERY MORNING AS DIRECTED, Disp: 90 capsule, Rfl: 3    pantoprazole (PROTONIX) 40 MG tablet, Take 1 tablet by mouth every morning 30 min before meals, Disp: 30 tablet, Rfl: 11    rosuvastatin (CRESTOR) 40 MG Tab, Take 1 tablet (40 mg total) by mouth every evening., Disp: 90 tablet, Rfl: 3    sertraline (ZOLOFT) 50 MG tablet, Take 1 tablet (50 mg total) by mouth once daily., Disp: 90 tablet, Rfl: 3    sucralfate (CARAFATE) 1 gram tablet, Take 1 tablet (1 g total) by mouth 4 (four) times daily as needed (GERD)., Disp: 20 tablet, Rfl: 0    tadalafiL (CIALIS) 20 MG Tab, Take 1 tablet (20 mg total) by mouth once daily., Disp: 30 tablet, Rfl: 11     There were no vitals filed for this visit.    Physical Exam:    GEN:   Well-appearing  Psych:  Appropriate affect, demonstrates insight  SKIN:  No rash on the face or bridge of the nose      LABS:   Lab Results   Component Value Date    HGB 15.8 07/20/2023    CO2 28 07/20/2023       RECORDS REVIEWED PREVIOUSLY:    4/29/14 HST: AHI 22, RDI 31    9/14/21 HST: AHI 34, RDI 45    ASSESSMENT    PROBLEM DESCRIPTION/ Sx on Presentation Interval Hx STATUS    OTTONIEL   + snoring, + witnessed apneas   Dx 2014 on CPAP for period of time  Had  nasal surgery and UPP  Redx 2021 AHI 34  On CPAP since redx   Good usage and efficiency  controlled   Daytime Sx   denies sleepiness when inactive   denies sleepiness when driving  ESS 10/24 on intake (reviewed from 8/26/21) Denies sleepiness; ESS 7/24 stable   Insomnia   Trouble falling asleep: no  Maintenance:         wakes 1-2 x nightly, return to sleep is quick  Prior hypnotics:      none  Current hypnotics: none   More consolidated sleep on PAP improved   Other issues:     PLAN     -using and benefiting from CPAP therapy  -adjusted pressures 5-14cwp  -continue using CPAP nightly  -CPAP supplies ordered  -discussed OTTONIEL and CPAP with patient in detail, including possible complications of untreated OTTONIEL like heart attack/stroke  -advised on strict driving precautions; advised never to drive drowsy    Advised on plan of care. Answered all patient questions. Patient verbalized understanding and voiced agreement with plan of care.       RTC 12 months or as needed       The patient was given open opportunity to ask questions and/or express concerns about treatment plan. All questions/concerns were discussed.     Two patient identifiers used prior to evaluation.

## 2023-10-10 ENCOUNTER — PATIENT MESSAGE (OUTPATIENT)
Dept: INTERNAL MEDICINE | Facility: CLINIC | Age: 58
End: 2023-10-10
Payer: COMMERCIAL

## 2023-10-10 DIAGNOSIS — T14.8XXA PUNCTURE WOUND: Primary | ICD-10-CM

## 2023-10-10 RX ORDER — DOXYCYCLINE HYCLATE 100 MG
100 TABLET ORAL EVERY 12 HOURS
Qty: 14 TABLET | Refills: 0 | Status: SHIPPED | OUTPATIENT
Start: 2023-10-10 | End: 2023-10-18

## 2023-10-26 ENCOUNTER — HOSPITAL ENCOUNTER (OUTPATIENT)
Dept: RADIOLOGY | Facility: HOSPITAL | Age: 58
Discharge: HOME | End: 2023-10-26
Attending: PHYSICIAN ASSISTANT
Payer: COMMERCIAL

## 2023-10-26 ENCOUNTER — OFFICE VISIT (OUTPATIENT)
Dept: SPORTS MEDICINE | Facility: CLINIC | Age: 58
End: 2023-10-26
Payer: COMMERCIAL

## 2023-10-26 VITALS
HEART RATE: 64 BPM | HEIGHT: 71 IN | DIASTOLIC BLOOD PRESSURE: 79 MMHG | SYSTOLIC BLOOD PRESSURE: 124 MMHG | WEIGHT: 241 LBS | BODY MASS INDEX: 33.74 KG/M2

## 2023-10-26 DIAGNOSIS — M25.562 LEFT KNEE PAIN, UNSPECIFIED CHRONICITY: ICD-10-CM

## 2023-10-26 DIAGNOSIS — M25.562 ACUTE PAIN OF LEFT KNEE: Primary | ICD-10-CM

## 2023-10-26 DIAGNOSIS — M17.12 PRIMARY OSTEOARTHRITIS OF LEFT KNEE: ICD-10-CM

## 2023-10-26 PROCEDURE — 99999 PR PBB SHADOW E&M-EST. PATIENT-LVL IV: CPT | Mod: PBBFAC,,, | Performed by: PHYSICIAN ASSISTANT

## 2023-10-26 PROCEDURE — 3044F HG A1C LEVEL LT 7.0%: CPT | Mod: CPTII,S$GLB,, | Performed by: PHYSICIAN ASSISTANT

## 2023-10-26 PROCEDURE — 1159F MED LIST DOCD IN RCRD: CPT | Mod: CPTII,S$GLB,, | Performed by: PHYSICIAN ASSISTANT

## 2023-10-26 PROCEDURE — 99204 OFFICE O/P NEW MOD 45 MIN: CPT | Mod: S$GLB,,, | Performed by: PHYSICIAN ASSISTANT

## 2023-10-26 PROCEDURE — 99999 PR PBB SHADOW E&M-EST. PATIENT-LVL IV: ICD-10-PCS | Mod: PBBFAC,,, | Performed by: PHYSICIAN ASSISTANT

## 2023-10-26 PROCEDURE — 3008F BODY MASS INDEX DOCD: CPT | Mod: CPTII,S$GLB,, | Performed by: PHYSICIAN ASSISTANT

## 2023-10-26 PROCEDURE — 4010F ACE/ARB THERAPY RXD/TAKEN: CPT | Mod: CPTII,S$GLB,, | Performed by: PHYSICIAN ASSISTANT

## 2023-10-26 PROCEDURE — 3044F PR MOST RECENT HEMOGLOBIN A1C LEVEL <7.0%: ICD-10-PCS | Mod: CPTII,S$GLB,, | Performed by: PHYSICIAN ASSISTANT

## 2023-10-26 PROCEDURE — 73564 X-RAY EXAM KNEE 4 OR MORE: CPT | Mod: TC,50

## 2023-10-26 PROCEDURE — 3074F PR MOST RECENT SYSTOLIC BLOOD PRESSURE < 130 MM HG: ICD-10-PCS | Mod: CPTII,S$GLB,, | Performed by: PHYSICIAN ASSISTANT

## 2023-10-26 PROCEDURE — 3074F SYST BP LT 130 MM HG: CPT | Mod: CPTII,S$GLB,, | Performed by: PHYSICIAN ASSISTANT

## 2023-10-26 PROCEDURE — 3008F PR BODY MASS INDEX (BMI) DOCUMENTED: ICD-10-PCS | Mod: CPTII,S$GLB,, | Performed by: PHYSICIAN ASSISTANT

## 2023-10-26 PROCEDURE — 4010F PR ACE/ARB THEARPY RXD/TAKEN: ICD-10-PCS | Mod: CPTII,S$GLB,, | Performed by: PHYSICIAN ASSISTANT

## 2023-10-26 PROCEDURE — 73564 XR KNEE ORTHO BILAT WITH FLEXION: ICD-10-PCS | Mod: 26,,, | Performed by: RADIOLOGY

## 2023-10-26 PROCEDURE — 1159F PR MEDICATION LIST DOCUMENTED IN MEDICAL RECORD: ICD-10-PCS | Mod: CPTII,S$GLB,, | Performed by: PHYSICIAN ASSISTANT

## 2023-10-26 PROCEDURE — 1160F RVW MEDS BY RX/DR IN RCRD: CPT | Mod: CPTII,S$GLB,, | Performed by: PHYSICIAN ASSISTANT

## 2023-10-26 PROCEDURE — 73564 X-RAY EXAM KNEE 4 OR MORE: CPT | Mod: 26,,, | Performed by: RADIOLOGY

## 2023-10-26 PROCEDURE — 99204 PR OFFICE/OUTPT VISIT, NEW, LEVL IV, 45-59 MIN: ICD-10-PCS | Mod: S$GLB,,, | Performed by: PHYSICIAN ASSISTANT

## 2023-10-26 PROCEDURE — 3078F PR MOST RECENT DIASTOLIC BLOOD PRESSURE < 80 MM HG: ICD-10-PCS | Mod: CPTII,S$GLB,, | Performed by: PHYSICIAN ASSISTANT

## 2023-10-26 PROCEDURE — 1160F PR REVIEW ALL MEDS BY PRESCRIBER/CLIN PHARMACIST DOCUMENTED: ICD-10-PCS | Mod: CPTII,S$GLB,, | Performed by: PHYSICIAN ASSISTANT

## 2023-10-26 PROCEDURE — 3078F DIAST BP <80 MM HG: CPT | Mod: CPTII,S$GLB,, | Performed by: PHYSICIAN ASSISTANT

## 2023-10-30 NOTE — PROGRESS NOTES
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    Pain began 1 month ago with no inciting injury or trauma. Pain start of the posterior lateral knee and then move to anterior medial. Pain is intermittent and worse with squatting and kneeling. He denies swelling.     Pain bothers him when trying to reach for a foot pedal when doing procedures. He has tried nsaids with little pain relief. He has iced the knee some and this has helped a little.     He reports that the pain is worse with steps.  It also bothers him at night.    Is affecting ADLs.     No mechanical symptoms, no instability    Previous left knee arthroscopy 5/2010:  Medial menisectomy   7x7 grade 2-3 changes of MFC    Review of Systems   Constitution: Negative. Negative for chills, fever and night sweats.   HENT: Negative for congestion and headaches.    Eyes: Negative for blurred vision, left vision loss and right vision loss.   Cardiovascular: Negative for chest pain and syncope.   Respiratory: Negative for cough and shortness of breath.    Endocrine: Negative for polydipsia, polyphagia and polyuria.   Hematologic/Lymphatic: Negative for bleeding problem. Does not bruise/bleed easily.   Skin: Negative for dry skin, itching and rash.   Musculoskeletal: Negative for falls and muscle weakness.   Gastrointestinal: Negative for abdominal pain and bowel incontinence.   Genitourinary: Negative for bladder incontinence and nocturia.   Neurological: Negative for disturbances in coordination, loss of balance and seizures.   Psychiatric/Behavioral: Negative for depression. The patient does not have insomnia.    Allergic/Immunologic: Negative for hives and persistent infections.   All other systems negative      PAST MEDICAL HISTORY:   Past Medical History:   Diagnosis Date    History of Clostridium difficile colitis     treated 2014    History of sarcoidosis     dx  1991, resolved    Hyperlipemia      PAST SURGICAL HISTORY:   Past Surgical History:   Procedure Laterality Date    APPENDECTOMY      EYE SURGERY  2006 Lasik    HERNIA REPAIR      orthoscopy      shoulder and knee     FAMILY HISTORY:   Family History   Problem Relation Age of Onset    Hyperlipidemia Mother     Vision loss Mother     Atrial fibrillation Mother     Hyperlipidemia Father     Stroke Father         hemorrhagic/ also with trauma, unknown    Hyperlipidemia Brother     Diabetes Mellitus Maternal Grandmother     Endometrial cancer Paternal Grandmother     ADD / ADHD Child     Diabetes Mellitus Maternal Uncle     Prostate cancer Maternal Uncle     Diabetes Mellitus Paternal Uncle     Colon cancer Neg Hx      SOCIAL HISTORY:   Social History     Socioeconomic History    Marital status:     Number of children: 3   Occupational History    Occupation: physician     Employer: [employer]   Tobacco Use    Smoking status: Never    Smokeless tobacco: Never   Substance and Sexual Activity    Alcohol use: No    Drug use: No    Sexual activity: Yes     Partners: Female   Social History Narrative    Intermittent exercise       MEDICATIONS:   Current Outpatient Medications:     celecoxib (CELEBREX) 200 MG capsule, Take 1 capsule (200 mg total) by mouth once daily., Disp: 90 capsule, Rfl: 3    ergocalciferol (VITAMIN D2) 50,000 unit Cap, Take 1 capsule (50,000 Units total) by mouth every 7 days., Disp: 12 capsule, Rfl: 1    omeprazole (PRILOSEC) 40 MG capsule, TAKE 1 CAPSULE BY MOUTH EVERY MORNING AS DIRECTED, Disp: 90 capsule, Rfl: 3    pantoprazole (PROTONIX) 40 MG tablet, Take 1 tablet by mouth every morning 30 min before meals, Disp: 30 tablet, Rfl: 11    rosuvastatin (CRESTOR) 40 MG Tab, Take 1 tablet (40 mg total) by mouth every evening., Disp: 90 tablet, Rfl: 3    sertraline (ZOLOFT) 50 MG tablet, Take 1 tablet (50 mg total) by mouth once daily., Disp: 90 tablet, Rfl: 3    sucralfate  "(CARAFATE) 1 gram tablet, Take 1 tablet (1 g total) by mouth 4 (four) times daily as needed (GERD)., Disp: 20 tablet, Rfl: 0    tadalafiL (CIALIS) 20 MG Tab, Take 1 tablet (20 mg total) by mouth once daily., Disp: 30 tablet, Rfl: 11  ALLERGIES:   Review of patient's allergies indicates:   Allergen Reactions    Percocet [oxycodone-acetaminophen]     Suture, silk     Adhesive Rash       VITAL SIGNS: /79   Pulse 64   Ht 5' 11" (1.803 m)   Wt 109.3 kg (241 lb)   BMI 33.61 kg/m²      PHYSICAL EXAMINATION    General:  The patient is alert and oriented x 3.  Mood is pleasant.  Observation of ears, eyes and nose reveal no gross abnormalities.  HEENT: NCAT, sclera nonicteric  Lungs: Respirations are equal and unlabored.    left  KNEE EXAMINATION     OBSERVATION / INSPECTION   Gait:   Nonantalgic   Alignment:  Neutral   Scars:   None   Muscle atrophy: Mild  Effusion:  None   Warmth:  None   Discoloration:   none     TENDERNESS / CREPITUS (T / C):          T / C      T / C   Patella   - / -   Lateral joint line   - / -      Peripatellar medial  +  Medial joint line    Mild + / -   Peripatellar lateral +  Medial plica   - / -   Patellar tendon -   Popliteal fossa  + and distal bicep femoris area/ -   Quad tendon   -   Gastrocnemius   -   Prepatellar Bursa - / -   Quadricep   -   Tibial tubercle  -  Thigh/hamstring  -   Pes anserine/HS -  Fibula    -   ITB   - / -  Tibia     -   Tib/fib joint  - / -  LCL    -     MFC   - / -   MCL: Proximal  -    LFC   - / -    Distal   -          ROM: (* = pain)  PASSIVE   ACTIVE    Left :   5 / 0 / 140   5 / 0 / 140     Right :    5 / 0 / 140   5 / 0 / 140    Patellofemoral examination:  See above noted areas of tenderness.   Patella position    Subluxation / dislocation: Centered           Sup. / Inf;   Normal   Crepitus (PF):    Absent   Patellar Mobility:       Medial-lateral:   Normal    Superior-inferior:  Normal    Inferior tilt   Normal    Patellar tendon:  Normal   Lateral " tilt:    Normal   J-sign:     None   Patellofemoral grind:   neg      MENISCAL SIGNS:     Pain on terminal extension:  -  Pain on terminal flexion:  -  Geralds maneuver:  -  Squat     + posterior    LIGAMENT EXAMINATION:  ACL / Lachman:  normal (-1 to 2mm)    PCL-Post.  drawer: normal 0 to 2mm  MCL- Valgus:  normal 0 to 2mm  LCL- Varus:  normal 0 to 2mm  Pivot shift: normal (Equal)       STRENGTH: (* = with pain) PAINFUL SIDE   Quadricep   5/5   Hamstrin/5    EXTREMITY NEURO-VASCULAR EXAMINATION:   Sensation:  Grossly intact to light touch all dermatomal regions.   Motor Function:  Fully intact motor function at hip, knee, foot and ankle    DTRs;  quadriceps and  achilles 2+.  No clonus and downgoing Babinski.    Vascular status:  DP and PT pulses 2+, brisk capillary refill, symmetric.     Other Findings:  + step down bilat  + bridge test     + bilateral hamstring cramping with prone resisted knee flexion.     Xrays:  Xrays of the bilateral knees with flexion were ordered and reviewed by me today. No fracture, subluxation. Mild DJD of the anterior and medial left knee. Kellgren Walter 2 or greater noted.       ASSESSMENT:    1. left Knee pain, acute  2. Left knee osteoarthritis   Bilateral hip abd/core weakness    Distal bicep femoris or possible popliteus pain/tendinopathy     PLAN:    1. Paresh from PT graciously came and did some knee mobs and posterior joint release with patient in clinic which provided some instant relief. Patient was given HEP to continued.   2. Ice compresses prn pain  3. NSAIDs  4. Referral for PT placed with Paresh if needed.   5. Discussed possible CSI if needed.   RTC prn.      All questions were answered, pt will contact us for questions or concerns in the interim.     I made the decision to obtain old records of the patient including previous notes and imaging. New imaging was ordered today of the extremity or extremities evaluated. I independently reviewed and interpreted  the radiographs and/or MRIs today as well as prior imaging.

## 2023-11-02 RX ORDER — ZONISAMIDE 100 MG/1
100 CAPSULE ORAL DAILY
Qty: 360 CAPSULE | Refills: 5 | Status: SHIPPED | OUTPATIENT
Start: 2023-11-02

## 2023-12-04 ENCOUNTER — TELEPHONE (OUTPATIENT)
Dept: SPORTS MEDICINE | Facility: CLINIC | Age: 58
End: 2023-12-04
Payer: COMMERCIAL

## 2023-12-04 DIAGNOSIS — M25.512 LEFT SHOULDER PAIN: Primary | ICD-10-CM

## 2023-12-04 DIAGNOSIS — G25.89 SCAPULAR DYSKINESIS: ICD-10-CM

## 2023-12-05 ENCOUNTER — CLINICAL SUPPORT (OUTPATIENT)
Dept: REHABILITATION | Facility: HOSPITAL | Age: 58
End: 2023-12-05
Payer: COMMERCIAL

## 2023-12-05 DIAGNOSIS — M54.12 CERVICAL RADICULOPATHY: Primary | ICD-10-CM

## 2023-12-05 DIAGNOSIS — G25.89 SCAPULAR DYSKINESIS: ICD-10-CM

## 2023-12-05 DIAGNOSIS — M25.512 LEFT SHOULDER PAIN: ICD-10-CM

## 2023-12-05 PROCEDURE — 97161 PT EVAL LOW COMPLEX 20 MIN: CPT | Performed by: PHYSICAL THERAPIST

## 2023-12-05 PROCEDURE — 97140 MANUAL THERAPY 1/> REGIONS: CPT | Performed by: PHYSICAL THERAPIST

## 2023-12-07 NOTE — PLAN OF CARE
[facility]   Physical Therapy Initial Evaluation      Name: Corazon Maldonado [facility]  Clinic Number: [id number]    Therapy Diagnosis:   Encounter Diagnoses   Name Primary?    Cervical radiculopathy Yes    Left shoulder pain     Scapular dyskinesis         Physician: Laura Wisdom MD    Physician Orders: PT Eval and Treat   Medical Diagnosis from Referral:   Diagnosis   M25.512 (ICD-10-CM) - Left shoulder pain   G25.89 (ICD-10-CM) - Scapular dyskinesis     Evaluation Date: 12/5/2023  Authorization Period Expiration: 12/3/2024  Plan of Care Expiration: 6/5/2024  Progress Note Due: 3/5/2024  Visit # / Visits authorized: 1/ 1   FOTO: 1/1    Precautions: Standard     Time In: 1805  Time Out: 1900  Total Appointment Time (timed & untimed codes): 55 minutes    Subjective     Date of onset: 3 weeks  History of current condition - Corazon reports: Patient reports right shoulder pain following his flu injection about 3 weeks ago. He notes the pain is in the axilla but also has affects the upper extremity into the wrist. He notes not being able to extend his wrist, radial nerve distribution pain, diminished biceps reflex, and pain on top of the shoulder. He notes receiving an injection to C6-C7 where bone spur was noted and he got short term relief, and he has also received trigger point injection with short term relief.   Falls: none    Imaging: none:     Prior Therapy: post op cuff   Social History: He lives with their family  Occupation: Physician  Prior Level of Function: Independent  Current Level of Function: Ind - pain work, sleep, ADL    Pain:  Current 7/10, worst 8/10, best 5/10   Location: right neck  and shoulder   Description: Aching, Burning, Tight, and Sharp  Aggravating Factors: Laying  Easing Factors: injection    Patients goals: return to work without shoulder pain      Medical History:   Past Medical History:   Diagnosis Date    History of Clostridium difficile colitis     treated 2014     History of sarcoidosis     dx 1991, resolved    Hyperlipemia        Surgical History:   Corazon Jones  has a past surgical history that includes Appendectomy; Hernia repair; orthoscopy; and Eye surgery (2006 Lasik).    Medications:   Corazon has a current medication list which includes the following prescription(s): celecoxib, ergocalciferol, omeprazole, pantoprazole, rosuvastatin, sertraline, sucralfate, tadalafil, and zonisamide.    Allergies:   Review of patient's allergies indicates:   Allergen Reactions    Percocet [oxycodone-acetaminophen]     Suture, silk     Adhesive Rash        Objective      Observation: Patient is a 58 y.o. male alert and oriented    Posture: Increased CT and thoracic kyphosis, scapula abducted and anterior tilted    Cervical Range of Motion:    Degrees(%) Pain   Flexion 80 -     Extension 70 midline     Right Rotation 60 Right side     Left Rotation 75 Pulling right     Right Side Bending 75 -   Left Side Bending 75 -   C0-C1 Flex 100 -   C0-C1 Ext 100 -   C0-C1 Sidebending 100 -   C1-2 Rotation nt -   C1-3 Rotation nt -      Hinges C5-C6 on the right   Shoulder Range of Motion:   Shoulder Left Right   Flexion 180 180   Abduction 180 180   ER 90 90   IR 70 70     Upper Extremity Strength  (R) UE  (L) UE    Shoulder flexion: 5/5 Shoulder flexion: 5/5   Shoulder Abduction: 5/5 Shoulder abduction: 4+/5   Shoulder ER 5/5 Shoulder ER 5/5   Shoulder IR 5/5 Shoulder IR 4-/5   Elbow flexion: 5/5 Elbow flexion: 5/5   Elbow extension: 5/5 Elbow extension: 5/5   Wrist flexion: 5/5 Wrist flexion: 5/5   Wrist extension: 5/5 Wrist extension: 3-/5    5/5 : 4/5   Lower Trap 4+/5 Lower Trap 4/5   Middle Trap 4+/5 Middle Trap 4/5       Special Tests:  Distraction -   Spurlings + R   Vertebral Artery -   Irwin -   Lateral Flexion Alar Ligament -   Transverse Ligament -   Shoulder Abduction Test +   Quadrant Testing -     Cervical joint mobility: Hypermobility C5-C6       Reflexes:  -Bicep  (C5): 1+  -Brachioradialis (C6): 2+  -Tricep (C7): 2+    Thoracic mobility: Limited thoracic mobility worse at CT    Palpation: Tender to T4 on the R       Sensation: intact    Flexibility:     Neural tension:   -ULTT Median: -  -ULTT Ulnar: -  -ULTT Radial: -   Intake Outcome Measure for FOTO Neck Survey    Therapist reviewed FOTO scores for Corazon Jones on 12/5/2023.   FOTO documents entered into zanda - see Media section.    Intake Score: Not given       Treatment     Total Treatment time (time-based codes) separate from Evaluation: 15 minutes     Corazon received the treatments listed below:      manual therapy techniques: Joint mobilizations and Soft tissue Mobilization were applied to the: neck and thoracic spine for 15 minutes, including:  Gr V thoracic manipulation  Prone gapping thoracic spine  Dry needling to rhomboid, posterior cuff, lat 0.40mm needles single stick needles        Patient Education and Home Exercises     Education provided:   - improve thoracic mobility, subscap strength and decrease neural tension    Written Home Exercises Provided: yes. Exercises were reviewed and Corazon was able to demonstrate them prior to the end of the session.  Corazon demonstrated good understanding of the education provided. See EMR under Patient Instructions for exercises provided during therapy sessions.    Assessment     Corazon is a 58 y.o. male referred to outpatient Physical Therapy with a medical diagnosis of left shoulder pain. Patient presents with limited subscap strength and posterior capsule mobility, increase neural tension in radial nerve distribution, poor thoracic mobility, and pain with work and ADLs.    Patient prognosis is Good.   Patient will benefit from skilled outpatient Physical Therapy to address the deficits stated above and in the chart below, provide patient /family education, and to maximize patientt's level of independence.     Plan of care discussed with patient: Yes  Patient's  spiritual, cultural and educational needs considered and patient is agreeable to the plan of care and goals as stated below:     Anticipated Barriers for therapy: work schedule    Medical Necessity is demonstrated by the following  History  Co-morbidities and personal factors that may impact the plan of care [x] LOW: no personal factors / co-morbidities  [] MODERATE: 1-2 personal factors / co-morbidities  [] HIGH: 3+ personal factors / co-morbidities    Moderate / High Support Documentation:   Co-morbidities affecting plan of care:     Personal Factors:   no deficits     Examination  Body Structures and Functions, activity limitations and participation restrictions that may impact the plan of care [x] LOW: addressing 1-2 elements  [] MODERATE: 3+ elements  [] HIGH: 4+ elements (please support below)    Moderate / High Support Documentation:      Clinical Presentation [x] LOW: stable  [] MODERATE: Evolving  [] HIGH: Unstable     Decision Making/ Complexity Score: Low         GOALS: Short Term Goals: 2 weeks  1.Report decreased neck pain  <   / =  4  /10  to increase tolerance for working  2. Increase cervical ROM by 5-10 degrees in order to perform ADLs with decreased difficulty.  3. Increase strength in B shoulders/scapular stabilizers by 1/3 MMT grade to increase tolerance for ADL and work activities.  4. Pt to tolerate HEP to improve ROM and independence with ADL's    Long Term Goals: 6 weeks  1.Report decreased neck pain  <   / =  0  /10  to increase tolerance for return to ADL without pain  2. Increase UE/neck flexibility in order to improve posture.    3.Increased strength in B shoulders/scapular stabilizers to >/= 4/5 MMT grade to increase tolerance for ADL and work activities.  4.  Pt will report at goals level on FOTO neck score for neck pain disability to demonstrate decrease in disability and improvement in neck pain.   Plan     Plan of care Certification: 12/5/2023 to 6/5/2024.    Outpatient Physical  Therapy 2 times weekly for 10 weeks to include the following interventions: Cervical/Lumbar Traction, Manual Therapy, Moist Heat/ Ice, Neuromuscular Re-ed, Patient Education, Self Care, Therapeutic Activities, and Therapeutic Exercise.     Crescencio Amin, PT, DPT, OCS, FAAOMPT

## 2023-12-07 NOTE — PROGRESS NOTES
[facility]   Physical Therapy Initial Evaluation      Name: Corazon Maldonado [facility]  Clinic Number: [id number]    Therapy Diagnosis:   Encounter Diagnoses   Name Primary?    Cervical radiculopathy Yes    Left shoulder pain     Scapular dyskinesis         Physician: Laura Wisdom MD    Physician Orders: PT Eval and Treat   Medical Diagnosis from Referral:   Diagnosis   M25.512 (ICD-10-CM) - Left shoulder pain   G25.89 (ICD-10-CM) - Scapular dyskinesis     Evaluation Date: 12/5/2023  Authorization Period Expiration: 12/3/2024  Plan of Care Expiration: 6/5/2024  Progress Note Due: 3/5/2024  Visit # / Visits authorized: 1/ 1   FOTO: 1/1    Precautions: Standard     Time In: 1805  Time Out: 1900  Total Appointment Time (timed & untimed codes): 55 minutes    Subjective     Date of onset: 3 weeks  History of current condition - Corazon reports: Patient reports right shoulder pain following his flu injection about 3 weeks ago. He notes the pain is in the axilla but also has affects the upper extremity into the wrist. He notes not being able to extend his wrist, radial nerve distribution pain, diminished biceps reflex, and pain on top of the shoulder. He notes receiving an injection to C6-C7 where bone spur was noted and he got short term relief, and he has also received trigger point injection with short term relief.   Falls: none    Imaging: none:     Prior Therapy: post op cuff   Social History: He lives with their family  Occupation: Physician  Prior Level of Function: Independent  Current Level of Function: Ind - pain work, sleep, ADL    Pain:  Current 7/10, worst 8/10, best 5/10   Location: right neck  and shoulder   Description: Aching, Burning, Tight, and Sharp  Aggravating Factors: Laying  Easing Factors: injection    Patients goals: return to work without shoulder pain      Medical History:   Past Medical History:   Diagnosis Date    History of Clostridium difficile colitis     treated 2014     History of sarcoidosis     dx 1991, resolved    Hyperlipemia        Surgical History:   Corazon Jones  has a past surgical history that includes Appendectomy; Hernia repair; orthoscopy; and Eye surgery (2006 Lasik).    Medications:   Corazon has a current medication list which includes the following prescription(s): celecoxib, ergocalciferol, omeprazole, pantoprazole, rosuvastatin, sertraline, sucralfate, tadalafil, and zonisamide.    Allergies:   Review of patient's allergies indicates:   Allergen Reactions    Percocet [oxycodone-acetaminophen]     Suture, silk     Adhesive Rash        Objective      Observation: Patient is a 58 y.o. male alert and oriented    Posture: Increased CT and thoracic kyphosis, scapula abducted and anterior tilted    Cervical Range of Motion:    Degrees(%) Pain   Flexion 80 -     Extension 70 midline     Right Rotation 60 Right side     Left Rotation 75 Pulling right     Right Side Bending 75 -   Left Side Bending 75 -   C0-C1 Flex 100 -   C0-C1 Ext 100 -   C0-C1 Sidebending 100 -   C1-2 Rotation nt -   C1-3 Rotation nt -      Hinges C5-C6 on the right   Shoulder Range of Motion:   Shoulder Left Right   Flexion 180 180   Abduction 180 180   ER 90 90   IR 70 70     Upper Extremity Strength  (R) UE  (L) UE    Shoulder flexion: 5/5 Shoulder flexion: 5/5   Shoulder Abduction: 5/5 Shoulder abduction: 4+/5   Shoulder ER 5/5 Shoulder ER 5/5   Shoulder IR 5/5 Shoulder IR 4-/5   Elbow flexion: 5/5 Elbow flexion: 5/5   Elbow extension: 5/5 Elbow extension: 5/5   Wrist flexion: 5/5 Wrist flexion: 5/5   Wrist extension: 5/5 Wrist extension: 3-/5    5/5 : 4/5   Lower Trap 4+/5 Lower Trap 4/5   Middle Trap 4+/5 Middle Trap 4/5       Special Tests:  Distraction -   Spurlings + R   Vertebral Artery -   Irwin -   Lateral Flexion Alar Ligament -   Transverse Ligament -   Shoulder Abduction Test +   Quadrant Testing -     Cervical joint mobility: Hypermobility C5-C6       Reflexes:  -Bicep  (C5): 1+  -Brachioradialis (C6): 2+  -Tricep (C7): 2+    Thoracic mobility: Limited thoracic mobility worse at CT    Palpation: Tender to T4 on the R       Sensation: intact    Flexibility:     Neural tension:   -ULTT Median: -  -ULTT Ulnar: -  -ULTT Radial: -   Intake Outcome Measure for FOTO Neck Survey    Therapist reviewed FOTO scores for Corazon Jones on 12/5/2023.   FOTO documents entered into [employer] - see Media section.    Intake Score: Not given       Treatment     Total Treatment time (time-based codes) separate from Evaluation: 15 minutes     Corazon received the treatments listed below:      manual therapy techniques: Joint mobilizations and Soft tissue Mobilization were applied to the: neck and thoracic spine for 15 minutes, including:  Gr V thoracic manipulation  Prone gapping thoracic spine  Dry needling to rhomboid, posterior cuff, lat 0.40mm needles single stick needles        Patient Education and Home Exercises     Education provided:   - improve thoracic mobility, subscap strength and decrease neural tension    Written Home Exercises Provided: yes. Exercises were reviewed and Corazon was able to demonstrate them prior to the end of the session.  Corazon demonstrated good understanding of the education provided. See EMR under Patient Instructions for exercises provided during therapy sessions.    Assessment     Corazon is a 58 y.o. male referred to outpatient Physical Therapy with a medical diagnosis of left shoulder pain. Patient presents with limited subscap strength and posterior capsule mobility, increase neural tension in radial nerve distribution, poor thoracic mobility, and pain with work and ADLs.    Patient prognosis is Good.   Patient will benefit from skilled outpatient Physical Therapy to address the deficits stated above and in the chart below, provide patient /family education, and to maximize patientt's level of independence.     Plan of care discussed with patient: Yes  Patient's  spiritual, cultural and educational needs considered and patient is agreeable to the plan of care and goals as stated below:     Anticipated Barriers for therapy: work schedule    Medical Necessity is demonstrated by the following  History  Co-morbidities and personal factors that may impact the plan of care [x] LOW: no personal factors / co-morbidities  [] MODERATE: 1-2 personal factors / co-morbidities  [] HIGH: 3+ personal factors / co-morbidities    Moderate / High Support Documentation:   Co-morbidities affecting plan of care:     Personal Factors:   no deficits     Examination  Body Structures and Functions, activity limitations and participation restrictions that may impact the plan of care [x] LOW: addressing 1-2 elements  [] MODERATE: 3+ elements  [] HIGH: 4+ elements (please support below)    Moderate / High Support Documentation:      Clinical Presentation [x] LOW: stable  [] MODERATE: Evolving  [] HIGH: Unstable     Decision Making/ Complexity Score: Low         GOALS: Short Term Goals: 2 weeks  1.Report decreased neck pain  <   / =  4  /10  to increase tolerance for working  2. Increase cervical ROM by 5-10 degrees in order to perform ADLs with decreased difficulty.  3. Increase strength in B shoulders/scapular stabilizers by 1/3 MMT grade to increase tolerance for ADL and work activities.  4. Pt to tolerate HEP to improve ROM and independence with ADL's    Long Term Goals: 6 weeks  1.Report decreased neck pain  <   / =  0  /10  to increase tolerance for return to ADL without pain  2. Increase UE/neck flexibility in order to improve posture.    3.Increased strength in B shoulders/scapular stabilizers to >/= 4/5 MMT grade to increase tolerance for ADL and work activities.  4.  Pt will report at goals level on FOTO neck score for neck pain disability to demonstrate decrease in disability and improvement in neck pain.   Plan     Plan of care Certification: 12/5/2023 to 6/5/2024.    Outpatient Physical  Therapy 2 times weekly for 10 weeks to include the following interventions: Cervical/Lumbar Traction, Manual Therapy, Moist Heat/ Ice, Neuromuscular Re-ed, Patient Education, Self Care, Therapeutic Activities, and Therapeutic Exercise.     Crescencio Amin, PT, DPT, OCS, FAAOMPT

## 2023-12-08 ENCOUNTER — CLINICAL SUPPORT (OUTPATIENT)
Dept: REHABILITATION | Facility: HOSPITAL | Age: 58
End: 2023-12-08
Payer: COMMERCIAL

## 2023-12-08 DIAGNOSIS — M25.511 ACUTE PAIN OF RIGHT SHOULDER: Primary | ICD-10-CM

## 2023-12-08 PROCEDURE — 97112 NEUROMUSCULAR REEDUCATION: CPT | Performed by: PHYSICAL THERAPIST

## 2023-12-08 PROCEDURE — 97140 MANUAL THERAPY 1/> REGIONS: CPT | Performed by: PHYSICAL THERAPIST

## 2023-12-08 NOTE — PROGRESS NOTES
[facility]   Physical Therapy Treatment Note      Name: Corazon Maldonado [facility]  Clinic Number: [id number]    Therapy Diagnosis:   Encounter Diagnosis   Name Primary?    Acute pain of right shoulder Yes     Physician: Mahendra Kamara MD    Visit Date: 12/8/2023    Physician Orders: PT Eval and Treat   Medical Diagnosis from Referral:   Diagnosis   M25.512 (ICD-10-CM) - Left shoulder pain   G25.89 (ICD-10-CM) - Scapular dyskinesis      Evaluation Date: 12/5/2023  Authorization Period Expiration: 12/3/2024  Plan of Care Expiration: 6/5/2024  Progress Note Due: 3/5/2024  Visit # / Visits authorized: 1/ 1   FOTO: 1/1       PTA Visit #: 0/5     Time In: 902  Time Out: 100  Total Billable Time: 58 minutes    Subjective     Pt reports: I was better until 330am the day I saw you, took tylenol and celebrex. Overall better but it is still there. Did my exercises yesterday  He was compliant with home exercise program.  Response to previous treatment: less pain  Functional change: wakes him up at night    Pain: 6/10  Location: right shoulder      Objective      Objective Measures updated at progress report unless specified.     Treatment     Corazon received the treatments listed below:      therapeutic exercises to develop strength, endurance, ROM, flexibility, posture, and core stabilization for 0 minutes including:      manual therapy techniques: Joint mobilizations, Manual traction, Myofacial release, Soft tissue Mobilization, and Dry Needling were applied to the: shoulder and thoracic spine for 33 minutes, including:  Right shoulder Gr II oscilaltions  Posterior capsule Gr IV  Cross body blocking scapula  Gr V thoracic manipulation  Prone gapping thoracic spine  Dry needling to rhomboid, posterior cuff, lat 0.40mm needles single stick needles    neuromuscular re-education activities to improve: Balance, Coordination, Kinesthetic, Sense, Proprioception, and Posture for 25 minutes. The following activities  were included:  Supine resisted IR 2 x 8  Genie IR OTB eccentric return 2 x 10  Prone chicken wings 15x  Seated FMP thoracic extensions 2 x 10    therapeutic activities to improve functional performance for 0  minutes, including:          Patient Education and Home Exercises       Education provided:   - improve subscap and thoracic mobility    Written Home Exercises Provided: YES. Exercises were reviewed and Corazon was able to demonstrate them prior to the end of the session.  Corazon demonstrated good understanding of the education provided. See EMR under Patient Instructions for exercises provided during therapy sessions    Assessment     Corazon tolerated treatment very well today. Improved thoracic mobility following manipulation and seated FMP. He gets relief with needling and notes referral of symptoms when needles in situ.     Corazon Is progressing well towards his goals.   Pt prognosis is Excellent.     Pt will continue to benefit from skilled outpatient physical therapy to address the deficits listed in the problem list box on initial evaluation, provide pt/family education and to maximize pt's level of independence in the home and community environment.     Pt's spiritual, cultural and educational needs considered and pt agreeable to plan of care and goals.     Anticipated barriers to physical therapy: work schedule    GOALS: Short Term Goals: 2 weeks  1.Report decreased neck pain  <   / =  4  /10  to increase tolerance for work(Progressing, not met)  2. Increase cervical ROM by 5-10 degrees in order to perform ADLs with decreased difficulty.(Progressing, not met)  3. Increase strength in B shoulders/scapular stabilizers by 1/3 MMT grade to increase tolerance for ADL and work activities.(Progressing, not met)  4. Pt to tolerate HEP to improve ROM and independence with ADL's(Progressing, not met)     Long Term Goals: 6 weeks  1.Report decreased neck pain  <   / =  0  /10  to increase tolerance for return to ADL  without pain(Progressing, not met)  2. Increase UE/neck flexibility in order to improve posture.  (Progressing, not met)  3.Increased strength in B shoulders/scapular stabilizers to >/= 4/5 MMT grade to increase tolerance for ADL and work activities.(Progressing, not met)  4.  Pt will report at goals level on FOTO neck score for neck pain disability to demonstrate decrease in disability and improvement in neck pain. (Progressing, not met)    Plan     Plan of care Certification: 12/5/2023 to 6/5/2024.     Improve subscap strength    Crescencio Amin, PT, DPT, OCS, FAAOMPT

## 2023-12-12 ENCOUNTER — CLINICAL SUPPORT (OUTPATIENT)
Dept: REHABILITATION | Facility: HOSPITAL | Age: 58
End: 2023-12-12
Payer: COMMERCIAL

## 2023-12-12 DIAGNOSIS — M25.511 ACUTE PAIN OF RIGHT SHOULDER: Primary | ICD-10-CM

## 2023-12-12 PROCEDURE — 97140 MANUAL THERAPY 1/> REGIONS: CPT | Performed by: PHYSICAL THERAPIST

## 2023-12-12 PROCEDURE — 97112 NEUROMUSCULAR REEDUCATION: CPT | Performed by: PHYSICAL THERAPIST

## 2023-12-13 ENCOUNTER — PROCEDURE VISIT (OUTPATIENT)
Dept: NEUROLOGY | Facility: CLINIC | Age: 58
End: 2023-12-13
Payer: COMMERCIAL

## 2023-12-13 ENCOUNTER — TELEPHONE (OUTPATIENT)
Dept: SPORTS MEDICINE | Facility: CLINIC | Age: 58
End: 2023-12-13
Payer: COMMERCIAL

## 2023-12-13 DIAGNOSIS — M54.12 CERVICAL RADICULOPATHY: ICD-10-CM

## 2023-12-13 PROCEDURE — 95886 MUSC TEST DONE W/N TEST COMP: CPT | Mod: S$GLB,,, | Performed by: PHYSICAL MEDICINE & REHABILITATION

## 2023-12-13 PROCEDURE — 95886 PR EMG COMPLETE, W/ NERVE CONDUCTION STUDIES, 5+ MUSCLES: ICD-10-PCS | Mod: S$GLB,,, | Performed by: PHYSICAL MEDICINE & REHABILITATION

## 2023-12-13 PROCEDURE — 95909 NRV CNDJ TST 5-6 STUDIES: CPT | Mod: S$GLB,,, | Performed by: PHYSICAL MEDICINE & REHABILITATION

## 2023-12-13 PROCEDURE — 95909 PR NERVE CONDUCTION STUDY; 5-6 STUDIES: ICD-10-PCS | Mod: S$GLB,,, | Performed by: PHYSICAL MEDICINE & REHABILITATION

## 2023-12-13 NOTE — PROGRESS NOTES
[facility]   Physical Therapy Treatment Note      Name: Corazon Maldonado [facility]  Clinic Number: [id number]    Therapy Diagnosis:   Encounter Diagnosis   Name Primary?    Acute pain of right shoulder Yes     Physician: Mahendra Kamara MD    Visit Date: 12/12/2023    Physician Orders: PT Eval and Treat   Medical Diagnosis from Referral:   Diagnosis   M25.512 (ICD-10-CM) - Left shoulder pain   G25.89 (ICD-10-CM) - Scapular dyskinesis      Evaluation Date: 12/5/2023  Authorization Period Expiration: 12/3/2024  Plan of Care Expiration: 6/5/2024  Progress Note Due: 3/5/2024  Visit # / Visits authorized: 2/12  FOTO: 1/1       PTA Visit #: 0/5     Time In: 500pm  Time Out: 605pm  Total Billable Time: 65 minutes    Subjective     Pt reports: Got an MRI this weekend. Notes some pain over the weekend, still comes and goes. Wore a sling with relief at work today    He was compliant with home exercise program.  Response to previous treatment: less pain  Functional change: wakes him up at night    Pain: 6/10  Location: right shoulder      Objective      Objective Measures updated at progress report unless specified.     Treatment     Corazon received the treatments listed below:      therapeutic exercises to develop strength, endurance, ROM, flexibility, posture, and core stabilization for 0 minutes including:      manual therapy techniques: Joint mobilizations, Manual traction, Myofacial release, Soft tissue Mobilization, and Dry Needling were applied to the: shoulder and thoracic spine for 35 minutes, including:  Right shoulder Gr II oscilaltions  Posterior capsule Gr IV  Cross body blocking scapula  Gr V thoracic manipulation  Prone gapping thoracic spine  2nd rib Gr III on the R   Dry needling to rhomboid, posterior cuff, lat 0.40mm needles single stick needles    neuromuscular re-education activities to improve: Balance, Coordination, Kinesthetic, Sense, Proprioception, and Posture for 25 minutes. The  following activities were included:    Supine thoracic extensions over foam roll 5'  Prone chicken wings 15x  Open books 15x LUE     NT  Supine resisted IR 2 x 8  Genie IR OTB eccentric return 2 x 10  Seated FMP thoracic extensions 2 x 10    therapeutic activities to improve functional performance for 0  minutes, including:      Patient Education and Home Exercises       Education provided:   - improve subscap and thoracic mobility    Written Home Exercises Provided: YES. Exercises were reviewed and Corazon was able to demonstrate them prior to the end of the session.  Corazon demonstrated good understanding of the education provided. See EMR under Patient Instructions for exercises provided during therapy sessions    Assessment     Corazon progressed with thoracic motion over the foam roll. Continues to get symptoms in axilla in prone position, relief following needling. Notes less pain in sling when able to turn off cuff activation at work.     Corazon Is progressing well towards his goals.   Pt prognosis is Excellent.     Pt will continue to benefit from skilled outpatient physical therapy to address the deficits listed in the problem list box on initial evaluation, provide pt/family education and to maximize pt's level of independence in the home and community environment.     Pt's spiritual, cultural and educational needs considered and pt agreeable to plan of care and goals.     Anticipated barriers to physical therapy: work schedule    GOALS: Short Term Goals: 2 weeks  1.Report decreased neck pain  <   / =  4  /10  to increase tolerance for work(Progressing, not met)  2. Increase cervical ROM by 5-10 degrees in order to perform ADLs with decreased difficulty.(Progressing, not met)  3. Increase strength in B shoulders/scapular stabilizers by 1/3 MMT grade to increase tolerance for ADL and work activities.(Progressing, not met)  4. Pt to tolerate HEP to improve ROM and independence with ADL's(Progressing, not met)      Long Term Goals: 6 weeks  1.Report decreased neck pain  <   / =  0  /10  to increase tolerance for return to ADL without pain(Progressing, not met)  2. Increase UE/neck flexibility in order to improve posture.  (Progressing, not met)  3.Increased strength in B shoulders/scapular stabilizers to >/= 4/5 MMT grade to increase tolerance for ADL and work activities.(Progressing, not met)  4.  Pt will report at goals level on FOTO neck score for neck pain disability to demonstrate decrease in disability and improvement in neck pain. (Progressing, not met)    Plan     Plan of care Certification: 12/5/2023 to 6/5/2024.     Improve subscap strength    Crescencio Amin, PT, DPT, OCS, FAAOMPT

## 2023-12-13 NOTE — PROCEDURES
Test Date:  2023    Patient: flash jones : 1965 Physician: Marquez Almeida D.O.   ID#: [id number] SEX: Male Ref. Phys: Sanaz Lopez,*     HPI: Flash Jones is a 58 y.o.male who presents for NCS/EMG to evaluate for right arm symptoms that began 20-25 days ago after a flu shot.  He began to have shoulder pain shortly after, and this progressed to hand weakness (mostly extensor weakness and handgrip weakness).  This has been slowly improving with PT, but weakness still remains.  No atrophy, no scap winging, neg OK sign. Strength exam with weakness of the EIP, EDC, wrist extensors, thumb extensors.          NCV & EMG Findings:  Evaluation of the right median motor nerve showed prolonged distal onset latency.  The right median sensory nerve showed prolonged distal peak latency and decreased conduction velocity.  All remaining nerves (as indicated in the following tables) were within normal limits.  All examined muscles (as indicated in the following table) showed no evidence of electrical instability.    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 Carpal tunnel syndrome).  There is no motor axonal loss.  This is graded as Moderate in severity on the right.    No definitive electrodiagnostic explanation for his constellation of symptoms (weakness without sensory deficit in the radial distribution vs C7 myotome distribution).  Clinically, a posterior interosseus nerve lesion would likely best explain this weakness, though no definitive evidence of this electrodiagnostically.  Note that there is a drop in the radial motor amplitude between the forearm and elbow, but he has larger arm muscles, so this may be technical, especially in the absence of needle EMG findings.  Clinically, no signs of involvement of the AIN or long thoracic nerve, which are most typically involved in Parsonage-Seals Syndrome/Neuralgic  Amyotrophy.  This was on the differential given his history of shoulder pain followed by weakness.  I do not see any evidence of a plexopathy (parsonage villalba or otherwise) or of a cervical radiculopathy.  Sensitivity for picking up cervical radiculopathy is roughly 50-71%, so I would not recommend using NCS/EMG to rule out cervical radiculopathy if there is a reasonable clinical suspicion.  If symptoms do not improve, a repeat study in 3 months is reasonable.             ___________________________  Marquez Almeida D.O.        NCS+  Motor Nerve Results      Latency Amplitude F-Lat Segment Distance CV Comment   Site (ms) Norm (mV) Norm (ms)  (cm) (m/s) Norm    Right Median (APB)   Wrist *5.1  < 4.7 6.4  > 4.2  Wrist-Palm - - -    Elbow 9.8 - 5.6 -  Elbow-Wrist 25 53  > 47    Right Ulnar (ADM)   Wrist 2.8  < 3.7 8.1  > 3.0         Bel Elbow 7.1 - 9.6 -  Bel Elbow-Wrist 24 56  > 52    Abv Elbow 9.1 - 9.0 -  Abv Elbow-Bel Elbow 11 55  > 43    Right Radial (EIP)   Forearm 2.2  < 2.5 2.2  > 1.70         Elbow 4.9 - 1.18 -  Elbow-Forearm 14 52 -    Sp Groove 7.4 - 1.00 -  Sp Groove-Elbow 22 88 -      Sensory Nerve Results      Latency (Peak) Amplitude (P-P) Segment Distance CV Comment   Site (ms) Norm (µV) Norm  (cm) (m/s) Norm    Right Median   Wrist-Dig II *5.6  < 4.0 10  > 8 Wrist-Dig II 14 *25  > 39    Right Ulnar   Wrist-Dig V 2.9  < 4.0 32  > 4 Wrist-Dig V 14 48  > 38    Right Radial   Forearm-Wrist 2.3  < 2.8 16  > 11 Forearm-Wrist 10 43 -      EMG+     Side Muscle Nerve Root Ins Act Fibs Psw Amp Dur Poly Recrt Int Pat Comment   Right Deltoid Axillary C5-C6 Nml Nml Nml Nml Nml 0 Nml Nml    Right Triceps Radial C6-C8 Nml Nml Nml Nml Nml 0 Nml Nml    Right EDC Radial,  Posterior In... C7-C8 Nml Nml Nml Nml Nml 0 Nml Nml    Right Biceps Musculocut C5-C6 Nml Nml Nml Nml Nml 0 Nml Nml    Right Pronator Teres Median C6-C7 Nml Nml Nml Nml Nml 0 Nml Nml    Right Brachiorad Radial C5-C6 Nml Nml Nml Nml Nml 0 Nml Nml             Waveforms:    Motor         Sensory                 Addendeum:  I asked Dr. Jones to come back given the drop in radial motor amplitude to compare to the asymptomatic side.  Results below.  The radial motor response is similar to the right side, where there is a drop in amplitude at the elbow.  I suspect this to be anatomical/technical.  We also checked the EIP muscle on the right, which was normal.  This (in combination with the normal EDC) also makes a PIN lesion much less likely.      NCS+  Motor Nerve Results      Latency Amplitude F-Lat Segment Distance CV Comment   Site (ms) Norm (mV) Norm (ms)  (cm) (m/s) Norm    Left Radial (EIP)   Forearm 1.68  < 2.5 4.1  > 1.70         Elbow 3.8 - 1.19 -  Elbow-Forearm - - -    Sp Groove 6.9 - 1.03 -  Sp Groove-Elbow - - -      Sensory Nerve Results      Latency (Peak) Amplitude (P-P) Segment Distance CV Comment   Site (ms) Norm (µV) Norm  (cm) (m/s) Norm    Left Radial (Rec:Wrist)   Forearm 2.2  < 2.8 19  > 11 Forearm-Wrist 9 41 -      EMG+     Side Muscle Nerve Root Ins Act Fibs Psw Amp Dur Poly Recrt Int Pat Comment   Right EIP Post Interosseous,  R... C7-C8 Nml Nml Nml Nml Nml 0 Nml Nml

## 2023-12-13 NOTE — TELEPHONE ENCOUNTER
Dr. Wisdom reviewed outside MRI images of patient's right shoulder from [facility].     Small supraspinatus full thickness RC tear with high grade partial infraspinatus tear. Upper 1/8 subscapularis with almost subluxing biceps.     Continue PT

## 2023-12-15 ENCOUNTER — CLINICAL SUPPORT (OUTPATIENT)
Dept: REHABILITATION | Facility: HOSPITAL | Age: 58
End: 2023-12-15
Payer: COMMERCIAL

## 2023-12-15 DIAGNOSIS — M25.511 ACUTE PAIN OF RIGHT SHOULDER: Primary | ICD-10-CM

## 2023-12-15 PROCEDURE — 97112 NEUROMUSCULAR REEDUCATION: CPT | Performed by: PHYSICAL THERAPIST

## 2023-12-15 PROCEDURE — 97140 MANUAL THERAPY 1/> REGIONS: CPT | Performed by: PHYSICAL THERAPIST

## 2023-12-15 NOTE — PROGRESS NOTES
[facility]   Physical Therapy Treatment Note      Name: Corazon Maldonado [facility]  Clinic Number: [id number]    Therapy Diagnosis:   Encounter Diagnosis   Name Primary?    Acute pain of right shoulder Yes     Physician: Mahendra Kamara MD    Visit Date: 12/15/2023    Physician Orders: PT Eval and Treat   Medical Diagnosis from Referral:   Diagnosis   M25.512 (ICD-10-CM) - Left shoulder pain   G25.89 (ICD-10-CM) - Scapular dyskinesis      Evaluation Date: 12/5/2023  Authorization Period Expiration: 12/3/2024  Plan of Care Expiration: 6/5/2024  Progress Note Due: 3/5/2024  Visit # / Visits authorized: 3/12  FOTO: 1/1       PTA Visit #: 0/5     Time In: 705am  Time Out: 806am  Total Billable Time: 61 minutes    Subjective     Pt reports: I felt good until moving a crib yesterday, sore this morning    He was compliant with home exercise program.  Response to previous treatment: less pain  Functional change: wakes him up at night    Pain: 6/10  Location: right shoulder      Objective      Objective Measures updated at progress report unless specified.     Treatment     Corazon received the treatments listed below:      therapeutic exercises to develop strength, endurance, ROM, flexibility, posture, and core stabilization for 0 minutes including:      manual therapy techniques: Joint mobilizations, Manual traction, Myofacial release, Soft tissue Mobilization, and Dry Needling were applied to the: shoulder and thoracic spine for 25 minutes, including:    Right shoulder Gr II oscilaltions  Posterior capsule Gr IV  Cross body blocking scapula-nt  Gr V thoracic manipulation  Prone gapping thoracic spine  2nd rib Gr III on the R -nt  Dry needling to rhomboid, posterior cuff, lat 0.40mm needles single stick needles    neuromuscular re-education activities to improve: Balance, Coordination, Kinesthetic, Sense, Proprioception, and Posture for 35 minutes. The following activities were included:    Supine thoracic  "extensions over foam roll 5'  Thread the needle foam roll 2 x  15  IR/ER walkouts GTB  20x 3"  Prone row bent elbow 2 x 10  Cat camel 2 x 15    NT  Prone chicken wings 15x  Open books 15x LUE   Supine resisted IR 2 x 8  Genie IR OTB eccentric return 2 x 10  Seated FMP thoracic extensions 2 x 10    therapeutic activities to improve functional performance for 0  minutes, including:      Patient Education and Home Exercises       Education provided:   - improve subscap and thoracic mobility    Written Home Exercises Provided: YES. Exercises were reviewed and Corazon was able to demonstrate them prior to the end of the session.  Corazon demonstrated good understanding of the education provided. See EMR under Patient Instructions for exercises provided during therapy sessions    Assessment     Corazon notes decreased pain following Tuesday session but soreness returned from carrying heavy crib last night. Post capsule stretch with thread the needle and notes pain first rep but it resolved. Will progress with cuff strengthening, started isometrics today    Corazon Is progressing well towards his goals.   Pt prognosis is Excellent.     Pt will continue to benefit from skilled outpatient physical therapy to address the deficits listed in the problem list box on initial evaluation, provide pt/family education and to maximize pt's level of independence in the home and community environment.     Pt's spiritual, cultural and educational needs considered and pt agreeable to plan of care and goals.     Anticipated barriers to physical therapy: work schedule    GOALS: Short Term Goals: 2 weeks  1.Report decreased neck pain  <   / =  4  /10  to increase tolerance for work(Progressing, not met)  2. Increase cervical ROM by 5-10 degrees in order to perform ADLs with decreased difficulty.(Progressing, not met)  3. Increase strength in B shoulders/scapular stabilizers by 1/3 MMT grade to increase tolerance for ADL and work " activities.(Progressing, not met)  4. Pt to tolerate HEP to improve ROM and independence with ADL's(Progressing, not met)     Long Term Goals: 6 weeks  1.Report decreased neck pain  <   / =  0  /10  to increase tolerance for return to ADL without pain(Progressing, not met)  2. Increase UE/neck flexibility in order to improve posture.  (Progressing, not met)  3.Increased strength in B shoulders/scapular stabilizers to >/= 4/5 MMT grade to increase tolerance for ADL and work activities.(Progressing, not met)  4.  Pt will report at goals level on FOTO neck score for neck pain disability to demonstrate decrease in disability and improvement in neck pain. (Progressing, not met)    Plan     Plan of care Certification: 12/5/2023 to 6/5/2024.     Improve subscap strength    Crescencio Amin, PT, DPT, OCS, FAAOMPT

## 2023-12-19 ENCOUNTER — CLINICAL SUPPORT (OUTPATIENT)
Dept: REHABILITATION | Facility: HOSPITAL | Age: 58
End: 2023-12-19
Payer: COMMERCIAL

## 2023-12-19 DIAGNOSIS — M25.511 ACUTE PAIN OF RIGHT SHOULDER: Primary | ICD-10-CM

## 2023-12-19 PROCEDURE — 97140 MANUAL THERAPY 1/> REGIONS: CPT | Performed by: PHYSICAL THERAPIST

## 2023-12-19 PROCEDURE — 97112 NEUROMUSCULAR REEDUCATION: CPT | Performed by: PHYSICAL THERAPIST

## 2023-12-20 NOTE — PROGRESS NOTES
[facility]   Physical Therapy Treatment Note      Name: Corazon Maldonado [facility]  Clinic Number: [id number]    Therapy Diagnosis:   Encounter Diagnosis   Name Primary?    Acute pain of right shoulder Yes     Physician: Mahendra Kamara MD    Visit Date: 12/19/2023    Physician Orders: PT Eval and Treat   Medical Diagnosis from Referral:   Diagnosis   M25.512 (ICD-10-CM) - Left shoulder pain   G25.89 (ICD-10-CM) - Scapular dyskinesis      Evaluation Date: 12/5/2023  Authorization Period Expiration: 12/3/2024  Plan of Care Expiration: 6/5/2024  Progress Note Due: 3/5/2024  Visit # / Visits authorized: 4/12  FOTO: 1/1       PTA Visit #: 0/5     Time In: 1704  Time Out: 1802  Total Billable Time: 58 minutes    Subjective     Pt reports it has ups and downs. Feels good after doing isometrics to the shoulder. Have not used the foam roll. Have one spot in the axilla where it hurts    He was compliant with home exercise program.  Response to previous treatment: less pain  Functional change: sling at times to work     Pain: 6/10  Location: right shoulder      Objective      Objective Measures updated at progress report unless specified.     Treatment     Corazon received the treatments listed below:      therapeutic exercises to develop strength, endurance, ROM, flexibility, posture, and core stabilization for 0 minutes including:      manual therapy techniques: Joint mobilizations, Manual traction, Myofacial release, Soft tissue Mobilization, and Dry Needling were applied to the: shoulder and thoracic spine for 35 minutes, including:    Right shoulder Gr II oscilaltions  Posterior capsule Gr IV  Cross body blocking scapula  Gr V thoracic manipulation-nt  Prone gapping thoracic spine-nt  2nd rib Gr III on the R -nt  Dry needling to rhomboid, posterior cuff, lat, UT 0.40mm needles single stick needles    neuromuscular re-education activities to improve: Balance, Coordination, Kinesthetic, Sense,  "Proprioception, and Posture for 23 minutes. The following activities were included:    Foam roll to subscap 3'  IR ancore 10# 3 x 15  ER walkouts yellow sport cord 3x to fatigue  Pt education    NT  Supine thoracic extensions over foam roll 5'  Thread the needle foam roll 2 x  15  IR/ER walkouts GTB  20x 3"  Prone row bent elbow 2 x 10  Cat camel 2 x 15  Prone chicken wings 15x  Open books 15x LUE   Supine resisted IR 2 x 8  Genie IR OTB eccentric return 2 x 10  Seated FMP thoracic extensions 2 x 10    therapeutic activities to improve functional performance for 0  minutes, including:      Patient Education and Home Exercises       Education provided:   - improve subscap and thoracic mobility    Written Home Exercises Provided: YES. Exercises were reviewed and Corazon was able to demonstrate them prior to the end of the session.  Corazon demonstrated good understanding of the education provided. See EMR under Patient Instructions for exercises provided during therapy sessions    Assessment     Corazon got relief from dry needling today to subscap and rhomboid, UT at end of session. Noted after performing cuff exercises he had decreased pain and he is consistent with serratus slides using shirt.   Corazon Is progressing well towards his goals.   Pt prognosis is Excellent.     Pt will continue to benefit from skilled outpatient physical therapy to address the deficits listed in the problem list box on initial evaluation, provide pt/family education and to maximize pt's level of independence in the home and community environment.     Pt's spiritual, cultural and educational needs considered and pt agreeable to plan of care and goals.     Anticipated barriers to physical therapy: work schedule    GOALS: Short Term Goals: 2 weeks  1.Report decreased neck pain  <   / =  4  /10  to increase tolerance for work(Progressing, not met)  2. Increase cervical ROM by 5-10 degrees in order to perform ADLs with decreased " difficulty.(Progressing, not met)  3. Increase strength in B shoulders/scapular stabilizers by 1/3 MMT grade to increase tolerance for ADL and work activities.(Progressing, not met)  4. Pt to tolerate HEP to improve ROM and independence with ADL's(Progressing, not met)     Long Term Goals: 6 weeks  1.Report decreased neck pain  <   / =  0  /10  to increase tolerance for return to ADL without pain(Progressing, not met)  2. Increase UE/neck flexibility in order to improve posture.  (Progressing, not met)  3.Increased strength in B shoulders/scapular stabilizers to >/= 4/5 MMT grade to increase tolerance for ADL and work activities.(Progressing, not met)  4.  Pt will report at goals level on FOTO neck score for neck pain disability to demonstrate decrease in disability and improvement in neck pain. (Progressing, not met)    Plan     Plan of care Certification: 12/5/2023 to 6/5/2024.     Improve subscap strength    Crescencio Amin, PT, DPT, OCS, FAAOMPT

## 2023-12-22 ENCOUNTER — CLINICAL SUPPORT (OUTPATIENT)
Dept: REHABILITATION | Facility: HOSPITAL | Age: 58
End: 2023-12-22
Payer: COMMERCIAL

## 2023-12-22 DIAGNOSIS — M25.511 ACUTE PAIN OF RIGHT SHOULDER: Primary | ICD-10-CM

## 2023-12-22 PROCEDURE — 97112 NEUROMUSCULAR REEDUCATION: CPT | Performed by: PHYSICAL THERAPIST

## 2023-12-22 PROCEDURE — 97140 MANUAL THERAPY 1/> REGIONS: CPT | Performed by: PHYSICAL THERAPIST

## 2023-12-22 NOTE — PROGRESS NOTES
[facility]   Physical Therapy Treatment Note      Name: Corazon Maldonado [facility]  Clinic Number: [id number]    Therapy Diagnosis:   Encounter Diagnosis   Name Primary?    Acute pain of right shoulder Yes     Physician: Mahendra Kamara MD    Visit Date: 12/22/2023    Physician Orders: PT Eval and Treat   Medical Diagnosis from Referral:   Diagnosis   M25.512 (ICD-10-CM) - Left shoulder pain   G25.89 (ICD-10-CM) - Scapular dyskinesis      Evaluation Date: 12/5/2023  Authorization Period Expiration: 12/3/2024  Plan of Care Expiration: 6/5/2024  Progress Note Due: 3/5/2024  Visit # / Visits authorized: 5/12  FOTO: 1/1       PTA Visit #: 0/5     Time In: 800  Time Out: 902  Total Billable Time: 62 minutes    Subjective     Pt reports less pain in the shoulder since last time needling. Can snap his fingers and better  strength    He was compliant with home exercise program.  Response to previous treatment: less pain  Functional change: sling at times to work     Pain: 6/10  Location: right shoulder      Objective      Objective Measures updated at progress report unless specified.     Treatment     Corazon received the treatments listed below:      therapeutic exercises to develop strength, endurance, ROM, flexibility, posture, and core stabilization for 0 minutes including:      manual therapy techniques: Joint mobilizations, Manual traction, Myofacial release, Soft tissue Mobilization, and Dry Needling were applied to the: shoulder and thoracic spine for 25 minutes, including:    Right shoulder Gr II oscilaltions  Posterior capsule Gr IV  Cross body blocking scapula  Gr V thoracic manipulation-nt  Prone gapping thoracic spine-nt  2nd rib Gr III on the R -nt  Dry needling to , posterior cuff, lat, supraspinatus 0.40mm needles single stick needles    neuromuscular re-education activities to improve: Balance, Coordination, Kinesthetic, Sense, Proprioception, and Posture for 35 minutes. The following  "activities were included:    Serratus landmind press 45# cross body 3 x 15  1/2 Kneeling thoracic mobility both directions 2 x 15 B   OH IR/ER eccentrics blue sport cord 3 x 15  ER walkouts yellow sport cord 3x to fatigue  Pt education    NT  Foam roll to subscap 3'  IR ancore 10# 3 x 15  Supine thoracic extensions over foam roll 5'  Thread the needle foam roll 2 x  15  IR/ER walkouts GTB  20x 3"  Prone row bent elbow 2 x 10  Cat camel 2 x 15  Prone chicken wings 15x  Open books 15x LUE   Supine resisted IR 2 x 8  Genie IR OTB eccentric return 2 x 10  Seated FMP thoracic extensions 2 x 10    therapeutic activities to improve functional performance for 0  minutes, including:      Patient Education and Home Exercises       Education provided:   - improve subscap and thoracic mobility    Written Home Exercises Provided: YES. Exercises were reviewed and Corazon was able to demonstrate them prior to the end of the session.  Corazon demonstrated good understanding of the education provided. See EMR under Patient Instructions for exercises provided during therapy sessions    Assessment     Relief with needling to the lat and supraspinatus. He is progressing eccentric loading to the cuff and serrtus with thoracic motion added to serratus strengthening      Corazon Is progressing well towards his goals.   Pt prognosis is Excellent.     Pt will continue to benefit from skilled outpatient physical therapy to address the deficits listed in the problem list box on initial evaluation, provide pt/family education and to maximize pt's level of independence in the home and community environment.     Pt's spiritual, cultural and educational needs considered and pt agreeable to plan of care and goals.     Anticipated barriers to physical therapy: work schedule    GOALS: Short Term Goals: 2 weeks  1.Report decreased neck pain  <   / =  4  /10  to increase tolerance for work(Progressing, not met)  2. Increase cervical ROM by 5-10 degrees in " order to perform ADLs with decreased difficulty.(Progressing, not met)  3. Increase strength in B shoulders/scapular stabilizers by 1/3 MMT grade to increase tolerance for ADL and work activities.(Progressing, not met)  4. Pt to tolerate HEP to improve ROM and independence with ADL's(Progressing, not met)     Long Term Goals: 6 weeks  1.Report decreased neck pain  <   / =  0  /10  to increase tolerance for return to ADL without pain(Progressing, not met)  2. Increase UE/neck flexibility in order to improve posture.  (Progressing, not met)  3.Increased strength in B shoulders/scapular stabilizers to >/= 4/5 MMT grade to increase tolerance for ADL and work activities.(Progressing, not met)  4.  Pt will report at goals level on FOTO neck score for neck pain disability to demonstrate decrease in disability and improvement in neck pain. (Progressing, not met)    Plan     Plan of care Certification: 12/5/2023 to 6/5/2024.     Improve subscap strength    Crescencio Amin, PT, DPT, OCS, FAAOMPT

## 2023-12-26 ENCOUNTER — CLINICAL SUPPORT (OUTPATIENT)
Dept: REHABILITATION | Facility: HOSPITAL | Age: 58
End: 2023-12-26
Payer: COMMERCIAL

## 2023-12-26 DIAGNOSIS — M25.511 ACUTE PAIN OF RIGHT SHOULDER: Primary | ICD-10-CM

## 2023-12-26 PROCEDURE — 97140 MANUAL THERAPY 1/> REGIONS: CPT | Performed by: PHYSICAL THERAPIST

## 2023-12-26 PROCEDURE — 97112 NEUROMUSCULAR REEDUCATION: CPT | Performed by: PHYSICAL THERAPIST

## 2023-12-27 NOTE — PROGRESS NOTES
[facility]   Physical Therapy Treatment Note      Name: Corazon Maldonado [facility]  Clinic Number: [id number]    Therapy Diagnosis:   Encounter Diagnosis   Name Primary?    Acute pain of right shoulder Yes     Physician: Mahendra Kamara MD    Visit Date: 12/26/2023    Physician Orders: PT Eval and Treat   Medical Diagnosis from Referral:   Diagnosis   M25.512 (ICD-10-CM) - Left shoulder pain   G25.89 (ICD-10-CM) - Scapular dyskinesis      Evaluation Date: 12/5/2023  Authorization Period Expiration: 12/3/2024  Plan of Care Expiration: 6/5/2024  Progress Note Due: 3/5/2024  Visit # / Visits authorized: 6/12  FOTO: 1/1       PTA Visit #: 0/5     Time In: 1700  Time Out: 1815  Total Billable Time: 75 minutes    Subjective     Pt reports I was very sore after last time, overdid it with the exercises.     He was compliant with home exercise program.  Response to previous treatment: less pain  Functional change: sling at times to work     Pain: 6/10  Location: right shoulder      Objective      Objective Measures updated at progress report unless specified.     Treatment     Corazon received the treatments listed below:      therapeutic exercises to develop strength, endurance, ROM, flexibility, posture, and core stabilization for 0 minutes including:      manual therapy techniques: Joint mobilizations, Manual traction, Myofacial release, Soft tissue Mobilization, and Dry Needling were applied to the: shoulder and thoracic spine for 25 minutes, including:    Right shoulder Gr II oscilaltions  Posterior capsule Gr IV  Cross body blocking scapula  Gr V thoracic manipulation  Prone gapping thoracic spine  2nd rib Gr III on the R -nt  Dry needling to , posterior cuff, lat, supraspinatus 0.40mm needles single stick needles    neuromuscular re-education activities to improve: Balance, Coordination, Kinesthetic, Sense, Proprioception, and Posture for 45 minutes. The following activities were included:    Supine  "thoracic extensions over foam roll 5'  Thread the needle foam roll 2 x  15  Serratus landmine press 35# 3 x 15  1/2 Kneeling thoracic mobility both directions 2 x 15 B   OH IR/ER eccentrics blue sport cord 3 x 15  ER walkouts yellow sport cord 3x to fatigue  Pt education    NT  Foam roll to subscap 3'  IR ancore 10# 3 x 15    IR/ER walkouts GTB  20x 3"  Prone row bent elbow 2 x 10  Cat camel 2 x 15  Prone chicken wings 15x  Open books 15x LUE   Supine resisted IR 2 x 8  Genie IR OTB eccentric return 2 x 10  Seated FMP thoracic extensions 2 x 10    therapeutic activities to improve functional performance for 0  minutes, including:      Patient Education and Home Exercises       Education provided:   - improve subscap and thoracic mobility    Written Home Exercises Provided: YES. Exercises were reviewed and Corazon was able to demonstrate them prior to the end of the session.  Corazon demonstrated good understanding of the education provided. See EMR under Patient Instructions for exercises provided during therapy sessions    Assessment     Assessed patient iván during treatment today for back pain. Noted he had increased symptoms with too much load to the cuff. Neural symptoms continue to slowly improve, moving back and forth to the scapula and into the subscap     Corazon Is progressing well towards his goals.   Pt prognosis is Excellent.     Pt will continue to benefit from skilled outpatient physical therapy to address the deficits listed in the problem list box on initial evaluation, provide pt/family education and to maximize pt's level of independence in the home and community environment.     Pt's spiritual, cultural and educational needs considered and pt agreeable to plan of care and goals.     Anticipated barriers to physical therapy: work schedule    GOALS: Short Term Goals: 2 weeks  1.Report decreased neck pain  <   / =  4  /10  to increase tolerance for work(Progressing, not met)  2. Increase cervical ROM by " 5-10 degrees in order to perform ADLs with decreased difficulty.(Progressing, not met)  3. Increase strength in B shoulders/scapular stabilizers by 1/3 MMT grade to increase tolerance for ADL and work activities.(Progressing, not met)  4. Pt to tolerate HEP to improve ROM and independence with ADL's(Progressing, not met)     Long Term Goals: 6 weeks  1.Report decreased neck pain  <   / =  0  /10  to increase tolerance for return to ADL without pain(Progressing, not met)  2. Increase UE/neck flexibility in order to improve posture.  (Progressing, not met)  3.Increased strength in B shoulders/scapular stabilizers to >/= 4/5 MMT grade to increase tolerance for ADL and work activities.(Progressing, not met)  4.  Pt will report at goals level on FOTO neck score for neck pain disability to demonstrate decrease in disability and improvement in neck pain. (Progressing, not met)    Plan     Plan of care Certification: 12/5/2023 to 6/5/2024.     Improve subscap strength    Crescencio Amin, PT, DPT, OCS, FAAOMPT

## 2023-12-29 ENCOUNTER — CLINICAL SUPPORT (OUTPATIENT)
Dept: REHABILITATION | Facility: HOSPITAL | Age: 58
End: 2023-12-29
Payer: COMMERCIAL

## 2023-12-29 DIAGNOSIS — M25.511 ACUTE PAIN OF RIGHT SHOULDER: Primary | ICD-10-CM

## 2023-12-29 PROCEDURE — 97140 MANUAL THERAPY 1/> REGIONS: CPT | Performed by: PHYSICAL THERAPIST

## 2023-12-29 PROCEDURE — 97014 ELECTRIC STIMULATION THERAPY: CPT | Performed by: PHYSICAL THERAPIST

## 2023-12-29 PROCEDURE — 97112 NEUROMUSCULAR REEDUCATION: CPT | Performed by: PHYSICAL THERAPIST

## 2023-12-29 NOTE — PROGRESS NOTES
[facility]   Physical Therapy Treatment Note      Name: Corazon Maldonado [facility]  Clinic Number: [id number]    Therapy Diagnosis:   Encounter Diagnosis   Name Primary?    Acute pain of right shoulder Yes     Physician: Mahendra Kamara MD    Visit Date: 12/29/2023    Physician Orders: PT Eval and Treat   Medical Diagnosis from Referral:   Diagnosis   M25.512 (ICD-10-CM) - Left shoulder pain   G25.89 (ICD-10-CM) - Scapular dyskinesis      Evaluation Date: 12/5/2023  Authorization Period Expiration: 12/3/2024  Plan of Care Expiration: 6/5/2024  Progress Note Due: 3/5/2024  Visit # / Visits authorized: 7/12  FOTO: 1/1       PTA Visit #: 0/5     Time In: 720  Time Out: 945  Total Billable Time: 85 minutes    Subjective     Pt reports PRP yesterday to my shoulder, soreness but I usually feel good 2-3 days after coming to PT    He was compliant with home exercise program.  Response to previous treatment: less pain  Functional change: sling at times to work     Pain: 6/10  Location: right shoulder      Objective      Objective Measures updated at progress report unless specified.     Treatment     Corazon received the treatments listed below:      therapeutic exercises to develop strength, endurance, ROM, flexibility, posture, and core stabilization for 0 minutes including:      manual therapy techniques: Joint mobilizations, Manual traction, Myofacial release, Soft tissue Mobilization, and Dry Needling were applied to the: shoulder and thoracic spine for 15 minutes, including:    Right shoulder Gr II oscilaltions  Posterior capsule Gr IV  Cross body blocking scapula  Gr V thoracic manipulation  Prone gapping thoracic spine  2nd rib Gr III on the R -nt  Dry needling to , posterior cuff, lat, supraspinatus 0.40mm needles single stick needles    neuromuscular re-education activities to improve: Balance, Coordination, Kinesthetic, Sense, Proprioception, and Posture for 45 minutes. The following activities  "were included:    Supine thoracic extensions over foam roll 5'  Thread the needle foam roll 2 x  15  Serratus wall slide foam roll only 3 x 15  1/2 Kneeling thoracic mobility both directions 2 x 15 B   Pt education    NT  Serratus landmine press 35# 3 x 15  Foam roll to subscap 3'  IR ancore 10# 3 x 15  OH IR/ER eccentrics blue sport cord 3 x 15  ER walkouts yellow sport cord 3x to fatigue  IR/ER walkouts GTB  20x 3"  Prone row bent elbow 2 x 10  Cat camel 2 x 15  Prone chicken wings 15x  Open books 15x LUE   Supine resisted IR 2 x 8  Genie IR OTB eccentric return 2 x 10  Seated FMP thoracic extensions 2 x 10    therapeutic activities to improve functional performance for 0  minutes, including:    Tens to periscapular 20' with education on PRP not loading first couple weeks    Patient Education and Home Exercises       Education provided:   - improve subscap and thoracic mobility    Written Home Exercises Provided: YES. Exercises were reviewed and Corazon was able to demonstrate them prior to the end of the session.  Corazon demonstrated good understanding of the education provided. See EMR under Patient Instructions for exercises provided during therapy sessions    Assessment     Corazon progressed with thoracic mobility today. He notes symptoms with prolonged poor posture at work at times. Did not load the cuff due to PRP yesterday. Will continue loading the shoulder as tolerated going forward     Corazon Is progressing well towards his goals.   Pt prognosis is Excellent.     Pt will continue to benefit from skilled outpatient physical therapy to address the deficits listed in the problem list box on initial evaluation, provide pt/family education and to maximize pt's level of independence in the home and community environment.     Pt's spiritual, cultural and educational needs considered and pt agreeable to plan of care and goals.     Anticipated barriers to physical therapy: work schedule    GOALS: Short Term Goals: 2 " weeks  1.Report decreased neck pain  <   / =  4  /10  to increase tolerance for work(Progressing, not met)  2. Increase cervical ROM by 5-10 degrees in order to perform ADLs with decreased difficulty.(Progressing, not met)  3. Increase strength in B shoulders/scapular stabilizers by 1/3 MMT grade to increase tolerance for ADL and work activities.(Progressing, not met)  4. Pt to tolerate HEP to improve ROM and independence with ADL's(Progressing, not met)     Long Term Goals: 6 weeks  1.Report decreased neck pain  <   / =  0  /10  to increase tolerance for return to ADL without pain(Progressing, not met)  2. Increase UE/neck flexibility in order to improve posture.  (Progressing, not met)  3.Increased strength in B shoulders/scapular stabilizers to >/= 4/5 MMT grade to increase tolerance for ADL and work activities.(Progressing, not met)  4.  Pt will report at goals level on FOTO neck score for neck pain disability to demonstrate decrease in disability and improvement in neck pain. (Progressing, not met)    Plan     Plan of care Certification: 12/5/2023 to 6/5/2024.     Improve subscap strength    Crescencio Amin, PT, DPT, OCS, FAAOMPT

## 2024-01-01 DIAGNOSIS — E78.2 MIXED HYPERLIPIDEMIA: ICD-10-CM

## 2024-01-01 DIAGNOSIS — F43.23 ADJUSTMENT DISORDER WITH MIXED ANXIETY AND DEPRESSED MOOD: ICD-10-CM

## 2024-01-01 DIAGNOSIS — K21.9 GASTROESOPHAGEAL REFLUX DISEASE, UNSPECIFIED WHETHER ESOPHAGITIS PRESENT: ICD-10-CM

## 2024-01-02 ENCOUNTER — CLINICAL SUPPORT (OUTPATIENT)
Dept: REHABILITATION | Facility: HOSPITAL | Age: 59
End: 2024-01-02
Payer: COMMERCIAL

## 2024-01-02 DIAGNOSIS — M25.511 ACUTE PAIN OF RIGHT SHOULDER: Primary | ICD-10-CM

## 2024-01-02 PROCEDURE — 97112 NEUROMUSCULAR REEDUCATION: CPT | Performed by: PHYSICAL THERAPIST

## 2024-01-02 PROCEDURE — 97140 MANUAL THERAPY 1/> REGIONS: CPT | Performed by: PHYSICAL THERAPIST

## 2024-01-02 RX ORDER — ROSUVASTATIN CALCIUM 40 MG/1
40 TABLET, COATED ORAL NIGHTLY
Qty: 90 TABLET | Refills: 3 | Status: SHIPPED | OUTPATIENT
Start: 2024-01-02

## 2024-01-02 RX ORDER — SERTRALINE HYDROCHLORIDE 50 MG/1
50 TABLET, FILM COATED ORAL DAILY
Qty: 90 TABLET | Refills: 3 | Status: SHIPPED | OUTPATIENT
Start: 2024-01-02

## 2024-01-02 RX ORDER — OMEPRAZOLE 40 MG/1
CAPSULE, DELAYED RELEASE ORAL
Qty: 90 CAPSULE | Refills: 3 | Status: SHIPPED | OUTPATIENT
Start: 2024-01-02

## 2024-01-02 NOTE — TELEPHONE ENCOUNTER
No care due was identified.  [facility] Embedded Care Due Messages. Reference number: [id number].   1/01/2024 6:11:42 PM CST

## 2024-01-03 NOTE — PROGRESS NOTES
[facility]   Physical Therapy Treatment Note      Name: Corazon Maldonado [facility]  Clinic Number: [id number]    Therapy Diagnosis:   Encounter Diagnosis   Name Primary?    Acute pain of right shoulder Yes     Physician: Mahendra Kamara MD    Visit Date: 1/2/2024    Physician Orders: PT Eval and Treat   Medical Diagnosis from Referral:   Diagnosis   M25.512 (ICD-10-CM) - Left shoulder pain   G25.89 (ICD-10-CM) - Scapular dyskinesis      Evaluation Date: 12/5/2023  Authorization Period Expiration: 12/3/2024  Plan of Care Expiration: 6/5/2024  Progress Note Due: 3/5/2024  Visit # / Visits authorized: 1/20  Total visits 9  FOTO: 1/1       PTA Visit #: 0/5     Time In: 1653  Time Out: 1758  Total Billable Time: 65 minutes    Subjective     Pt reports I felt really good, got my posture vest and it is helping. Pain today after carrying some heavy hampers around the house yesterday    He was compliant with home exercise program.  Response to previous treatment: less pain  Functional change: posture vest      Pain: 6/10  Location: right shoulder      Objective      Objective Measures updated at progress report unless specified.     Treatment     Corazon received the treatments listed below:      therapeutic exercises to develop strength, endurance, ROM, flexibility, posture, and core stabilization for 0 minutes including:      manual therapy techniques: Joint mobilizations, Manual traction, Myofacial release, Soft tissue Mobilization, and Dry Needling were applied to the: shoulder and thoracic spine for 25 minutes, including:    Right shoulder Gr II oscilaltions  Posterior capsule Gr IV  Cross body blocking scapula  Gr V thoracic manipulation-nt  Prone gapping thoracic spine-nt  2nd rib Gr III on the R -nt  Dry needling to lat, rhomboid, UT, 0.40mm needles single stick needles    neuromuscular re-education activities to improve: Balance, Coordination, Kinesthetic, Sense, Proprioception, and Posture for 40  "minutes. The following activities were included:    Open books at wall 2 x 15 B   Serratus press 13# CC 3 x 15  CC pull down low trap 13# 3 x 15  ER with lift OTB 2 x 15  Pushup plus on ground 4 x 8  Pt education    NT    1/2 Kneeling thoracic mobility both directions 2 x 15 B   Supine thoracic extensions over foam roll 5'  Thread the needle foam roll 2 x  15  Serratus wall slide foam roll only 3 x 15  Serratus landmine press 35# 3 x 15  Foam roll to subscap 3'  IR ancore 10# 3 x 15  OH IR/ER eccentrics blue sport cord 3 x 15  ER walkouts yellow sport cord 3x to fatigue  IR/ER walkouts GTB  20x 3"  Prone row bent elbow 2 x 10  Cat camel 2 x 15  Prone chicken wings 15x  Open books 15x LUE   Supine resisted IR 2 x 8  Genie IR OTB eccentric return 2 x 10  Seated FMP thoracic extensions 2 x 10    therapeutic activities to improve functional performance for 0  minutes, including:    Tens to periscapular 20' with education on PRP not loading first couple weeks    Patient Education and Home Exercises       Education provided:   - improve subscap and thoracic mobility    Written Home Exercises Provided: YES. Exercises were reviewed and Corazon was able to demonstrate them prior to the end of the session.  Corazon demonstrated good understanding of the education provided. See EMR under Patient Instructions for exercises provided during therapy sessions    Assessment     Corazon progressed cuff strengthening today with lift from isometric walkouts. Noted he is still winging so more lower trap and serratus focus placed on strengthening. Good muscle twitch response to the UT and relief with DN to the lat today     Corazon Is progressing well towards his goals.   Pt prognosis is Excellent.     Pt will continue to benefit from skilled outpatient physical therapy to address the deficits listed in the problem list box on initial evaluation, provide pt/family education and to maximize pt's level of independence in the home and community " environment.     Pt's spiritual, cultural and educational needs considered and pt agreeable to plan of care and goals.     Anticipated barriers to physical therapy: work schedule    GOALS: Short Term Goals: 2 weeks  1.Report decreased neck pain  <   / =  4  /10  to increase tolerance for work(Progressing, not met)  2. Increase cervical ROM by 5-10 degrees in order to perform ADLs with decreased difficulty.(Progressing, not met)  3. Increase strength in B shoulders/scapular stabilizers by 1/3 MMT grade to increase tolerance for ADL and work activities.(Progressing, not met)  4. Pt to tolerate HEP to improve ROM and independence with ADL's(Progressing, not met)     Long Term Goals: 6 weeks  1.Report decreased neck pain  <   / =  0  /10  to increase tolerance for return to ADL without pain(Progressing, not met)  2. Increase UE/neck flexibility in order to improve posture.  (Progressing, not met)  3.Increased strength in B shoulders/scapular stabilizers to >/= 4/5 MMT grade to increase tolerance for ADL and work activities.(Progressing, not met)  4.  Pt will report at goals level on FOTO neck score for neck pain disability to demonstrate decrease in disability and improvement in neck pain. (Progressing, not met)    Plan     Plan of care Certification: 12/5/2023 to 6/5/2024.     Improve subscap strength    Crescencio Amin, PT, DPT, OCS, FAAOMPT

## 2024-01-05 ENCOUNTER — CLINICAL SUPPORT (OUTPATIENT)
Dept: REHABILITATION | Facility: HOSPITAL | Age: 59
End: 2024-01-05
Payer: COMMERCIAL

## 2024-01-05 DIAGNOSIS — M25.511 ACUTE PAIN OF RIGHT SHOULDER: Primary | ICD-10-CM

## 2024-01-05 PROCEDURE — 97140 MANUAL THERAPY 1/> REGIONS: CPT | Performed by: PHYSICAL THERAPIST

## 2024-01-05 PROCEDURE — 97112 NEUROMUSCULAR REEDUCATION: CPT | Performed by: PHYSICAL THERAPIST

## 2024-01-05 NOTE — PROGRESS NOTES
[facility]   Physical Therapy Treatment Note      Name: Corazon Maldonado [facility]  Clinic Number: [id number]    Therapy Diagnosis:   Encounter Diagnosis   Name Primary?    Acute pain of right shoulder Yes     Physician: Mahendra Kamara MD    Visit Date: 1/5/2024    Physician Orders: PT Eval and Treat   Medical Diagnosis from Referral:   Diagnosis   M25.512 (ICD-10-CM) - Left shoulder pain   G25.89 (ICD-10-CM) - Scapular dyskinesis      Evaluation Date: 12/5/2023  Authorization Period Expiration: 12/3/2024  Plan of Care Expiration: 6/5/2024  Progress Note Due: 3/5/2024  Visit # / Visits authorized: 2/20  Total visits 9  FOTO: 1/1       PTA Visit #: 0/5     Time In: 730  Time Out: 830  Total Billable Time: 60 minutes    Subjective     Pt reports Woke up this morning with posterior shoulder pain.     He was compliant with home exercise program.  Response to previous treatment: less pain  Functional change: posture vest      Pain: 6/10  Location: right shoulder      Objective      Objective Measures updated at progress report unless specified.     Treatment     Corazon received the treatments listed below:      therapeutic exercises to develop strength, endurance, ROM, flexibility, posture, and core stabilization for 0 minutes including:      manual therapy techniques: Joint mobilizations, Manual traction, Myofacial release, Soft tissue Mobilization, and Dry Needling were applied to the: shoulder and thoracic spine for 25 minutes, including:    Right shoulder Gr II oscilaltions  Posterior capsule Gr IV  Cross body blocking scapula  Gr V thoracic manipulation-nt  Prone gapping thoracic spine-nt  2nd rib Gr III on the R -nt  Dry needling to lat, rhomboid, UT, 0.40mm needles single stick needles    neuromuscular re-education activities to improve: Balance, Coordination, Kinesthetic, Sense, Proprioception, and Posture for 35 minutes. The following activities were included:    Prone Y 1# 3 x 12  Prone row  with ER 1# 3 X 12  Genie IR YTB 3 x 15  Landmine press 35# 3 x 15  Serratus press YTB 3 x 15  CC 10# scaptions 3 x 15  Pt education    NT  Open books at wall 2 x 15 B   Serratus press 13# CC 3 x 15  CC pull down low trap 13# 3 x 15  ER with lift OTB 2 x 15  Pushup plus on ground 4 x 8  1/2 Kneeling thoracic mobility both directions 2 x 15 B   Supine thoracic extensions over foam roll 5'  Thread the needle foam roll 2 x  15  Serratus wall slide foam roll only 3 x 15  Serratus landmine press 35# 3 x 15  Foam roll to subscap 3'  IR ancore 10# 3 x 15    therapeutic activities to improve functional performance for 0  minutes, including:    Tens to periscapular 0' with education on PRP not loading first couple weeks    Patient Education and Home Exercises       Education provided:   - improve subscap and thoracic mobility    Written Home Exercises Provided: YES. Exercises were reviewed and Corazon was able to demonstrate them prior to the end of the session.  Corazon demonstrated good understanding of the education provided. See EMR under Patient Instructions for exercises provided during therapy sessions    Assessment     Corazon progressing with strengthening to the cuff and serratus. Relief with CT manipulation and subscap STM. Continues to progress with CT self mob and strengthening within session     Corzaon Is progressing well towards his goals.   Pt prognosis is Excellent.     Pt will continue to benefit from skilled outpatient physical therapy to address the deficits listed in the problem list box on initial evaluation, provide pt/family education and to maximize pt's level of independence in the home and community environment.     Pt's spiritual, cultural and educational needs considered and pt agreeable to plan of care and goals.     Anticipated barriers to physical therapy: work schedule    GOALS: Short Term Goals: 2 weeks  1.Report decreased neck pain  <   / =  4  /10  to increase tolerance for work(Progressing, not  met)  2. Increase cervical ROM by 5-10 degrees in order to perform ADLs with decreased difficulty.(Progressing, not met)  3. Increase strength in B shoulders/scapular stabilizers by 1/3 MMT grade to increase tolerance for ADL and work activities.(Progressing, not met)  4. Pt to tolerate HEP to improve ROM and independence with ADL's(Progressing, not met)     Long Term Goals: 6 weeks  1.Report decreased neck pain  <   / =  0  /10  to increase tolerance for return to ADL without pain(Progressing, not met)  2. Increase UE/neck flexibility in order to improve posture.  (Progressing, not met)  3.Increased strength in B shoulders/scapular stabilizers to >/= 4/5 MMT grade to increase tolerance for ADL and work activities.(Progressing, not met)  4.  Pt will report at goals level on FOTO neck score for neck pain disability to demonstrate decrease in disability and improvement in neck pain. (Progressing, not met)    Plan     Plan of care Certification: 12/5/2023 to 6/5/2024.     Improve subscap strength    Crescencio Amin, PT, DPT, OCS, FAAOMPT

## 2024-01-09 ENCOUNTER — CLINICAL SUPPORT (OUTPATIENT)
Dept: REHABILITATION | Facility: HOSPITAL | Age: 59
End: 2024-01-09
Payer: COMMERCIAL

## 2024-01-09 DIAGNOSIS — M25.511 ACUTE PAIN OF RIGHT SHOULDER: Primary | ICD-10-CM

## 2024-01-09 PROCEDURE — 97140 MANUAL THERAPY 1/> REGIONS: CPT | Performed by: PHYSICAL THERAPIST

## 2024-01-09 PROCEDURE — 97112 NEUROMUSCULAR REEDUCATION: CPT | Performed by: PHYSICAL THERAPIST

## 2024-01-10 NOTE — PROGRESS NOTES
[facility]   Physical Therapy Treatment Note      Name: Corazon Maldonado [facility]  Clinic Number: [id number]    Therapy Diagnosis:   Encounter Diagnosis   Name Primary?    Acute pain of right shoulder Yes     Physician: Mahendra Kamara MD    Visit Date: 1/9/2024    Physician Orders: PT Eval and Treat   Medical Diagnosis from Referral:   Diagnosis   M25.512 (ICD-10-CM) - Left shoulder pain   G25.89 (ICD-10-CM) - Scapular dyskinesis      Evaluation Date: 12/5/2023  Authorization Period Expiration: 12/3/2024  Plan of Care Expiration: 6/5/2024  Progress Note Due: 3/5/2024  Visit # / Visits authorized: 3/20  Total visits 9  FOTO: 1/1       PTA Visit #: 0/5     Time In: 1702  Time Out: 1800  Total Billable Time: 58 minutes    Subjective     Pt reports I felt really good after Friday, and then I had to bring my mom to the hospital and carrying some things     He was compliant with home exercise program.  Response to previous treatment: less pain  Functional change: posture vest      Pain: 6/10  Location: right shoulder      Objective      Objective Measures updated at progress report unless specified.     Treatment     Corazon received the treatments listed below:      therapeutic exercises to develop strength, endurance, ROM, flexibility, posture, and core stabilization for 0 minutes including:      manual therapy techniques: Joint mobilizations, Manual traction, Myofacial release, Soft tissue Mobilization, and Dry Needling were applied to the: shoulder and thoracic spine for 25 minutes, including:    Right shoulder Gr II oscilaltions  Posterior capsule Gr IV  Cross body blocking scapula  Gr V thoracic manipulation-nt  Prone gapping thoracic spine-nt  2nd rib Gr III on the R -nt  Dry needling to subscap, middle trap, supraspinatus0.40mm needles single stick needles    neuromuscular re-education activities to improve: Balance, Coordination, Kinesthetic, Sense, Proprioception, and Posture for 33 minutes.  The following activities were included:    Supine ER OTB on foot 2 x 15  Supine IR perturbations OTB 2 x 15  1/2 kneeling CT rotations at all 2 x 15  Lat stretch at wall 20x   Seated CT extensions 2 x 10  Pt education    NT  Open books at wall 2 x 15 B   Serratus press 13# CC 3 x 15  CC pull down low trap 13# 3 x 15  ER with lift OTB 2 x 15  Pushup plus on ground 4 x 8  1/2 Kneeling thoracic mobility both directions 2 x 15 B   Supine thoracic extensions over foam roll 5'  Thread the needle foam roll 2 x  15  Serratus wall slide foam roll only 3 x 15  Serratus landmine press 35# 3 x 15  Foam roll to subscap 3'  IR ancore 10# 3 x 15    therapeutic activities to improve functional performance for 0  minutes, including:    Tens to periscapular 0' with education on PRP not loading first couple weeks    Patient Education and Home Exercises       Education provided:   - improve subscap and thoracic mobility    Written Home Exercises Provided: YES. Exercises were reviewed and Corazon was able to demonstrate them prior to the end of the session.  Corazon demonstrated good understanding of the education provided. See EMR under Patient Instructions for exercises provided during therapy sessions    Assessment     Corazon progressed with lat stretching and CT junction mobility. Improved mobility seated today and adjusted at the wall with CT mobility and lat stretching. Continues to progress strengthening, relief with DN to supraspinatus    Corazon Is progressing well towards his goals.   Pt prognosis is Excellent.     Pt will continue to benefit from skilled outpatient physical therapy to address the deficits listed in the problem list box on initial evaluation, provide pt/family education and to maximize pt's level of independence in the home and community environment.     Pt's spiritual, cultural and educational needs considered and pt agreeable to plan of care and goals.     Anticipated barriers to physical therapy: work  schedule    GOALS: Short Term Goals: 2 weeks  1.Report decreased neck pain  <   / =  4  /10  to increase tolerance for work(Progressing, not met)  2. Increase cervical ROM by 5-10 degrees in order to perform ADLs with decreased difficulty.(Progressing, not met)  3. Increase strength in B shoulders/scapular stabilizers by 1/3 MMT grade to increase tolerance for ADL and work activities.(Progressing, not met)  4. Pt to tolerate HEP to improve ROM and independence with ADL's(Progressing, not met)     Long Term Goals: 6 weeks  1.Report decreased neck pain  <   / =  0  /10  to increase tolerance for return to ADL without pain(Progressing, not met)  2. Increase UE/neck flexibility in order to improve posture.  (Progressing, not met)  3.Increased strength in B shoulders/scapular stabilizers to >/= 4/5 MMT grade to increase tolerance for ADL and work activities.(Progressing, not met)  4.  Pt will report at goals level on FOTO neck score for neck pain disability to demonstrate decrease in disability and improvement in neck pain. (Progressing, not met)    Plan     Plan of care Certification: 12/5/2023 to 6/5/2024.     Improve subscap strength    Crescencio Falphilipust, PT, DPT, OCS, FAAOMPT

## 2024-01-12 ENCOUNTER — CLINICAL SUPPORT (OUTPATIENT)
Dept: REHABILITATION | Facility: HOSPITAL | Age: 59
End: 2024-01-12
Payer: COMMERCIAL

## 2024-01-12 DIAGNOSIS — M25.511 ACUTE PAIN OF RIGHT SHOULDER: Primary | ICD-10-CM

## 2024-01-12 PROCEDURE — 97112 NEUROMUSCULAR REEDUCATION: CPT | Performed by: PHYSICAL THERAPIST

## 2024-01-12 PROCEDURE — 97140 MANUAL THERAPY 1/> REGIONS: CPT | Performed by: PHYSICAL THERAPIST

## 2024-01-12 NOTE — PROGRESS NOTES
[facility]   Physical Therapy Treatment Note      Name: Corazon Maldonado [facility]  Clinic Number: [id number]    Therapy Diagnosis:   Encounter Diagnosis   Name Primary?    Acute pain of right shoulder Yes     Physician: Mahendra Kamara MD    Visit Date: 1/12/2024    Physician Orders: PT Eval and Treat   Medical Diagnosis from Referral:   Diagnosis   M25.512 (ICD-10-CM) - Left shoulder pain   G25.89 (ICD-10-CM) - Scapular dyskinesis      Evaluation Date: 12/5/2023  Authorization Period Expiration: 12/3/2024  Plan of Care Expiration: 6/5/2024  Progress Note Due: 3/5/2024  Visit # / Visits authorized: 4/20  Total visits 9  FOTO: 1/1       PTA Visit #: 0/5     Time In: 657  Time Out: 800  Total Billable Time: 63 minutes    Subjective     Pt reports I have quit all lifting, was doing well till I had to lift something yesterday    He was compliant with home exercise program.  Response to previous treatment: less pain  Functional change: posture vest      Pain: 6/10  Location: right shoulder      Objective      Objective Measures updated at progress report unless specified.     Pain with closing on the R  TTP over T2 and 2nd rib on the R    Treatment     Corazon received the treatments listed below:      therapeutic exercises to develop strength, endurance, ROM, flexibility, posture, and core stabilization for 0 minutes including:      manual therapy techniques: Joint mobilizations, Manual traction, Myofacial release, Soft tissue Mobilization, and Dry Needling were applied to the: shoulder and thoracic spine for 25 minutes, including:    Gr V thoracic manipulation-nt  Prone gapping thoracic spine-nt  2nd rib Gr III on the R  Cross body blocking scapula  Dry needling to subscap, middle trap, supraspinatus0.40mm needles single stick needles    NT  Right shoulder Gr II oscilaltions  Posterior capsule Gr IV      neuromuscular re-education activities to improve: Balance, Coordination, Kinesthetic, Sense,  Proprioception, and Posture for 38 minutes. The following activities were included:    Self mob over 1/2 wedge adding towels 20x ea  IR/ER walkouts GTB 2 x 15  Wall rotations YTB 30x  Quadruped CT junction self mob sitting back (towel in popliteal) 30x  Wall slide lift off 30x  Pt education    NT  Supine ER OTB on foot 2 x 15  Supine IR perturbations OTB 2 x 15  1/2 kneeling CT rotations at all 2 x 15  Lat stretch at wall 20x   Seated CT extensions 2 x 10  Open books at wall 2 x 15 B   Serratus press 13# CC 3 x 15  CC pull down low trap 13# 3 x 15  ER with lift OTB 2 x 15  Pushup plus on ground 4 x 8  1/2 Kneeling thoracic mobility both directions 2 x 15 B   Supine thoracic extensions over foam roll 5'  Thread the needle foam roll 2 x  15  Serratus wall slide foam roll only 3 x 15  Serratus landmine press 35# 3 x 15  Foam roll to subscap 3'  IR ancore 10# 3 x 15    therapeutic activities to improve functional performance for 0  minutes, including:    Tens to periscapular 0' with education on PRP not loading first couple weeks    Patient Education and Home Exercises       Education provided:   - improve subscap and thoracic mobility    Written Home Exercises Provided: YES. Exercises were reviewed and Corazon was able to demonstrate them prior to the end of the session.  Corazon demonstrated good understanding of the education provided. See EMR under Patient Instructions for exercises provided during therapy sessions    Assessment     Relief with mobilization to T2 today. Self mob in quadruped improved knee flexion and he notes relief at the wall. Did well with supine self extension mob over the 1/2 wedge today. Progressing cuff with isometrics.    Corazon Is progressing well towards his goals.   Pt prognosis is Excellent.     Pt will continue to benefit from skilled outpatient physical therapy to address the deficits listed in the problem list box on initial evaluation, provide pt/family education and to maximize pt's  level of independence in the home and community environment.     Pt's spiritual, cultural and educational needs considered and pt agreeable to plan of care and goals.     Anticipated barriers to physical therapy: work schedule    GOALS: Short Term Goals: 2 weeks  1.Report decreased neck pain  <   / =  4  /10  to increase tolerance for work(Progressing, not met)  2. Increase cervical ROM by 5-10 degrees in order to perform ADLs with decreased difficulty.(Progressing, not met)  3. Increase strength in B shoulders/scapular stabilizers by 1/3 MMT grade to increase tolerance for ADL and work activities.(Progressing, not met)  4. Pt to tolerate HEP to improve ROM and independence with ADL's(Progressing, not met)     Long Term Goals: 6 weeks  1.Report decreased neck pain  <   / =  0  /10  to increase tolerance for return to ADL without pain(Progressing, not met)  2. Increase UE/neck flexibility in order to improve posture.  (Progressing, not met)  3.Increased strength in B shoulders/scapular stabilizers to >/= 4/5 MMT grade to increase tolerance for ADL and work activities.(Progressing, not met)  4.  Pt will report at goals level on FOTO neck score for neck pain disability to demonstrate decrease in disability and improvement in neck pain. (Progressing, not met)    Plan     Plan of care Certification: 12/5/2023 to 6/5/2024.     Improve subscap strength    Crescencio Amin, PT, DPT, OCS, FAAOMPT

## 2024-01-16 ENCOUNTER — CLINICAL SUPPORT (OUTPATIENT)
Dept: REHABILITATION | Facility: HOSPITAL | Age: 59
End: 2024-01-16
Payer: COMMERCIAL

## 2024-01-16 DIAGNOSIS — M25.511 ACUTE PAIN OF RIGHT SHOULDER: Primary | ICD-10-CM

## 2024-01-16 PROCEDURE — 97140 MANUAL THERAPY 1/> REGIONS: CPT | Performed by: PHYSICAL THERAPIST

## 2024-01-16 PROCEDURE — 97112 NEUROMUSCULAR REEDUCATION: CPT | Performed by: PHYSICAL THERAPIST

## 2024-01-17 NOTE — PROGRESS NOTES
[facility]   Physical Therapy Treatment Note      Name: Corazon Maldonado [facility]  Clinic Number: [id number]    Therapy Diagnosis:   Encounter Diagnosis   Name Primary?    Acute pain of right shoulder Yes     Physician: Mahendra Kamara MD    Visit Date: 1/16/2024    Physician Orders: PT Eval and Treat   Medical Diagnosis from Referral:   Diagnosis   M25.512 (ICD-10-CM) - Left shoulder pain   G25.89 (ICD-10-CM) - Scapular dyskinesis      Evaluation Date: 12/5/2023  Authorization Period Expiration: 12/3/2024  Plan of Care Expiration: 6/5/2024  Progress Note Due: 3/5/2024  Visit # / Visits authorized: 5/20  Total visits 12  FOTO: 1/1       PTA Visit #: 0/5     Time In: 1700  Time Out: 1802  Total Billable Time: 62 minutes    Subjective     Pt reports  Less pain the last 3 days. I was able to workout yesterday without it hurting    He was compliant with home exercise program.  Response to previous treatment: less pain  Functional change: posture vest      Pain: 6/10  Location: right shoulder      Objective      Objective Measures updated at progress report unless specified.     Pain with closing on the R  TTP over T2 and 2nd rib on the R    Treatment     Corazon received the treatments listed below:      therapeutic exercises to develop strength, endurance, ROM, flexibility, posture, and core stabilization for 0 minutes including:      manual therapy techniques: Joint mobilizations, Manual traction, Myofacial release, Soft tissue Mobilization, and Dry Needling were applied to the: shoulder and thoracic spine for 15 minutes, including:    Gr V thoracic manipulation-nt  Prone gapping thoracic spine-nt  2nd rib Gr III on the R  Cross body blocking scapula  Dry needling to subscap, UT 0.40mm needles single stick needles    NT  Right shoulder Gr II oscilaltions  Posterior capsule Gr IV      neuromuscular re-education activities to improve: Balance, Coordination, Kinesthetic, Sense, Proprioception, and  Posture for 47 minutes. The following activities were included:    Foam roll extensions 20x  IR/ER walkouts YTB at 90 deg 2 x 15  Wall rotations YTB 30x  Quadruped CT junction self mob sitting back (towel in popliteal) 30x  Wall slide lift off 30x  Long sitting golf  OH 2 x10  Golf  chops 2 x 15  Pt education    NT  Self mob over 1/2 wedge adding towels 20x ea  Supine ER OTB on foot 2 x 15  Supine IR perturbations OTB 2 x 15  1/2 kneeling CT rotations at all 2 x 15  Lat stretch at wall 20x   Seated CT extensions 2 x 10  Open books at wall 2 x 15 B   Serratus press 13# CC 3 x 15  CC pull down low trap 13# 3 x 15  ER with lift OTB 2 x 15  Pushup plus on ground 4 x 8  1/2 Kneeling thoracic mobility both directions 2 x 15 B   Supine thoracic extensions over foam roll 5'  Thread the needle foam roll 2 x  15  Serratus wall slide foam roll only 3 x 15  Serratus landmine press 35# 3 x 15  Foam roll to subscap 3'  IR ancore 10# 3 x 15    therapeutic activities to improve functional performance for 0  minutes, including:    Tens to periscapular 0' with education on PRP not loading first couple weeks    Patient Education and Home Exercises       Education provided:   - improve subscap and thoracic mobility    Written Home Exercises Provided: YES. Exercises were reviewed and Corazon was able to demonstrate them prior to the end of the session.  Corazon demonstrated good understanding of the education provided. See EMR under Patient Instructions for exercises provided during therapy sessions    Assessment     Progressed golf  wand with lat stretch and anti rotation and loading thoracic rotation. He notes relief within session but need to increase cuff activation as part of his HEP.     Corazon Is progressing well towards his goals.   Pt prognosis is Excellent.     Pt will continue to benefit from skilled outpatient physical therapy to address the deficits listed in the problem list box on initial evaluation,  provide pt/family education and to maximize pt's level of independence in the home and community environment.     Pt's spiritual, cultural and educational needs considered and pt agreeable to plan of care and goals.     Anticipated barriers to physical therapy: work schedule    GOALS: Short Term Goals: 2 weeks  1.Report decreased neck pain  <   / =  4  /10  to increase tolerance for work(Progressing, not met)  2. Increase cervical ROM by 5-10 degrees in order to perform ADLs with decreased difficulty.(Progressing, not met)  3. Increase strength in B shoulders/scapular stabilizers by 1/3 MMT grade to increase tolerance for ADL and work activities.(Progressing, not met)  4. Pt to tolerate HEP to improve ROM and independence with ADL's(Progressing, not met)     Long Term Goals: 6 weeks  1.Report decreased neck pain  <   / =  0  /10  to increase tolerance for return to ADL without pain(Progressing, not met)  2. Increase UE/neck flexibility in order to improve posture.  (Progressing, not met)  3.Increased strength in B shoulders/scapular stabilizers to >/= 4/5 MMT grade to increase tolerance for ADL and work activities.(Progressing, not met)  4.  Pt will report at goals level on FOTO neck score for neck pain disability to demonstrate decrease in disability and improvement in neck pain. (Progressing, not met)    Plan     Plan of care Certification: 12/5/2023 to 6/5/2024.     Improve subscap strength    Crescencio Amin, PT, DPT, OCS, FAAOMPT

## 2024-01-23 ENCOUNTER — CLINICAL SUPPORT (OUTPATIENT)
Dept: REHABILITATION | Facility: HOSPITAL | Age: 59
End: 2024-01-23
Payer: COMMERCIAL

## 2024-01-23 DIAGNOSIS — M25.511 ACUTE PAIN OF RIGHT SHOULDER: Primary | ICD-10-CM

## 2024-01-23 PROCEDURE — 97140 MANUAL THERAPY 1/> REGIONS: CPT | Performed by: PHYSICAL THERAPIST

## 2024-01-23 PROCEDURE — 97112 NEUROMUSCULAR REEDUCATION: CPT | Performed by: PHYSICAL THERAPIST

## 2024-01-24 NOTE — PROGRESS NOTES
[facility]   Physical Therapy Treatment Note      Name: Corazon Maldonado [facility]  Clinic Number: [id number]    Therapy Diagnosis:   Encounter Diagnosis   Name Primary?    Acute pain of right shoulder Yes     Physician: Mahendra Kamara MD    Visit Date: 1/23/2024    Physician Orders: PT Eval and Treat   Medical Diagnosis from Referral:   Diagnosis   M25.512 (ICD-10-CM) - Left shoulder pain   G25.89 (ICD-10-CM) - Scapular dyskinesis      Evaluation Date: 12/5/2023  Authorization Period Expiration: 12/3/2024  Plan of Care Expiration: 6/5/2024  Progress Note Due: 3/5/2024  Visit # / Visits authorized: 6/20  Total visits 12  FOTO: 1/1       PTA Visit #: 0/5     Time In: 1700  Time Out: 1754  Total Billable Time: 54 minutes    Subjective     Pt reports  I feel really good after doing my exercises, just have to keep up with it. Notes on Monday it hurt but today is better    He was compliant with home exercise program.  Response to previous treatment: less pain  Functional change: posture vest      Pain: 6/10  Location: right shoulder      Objective      Objective Measures updated at progress report unless specified.     Pain with closing on the R  TTP over T2 and 2nd rib on the R    Treatment     Corazon received the treatments listed below:      therapeutic exercises to develop strength, endurance, ROM, flexibility, posture, and core stabilization for 0 minutes including:      manual therapy techniques: Joint mobilizations, Manual traction, Myofacial release, Soft tissue Mobilization, and Dry Needling were applied to the: shoulder and thoracic spine for 15 minutes, including:    Gr V thoracic manipulation  Prone gapping thoracic spine  2nd rib Gr III on the R  STM to lat blocking scap    NT  Cross body blocking scapula  Dry needling to subscap, UT 0.40mm needles single stick needles  Right shoulder Gr II oscilaltions  Posterior capsule Gr IV      neuromuscular re-education activities to improve: Balance,  Coordination, Kinesthetic, Sense, Proprioception, and Posture for 39 minutes. The following activities were included:    Prone mid trap elbow raises 30x  Prone low trap Y off EOT 30x  Prone level 2 low trap 30x  D2 extensions cable cord 10# 3 x 15  High row with ER and press GTB 30x  Cross body lat stretch 3x to fatigue  Pt education    NT  Foam roll extensions 20x  IR/ER walkouts YTB at 90 deg 2 x 15  Wall rotations YTB 30x  Quadruped CT junction self mob sitting back (towel in popliteal) 30x  Wall slide lift off 30x  Long sitting golf  OH 2 x10  Golf  chops 2 x 15  Self mob over 1/2 wedge adding towels 20x ea      therapeutic activities to improve functional performance for 0  minutes, including:    Tens to periscapular 0' with education on PRP not loading first couple weeks    Patient Education and Home Exercises       Education provided:   - improve subscap and thoracic mobility    Written Home Exercises Provided: YES. Exercises were reviewed and Corazon was able to demonstrate them prior to the end of the session.  Corazon demonstrated good understanding of the education provided. See EMR under Patient Instructions for exercises provided during therapy sessions    Assessment     Progressed today prone mid and lower trap, previously painful but tolerated well today. Progressing thoracic mobility with OH motion and focus on restoring CT junction mobility    Corazon Is progressing well towards his goals.   Pt prognosis is Excellent.     Pt will continue to benefit from skilled outpatient physical therapy to address the deficits listed in the problem list box on initial evaluation, provide pt/family education and to maximize pt's level of independence in the home and community environment.     Pt's spiritual, cultural and educational needs considered and pt agreeable to plan of care and goals.     Anticipated barriers to physical therapy: work schedule    GOALS: Short Term Goals: 2 weeks  1.Report decreased  neck pain  <   / =  4  /10  to increase tolerance for work(Progressing, not met)  2. Increase cervical ROM by 5-10 degrees in order to perform ADLs with decreased difficulty.(Progressing, not met)  3. Increase strength in B shoulders/scapular stabilizers by 1/3 MMT grade to increase tolerance for ADL and work activities.(Progressing, not met)  4. Pt to tolerate HEP to improve ROM and independence with ADL's(Progressing, not met)     Long Term Goals: 6 weeks  1.Report decreased neck pain  <   / =  0  /10  to increase tolerance for return to ADL without pain(Progressing, not met)  2. Increase UE/neck flexibility in order to improve posture.  (Progressing, not met)  3.Increased strength in B shoulders/scapular stabilizers to >/= 4/5 MMT grade to increase tolerance for ADL and work activities.(Progressing, not met)  4.  Pt will report at goals level on FOTO neck score for neck pain disability to demonstrate decrease in disability and improvement in neck pain. (Progressing, not met)    Plan     Plan of care Certification: 12/5/2023 to 6/5/2024.     Improve subscap strength    Crescencio Amin, PT, DPT, OCS, FAAOMPT

## 2024-01-29 ENCOUNTER — HOSPITAL ENCOUNTER (OUTPATIENT)
Dept: RADIOLOGY | Facility: OTHER | Age: 59
Discharge: HOME | End: 2024-01-29
Attending: STUDENT IN AN ORGANIZED HEALTH CARE EDUCATION/TRAINING PROGRAM
Payer: COMMERCIAL

## 2024-01-29 DIAGNOSIS — M79.672 LEFT FOOT PAIN: Primary | ICD-10-CM

## 2024-01-29 DIAGNOSIS — M79.672 LEFT FOOT PAIN: ICD-10-CM

## 2024-01-29 PROCEDURE — 73660 X-RAY EXAM OF TOE(S): CPT | Mod: TC,FY,LT

## 2024-01-29 PROCEDURE — 73660 X-RAY EXAM OF TOE(S): CPT | Mod: 26,LT,, | Performed by: INTERNAL MEDICINE

## 2024-01-30 ENCOUNTER — CLINICAL SUPPORT (OUTPATIENT)
Dept: REHABILITATION | Facility: HOSPITAL | Age: 59
End: 2024-01-30
Payer: COMMERCIAL

## 2024-01-30 DIAGNOSIS — M25.511 ACUTE PAIN OF RIGHT SHOULDER: Primary | ICD-10-CM

## 2024-01-30 PROCEDURE — 97140 MANUAL THERAPY 1/> REGIONS: CPT | Performed by: PHYSICAL THERAPIST

## 2024-01-30 PROCEDURE — 97112 NEUROMUSCULAR REEDUCATION: CPT | Performed by: PHYSICAL THERAPIST

## 2024-02-01 NOTE — PROGRESS NOTES
[facility]   Physical Therapy Treatment Note      Name: Corazon Maldonado [facility]  Clinic Number: [id number]    Therapy Diagnosis:   Encounter Diagnosis   Name Primary?    Acute pain of right shoulder Yes     Physician: Mahendra Kamara MD    Visit Date: 1/30/2024    Physician Orders: PT Eval and Treat   Medical Diagnosis from Referral:   Diagnosis   M25.512 (ICD-10-CM) - Left shoulder pain   G25.89 (ICD-10-CM) - Scapular dyskinesis      Evaluation Date: 12/5/2023  Authorization Period Expiration: 12/3/2024  Plan of Care Expiration: 6/5/2024  Progress Note Due: 3/5/2024  Visit # / Visits authorized: 7/20  Total visits 12  FOTO: 1/1       PTA Visit #: 0/5     Time In: 1711  Time Out: 1756  Total Billable Time: 45 minutes    Subjective     Pt reports  Much better after doing the cable columns, maybe 1/10 pain     He was compliant with home exercise program.  Response to previous treatment: less pain  Functional change: posture vest      Pain: 6/10  Location: right shoulder      Objective      Objective Measures updated at progress report unless specified.     Pain with closing on the R  TTP over T2 and 2nd rib on the R    Treatment     Corazon received the treatments listed below:      therapeutic exercises to develop strength, endurance, ROM, flexibility, posture, and core stabilization for 0 minutes including:      manual therapy techniques: Joint mobilizations, Manual traction, Myofacial release, Soft tissue Mobilization, and Dry Needling were applied to the: shoulder and thoracic spine for 15 minutes, including:    Cross body blocking scapula  Posterior capsule Gr IV  STM to lat blocking scap    NT  Gr V thoracic manipulation  Prone gapping thoracic spine  2nd rib Gr III on the R  Dry needling to subscap, UT 0.40mm needles single stick needles  Right shoulder Gr II oscilaltions        neuromuscular re-education activities to improve: Balance, Coordination, Kinesthetic, Sense, Proprioception, and  Posture for 30 minutes. The following activities were included:    Foam roll extensions 20x  D2 extensions cable cord 13# 3 x 15  Serratus punch rotation 13# 3 x 15  Overhead IR subscap 7# 3 x 15  Pt education    NT  High row with ER and press GTB 30x  Cross body lat stretch 3x to fatigue  Prone mid trap elbow raises 30x  Prone low trap Y off EOT 30x  Prone level 2 low trap 30x  IR/ER walkouts YTB at 90 deg 2 x 15  Wall rotations YTB 30x  Quadruped CT junction self mob sitting back (towel in popliteal) 30x  Wall slide lift off 30x  Long sitting golf  OH 2 x10  Golf  chops 2 x 15  Self mob over 1/2 wedge adding towels 20x ea      therapeutic activities to improve functional performance for 0  minutes, including:    Tens to periscapular 0' with education on PRP not loading first couple weeks    Patient Education and Home Exercises       Education provided:   - improve subscap and thoracic mobility    Written Home Exercises Provided: YES. Exercises were reviewed and Corazon was able to demonstrate them prior to the end of the session.  Corazon demonstrated good understanding of the education provided. See EMR under Patient Instructions for exercises provided during therapy sessions    Assessment     Progressed today with strengthening to periscapular and cross body/IR stretch. He notes relief with D2 mobility exercises and progressed loading to serratus.     Corazon Is progressing well towards his goals.   Pt prognosis is Excellent.     Pt will continue to benefit from skilled outpatient physical therapy to address the deficits listed in the problem list box on initial evaluation, provide pt/family education and to maximize pt's level of independence in the home and community environment.     Pt's spiritual, cultural and educational needs considered and pt agreeable to plan of care and goals.     Anticipated barriers to physical therapy: work schedule    GOALS: Short Term Goals: 2 weeks  1.Report decreased neck  pain  <   / =  4  /10  to increase tolerance for work(Progressing, not met)  2. Increase cervical ROM by 5-10 degrees in order to perform ADLs with decreased difficulty.(Progressing, not met)  3. Increase strength in B shoulders/scapular stabilizers by 1/3 MMT grade to increase tolerance for ADL and work activities.(Progressing, not met)  4. Pt to tolerate HEP to improve ROM and independence with ADL's(Progressing, not met)     Long Term Goals: 6 weeks  1.Report decreased neck pain  <   / =  0  /10  to increase tolerance for return to ADL without pain(Progressing, not met)  2. Increase UE/neck flexibility in order to improve posture.  (Progressing, not met)  3.Increased strength in B shoulders/scapular stabilizers to >/= 4/5 MMT grade to increase tolerance for ADL and work activities.(Progressing, not met)  4.  Pt will report at goals level on FOTO neck score for neck pain disability to demonstrate decrease in disability and improvement in neck pain. (Progressing, not met)    Plan     Plan of care Certification: 12/5/2023 to 6/5/2024.     Improve subscap strength    Crescencio Amin, PT, DPT, OCS, FAAOMPT

## 2024-02-03 DIAGNOSIS — N52.9 ERECTILE DYSFUNCTION, UNSPECIFIED ERECTILE DYSFUNCTION TYPE: ICD-10-CM

## 2024-02-03 NOTE — TELEPHONE ENCOUNTER
No care due was identified.  [facility] Embedded Care Due Messages. Reference number: [id number].   2/03/2024 5:08:48 AM CST

## 2024-02-04 RX ORDER — TADALAFIL 20 MG/1
20 TABLET ORAL DAILY
Qty: 30 TABLET | Refills: 5 | Status: SHIPPED | OUTPATIENT
Start: 2024-02-04

## 2024-02-04 NOTE — TELEPHONE ENCOUNTER
Refill Decision Note   Corazon Jones  is requesting a refill authorization.  Brief Assessment and Rationale for Refill:  Approve     Medication Therapy Plan:         Comments:     Note composed:2:59 AM 02/04/2024

## 2024-02-09 ENCOUNTER — CLINICAL SUPPORT (OUTPATIENT)
Dept: REHABILITATION | Facility: HOSPITAL | Age: 59
End: 2024-02-09
Payer: COMMERCIAL

## 2024-02-09 DIAGNOSIS — M25.511 ACUTE PAIN OF RIGHT SHOULDER: Primary | ICD-10-CM

## 2024-02-09 PROCEDURE — 97112 NEUROMUSCULAR REEDUCATION: CPT | Performed by: PHYSICAL THERAPIST

## 2024-02-09 RX ORDER — ERYTHROMYCIN 5 MG/G
OINTMENT OPHTHALMIC 2 TIMES DAILY
Qty: 3.5 G | Refills: 0 | Status: SHIPPED | OUTPATIENT
Start: 2024-02-09 | End: 2024-02-23

## 2024-02-11 NOTE — PROGRESS NOTES
[facility]   Physical Therapy Treatment Note      Name: Corazon Maldonado [facility]  Clinic Number: [id number]    Therapy Diagnosis:   Encounter Diagnosis   Name Primary?    Acute pain of right shoulder Yes     Physician: Mahendra Kamara MD    Visit Date: 2/9/2024    Physician Orders: PT Eval and Treat   Medical Diagnosis from Referral:   Diagnosis   M25.512 (ICD-10-CM) - Left shoulder pain   G25.89 (ICD-10-CM) - Scapular dyskinesis      Evaluation Date: 12/5/2023  Authorization Period Expiration: 12/3/2024  Plan of Care Expiration: 6/5/2024  Progress Note Due: 3/5/2024  Visit # / Visits authorized: 8/20  Total visits 12  FOTO: 1/1       PTA Visit #: 0/5     Time In: 1000  Time Out: 1115  Total Billable Time: 75 minutes    Subjective     Pt reports  Now it's just supraspinatus pain but not like it was. I forget about it, no issues with my posture at work and no need for caffeine for relief. Prepared for discharge today, self managing symptoms    He was compliant with home exercise program.  Response to previous treatment: less pain  Functional change: posture vest      Pain: 1/10  Location: right shoulder      Objective      Objective Measures updated at progress report unless specified.     Pain with closing on the R  TTP over T2 and 2nd rib on the R    Treatment     Corazon received the treatments listed below:      therapeutic exercises to develop strength, endurance, ROM, flexibility, posture, and core stabilization for 0 minutes including:      manual therapy techniques: Joint mobilizations, Manual traction, Myofacial release, Soft tissue Mobilization, and Dry Needling were applied to the: shoulder and thoracic spine for 15 minutes, including:    Cross body blocking scapula  Posterior capsule Gr IV  STM to lat blocking scap    NT  Gr V thoracic manipulation  Prone gapping thoracic spine  2nd rib Gr III on the R  Dry needling to subscap, UT 0.40mm needles single stick needles  Right shoulder Gr II  "oscilaltions      neuromuscular re-education activities to improve: Balance, Coordination, Kinesthetic, Sense, Proprioception, and Posture for 60 minutes. The following activities were included:    Foam roll extensions 20x  D2 extensions cable cord 13# 3 x 15  Overhead IR subscap 7# 3 x 15  90/90 facing toward/away green cord eccentrics 3 x 15 2"/3" control  Pt education    NT  Serratus punch rotation 13# 3 x 15  High row with ER and press GTB 30x  Cross body lat stretch 3x to fatigue  Prone mid trap elbow raises 30x  Prone low trap Y off EOT 30x  Prone level 2 low trap 30x  IR/ER walkouts YTB at 90 deg 2 x 15  Wall rotations YTB 30x  Quadruped CT junction self mob sitting back (towel in popliteal) 30x  Wall slide lift off 30x  Long sitting golf  OH 2 x10  Golf  chops 2 x 15  Self mob over 1/2 wedge adding towels 20x ea      therapeutic activities to improve functional performance for 0  minutes, including:    Tens to periscapular 0' with education on PRP not loading first couple weeks    Patient Education and Home Exercises       Education provided:   - improve subscap and thoracic mobility    Written Home Exercises Provided: YES. Exercises were reviewed and Corazon was able to demonstrate them prior to the end of the session.  Corazon demonstrated good understanding of the education provided. See EMR under Patient Instructions for exercises provided during therapy sessions    Assessment     Progressed OH cuff strengthening today at 90/90 position. Continues to get relief with D1/D2 motion and cross body stretch to posterior capsule. Noted painful arc to supraspinatus especially if he loads the cuff into abduction    Corazon Is progressing well towards his goals.   Pt prognosis is Excellent.     Pt will continue to benefit from skilled outpatient physical therapy to address the deficits listed in the problem list box on initial evaluation, provide pt/family education and to maximize pt's level of " independence in the home and community environment.     Pt's spiritual, cultural and educational needs considered and pt agreeable to plan of care and goals.     Anticipated barriers to physical therapy: work schedule    GOALS: Short Term Goals: 2 weeks  1.Report decreased neck pain  <   / =  4  /10  to increase tolerance for work(Progressing, not met)  2. Increase cervical ROM by 5-10 degrees in order to perform ADLs with decreased difficulty.(Progressing, not met)  3. Increase strength in B shoulders/scapular stabilizers by 1/3 MMT grade to increase tolerance for ADL and work activities.(Progressing, not met)  4. Pt to tolerate HEP to improve ROM and independence with ADL's(Progressing, not met)     Long Term Goals: 6 weeks  1.Report decreased neck pain  <   / =  0  /10  to increase tolerance for return to ADL without pain(Progressing, not met)  2. Increase UE/neck flexibility in order to improve posture.  (Progressing, not met)  3.Increased strength in B shoulders/scapular stabilizers to >/= 4/5 MMT grade to increase tolerance for ADL and work activities.(Progressing, not met)  4.  Pt will report at goals level on FOTO neck score for neck pain disability to demonstrate decrease in disability and improvement in neck pain. (Progressing, not met)    Plan     Plan of care Certification: 12/5/2023 to 6/5/2024.     Improve subscap strength    Crescencio Amin, PT, DPT, OCS, FAAOMPT

## 2024-02-20 ENCOUNTER — CLINICAL SUPPORT (OUTPATIENT)
Dept: REHABILITATION | Facility: HOSPITAL | Age: 59
End: 2024-02-20
Payer: COMMERCIAL

## 2024-02-20 DIAGNOSIS — M25.511 ACUTE PAIN OF RIGHT SHOULDER: Primary | ICD-10-CM

## 2024-02-20 PROCEDURE — 97140 MANUAL THERAPY 1/> REGIONS: CPT | Performed by: PHYSICAL THERAPIST

## 2024-02-20 PROCEDURE — 97112 NEUROMUSCULAR REEDUCATION: CPT | Performed by: PHYSICAL THERAPIST
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
## 2024-02-27 ENCOUNTER — CLINICAL SUPPORT (OUTPATIENT)
Dept: REHABILITATION | Facility: HOSPITAL | Age: 59
End: 2024-02-27
Payer: COMMERCIAL

## 2024-02-27 DIAGNOSIS — M25.511 ACUTE PAIN OF RIGHT SHOULDER: Primary | ICD-10-CM

## 2024-02-27 PROCEDURE — 97140 MANUAL THERAPY 1/> REGIONS: CPT | Performed by: PHYSICAL THERAPIST

## 2024-02-27 PROCEDURE — 97112 NEUROMUSCULAR REEDUCATION: CPT | Performed by: PHYSICAL THERAPIST
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
## 2024-03-01 ENCOUNTER — CLINICAL SUPPORT (OUTPATIENT)
Dept: REHABILITATION | Facility: HOSPITAL | Age: 59
End: 2024-03-01
Payer: COMMERCIAL

## 2024-03-01 DIAGNOSIS — M25.511 ACUTE PAIN OF RIGHT SHOULDER: Primary | ICD-10-CM

## 2024-03-01 PROCEDURE — 97140 MANUAL THERAPY 1/> REGIONS: CPT | Performed by: PHYSICAL THERAPIST

## 2024-03-01 PROCEDURE — 97112 NEUROMUSCULAR REEDUCATION: CPT | Performed by: PHYSICAL THERAPIST
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
## 2024-03-05 ENCOUNTER — CLINICAL SUPPORT (OUTPATIENT)
Dept: REHABILITATION | Facility: HOSPITAL | Age: 59
End: 2024-03-05
Payer: COMMERCIAL

## 2024-03-05 ENCOUNTER — TELEPHONE (OUTPATIENT)
Dept: SPORTS MEDICINE | Facility: CLINIC | Age: 59
End: 2024-03-05
Payer: COMMERCIAL

## 2024-03-05 DIAGNOSIS — M25.511 ACUTE PAIN OF RIGHT SHOULDER: Primary | ICD-10-CM

## 2024-03-05 DIAGNOSIS — M25.561 ACUTE PAIN OF RIGHT KNEE: Primary | ICD-10-CM

## 2024-03-05 PROCEDURE — 97112 NEUROMUSCULAR REEDUCATION: CPT | Performed by: PHYSICAL THERAPIST

## 2024-03-05 PROCEDURE — 97140 MANUAL THERAPY 1/> REGIONS: CPT | Performed by: PHYSICAL THERAPIST
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
## 2024-03-07 ENCOUNTER — CLINICAL SUPPORT (OUTPATIENT)
Dept: REHABILITATION | Facility: HOSPITAL | Age: 59
End: 2024-03-07
Payer: COMMERCIAL

## 2024-03-07 DIAGNOSIS — M25.511 ACUTE PAIN OF RIGHT SHOULDER: Primary | ICD-10-CM

## 2024-03-07 PROCEDURE — 97530 THERAPEUTIC ACTIVITIES: CPT | Performed by: PHYSICAL THERAPIST

## 2024-03-07 PROCEDURE — 97140 MANUAL THERAPY 1/> REGIONS: CPT | Performed by: PHYSICAL THERAPIST

## 2024-03-07 PROCEDURE — 97112 NEUROMUSCULAR REEDUCATION: CPT | Performed by: PHYSICAL THERAPIST
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
## 2024-03-12 ENCOUNTER — CLINICAL SUPPORT (OUTPATIENT)
Dept: REHABILITATION | Facility: HOSPITAL | Age: 59
End: 2024-03-12
Payer: COMMERCIAL

## 2024-03-12 DIAGNOSIS — M25.511 ACUTE PAIN OF RIGHT SHOULDER: Primary | ICD-10-CM

## 2024-03-12 PROCEDURE — 97530 THERAPEUTIC ACTIVITIES: CPT | Performed by: PHYSICAL THERAPIST
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
## 2024-03-14 ENCOUNTER — PATIENT MESSAGE (OUTPATIENT)
Dept: INTERNAL MEDICINE | Facility: CLINIC | Age: 59
End: 2024-03-14
Payer: COMMERCIAL

## 2024-03-14 DIAGNOSIS — E55.9 VITAMIN D INSUFFICIENCY: ICD-10-CM

## 2024-03-14 DIAGNOSIS — Z00.00 ANNUAL PHYSICAL EXAM: Primary | ICD-10-CM

## 2024-03-15 ENCOUNTER — TELEPHONE (OUTPATIENT)
Dept: INTERNAL MEDICINE | Facility: CLINIC | Age: 59
End: 2024-03-15
Payer: COMMERCIAL

## 2024-03-15 ENCOUNTER — LAB VISIT (OUTPATIENT)
Dept: LAB | Facility: OTHER | Age: 59
End: 2024-03-15
Attending: STUDENT IN AN ORGANIZED HEALTH CARE EDUCATION/TRAINING PROGRAM
Payer: COMMERCIAL

## 2024-03-15 ENCOUNTER — PATIENT MESSAGE (OUTPATIENT)
Dept: INTERNAL MEDICINE | Facility: CLINIC | Age: 59
End: 2024-03-15

## 2024-03-15 ENCOUNTER — OFFICE VISIT (OUTPATIENT)
Dept: INTERNAL MEDICINE | Facility: CLINIC | Age: 59
End: 2024-03-15
Payer: COMMERCIAL

## 2024-03-15 VITALS — OXYGEN SATURATION: 96 % | WEIGHT: 238.31 LBS | BODY MASS INDEX: 33.36 KG/M2 | HEART RATE: 77 BPM | HEIGHT: 71 IN

## 2024-03-15 DIAGNOSIS — Z12.5 SCREENING FOR MALIGNANT NEOPLASM OF PROSTATE: Primary | ICD-10-CM

## 2024-03-15 DIAGNOSIS — Z00.00 ANNUAL PHYSICAL EXAM: ICD-10-CM

## 2024-03-15 DIAGNOSIS — E78.00 PURE HYPERCHOLESTEROLEMIA: ICD-10-CM

## 2024-03-15 DIAGNOSIS — I10 PRIMARY HYPERTENSION: Primary | ICD-10-CM

## 2024-03-15 DIAGNOSIS — Z12.5 SCREENING FOR MALIGNANT NEOPLASM OF PROSTATE: ICD-10-CM

## 2024-03-15 DIAGNOSIS — E55.9 VITAMIN D INSUFFICIENCY: ICD-10-CM

## 2024-03-15 DIAGNOSIS — D70.0: ICD-10-CM

## 2024-03-15 LAB
25(OH)D3+25(OH)D2 SERPL-MCNC: 25 NG/ML (ref 30–96)
ALBUMIN SERPL BCP-MCNC: 4.3 G/DL (ref 3.5–5.2)
ALP SERPL-CCNC: 72 U/L (ref 55–135)
ALT SERPL W/O P-5'-P-CCNC: 44 U/L (ref 10–44)
ANION GAP SERPL CALC-SCNC: 8 MMOL/L (ref 8–16)
AST SERPL-CCNC: 33 U/L (ref 10–40)
BASOPHILS # BLD AUTO: 0.02 K/UL (ref 0–0.2)
BASOPHILS NFR BLD: 0.7 % (ref 0–1.9)
BILIRUB SERPL-MCNC: 0.5 MG/DL (ref 0.1–1)
BUN SERPL-MCNC: 11 MG/DL (ref 6–20)
CALCIUM SERPL-MCNC: 9.8 MG/DL (ref 8.7–10.5)
CHLORIDE SERPL-SCNC: 108 MMOL/L (ref 95–110)
CHOLEST SERPL-MCNC: 175 MG/DL (ref 120–199)
CHOLEST/HDLC SERPL: 4.2 {RATIO} (ref 2–5)
CO2 SERPL-SCNC: 27 MMOL/L (ref 23–29)
COMPLEXED PSA SERPL-MCNC: 0.21 NG/ML (ref 0–4)
CREAT SERPL-MCNC: 0.9 MG/DL (ref 0.5–1.4)
DIFFERENTIAL METHOD BLD: ABNORMAL
EOSINOPHIL # BLD AUTO: 0 K/UL (ref 0–0.5)
EOSINOPHIL NFR BLD: 1.4 % (ref 0–8)
ERYTHROCYTE [DISTWIDTH] IN BLOOD BY AUTOMATED COUNT: 13.1 % (ref 11.5–14.5)
EST. GFR  (NO RACE VARIABLE): >60 ML/MIN/1.73 M^2
ESTIMATED AVG GLUCOSE: 114 MG/DL (ref 68–131)
GLUCOSE SERPL-MCNC: 98 MG/DL (ref 70–110)
HBA1C MFR BLD: 5.6 % (ref 4–5.6)
HCT VFR BLD AUTO: 47.1 % (ref 40–54)
HDLC SERPL-MCNC: 42 MG/DL (ref 40–75)
HDLC SERPL: 24 % (ref 20–50)
HGB BLD-MCNC: 15.7 G/DL (ref 14–18)
IMM GRANULOCYTES # BLD AUTO: 0.01 K/UL (ref 0–0.04)
IMM GRANULOCYTES NFR BLD AUTO: 0.4 % (ref 0–0.5)
LDLC SERPL CALC-MCNC: 116.8 MG/DL (ref 63–159)
LYMPHOCYTES # BLD AUTO: 1.3 K/UL (ref 1–4.8)
LYMPHOCYTES NFR BLD: 45.8 % (ref 18–48)
MCH RBC QN AUTO: 29.5 PG (ref 27–31)
MCHC RBC AUTO-ENTMCNC: 33.3 G/DL (ref 32–36)
MCV RBC AUTO: 89 FL (ref 82–98)
MONOCYTES # BLD AUTO: 0.4 K/UL (ref 0.3–1)
MONOCYTES NFR BLD: 14.4 % (ref 4–15)
NEUTROPHILS # BLD AUTO: 1.1 K/UL (ref 1.8–7.7)
NEUTROPHILS NFR BLD: 37.3 % (ref 38–73)
NONHDLC SERPL-MCNC: 133 MG/DL
NRBC BLD-RTO: 0 /100 WBC
PLATELET # BLD AUTO: 254 K/UL (ref 150–450)
PMV BLD AUTO: 9.6 FL (ref 9.2–12.9)
POTASSIUM SERPL-SCNC: 4.6 MMOL/L (ref 3.5–5.1)
PROT SERPL-MCNC: 7.2 G/DL (ref 6–8.4)
RBC # BLD AUTO: 5.32 M/UL (ref 4.6–6.2)
SODIUM SERPL-SCNC: 143 MMOL/L (ref 136–145)
TRIGL SERPL-MCNC: 81 MG/DL (ref 30–150)
TSH SERPL DL<=0.005 MIU/L-ACNC: 0.94 UIU/ML (ref 0.4–4)
WBC # BLD AUTO: 2.84 K/UL (ref 3.9–12.7)

## 2024-03-15 PROCEDURE — 84443 ASSAY THYROID STIM HORMONE: CPT | Performed by: STUDENT IN AN ORGANIZED HEALTH CARE EDUCATION/TRAINING PROGRAM

## 2024-03-15 PROCEDURE — 1159F MED LIST DOCD IN RCRD: CPT | Mod: CPTII,S$GLB,, | Performed by: STUDENT IN AN ORGANIZED HEALTH CARE EDUCATION/TRAINING PROGRAM

## 2024-03-15 PROCEDURE — 80061 LIPID PANEL: CPT | Performed by: STUDENT IN AN ORGANIZED HEALTH CARE EDUCATION/TRAINING PROGRAM

## 2024-03-15 PROCEDURE — 36415 COLL VENOUS BLD VENIPUNCTURE: CPT | Performed by: STUDENT IN AN ORGANIZED HEALTH CARE EDUCATION/TRAINING PROGRAM

## 2024-03-15 PROCEDURE — 85025 COMPLETE CBC W/AUTO DIFF WBC: CPT | Performed by: STUDENT IN AN ORGANIZED HEALTH CARE EDUCATION/TRAINING PROGRAM

## 2024-03-15 PROCEDURE — 99999 PR PBB SHADOW E&M-EST. PATIENT-LVL IV: CPT | Mod: PBBFAC,,, | Performed by: STUDENT IN AN ORGANIZED HEALTH CARE EDUCATION/TRAINING PROGRAM

## 2024-03-15 PROCEDURE — 83036 HEMOGLOBIN GLYCOSYLATED A1C: CPT | Performed by: STUDENT IN AN ORGANIZED HEALTH CARE EDUCATION/TRAINING PROGRAM

## 2024-03-15 PROCEDURE — 99214 OFFICE O/P EST MOD 30 MIN: CPT | Mod: S$GLB,,, | Performed by: STUDENT IN AN ORGANIZED HEALTH CARE EDUCATION/TRAINING PROGRAM

## 2024-03-15 PROCEDURE — 4010F ACE/ARB THERAPY RXD/TAKEN: CPT | Mod: CPTII,S$GLB,, | Performed by: STUDENT IN AN ORGANIZED HEALTH CARE EDUCATION/TRAINING PROGRAM

## 2024-03-15 PROCEDURE — 84153 ASSAY OF PSA TOTAL: CPT | Performed by: STUDENT IN AN ORGANIZED HEALTH CARE EDUCATION/TRAINING PROGRAM

## 2024-03-15 PROCEDURE — 80053 COMPREHEN METABOLIC PANEL: CPT | Performed by: STUDENT IN AN ORGANIZED HEALTH CARE EDUCATION/TRAINING PROGRAM

## 2024-03-15 PROCEDURE — 82306 VITAMIN D 25 HYDROXY: CPT | Performed by: STUDENT IN AN ORGANIZED HEALTH CARE EDUCATION/TRAINING PROGRAM

## 2024-03-15 PROCEDURE — G2211 COMPLEX E/M VISIT ADD ON: HCPCS | Mod: S$GLB,,, | Performed by: STUDENT IN AN ORGANIZED HEALTH CARE EDUCATION/TRAINING PROGRAM

## 2024-03-15 PROCEDURE — 3008F BODY MASS INDEX DOCD: CPT | Mod: CPTII,S$GLB,, | Performed by: STUDENT IN AN ORGANIZED HEALTH CARE EDUCATION/TRAINING PROGRAM

## 2024-03-15 RX ORDER — LOSARTAN POTASSIUM 25 MG/1
25 TABLET ORAL DAILY
Qty: 30 TABLET | Refills: 3 | Status: SHIPPED | OUTPATIENT
Start: 2024-03-15 | End: 2024-06-12 | Stop reason: SDUPTHER

## 2024-03-15 NOTE — PROGRESS NOTES
[facility]    Subjective:       Patient ID: Corazon Jones is a 58 y.o. male.    Chief Complaint: Hypertension (F/u)        History was obtained from the patient and supplemented through chart review.  This patient is known to me.    HPI:    Patient is a 58 y.o. male who presents for f/u of HTN, HLD, prediabetes, presents for htn f/u      BP worsened recently  HTN  -orthostasis in the past  -stopped other meds, had stopped losartan 25, now restarting  -light-headedness in the past  -resuming since elevated 122/90 more representative    High stress in family    Shoulder and knee pain  Pt working with specialists    Checking labs, pt was aware of likely charge for PSA that was drawn earlier than 12 months    Vit D insufficiency  -taking supplement 2000 mg  recheck    Not addressed    Hx of Sarcoidosis diagnosis in 1991  -Resolved, potentially erroneous diagnosis  -Also was treated as tb at time time  -Environmental exposure in [place]  -Abn chest CT findings stable    Carpal tunnel   -Has braces, only intermittent symptoms  -rests, encourage more     Prediabetes  -stable, counseled  -pt intermittent fasts, poor diet in evenings    Hx of 5 mm Stone in left kidney  Hx of Prior imaging with possible hepatic steatosis    Recent bilat epicondylitis  S/p injections doing well    Wt Readings from Last 15 Encounters:   03/15/24 108.1 kg (238 lb 5.1 oz)   10/26/23 109.3 kg (241 lb)   08/11/23 110.7 kg (244 lb)   08/11/23 109.3 kg (241 lb)   08/11/23 110.7 kg (244 lb)   08/08/23 111.1 kg (244 lb 14.9 oz)   07/20/23 107.2 kg (236 lb 5.3 oz)   12/07/22 109.9 kg (242 lb 4.6 oz)   10/18/22 110.3 kg (243 lb 2.7 oz)   07/28/22 106.8 kg (235 lb 7.2 oz)   06/07/22 107.2 kg (236 lb 5.3 oz)   03/24/22 109 kg (240 lb 4.8 oz)   08/26/21 104.8 kg (231 lb)   08/27/20 104.3 kg (230 lb)   02/03/20 107.3 kg (236 lb 8.9 oz)        Medical History  Past Medical History:   Diagnosis Date    History of Clostridium difficile colitis      treated 2014    History of sarcoidosis     dx 1991, resolved    Hyperlipemia        Review of Systems   Constitutional:  Negative for appetite change and diaphoresis.   HENT:  Negative for trouble swallowing.    Respiratory:  Negative for shortness of breath.    Cardiovascular:  Negative for chest pain.   Gastrointestinal:  Negative for diarrhea, nausea and vomiting.   Musculoskeletal:  Positive for arthralgias and myalgias. Negative for gait problem.   Neurological:  Negative for dizziness and seizures.   Psychiatric/Behavioral:  Negative for hallucinations.         High stress, feels even-keel         Surgical hx, family hx, social hx   Have been reviewed      Current Outpatient Medications:     celecoxib (CELEBREX) 200 MG capsule, Take 1 capsule (200 mg total) by mouth once daily., Disp: 90 capsule, Rfl: 3    omeprazole (PRILOSEC) 40 MG capsule, TAKE 1 CAPSULE BY MOUTH EVERY MORNING AS DIRECTED, Disp: 90 capsule, Rfl: 3    rosuvastatin (CRESTOR) 40 MG Tab, Take 1 tablet (40 mg total) by mouth every evening., Disp: 90 tablet, Rfl: 3    sertraline (ZOLOFT) 50 MG tablet, Take 1 tablet (50 mg total) by mouth once daily., Disp: 90 tablet, Rfl: 3    tadalafiL (CIALIS) 20 MG Tab, Take 1 tablet (20 mg total) by mouth once daily., Disp: 30 tablet, Rfl: 5    zonisamide (ZONEGRAN) 100 MG Cap, Take 1 at bedtime for 3 days, then 2 at bedtime for 3 days, then 3 at bedtime for 3 days, then 4 at bedtime to follow. Stay at the most comfortable dose., Disp: 360 capsule, Rfl: 5    ergocalciferol (VITAMIN D2) 50,000 unit Cap, Take 1 capsule (50,000 Units total) by mouth every 7 days. (Patient not taking: Reported on 3/15/2024), Disp: 12 capsule, Rfl: 1    losartan (COZAAR) 25 MG tablet, Take 1 tablet (25 mg total) by mouth once daily., Disp: 30 tablet, Rfl: 3    pantoprazole (PROTONIX) 40 MG tablet, Take 1 tablet by mouth every morning 30 min before meals, Disp: 30 tablet, Rfl: 11    sucralfate (CARAFATE) 1 gram tablet, Take 1  "tablet (1 g total) by mouth 4 (four) times daily as needed (GERD). (Patient not taking: Reported on 3/15/2024), Disp: 20 tablet, Rfl: 0    tretinoin (RETIN-A) 0.05 % cream, Apply topically to the skin every night (Patient not taking: Reported on 3/15/2024), Disp: 20 g, Rfl: 0    Objective:        Body mass index is 33.24 kg/m².  Vitals:    03/15/24 1456   BP: (!) (P) 122/90   Pulse: 77   SpO2: 96%   Weight: 108.1 kg (238 lb 5.1 oz)   Height: 5' 11" (1.803 m)   PainSc:   2   PainLoc: Shoulder         Physical Exam  Vitals and nursing note reviewed.   Constitutional:       General: He is not in acute distress.     Appearance: Normal appearance. He is not ill-appearing.   HENT:      Head: Normocephalic and atraumatic.   Eyes:      General: No scleral icterus.  Pulmonary:      Effort: Pulmonary effort is normal.   Musculoskeletal:         General: No deformity.   Neurological:      Mental Status: He is alert and oriented to person, place, and time.   Psychiatric:         Behavior: Behavior normal.           Lab Results   Component Value Date    WBC 2.84 (L) 03/15/2024    HGB 15.7 03/15/2024    HCT 47.1 03/15/2024     03/15/2024    CHOL 182 07/20/2023    TRIG 85 07/20/2023    HDL 49 07/20/2023    ALT 44 03/15/2024    AST 33 03/15/2024     03/15/2024    K 4.6 03/15/2024     03/15/2024    CREATININE 0.9 03/15/2024    BUN 11 03/15/2024    CO2 27 03/15/2024    TSH 0.935 03/15/2024    PSA 0.25 07/20/2023    HGBA1C 5.5 07/20/2023       The 10-year ASCVD risk score (Davina BARILLAS, et al., 2019) is: 7.9%    Values used to calculate the score:      Age: 58 years      Sex: Male      Is Non- : No      Diabetic: No      Tobacco smoker: No      Systolic Blood Pressure: 122 mmHg      Is BP treated: Yes      HDL Cholesterol: 49 mg/dL      Total Cholesterol: 182 mg/dL    On crestor 40    (Imaging have been independently reviewed)    Reviewed past imaging for renal us, none. Will " obtain      Assessment:         1. Primary hypertension    2. Pure hypercholesterolemia    3. Chronic familial neutropenia            Plan:     Corazon was seen today for hypertension.    Diagnoses and all orders for this visit:    Primary hypertension  -     US Renal Artery Stenosis Hyperten (xpd); Future  -     losartan (COZAAR) 25 MG tablet; Take 1 tablet (25 mg total) by mouth once daily.    Pure hypercholesterolemia    Chronic familial neutropenia              Health Maintenance  - Lipids: normal on crestor 40  - A1C: 5.7 12/2022   - Colon Ca Screen:  11/2020 [facility], repeat 5 years  - Immunizations: encouraged PNA, shingles  - PSA normal : normal 3/2022, repeat     Tests to Keep You Healthy    Colon Cancer Screening: Met on 11/11/2020  Last Blood Pressure <= 139/89 (3/15/2024): NO    Follow up in about 3 months (around 6/15/2024) for htn follow-up.      40-54 min were used in chart review, evaluation and counseling of patient, documentation and review of results on same day of service including messaging to get care for today, texts to personal phone re: labs    Visit today is associated with current or anticipated ongoing medical care related to this patient's single serious condition/complex condition of labile HTN w/ hx of orthostasis, family stress, cardiac risk factors, HM labs. The patient will return to see me as these issues will be followed longitudinally.          All medications were reviewed including potential side effects and risks/benefits.  Pt was counseled to call back if anything worsens or if questions arise.    Baldomero Schwab MD  Family Medicine  [facility]  [address]  [address]  [place] [address]  Phone [phone]  Fax [phone]

## 2024-03-15 NOTE — PATIENT INSTRUCTIONS
[employer]    Therapy/Psychology:   You can try anyone of these number to see if your insurance is accepted or you would have to call your insurance.     Cognitive Behavioral Therapy (CBT) [facility]   Address: [facility] [place] [address]   Phone: [phone]   [email or web address]     Integrated Behavioral Health [address], [address]   Phone: [phone]   You can email for an appointment at: [email or web address]     [facility]   [address] [place] [address] [address], [facility], [address]   [email or web address]   Dr. July Alcantara, [phone] or [email or web address]   Dr. Simi Candelaria, [phone] or [email or web address]     Fatemeh Norman [facility] (therapist) [phone] [address]    [facility] ([facility]) [phone]   [address] [facility] [address]   Elizabeth Bethea   [facility] (therapist) [phone]   [address] [facility]   Linda Dash [facility] ([facility]rapist) [phone]   [address] [facility]   NEGARQuyen Yap         [facility]                    [phone]   Delvin Cameron [phone] (therapist) [address] [facility]   Louie Bethea (therapist) [phone]  [address] Reina Singer (therapist) [phone] [address] [facility]     [facility] Counseling [phone]   [facility] [place] [address]     Employee Assistance Program (EAP)   Check through your employer's HR.     [facility] (accepts medicaid)  [address]  [phone]  or [phone].[facility] ([phone])    Online Therapist:     [email or web address]     Free Guided Meditations   [email or web address]   [email or web address]   [email or web address]

## 2024-03-18 ENCOUNTER — HOSPITAL ENCOUNTER (OUTPATIENT)
Dept: RADIOLOGY | Facility: OTHER | Age: 59
Discharge: HOME | End: 2024-03-18
Attending: STUDENT IN AN ORGANIZED HEALTH CARE EDUCATION/TRAINING PROGRAM
Payer: COMMERCIAL

## 2024-03-18 DIAGNOSIS — I10 PRIMARY HYPERTENSION: ICD-10-CM

## 2024-03-18 PROCEDURE — 93975 VASCULAR STUDY: CPT | Mod: TC

## 2024-03-18 PROCEDURE — 93975 VASCULAR STUDY: CPT | Mod: 26,,, | Performed by: RADIOLOGY

## 2024-03-18 PROCEDURE — 76770 US EXAM ABDO BACK WALL COMP: CPT | Mod: 26,59,, | Performed by: RADIOLOGY

## 2024-03-19 ENCOUNTER — CLINICAL SUPPORT (OUTPATIENT)
Dept: REHABILITATION | Facility: HOSPITAL | Age: 59
End: 2024-03-19
Payer: COMMERCIAL

## 2024-03-19 DIAGNOSIS — M25.511 ACUTE PAIN OF RIGHT SHOULDER: Primary | ICD-10-CM

## 2024-03-19 PROCEDURE — 97530 THERAPEUTIC ACTIVITIES: CPT | Performed by: PHYSICAL THERAPIST

## 2024-03-19 NOTE — PROGRESS NOTES
[facility]   Physical Therapy Treatment Note      Name: Corazon Maldonado [facility]  Clinic Number: [id number]    Therapy Diagnosis:   Encounter Diagnosis   Name Primary?    Acute pain of right shoulder Yes     Physician: Mahendra Kamara MD    Visit Date: 3/19/2024    Physician Orders: PT Eval and Treat   Medical Diagnosis from Referral:   Diagnosis   M25.512 (ICD-10-CM) - Left shoulder pain   G25.89 (ICD-10-CM) - Scapular dyskinesis      Evaluation Date: 12/5/2023  Authorization Period Expiration: 12/3/2024  Plan of Care Expiration: 6/5/2024  Progress Note Due: 3/5/2024  Visit # / Visits authorized: 15/20  Total visits 20  FOTO: 1/1       PTA Visit #: 0/5     Time In: 1655  Time Out:1752  Total Billable Time: 57 minutes    Subjective     Pt reports  almost all the way better in the shoulder. I can tell its still there with exercise and if I test it, but day to day its much better. The knee makes me nervous it will buckle, need to get it stronger   He was compliant with home exercise program.  Response to previous treatment: less pain  Functional change: pain with work ever since the accident       Pain: 3/10  Location: right shoulder      Objective      Objective Measures updated at progress report unless specified.     +subscap lift off test  +resisted IR testing  3-/5 subscap strength testing  Resisted lat strength 4/5 on R  ER 4-/5 but pain free  No loss in ROM, relief with posterior capsule stretch    -empty-can, Hawkin's Selvin, resisted ER, Neer's, O'Briens, painful arc, and drop arm testing    TTP over the levator scapular and T4 TP on the R    Treatment     Corazon received the treatments listed below:      therapeutic exercises to develop strength, endurance, ROM, flexibility, posture, and core stabilization for 0 minutes including:      manual therapy techniques: Joint mobilizations, Manual traction, Myofacial release, Soft tissue Mobilization, and Dry Needling were applied to the:  "shoulder and thoracic spine for 0 minutes, including:    STM to lat blocking scap  Medial gapping knee Gr IV  Tibial IR Gr IV with knee flexion  Patellar mobilizations all planes  Tibia AP improve knee flexion at end range flexion     NT  Cross body blocking scapula  Posterior capsule Gr IV  Right shoulder Gr II oscillations  Inferior mob Gr IV R shoulder  Centering glide with ER R shoulder   Dry needling to subscapularis, lat, middle trap 0.40mm needles single stick needles - 5 needles used in total, relief noted and no adverse effect noted-nt  Gr V thoracic manipulation  Prone gapping thoracic spine  2nd rib Gr III on the R      neuromuscular re-education activities to improve: Balance, Coordination, Kinesthetic, Sense, Proprioception, and Posture for 0 minutes. The following activities were included:    Roman stretch strap 30" holds  Quadruped HHR 2 x 10 - towel gapping  Standing knee flexion foot back on table 10" holds 20x  Pt education    NT  Prone IR 2# 2 x 10  Supine lat stretch wedge between elbows 10x 10"  Prone scap set with extensions 3 x 15  Prone long lever T 1# 3 x 12  Genie IR GTB 2"/3" 3 x 12  Quadruped serratus HHR 10x 10"        therapeutic activities to improve functional performance for 57  minutes, including:    Bike 8' level 22 for knee strength and cardio endurance training  Leg press eccentrics 260# 5 x 5  RDL 15# kick stand 3 x 12  High row with ER and press GTB 3 x 15  Lateral walks BTB with 6# med ball press 5 laps turf      NT  D2 extensions 13# 3 x 15  Tricep extension rope CC 17# 3 x 15  Genie IR/ER GTB 3 x 12 eccentric  LAQ 10# eccentric lowering 3 x 10  SLR upright 5# 3 x 10 3" hold  HS curl jc 55# 3 x 15  Cross body stretch BTB   -super set D2 extension 3 x 12    Ice 0' R Knee    Patient Education and Home Exercises       Education provided:   - improve subscap and thoracic mobility    Written Home Exercises Provided: YES. Exercises were reviewed and Corazon was able to " demonstrate them prior to the end of the session.  Corazon demonstrated good understanding of the education provided. See EMR under Patient Instructions for exercises provided during therapy sessions    Assessment     Increased loading to the quad, glute, and hamstring today. Most limited to the hamstring with RDL. Note a limited knee flexion on leg press to avoid posterior horn of mensicus discomfort.     Corazon Is progressing well towards his goals.   Pt prognosis is Excellent.     Pt will continue to benefit from skilled outpatient physical therapy to address the deficits listed in the problem list box on initial evaluation, provide pt/family education and to maximize pt's level of independence in the home and community environment.     Pt's spiritual, cultural and educational needs considered and pt agreeable to plan of care and goals.     Anticipated barriers to physical therapy: work schedule    GOALS: Short Term Goals: 2 weeks  1.Report decreased neck pain  <   / =  4  /10  to increase tolerance for work(Progressing, not met)  2. Increase cervical ROM by 5-10 degrees in order to perform ADLs with decreased difficulty.(Progressing, not met)  3. Increase strength in B shoulders/scapular stabilizers by 1/3 MMT grade to increase tolerance for ADL and work activities.(Progressing, not met)  4. Pt to tolerate HEP to improve ROM and independence with ADL's(Progressing, not met)     Long Term Goals: 6 weeks  1.Report decreased neck pain  <   / =  0  /10  to increase tolerance for return to ADL without pain(Progressing, not met)  2. Increase UE/neck flexibility in order to improve posture.  (Progressing, not met)  3.Increased strength in B shoulders/scapular stabilizers to >/= 4/5 MMT grade to increase tolerance for ADL and work activities.(Progressing, not met)  4.  Pt will report at goals level on FOTO neck score for neck pain disability to demonstrate decrease in disability and improvement in neck pain. (Progressing,  not met)    Plan     Plan of care Certification: 12/5/2023 to 6/5/2024.     Improve subscap strength    Crescencio Amin, PT, DPT, OCS, FAAOMPT

## 2024-04-10 ENCOUNTER — PATIENT MESSAGE (OUTPATIENT)
Dept: INTERNAL MEDICINE | Facility: CLINIC | Age: 59
End: 2024-04-10
Payer: COMMERCIAL

## 2024-04-16 ENCOUNTER — LAB VISIT (OUTPATIENT)
Dept: LAB | Facility: OTHER | Age: 59
End: 2024-04-16
Attending: STUDENT IN AN ORGANIZED HEALTH CARE EDUCATION/TRAINING PROGRAM
Payer: COMMERCIAL

## 2024-04-16 ENCOUNTER — OFFICE VISIT (OUTPATIENT)
Dept: INTERNAL MEDICINE | Facility: CLINIC | Age: 59
End: 2024-04-16
Payer: COMMERCIAL

## 2024-04-16 VITALS — OXYGEN SATURATION: 95 % | BODY MASS INDEX: 32.84 KG/M2 | HEIGHT: 71 IN | WEIGHT: 234.56 LBS | HEART RATE: 73 BPM

## 2024-04-16 DIAGNOSIS — R74.01 TRANSAMINITIS: ICD-10-CM

## 2024-04-16 DIAGNOSIS — B34.9 VIRAL SYNDROME: ICD-10-CM

## 2024-04-16 DIAGNOSIS — I10 PRIMARY HYPERTENSION: ICD-10-CM

## 2024-04-16 DIAGNOSIS — B34.9 VIRAL SYNDROME: Primary | ICD-10-CM

## 2024-04-16 DIAGNOSIS — R73.03 PREDIABETES: ICD-10-CM

## 2024-04-16 LAB
ALBUMIN SERPL BCP-MCNC: 3.9 G/DL (ref 3.5–5.2)
ALP SERPL-CCNC: 89 U/L (ref 55–135)
ALT SERPL W/O P-5'-P-CCNC: 63 U/L (ref 10–44)
ANION GAP SERPL CALC-SCNC: 8 MMOL/L (ref 8–16)
AST SERPL-CCNC: 40 U/L (ref 10–40)
BILIRUB SERPL-MCNC: 0.5 MG/DL (ref 0.1–1)
BUN SERPL-MCNC: 13 MG/DL (ref 6–20)
CALCIUM SERPL-MCNC: 9.8 MG/DL (ref 8.7–10.5)
CHLORIDE SERPL-SCNC: 105 MMOL/L (ref 95–110)
CK SERPL-CCNC: 108 U/L (ref 20–200)
CO2 SERPL-SCNC: 26 MMOL/L (ref 23–29)
CREAT SERPL-MCNC: 0.9 MG/DL (ref 0.5–1.4)
ERYTHROCYTE [DISTWIDTH] IN BLOOD BY AUTOMATED COUNT: 13.2 % (ref 11.5–14.5)
EST. GFR  (NO RACE VARIABLE): >60 ML/MIN/1.73 M^2
GLUCOSE SERPL-MCNC: 102 MG/DL (ref 70–110)
HCT VFR BLD AUTO: 45.3 % (ref 40–54)
HGB BLD-MCNC: 14.9 G/DL (ref 14–18)
MCH RBC QN AUTO: 28.8 PG (ref 27–31)
MCHC RBC AUTO-ENTMCNC: 32.9 G/DL (ref 32–36)
MCV RBC AUTO: 88 FL (ref 82–98)
PLATELET # BLD AUTO: 270 K/UL (ref 150–450)
PMV BLD AUTO: 9.4 FL (ref 9.2–12.9)
POTASSIUM SERPL-SCNC: 4.5 MMOL/L (ref 3.5–5.1)
PROT SERPL-MCNC: 7.5 G/DL (ref 6–8.4)
RBC # BLD AUTO: 5.17 M/UL (ref 4.6–6.2)
SODIUM SERPL-SCNC: 139 MMOL/L (ref 136–145)
WBC # BLD AUTO: 3.5 K/UL (ref 3.9–12.7)

## 2024-04-16 PROCEDURE — 36415 COLL VENOUS BLD VENIPUNCTURE: CPT | Performed by: STUDENT IN AN ORGANIZED HEALTH CARE EDUCATION/TRAINING PROGRAM

## 2024-04-16 PROCEDURE — 99214 OFFICE O/P EST MOD 30 MIN: CPT | Mod: S$GLB,,, | Performed by: STUDENT IN AN ORGANIZED HEALTH CARE EDUCATION/TRAINING PROGRAM

## 2024-04-16 PROCEDURE — 1159F MED LIST DOCD IN RCRD: CPT | Mod: CPTII,S$GLB,, | Performed by: STUDENT IN AN ORGANIZED HEALTH CARE EDUCATION/TRAINING PROGRAM

## 2024-04-16 PROCEDURE — 80053 COMPREHEN METABOLIC PANEL: CPT | Performed by: STUDENT IN AN ORGANIZED HEALTH CARE EDUCATION/TRAINING PROGRAM

## 2024-04-16 PROCEDURE — 99999 PR PBB SHADOW E&M-EST. PATIENT-LVL III: CPT | Mod: PBBFAC,,, | Performed by: STUDENT IN AN ORGANIZED HEALTH CARE EDUCATION/TRAINING PROGRAM

## 2024-04-16 PROCEDURE — 3044F HG A1C LEVEL LT 7.0%: CPT | Mod: CPTII,S$GLB,, | Performed by: STUDENT IN AN ORGANIZED HEALTH CARE EDUCATION/TRAINING PROGRAM

## 2024-04-16 PROCEDURE — 3008F BODY MASS INDEX DOCD: CPT | Mod: CPTII,S$GLB,, | Performed by: STUDENT IN AN ORGANIZED HEALTH CARE EDUCATION/TRAINING PROGRAM

## 2024-04-16 PROCEDURE — 85027 COMPLETE CBC AUTOMATED: CPT | Performed by: STUDENT IN AN ORGANIZED HEALTH CARE EDUCATION/TRAINING PROGRAM

## 2024-04-16 PROCEDURE — 4010F ACE/ARB THERAPY RXD/TAKEN: CPT | Mod: CPTII,S$GLB,, | Performed by: STUDENT IN AN ORGANIZED HEALTH CARE EDUCATION/TRAINING PROGRAM

## 2024-04-16 PROCEDURE — 82550 ASSAY OF CK (CPK): CPT | Performed by: STUDENT IN AN ORGANIZED HEALTH CARE EDUCATION/TRAINING PROGRAM

## 2024-04-16 NOTE — PROGRESS NOTES
[facility]    Subjective:       Patient ID: Corazon Jones is a 58 y.o. male.    Chief Complaint: Fatigue      History was obtained from the patient and supplemented through chart review.  This patient is known to me.     HPI:    Patient is a 58 y.o. male who presents for post-viral syndrome    Tues April 2nd mechanical fall to back of head, did presentation subsequntly. No known LOC, not precipitous decline. Felt well for a little over 2 days.    Friday out and about in nature with hobbies, started feeling poorly, severe occipital headaches  Sat and Sunday chills, body aches  Couldn't work Monday, 8th. Stayed home all week due to illness  Sunday improved 14th, went back to work 15th yesterday, low energy    Home covid was neg    Never had documented fever, but was     Some GI symptoms, severe headaches  Some positional orthostasis    Held BP med while ill    Wife maybe had headache few days but not degree of ilness    Medical History  Past Medical History:   Diagnosis Date    History of Clostridium difficile colitis     treated 2014    History of sarcoidosis     dx 1991, resolved    Hyperlipemia        Review of Systems   Constitutional:  Positive for fatigue.   Neurological:  Positive for weakness.   Psychiatric/Behavioral:  Positive for confusion and decreased concentration.          Surgical hx, family hx, social hx   Have been reviewed      Current Outpatient Medications:     celecoxib (CELEBREX) 200 MG capsule, Take 1 capsule (200 mg total) by mouth once daily., Disp: 90 capsule, Rfl: 3    losartan (COZAAR) 25 MG tablet, Take 1 tablet (25 mg total) by mouth once daily., Disp: 30 tablet, Rfl: 3    omeprazole (PRILOSEC) 40 MG capsule, TAKE 1 CAPSULE BY MOUTH EVERY MORNING AS DIRECTED, Disp: 90 capsule, Rfl: 3    rosuvastatin (CRESTOR) 40 MG Tab, Take 1 tablet (40 mg total) by mouth every evening., Disp: 90 tablet, Rfl: 3    sertraline (ZOLOFT) 50 MG tablet, Take 1 tablet (50 mg total) by  "mouth once daily., Disp: 90 tablet, Rfl: 3    tadalafiL (CIALIS) 20 MG Tab, Take 1 tablet (20 mg total) by mouth once daily., Disp: 30 tablet, Rfl: 5    zonisamide (ZONEGRAN) 100 MG Cap, Take 1 at bedtime for 3 days, then 2 at bedtime for 3 days, then 3 at bedtime for 3 days, then 4 at bedtime to follow. Stay at the most comfortable dose., Disp: 360 capsule, Rfl: 5    ergocalciferol (VITAMIN D2) 50,000 unit Cap, Take 1 capsule (50,000 Units total) by mouth every 7 days. (Patient not taking: Reported on 3/15/2024), Disp: 12 capsule, Rfl: 1    tretinoin (RETIN-A) 0.05 % cream, Apply topically to the skin every night (Patient not taking: Reported on 3/15/2024), Disp: 20 g, Rfl: 0    Objective:        Body mass index is 32.72 kg/m².  Vitals:    04/16/24 1000   BP: (P) 108/80   Pulse: 73   SpO2: 95%   Weight: 106.4 kg (234 lb 9.1 oz)   Height: 5' 11" (1.803 m)   PainSc: 0-No pain     Physical Exam  Vitals and nursing note reviewed.   Constitutional:       General: He is not in acute distress.     Appearance: Normal appearance. He is not ill-appearing.   HENT:      Head: Normocephalic and atraumatic.   Eyes:      General: No scleral icterus.  Cardiovascular:      Rate and Rhythm: Normal rate and regular rhythm.      Heart sounds: Normal heart sounds.   Pulmonary:      Effort: Pulmonary effort is normal.   Musculoskeletal:         General: No deformity.   Neurological:      Mental Status: He is alert and oriented to person, place, and time.   Psychiatric:         Behavior: Behavior normal.           Lab Results   Component Value Date    WBC 3.50 (L) 04/16/2024    HGB 14.9 04/16/2024    HCT 45.3 04/16/2024     04/16/2024    CHOL 175 03/15/2024    TRIG 81 03/15/2024    HDL 42 03/15/2024    ALT 63 (H) 04/16/2024    AST 40 04/16/2024     04/16/2024    K 4.5 04/16/2024     04/16/2024    CREATININE 0.9 04/16/2024    BUN 13 04/16/2024    CO2 26 04/16/2024    TSH 0.935 03/15/2024    PSA 0.21 03/15/2024    HGBA1C " 5.6 03/15/2024       The 10-year ASCVD risk score (Davina BARILLAS, et al., 2019) is: 6.9%    Values used to calculate the score:      Age: 58 years      Sex: Male      Is Non- : No      Diabetic: No      Tobacco smoker: No      Systolic Blood Pressure: 108 mmHg      Is BP treated: Yes      HDL Cholesterol: 42 mg/dL      Total Cholesterol: 175 mg/dL    (Imaging have been independently reviewed)      Assessment:         1. Viral syndrome    2. Primary hypertension    3. Prediabetes    4. Transaminitis          Plan:     Corazon was seen today for fatigue.    Diagnoses and all orders for this visit:    Viral syndrome  -     CBC Without Differential; Future  -     Comprehensive Metabolic Panel; Future  -     CK; Future    Primary hypertension    Prediabetes    Transaminitis      30 min were used in chart review, coordination with pt's co-workers, timing of visit, personal text messages to arrange care same day, review of labs, evaluation and counseling of patient, documentation and review of results on same day of service         Follow up if symptoms worsen or fail to improve, for and as already planned.        All medications were reviewed including potential side effects and risks/benefits.  Pt was counseled to call back if anything worsens or if questions arise.    Baldomero Schwab MD  Family Medicine  [facility]  [id number]  [place] [address]  Phone [phone]  Fax [phone]

## 2024-04-18 ENCOUNTER — PATIENT MESSAGE (OUTPATIENT)
Dept: INTERNAL MEDICINE | Facility: CLINIC | Age: 59
End: 2024-04-18
Payer: COMMERCIAL

## 2024-04-18 DIAGNOSIS — R11.0 NAUSEA: Primary | ICD-10-CM

## 2024-04-18 RX ORDER — ONDANSETRON 4 MG/1
4 TABLET, ORALLY DISINTEGRATING ORAL EVERY 8 HOURS PRN
Qty: 20 TABLET | Refills: 0 | Status: SHIPPED | OUTPATIENT
Start: 2024-04-18

## 2024-04-19 ENCOUNTER — HOSPITAL ENCOUNTER (OUTPATIENT)
Dept: RADIOLOGY | Facility: OTHER | Age: 59
Discharge: HOME | End: 2024-04-19
Attending: STUDENT IN AN ORGANIZED HEALTH CARE EDUCATION/TRAINING PROGRAM
Payer: COMMERCIAL

## 2024-04-19 ENCOUNTER — OFFICE VISIT (OUTPATIENT)
Dept: INTERNAL MEDICINE | Facility: CLINIC | Age: 59
End: 2024-04-19
Payer: COMMERCIAL

## 2024-04-19 ENCOUNTER — PATIENT MESSAGE (OUTPATIENT)
Dept: INTERNAL MEDICINE | Facility: CLINIC | Age: 59
End: 2024-04-19

## 2024-04-19 VITALS — SYSTOLIC BLOOD PRESSURE: 110 MMHG | DIASTOLIC BLOOD PRESSURE: 88 MMHG | HEART RATE: 86 BPM | OXYGEN SATURATION: 96 %

## 2024-04-19 DIAGNOSIS — T14.8XXA HYPERFLEXION INJURY: ICD-10-CM

## 2024-04-19 DIAGNOSIS — S09.90XA TRAUMATIC INJURY OF HEAD, INITIAL ENCOUNTER: ICD-10-CM

## 2024-04-19 DIAGNOSIS — F07.81 POST CONCUSSIVE SYNDROME: Primary | ICD-10-CM

## 2024-04-19 PROCEDURE — 72128 CT CHEST SPINE W/O DYE: CPT | Mod: TC

## 2024-04-19 PROCEDURE — 99999 PR PBB SHADOW E&M-EST. PATIENT-LVL III: CPT | Mod: PBBFAC,,, | Performed by: STUDENT IN AN ORGANIZED HEALTH CARE EDUCATION/TRAINING PROGRAM

## 2024-04-19 PROCEDURE — 70450 CT HEAD/BRAIN W/O DYE: CPT | Mod: 26,,, | Performed by: RADIOLOGY

## 2024-04-19 PROCEDURE — 4010F ACE/ARB THERAPY RXD/TAKEN: CPT | Mod: CPTII,S$GLB,, | Performed by: STUDENT IN AN ORGANIZED HEALTH CARE EDUCATION/TRAINING PROGRAM

## 2024-04-19 PROCEDURE — 3044F HG A1C LEVEL LT 7.0%: CPT | Mod: CPTII,S$GLB,, | Performed by: STUDENT IN AN ORGANIZED HEALTH CARE EDUCATION/TRAINING PROGRAM

## 2024-04-19 PROCEDURE — 3074F SYST BP LT 130 MM HG: CPT | Mod: CPTII,S$GLB,, | Performed by: STUDENT IN AN ORGANIZED HEALTH CARE EDUCATION/TRAINING PROGRAM

## 2024-04-19 PROCEDURE — 3079F DIAST BP 80-89 MM HG: CPT | Mod: CPTII,S$GLB,, | Performed by: STUDENT IN AN ORGANIZED HEALTH CARE EDUCATION/TRAINING PROGRAM

## 2024-04-19 PROCEDURE — 72128 CT CHEST SPINE W/O DYE: CPT | Mod: 26,,, | Performed by: RADIOLOGY

## 2024-04-19 PROCEDURE — 70450 CT HEAD/BRAIN W/O DYE: CPT | Mod: TC

## 2024-04-19 PROCEDURE — 72125 CT NECK SPINE W/O DYE: CPT | Mod: 26,,, | Performed by: RADIOLOGY

## 2024-04-19 PROCEDURE — 72125 CT NECK SPINE W/O DYE: CPT | Mod: TC

## 2024-04-19 PROCEDURE — 99215 OFFICE O/P EST HI 40 MIN: CPT | Mod: S$GLB,,, | Performed by: STUDENT IN AN ORGANIZED HEALTH CARE EDUCATION/TRAINING PROGRAM

## 2024-04-19 NOTE — PROGRESS NOTES
"  [facility]    Subjective:       Patient ID: Corazon Jones is a 58 y.o. male.    Chief Complaint: No chief complaint on file.      History was obtained from the patient and supplemented through chart review.  This patient is known to me.     HPI:    Patient is a 58 y.o. male who presents for post-viral syndrome and post-concussive syndrome    Tues April 2nd mechanical fall to back of head, did presentation at [facility] subsequently. No known LOC, not precipitous decline. Felt well for a little over 2 days.    In retrospect, pt adds he asked to sit down for questions. [place] well wed, thurs at work.    Friday was out and about in nature with hobbies (flying drone), started feeling poorly, had severe occipital headaches  Sat and Sunday chills, body aches, "head down to the spine," myalgias and fasciculations, slept through weekend  Never had documented fever, but was feeling feverish, and had chills    Worked Monday, April 8th with difficulty.  Then stayed home all week due to illness,sleeping, very fatigued.  Sunday improved 14th, went back to work 15th yesterday, had low energy    Saw PCP Tues, still low energy but felt well-enough to work.=  Wednesday night vomited yellow creamy, 1 streak of blood  Severe constipation, no more vomiting has been taking zofran (counseled on constipation).  Stools have been very light colored.  Thoracic back pain, worse with bilat forward adduction of arms    co-worker staff expressed concern about his mental acuity, slowness of speech, lack of clarity in diction.  Reported to staff.  Pt asked to take leave on Monday, given recs for FMLA.    4/19/2024 CT head, CT thoracic spine and cervical spine normal  Referral made to concussion management program and neurology's concussion branch.    Home covid was neg, never tested for flu, etc.  Wife may have had mild illness.    Did take meds today 110/88 with BP med on board.  High stress.    Depending on recovery next week, " may need to be out more than Monday.  To be determined.    Medical History  Past Medical History:   Diagnosis Date    History of Clostridium difficile colitis     treated 2014    History of sarcoidosis     dx 1991, resolved    Hyperlipemia        Review of Systems   Constitutional:  Positive for fatigue.   Musculoskeletal:  Positive for back pain.        Balance difficulty   Neurological:  Positive for weakness and headaches.   Psychiatric/Behavioral:  Positive for confusion and decreased concentration.          Surgical hx, family hx, social hx   Have been reviewed      Current Outpatient Medications:     celecoxib (CELEBREX) 200 MG capsule, Take 1 capsule (200 mg total) by mouth once daily., Disp: 90 capsule, Rfl: 3    ergocalciferol (VITAMIN D2) 50,000 unit Cap, Take 1 capsule (50,000 Units total) by mouth every 7 days. (Patient not taking: Reported on 3/15/2024), Disp: 12 capsule, Rfl: 1    losartan (COZAAR) 25 MG tablet, Take 1 tablet (25 mg total) by mouth once daily., Disp: 30 tablet, Rfl: 3    omeprazole (PRILOSEC) 40 MG capsule, TAKE 1 CAPSULE BY MOUTH EVERY MORNING AS DIRECTED, Disp: 90 capsule, Rfl: 3    ondansetron (ZOFRAN-ODT) 4 MG TbDL, Take 1 tablet (4 mg total) by mouth every 8 (eight) hours as needed (nausea). nausea. can worsen constipation, Disp: 20 tablet, Rfl: 0    rosuvastatin (CRESTOR) 40 MG Tab, Take 1 tablet (40 mg total) by mouth every evening., Disp: 90 tablet, Rfl: 3    sertraline (ZOLOFT) 50 MG tablet, Take 1 tablet (50 mg total) by mouth once daily., Disp: 90 tablet, Rfl: 3    tadalafiL (CIALIS) 20 MG Tab, Take 1 tablet (20 mg total) by mouth once daily., Disp: 30 tablet, Rfl: 5    tretinoin (RETIN-A) 0.05 % cream, Apply topically to the skin every night (Patient not taking: Reported on 3/15/2024), Disp: 20 g, Rfl: 0    zonisamide (ZONEGRAN) 100 MG Cap, Take 1 at bedtime for 3 days, then 2 at bedtime for 3 days, then 3 at bedtime for 3 days, then 4 at bedtime to follow. Stay at the most  comfortable dose., Disp: 360 capsule, Rfl: 5    Objective:        There is no height or weight on file to calculate BMI.  Vitals:    04/19/24 1547   BP: 110/88   Pulse: 86   SpO2: 96%     Physical Exam  Vitals and nursing note reviewed.   Constitutional:       General: He is not in acute distress.     Comments: Sleepy, eyes droopy, speech slower than normal   HENT:      Head: Normocephalic and atraumatic.   Eyes:      General: No scleral icterus.  Cardiovascular:      Rate and Rhythm: Normal rate and regular rhythm.      Heart sounds: Normal heart sounds.   Pulmonary:      Effort: Pulmonary effort is normal.   Musculoskeletal:         General: No deformity.   Neurological:      Mental Status: He is alert and oriented to person, place, and time.      Cranial Nerves: No cranial nerve deficit.      Motor: No weakness or pronator drift.      Coordination: Romberg sign negative. Heel to Delong Test normal.      Gait: Tandem walk abnormal. Gait normal.      Deep Tendon Reflexes:      Reflex Scores:       Brachioradialis reflexes are 2+ on the right side and 2+ on the left side.       Patellar reflexes are 2+ on the right side and 3+ on the left side.  Psychiatric:         Behavior: Behavior normal.           Lab Results   Component Value Date    WBC 3.50 (L) 04/16/2024    HGB 14.9 04/16/2024    HCT 45.3 04/16/2024     04/16/2024    CHOL 175 03/15/2024    TRIG 81 03/15/2024    HDL 42 03/15/2024    ALT 63 (H) 04/16/2024    AST 40 04/16/2024     04/16/2024    K 4.5 04/16/2024     04/16/2024    CREATININE 0.9 04/16/2024    BUN 13 04/16/2024    CO2 26 04/16/2024    TSH 0.935 03/15/2024    PSA 0.21 03/15/2024    HGBA1C 5.6 03/15/2024       The 10-year ASCVD risk score (Davina BARILLAS, et al., 2019) is: 7.1%    Values used to calculate the score:      Age: 58 years      Sex: Male      Is Non- : No      Diabetic: No      Tobacco smoker: No      Systolic Blood Pressure: 110 mmHg      Is BP  treated: Yes      HDL Cholesterol: 42 mg/dL      Total Cholesterol: 175 mg/dL    (Imaging have been independently reviewed)    CT head, c-spine, t-spine all reviewed    Assessment:         1. Post concussive syndrome    2. Traumatic injury of head, initial encounter    3. Hyperflexion injury          Plan:     Diagnoses and all orders for this visit:    Post concussive syndrome  -     Ambulatory referral/consult to Concussion Management Program; Future  -     Ambulatory referral/consult to Neurology; Future    Traumatic injury of head, initial encounter  -     CT Head Without Contrast; Future  -     Cancel: CT Cervical Spine Without Contrast; Future  -     Cancel: CT Thoracic Spine Without Contrast; Future  -     CT Cervical Spine Without Contrast; Future  -     CT Thoracic Spine Without Contrast; Future    Hyperflexion injury  -     CT Head Without Contrast; Future  -     Cancel: CT Cervical Spine Without Contrast; Future  -     Cancel: CT Thoracic Spine Without Contrast; Future  -     CT Cervical Spine Without Contrast; Future  -     CT Thoracic Spine Without Contrast; Future      54 min were used in chart review, evaluation and counseling of patient, documentation and review of results on same day of service. This was essentially in lieu of an ER visit.      No follow-ups on file.        All medications were reviewed including potential side effects and risks/benefits.  Pt was counseled to call back if anything worsens or if questions arise.    Baldomero Schwab MD  Family Medicine  [facility]  [address] [address]  Phone [phone]  Fax [phone]

## 2024-04-20 ENCOUNTER — PATIENT MESSAGE (OUTPATIENT)
Dept: NEUROLOGY | Facility: CLINIC | Age: 59
End: 2024-04-20
Payer: COMMERCIAL

## 2024-04-20 ENCOUNTER — TELEPHONE (OUTPATIENT)
Dept: NEUROLOGY | Facility: CLINIC | Age: 59
End: 2024-04-20
Payer: COMMERCIAL

## 2024-04-20 NOTE — TELEPHONE ENCOUNTER
Spoke with patient and is scheduled for concussion clinic with Dr. Hartman on 4/23/24 at 10am.

## 2024-04-22 DIAGNOSIS — S06.0XAA CONCUSSION WITH UNKNOWN LOSS OF CONSCIOUSNESS STATUS, INITIAL ENCOUNTER: Primary | ICD-10-CM

## 2024-04-23 ENCOUNTER — OFFICE VISIT (OUTPATIENT)
Dept: NEUROLOGY | Facility: CLINIC | Age: 59
End: 2024-04-23
Payer: COMMERCIAL

## 2024-04-23 ENCOUNTER — CLINICAL SUPPORT (OUTPATIENT)
Dept: REHABILITATION | Facility: HOSPITAL | Age: 59
End: 2024-04-23
Attending: PSYCHIATRY & NEUROLOGY
Payer: COMMERCIAL

## 2024-04-23 ENCOUNTER — CLINICAL SUPPORT (OUTPATIENT)
Dept: REHABILITATION | Facility: HOSPITAL | Age: 59
End: 2024-04-23
Payer: COMMERCIAL

## 2024-04-23 VITALS
HEIGHT: 70 IN | DIASTOLIC BLOOD PRESSURE: 75 MMHG | WEIGHT: 233.69 LBS | SYSTOLIC BLOOD PRESSURE: 111 MMHG | HEART RATE: 79 BPM | BODY MASS INDEX: 33.46 KG/M2

## 2024-04-23 DIAGNOSIS — F07.81 POST CONCUSSIVE SYNDROME: ICD-10-CM

## 2024-04-23 DIAGNOSIS — H55.81 SACCADIC EYE MOVEMENT DEFICIENCY: ICD-10-CM

## 2024-04-23 DIAGNOSIS — F07.81 POST CONCUSSION SYNDROME: Primary | ICD-10-CM

## 2024-04-23 DIAGNOSIS — H51.11 CONVERGENCE INSUFFICIENCY: ICD-10-CM

## 2024-04-23 DIAGNOSIS — G44.321 INTRACTABLE CHRONIC POST-TRAUMATIC HEADACHE: ICD-10-CM

## 2024-04-23 DIAGNOSIS — Z74.09 IMPAIRED FUNCTIONAL MOBILITY, BALANCE, AND ENDURANCE: Primary | ICD-10-CM

## 2024-04-23 DIAGNOSIS — S06.9XAS COGNITIVE AND NEUROBEHAVIORAL DYSFUNCTION FOLLOWING BRAIN INJURY: ICD-10-CM

## 2024-04-23 DIAGNOSIS — G44.86 CERVICOGENIC HEADACHE: ICD-10-CM

## 2024-04-23 DIAGNOSIS — H81.90 VESTIBULOPATHY, UNSPECIFIED LATERALITY: ICD-10-CM

## 2024-04-23 DIAGNOSIS — S06.0XAA CONCUSSION WITH UNKNOWN LOSS OF CONSCIOUSNESS STATUS, INITIAL ENCOUNTER: ICD-10-CM

## 2024-04-23 DIAGNOSIS — F09 COGNITIVE AND NEUROBEHAVIORAL DYSFUNCTION FOLLOWING BRAIN INJURY: ICD-10-CM

## 2024-04-23 DIAGNOSIS — S13.4XXA WHIPLASH, INITIAL ENCOUNTER: ICD-10-CM

## 2024-04-23 DIAGNOSIS — M25.511 ACUTE PAIN OF RIGHT SHOULDER: Primary | ICD-10-CM

## 2024-04-23 DIAGNOSIS — H51.9 CONVERGENCE INSUFFICIENCY OR PALSY IN BINOCULAR EYE MOVEMENT: ICD-10-CM

## 2024-04-23 DIAGNOSIS — G31.89 COGNITIVE AND NEUROBEHAVIORAL DYSFUNCTION FOLLOWING BRAIN INJURY: ICD-10-CM

## 2024-04-23 DIAGNOSIS — H81.8X9 DEFICIT OF VESTIBULO-OCULAR REFLEX: ICD-10-CM

## 2024-04-23 DIAGNOSIS — S06.0X0A CONCUSSION WITHOUT LOSS OF CONSCIOUSNESS, INITIAL ENCOUNTER: Primary | ICD-10-CM

## 2024-04-23 DIAGNOSIS — H55.82 SMOOTH PURSUIT MOVEMENT DEFICIENCY: ICD-10-CM

## 2024-04-23 DIAGNOSIS — G44.319 ACUTE POST-TRAUMATIC HEADACHE, NOT INTRACTABLE: ICD-10-CM

## 2024-04-23 PROCEDURE — 97165 OT EVAL LOW COMPLEX 30 MIN: CPT

## 2024-04-23 PROCEDURE — 3008F BODY MASS INDEX DOCD: CPT | Mod: CPTII,S$GLB,, | Performed by: PSYCHIATRY & NEUROLOGY

## 2024-04-23 PROCEDURE — 97161 PT EVAL LOW COMPLEX 20 MIN: CPT | Performed by: PHYSICAL THERAPIST

## 2024-04-23 PROCEDURE — 99205 OFFICE O/P NEW HI 60 MIN: CPT | Mod: 25,S$GLB,, | Performed by: PSYCHIATRY & NEUROLOGY

## 2024-04-23 PROCEDURE — 97530 THERAPEUTIC ACTIVITIES: CPT | Performed by: PHYSICAL THERAPIST

## 2024-04-23 PROCEDURE — 4010F ACE/ARB THERAPY RXD/TAKEN: CPT | Mod: CPTII,S$GLB,, | Performed by: PSYCHIATRY & NEUROLOGY

## 2024-04-23 PROCEDURE — 99999 PR PBB SHADOW E&M-EST. PATIENT-LVL IV: CPT | Mod: PBBFAC,,,

## 2024-04-23 PROCEDURE — 3078F DIAST BP <80 MM HG: CPT | Mod: CPTII,S$GLB,, | Performed by: PSYCHIATRY & NEUROLOGY

## 2024-04-23 PROCEDURE — 3074F SYST BP LT 130 MM HG: CPT | Mod: CPTII,S$GLB,, | Performed by: PSYCHIATRY & NEUROLOGY

## 2024-04-23 PROCEDURE — 1159F MED LIST DOCD IN RCRD: CPT | Mod: CPTII,S$GLB,, | Performed by: PSYCHIATRY & NEUROLOGY

## 2024-04-23 PROCEDURE — 97129 THER IVNTJ 1ST 15 MIN: CPT

## 2024-04-23 PROCEDURE — 96116 NUBHVL XM PHYS/QHP 1ST HR: CPT | Mod: 59,S$GLB,, | Performed by: PSYCHIATRY & NEUROLOGY

## 2024-04-23 PROCEDURE — 99367 TEAM CONF W/O PAT BY PHYS: CPT | Mod: S$GLB,,, | Performed by: PSYCHIATRY & NEUROLOGY

## 2024-04-23 PROCEDURE — 96125 COGNITIVE TEST BY HC PRO: CPT | Mod: 59

## 2024-04-23 PROCEDURE — 97112 NEUROMUSCULAR REEDUCATION: CPT | Performed by: PHYSICAL THERAPIST

## 2024-04-23 PROCEDURE — 1160F RVW MEDS BY RX/DR IN RCRD: CPT | Mod: CPTII,S$GLB,, | Performed by: PSYCHIATRY & NEUROLOGY

## 2024-04-23 PROCEDURE — 3044F HG A1C LEVEL LT 7.0%: CPT | Mod: CPTII,S$GLB,, | Performed by: PSYCHIATRY & NEUROLOGY

## 2024-04-23 RX ORDER — METHOCARBAMOL 500 MG/1
500 TABLET, FILM COATED ORAL 2 TIMES DAILY
Qty: 120 TABLET | Refills: 0 | Status: SHIPPED | OUTPATIENT
Start: 2024-04-23 | End: 2024-06-29

## 2024-04-23 RX ORDER — METHYLPREDNISOLONE 4 MG/1
TABLET ORAL
Qty: 21 EACH | Refills: 0 | Status: SHIPPED | OUTPATIENT
Start: 2024-04-23

## 2024-04-23 NOTE — PATIENT INSTRUCTIONS
Oculomotor: Smooth Pursuits        Holding a target, keep eyes on target and slowly move target side to side, up and down, and diagonally with head still. Perform sitting.  Repeat 1 set x 30 seconds per session. Do 2 sessions per day.     Copyright © I. All rights reserved.       Oculomotor: Saccades        Holding two targets positioned side by side, up and down, and diagonally, move eyes quickly from target to target as head stays still. Be sure to bring targets into focus. Perform 1 sets x 30 seconds each direction. Do 2 sessions per day.    Copyright © [employer]. All rights reserved.     GAZE SHIFT FROM NEAR TO FAR TARGET:  Perform 30 seconds and shift gaze from close to far target. Be sure to bring the targets into focus before switching your gaze to the other one. Perform 2 times per day.      Gaze Stabilization: Tip Card    1.Target must remain in focus, not blurry, and appear stationary while head is in motion.  2.Perform exercises with small head movements (45° to either side of midline).  3.Increase speed of head motion so long as target is in focus.  4.If you wear eyeglasses, be sure you can see target through lens (therapist will give specific instructions for bifocal / progressive lenses).  5.These exercises may provoke dizziness or nausea. Work through these symptoms. If too dizzy, slow head movement slightly. Rest between each exercise.  6.Exercises demand concentration; avoid distractions.  7.For safety, perform standing exercises close to a counter, wall, corner, or next to someone.    Copyright © I. All rights reserved.       Gaze Stabilization (VORx1): Sitting        Keeping eyes on target, tilt head down 15-30° and move head side to side for 30 seconds. Repeat while moving head up and down for 30 seconds.  Do 2 sessions per day. Only move head at the speed that you can keep target in focus. Remember head movements are short.     Copyright © [employer]. All rights reserved.

## 2024-04-23 NOTE — PROGRESS NOTES
Subjective:       Patient ID: Corazon Jones is a 58 y.o. male.    Chief Complaint:  Concussion      Consultation Requested by:   Baldomero Schwab Md  [address] [facility]  [address]  [place] [address]    History of Present Illness  58-year-old male presents for evaluation of a complicated concussion.  He was injured on 04/02 when he slipped and fell out of his shower striking between her shoulder blades onto his neck and back and then the back of his head.  He notes he did not lose consciousness at that time.  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  He notes that he had no specific symptoms after giving a lecture but he felt quite fatigued throughout the rest of the week.  He notes that by Friday, 4/5, he had a bad headache and had significant problems with his joints hurting, stating that it felt as if he had polymyalgia rheumatica.  He notes that all of his major joints hurt including his knee and ankle as well as his shoulders neck and back elbows, etc..  He notes that on 04/06 and 4/7 he did not leave his bed and took Tylenol and Celebrex around the clock.  After having slept through most of the weekend he went back to work on 04/08 and had a half day of work then basically went home and went back to bed.  He returned to work on 04/22 and he was told by his work colleagues that he sounded slower than he usually does but he notes currently, that he feels as if we can think quickly but his verbal output does not match his cognitive speed.  He notes initially he had a significant headache where he felt as if his eyeballs felt heavier than the rest of his entire body.  He notes that that no longer is the case.  He notes his headaches tend to be occipital and feel pounding and pulsating.  He does not have a baseline headache disorder.  The patient notes that he felt as if he had a  rash in his right axilla about a week ago.  Upon visual inspection there looked to be too scratch marks their from where he was itching it, rather than any vesicles or other skin lesions.    He has filled out a sports concussion office Assessment Tool 6 and scored the following:  Five, severe for fatigue  Four, moderate for headaches  3, moderate for feeling slowed down, feeling in a fog, confusion, drowsiness  Two, mild for pressure in head, sensitivity to light, sensitivity to noise  One, mild for dizziness   0, none for nausea, neck pain, blurred vision, balance problems, more emotional, irritability, sadness, nervous or anxious, sleep disturbance, abnormal heart rate, excessive sweating    Of note he notes that he was already in physical therapy for his right shoulder prior to his injury.  He notes that this feels as if it has been exacerbated. I have assessed his cognitive and emotional function and they remain appropriate for his medical condition.     Past Medical History:   Diagnosis Date    History of Clostridium difficile colitis     treated 2014    History of sarcoidosis     dx 1991, resolved    Hyperlipemia        Past Surgical History:   Procedure Laterality Date    APPENDECTOMY      EYE SURGERY  2006 Lasik    HERNIA REPAIR      orthoscopy      shoulder and knee       Family History   Problem Relation Name Age of Onset    Hyperlipidemia Mother Magdalena     Vision loss Mother Magdalena     Atrial fibrillation Mother Magdalena     Hyperlipidemia Father Essam     Stroke Father Essam         hemorrhagic/ also with trauma, unknown    Hyperlipidemia Brother      Diabetes Mellitus Maternal Grandmother      Endometrial cancer Paternal Grandmother      ADD / ADHD Child      Diabetes Mellitus Maternal Uncle      Prostate cancer Maternal Uncle      Diabetes Mellitus Paternal Uncle      Colon cancer Neg Hx         Social History     Socioeconomic History    Marital status:     Number of children: 3   Occupational  History    Occupation: physician     Employer: [employer]   Tobacco Use    Smoking status: Never    Smokeless tobacco: Never   Substance and Sexual Activity    Alcohol use: No    Drug use: No    Sexual activity: Yes     Partners: Female   Social History Narrative    Intermittent exercise     Social Determinants of Health     Financial Resource Strain: Patient Declined (3/15/2024)    Overall Financial Resource Strain (CARDIA)     Difficulty of Paying Living Expenses: Patient declined   Food Insecurity: Patient Declined (3/15/2024)    Hunger Vital Sign     Worried About Running Out of Food in the Last Year: Patient declined     Ran Out of Food in the Last Year: Patient declined   Transportation Needs: Patient Declined (3/15/2024)    PRAPARE - Transportation     Lack of Transportation (Medical): Patient declined     Lack of Transportation (Non-Medical): Patient declined   Physical Activity: Unknown (3/15/2024)    Exercise Vital Sign     Days of Exercise per Week: 1 day   Stress: Stress Concern Present (3/15/2024)    [employer] - Occupational Stress Questionnaire     Feeling of Stress : To some extent   Social Connections: Unknown (3/15/2024)    Social Connection and Isolation Panel [NHANES]     Frequency of Communication with Friends and Family: Once a week     Frequency of Social Gatherings with Friends and Family: Patient declined     Active Member of Clubs or Organizations: No     Attends Club or Organization Meetings: Patient declined     Marital Status:    Housing Stability: Unknown (3/15/2024)    Housing Stability Vital Sign     Unable to Pay for Housing in the Last Year: No     Unstable Housing in the Last Year: No       Review of Systems  Review of Systems   Constitutional:  Positive for activity change and fatigue.   Eyes:  Positive for photophobia and visual disturbance.   Gastrointestinal:  Positive for nausea and vomiting.   Musculoskeletal:  Positive  for arthralgias, myalgias, neck pain and neck stiffness.   Neurological:  Positive for dizziness and headaches.   Psychiatric/Behavioral:  Positive for decreased concentration. Negative for sleep disturbance. The patient is not nervous/anxious.    All other systems reviewed and are negative.      Objective:     Vitals:    04/23/24 0952   BP: 111/75   Pulse: 79      Physical Exam  HENT:      Head: Normocephalic.   Eyes:      Extraocular Movements: EOM normal.      Pupils: Pupils are equal, round, and reactive to light.   Neck:     Pulmonary:      Effort: Pulmonary effort is normal.   Musculoskeletal:      Cervical back: Tenderness present. Muscular tenderness present.      Thoracic back: Tenderness present.      Lumbar back: Tenderness present.        Back:    Neurological:      Mental Status: He is oriented to person, place, and time.      Motor: Motor strength is normal.     Gait: Gait is intact.      Deep Tendon Reflexes:      Reflex Scores:       Tricep reflexes are 2+ on the right side and 2+ on the left side.       Bicep reflexes are 2+ on the right side and 2+ on the left side.       Brachioradialis reflexes are 2+ on the right side and 2+ on the left side.       Patellar reflexes are 3+ on the right side and 3+ on the left side.  Psychiatric:         Speech: Speech normal.         NEUROLOGICAL EXAMINATION:     MENTAL STATUS   Oriented to person, place, and time.   Registration: recalls 3 of 3 objects. Recall at 5 minutes: recalls 2 of 3 objects. Follows 2 step commands.   Attention: decreased. Concentration: decreased.   Speech: speech is normal   Level of consciousness: alert  Knowledge: good.   Normal comprehension. Praxis: normal     CRANIAL NERVES     CN II   Visual fields full to confrontation.     CN III, IV, VI   Pupils are equal, round, and reactive to light.  Extraocular motions are normal.     CN V   Facial sensation intact.     CN VII   Facial expression full, symmetric.     CN VIII   CN VIII  normal.     CN IX, X   CN IX normal.   CN X normal.     CN XI   CN XI normal.     CN XII   CN XII normal.        Point of convergence is abnormal, greater than 20 cm, transient diplopia especially in bilateral inferior quadrant and right superior quadrant.    Renetta is abnormal 7/9/8, indicating vestibulopathy     MOTOR EXAM   Muscle bulk: normal  Overall muscle tone: normal    Strength   Strength 5/5 throughout.     REFLEXES     Reflexes   Right brachioradialis: 2+  Left brachioradialis: 2+  Right biceps: 2+  Left biceps: 2+  Right triceps: 2+  Left triceps: 2+  Right patellar: 3+  Left patellar: 3+  Right plantar: normal  Left plantar: normal  Right Bethea: absent  Left Bethea: absent    SENSORY EXAM   Light touch normal.     GAIT AND COORDINATION     Gait  Gait: normal    Assessment/Plan:     Problem List Items Addressed This Visit    None  Visit Diagnoses       Concussion without loss of consciousness, initial encounter    -  Primary    Relevant Medications    methylPREDNISolone (MEDROL DOSEPACK) 4 mg tablet    Post concussive syndrome        Cervicogenic headache        Acute post-traumatic headache, not intractable        Relevant Medications    methylPREDNISolone (MEDROL DOSEPACK) 4 mg tablet    methocarbamoL (ROBAXIN) 500 MG Tab    Other Relevant Orders    Sedimentation rate    C-REACTIVE PROTEIN    Convergence insufficiency or palsy in binocular eye movement        Whiplash, initial encounter        Relevant Medications    methocarbamoL (ROBAXIN) 500 MG Tab    Other Relevant Orders    Sedimentation rate    C-REACTIVE PROTEIN    Vestibulopathy, unspecified laterality        Cognitive and neurobehavioral dysfunction following brain injury              58-year-old male presents for evaluation of concussion sustained on 04/02. 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 He notes that he had a weekend of severe fatigue, and arthralgia, which we believe to be related to  a viral illness. The confounding issue has likely exacerbated his recovery after concussion. At this time, we have discussed a steroid pack to break his current headache cycle.  We have discussed that his severe fatigue could be related to convergence insufficiency, whiplash with proprioceptive changes and vestibulopathy.  All of which could be draining.  The patient does have a vitamin-D insufficiency upon laboratory review.  I will check some other labs and see if he is dealing with other postviral issues.  I will see the patient back in about a week.  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  If he is able to tolerate that half day return, then I will likely allow him to return to work more fully.  For now we have deferred any sort of injection as the patient has no specific focal tenderness or Tinel sign positivity.  I will give him Robaxin to take p.r.n. b.i.d..  I have discussed his case a synchronously with PT/OT, speech therapy as part of today's multidisciplinary concussion Clinic.  This was done a synchronously, apart from my time with the patient.        Reyes Hartman MD, FAAN  This note is dictated on M*Modal Fluency speech recognition program. There are word recognition mistakes that are occasionally missed on review.

## 2024-04-23 NOTE — LETTER
April 23, 2024      [facility] - Neurology 7th Fl  [address]  [facility] [address]  Phone: [phone]  Fax: [phone]       Patient: Corazon Jones   YOB: 1965  Date of Visit: 04/23/2024    To Whom It May Concern:    Maritza Jones  was at [facility] on 04/23/2024. The patient may return to work on 4/29/24 with no restrictions. If you have any questions or concerns, or if I can be of further assistance, please do not hesitate to contact me.    Sincerely,    Reyes Hartman III, MD

## 2024-04-23 NOTE — PROGRESS NOTES
[facility]  Physical Therapy Neurological Rehabilitation Initial Evaluation  [facility]    Name: Corazon Jones  Clinic Number: [id number]    Therapy Diagnosis:   Encounter Diagnoses   Name Primary?    Concussion with unknown loss of consciousness status, initial encounter     Impaired functional mobility, balance, and endurance Yes    Intractable chronic post-traumatic headache      Physician: Reyes Hartman III, MD    Physician Orders: PT Eval and Treat neuro Program  Medical Diagnosis from Referral:   Diagnosis   S06.0XAA (ICD-10-CM) - Concussion with unknown loss of consciousness status, initial encounter     Evaluation Date: 4/23/2024  Authorization Period Expiration: 4/22/25  Plan of Care Expiration: 6/18/24  Visit # / Visits authorized: 1/ 1    PN Due: 5/23/24     Time In: 1117  Time Out: 1144  Total Billable Time: 27 minutes    Precautions: Standard    Subjective   Date of onset: 4/2/24  History of current condition - per medical record: patient fell and hit back of head on 4/2/24. Patient began to experience occipital headache ~3 days later. Patient likely also had a viral infection after he fell.  Corazon reports: current sx: brain fog, fatigue (reports sleeping all day and not rested), daily headache, feels slightly off balance while walking. New onset of intrathoracic spine pain (since fall)- sharp pain. Overall sx are improving. PMHx: hyperlipemia      Medical History:   Past Medical History:   Diagnosis Date    History of Clostridium difficile colitis     treated 2014    History of sarcoidosis     dx 1991, resolved    Hyperlipemia        Surgical History:   Corazon Jones  has a past surgical history that includes Appendectomy; Hernia repair; orthoscopy; and Eye surgery (2006 Lasik).    Medications:   Corazon has a current medication list which includes the following prescription(s): celecoxib, ergocalciferol, losartan, methocarbamol, methylprednisolone, omeprazole,  ondansetron, rosuvastatin, sertraline, tadalafil, tretinoin, and zonisamide.    Allergies:   Review of patient's allergies indicates:   Allergen Reactions    Percocet [oxycodone-acetaminophen]     Suture, silk     Adhesive Rash        Imaging,  CT, head 4/19/24:   FINDINGS:  Intracranial compartment: Brain appears normally formed.     Ventricles and sulci are normal in size for age without evidence of hydrocephalus. No extra-axial blood or fluid collections.     The brain parenchyma appears normal. No parenchymal mass, hemorrhage, edema or major vascular distribution infarct.     Skull/extracranial contents (limited evaluation): No fracture. Mastoid air cells and paranasal sinuses are essentially clear.  Imaged portions of the orbits are within normal limits.     Impression:  No acute intracranial abnormality.     Electronically signed by:Jl Hargrove MD  Date:                                            04/19/2024    CT, cervical/ thoracic 4/19/24:   FINDINGS:  Cervical spine:  No acute cervical fracture.  The prevertebral soft tissues are unremarkable.     Degenerative changes with disc bulging endplate osteophyte formation most notable at C6-7 but no evidence to suggest cord impingement with moderate right foraminal stenosis noted at this level.     Thoracic spine:  No acute compression fracture.  No advanced degenerative changes.     Impression:  No acute fracture of the cervical and thoracic spine.     Electronically signed by:Hank Dillard  Date:                                            04/19/2024      Prior Therapy: physical therapy for shoulder pain/ weakness  Social History:  lives with their family- son  Falls: none since injury   DME: none    Home Environment: 2 story home, 1 step to enter   Exercise Routine / History:  home exercise program for shoulder and knee (home exercise program from physical therapy)  Occupation: pain management physician   Prior Level of Function: working 8 hours/day x 4 days/  week   Current Level of Function: not currently working, lethargic/ poor sleep    Dizziness Handicap Inventory: not tested    16-34= mild   26-53= moderate   >/= 54= severe    Pain:  Current 1/10, worst 8/10, best 0/10   Location: base of skull, top of head   Description: Throbbing, dull  Aggravating Factors: bearing down  Easing Factors: Celebrex- last dose 3-4 days ago     Pts goals: want to wake up and go back to work    Objective     Mental status: alert, oriented to person, place, and time  Appearance: Casually dressed  Behavior:  calm and cooperative  Attention Span and Concentration: slowed processing speed/ speech    Dominant hand:  right     Posture Alignment :mild left thoracic rotation, slight right suboccipital side bending    Sensation:  Light Touch: Intact           Proprioception:   Intact      SPECIAL TESTS:   Vertebral artery test: (-)  Sharp Pulsar Test: (-)   Transverse ligament test: not tested   Alar ligament test: (-)   Anterior Shear/ Sagittal Stress Test: difficulty assessing due to muscle guarding        OCULOMOTOR ASSESSMENT: Refer to OT report for details       Head- Neck Differentiation Test: not tested     VESTIBULAR ASSESSMENT:   Hallpike-Fran Test: not applicable   Roll Test: not applicable       ORTHOPEDIC ASSESSMENT:  Muscle Tone: increased right scalenes, bilateral suboccipitals, bilateral Upper trapezius (right>left), bilateral Levator scapulae (right>left), cervical> thoracic erector spinae   TTP: right Upper trapezius, right side of cervical erector spinae       RANGE OF MOTION:  CERVICAL SPINE AROM:   Flexion: (norm: 80-90 deg) 25 deg*    Extension: (norm: 70-80 deg) 55 deg   Left Sidebend: (norm: 20-45 deg) 32 deg   Right Sidebend: (norm: 20-45 deg) 22 deg   Left Rotation: (norm: 70-90 deg) 65 deg   Right Rotation: (norm: 70-90 deg) 55 deg*   * pain at end range  Joint restrictions: difficulty assessing due to muscle guarding    Craniocervical flexion rotation test: tightness  "with right rotation     UPPER EXTREMITY  (R) UE: WNLs  (L) UE: WNLs         LOWER EXTREMITIES  Not tested        STRENGTH: manual muscle test grades below   Deep neck flexors: 4+/5, able to hold 5 sec     Upper Extremity Strength   BIJAL MALDONADO   Gross shoulder:  5/5 5/5   Lats:  5/5 5/5   Low traps:  5/5 5/5   Mid traps:  5/5 5/5   Rhomboids:  5/5 5/5     Lower Extremity Strength: not tested       POSTURAL CONTROL:  Monica Sensory Testing:  (P= Pass, F= Fail; note any sway; hold each position for 30")  Condition 1: (firm surface/feet together/eyes open) P, minimal-mod sway  Condition 2: (firm surface/feet together/eyes closed) P, moderate sway  Condition 3: (firm surface/feet in tandem/eyes open) F (14 sec),   Condition 4: (firm surface/feet in tandem/eyes closed) not tested   Condition 5: (soft surface/feet together/eyes open) P, minimal-moderate sway  Condition 6: (soft surface/feet together/eyes closed) P, moderate sway  Condition 7: (Fakuda step test), measure distance varied from center starting position started turning left then self corrected to within functional limits      Eyes Open Eyes Closed   Single Limb Stance R LE Not tested  Not tested    Single Limb Stance L LE Not tested  Not tested        GAIT ASSESSMENT:   - AD used: none  - Assistance: I  - Distance: community  - Curb/ Ramp: I  - Stairs:  not tested     GAIT DEVIATIONS:  Corazon displays the following deviations with ambulation: slower chris     Evaluation   Timed Up and Go Not tested    TUG cognitive Not tested    Self Selected Walking Speed  Not tested      Endurance Deficit (per pt report): poor, lethargic during evaluation despite sleeping from 9 pm to 8 am      Special Tests: Functional Gait Assessment TBA       TREATMENT   Treatment Time In: 1145  Treatment Time Out: 1155  Total Treatment time separate from Evaluation: 10 minutes    Corazon participated in dynamic functional therapeutic activities to improve functional performance for 10  minutes, " including:     Review of home exercise program:   Upper trapezius  stretch  Levator scapulae  stretch  sternocleidomastoid stretch  Thoracic rotation at wall  Semi tandem stance   - to progress to head turns as able      Home Exercises and Patient Education Provided    Education provided:   - role of physical therapy/ plan of care  - review of impairments noted  - review of home exercise program     Written Home Exercises Provided: yes.  Exercises were reviewed and Corazon was able to demonstrate them prior to the end of the session.  Corazon demonstrated good  understanding of the education provided.     See EMR under Patient Instructions for exercises provided 4/23/2024.    Assessment   Corazon is a 58 y.o. male referred to outpatient Physical Therapy with a medical diagnosis of concussion. Patient suffered a fall hitting the back of the head on 4/2/24. Patient reports a 3 day delay in the onset of occipital headache. He reports the current impairments: brain fog, fatigue, daily headache, slightly off balance while walking, and intrathoracic spine pain. Patient rests (sitting) in an abnormal posture consisting of mild left thoracic rotation and slight right suboccipital side bending. Pt presents with increased muscle tone in the upper cervical to mid thoracic posterior chain. Muscle knots noted in right Upper trapezius and left side of cervical erector spinae. Cervical active range of motion is decreased for flexion and right sided motions. Limited mobility is noted at the [place]-axial joint. Decreased cervical flexor endurance noted. Patient also demonstrates impaired postural responses per Monica. Increased sway noted on all conditions with patient being unable to complete condition 3. Balance worsened without visual support indicating a likely vestibular component.  Fukuda Step Test was negative, but patient was noted to turn significantly towards left side then self correct prior to conclusion of test. Current  impairments are preventing patient from being able to work. Home exercise program to address cervical flexibility and balance responses reviewed and issued to patient. Patient can benefit from skilled physical therapy to address aforementioned impairments to decrease headache and return to prior level of function.     Pt prognosis is Good.   Pt will benefit from skilled outpatient Physical Therapy to address the deficits stated above and in the chart below, provide pt/family education, and to maximize pt's level of independence.     Plan of care discussed with patient: Yes  Pt's spiritual, cultural and educational needs considered and patient is agreeable to the plan of care and goals as stated below:     Anticipated Barriers for therapy: none    Medical Necessity is demonstrated by the following  History  Co-morbidities and personal factors that may impact the plan of care Co-morbidities:   hyperlipemia     Personal Factors:   no deficits     moderate   Examination  Body Structures and Functions, activity limitations and participation restrictions that may impact the plan of care Body Regions:   neck  upper extremities  trunk    Body Systems:    gross symmetry  ROM  strength  gross coordinated movement  balance  gait  transfers  transitions  motor control  edema  skin integrity  Vestibular function    Participation Restrictions:   Unable to work  Daily headache   Impaired balance    Activity limitations:   Learning and applying knowledge  no deficits    General Tasks and Commands  See SLP    Communication  no deficits    Mobility  walking    Self care  no deficits    Domestic Life  doing house work (cleaning house, washing dishes, laundry)    Interactions/Relationships  no deficits    Life Areas  employment    Community and Social Life  community life  recreation and leisure         high   Clinical Presentation stable and uncomplicated low   Decision Making/ Complexity Score: low     Goals:  Short Term Goals: 4  weeks   Patient will report compliance with home exercise program to address cervical impairments at least 3x/week to decrease headache.   Assess Functional Gait Assessment and set goals as needed.   Patient will demonstrate improved cervical flexion to 40 deg to demonstrate improved muscle integrity.   Patient will demonstrate improved cervical flexor endurance to decrease headache by performing chin tuck for >10 seconds.   Patient will report a break in headache cycle by reporting only headache 3 days/ week.     Long Term Goals: 8 weeks   Patient will demonstrate improved cervical right rotation to >65 deg to improve safety with driving.   Patient will deny headache x 2 weeks to demonstrate a return to prior level of function.   Patient will demonstrate improved balance responses by performing tandem stance x 30 sec.   Patient will demonstrate improved vestibular function by performing narrow base of support, foam, x 30 sec with minimal sway.     Plan   Plan of care Certification: 4/23/2024 to 6/18/24.    Outpatient Physical Therapy 2 times weekly for 10 weeks to include the following interventions: Cervical/Lumbar Traction, Electrical Stimulation dry needling, Manual Therapy, Moist Heat/ Ice, Neuromuscular Re-ed, Patient Education, Therapeutic Activities, and Therapeutic Exercise.     Bonny Gordon, PT, DPT,   Board-Certified Clinical Specialist in Neurologic Physical Therapy   Certified Brain Injury Specialist    4/23/2024

## 2024-04-23 NOTE — PATIENT INSTRUCTIONS
Levator Stretch        Grasp seat or sit on hand on side to be stretched. Turn head toward other side and look down. Use hand on head to gently stretch neck in that position. Hold 15-30 seconds. Repeat on other side.  Repeat 3 times each side. Do 1-2 sessions per day.    Upper Trapezius Stretch        Look straight ahead. Gently grasp right side of head while reaching behind back with other hand. Tilt head away until a gentle stretch is felt. Hold 15-30 seconds. Repeat on opposite side.   Repeat 3 times each side. Do 1-2 sessions per day.    Semi Tandem Standing        Heel of one foot against arch of other:   Balance 30 seconds.   Turn head side to side for 30 seconds.   Look up and down for 30 seconds.     Repeat 2 times with each foot forward. Do 1 sessions per day.     [email or web address]     Copyright © [employer]. All rights reserved.       © 2024 [employer] All Rights Reserved

## 2024-04-23 NOTE — PROGRESS NOTES
[facility]  Speech Therapy Evaluation - [facility]    Date: 4/23/2024     Name: Corazon Jones   MRN: [id number]    Therapy Diagnosis: No diagnosis found.   Physician: Reyes Hartman III, MD  Physician Orders: Ambulatory Referral to Speech Therapy   Medical Diagnosis: Concussion with unknown loss of consciousness status, initial encounter [S06.0XAA]    Visit # / Visits Authorized:  1 / 1   Date of Evaluation:  4/23/2024   Insurance Authorization Period: 4/22/2024 - 4/22/2025   Plan of Care Certification: Evaluation only       Time In: 11:45   Time Out: 12:30   Procedure Min.   Cognitive Communication Evaluation - including administration, scoring and interpretation   45     Precautions: Standard  Subjective   Date of Onset: 4/02/2024  History of Current Condition:  Corazon Jones is a 58 y.o. male who presents to [facility] for evaluation and treatment secondary to post-concussion syndrome. Patient was referred to therapy by Dr. Hartman at the [facility]. Patient's concussion occurred in 04/02/2024 after fall to back of head, did presentation at [facility] subsequently. No known LOC. Per chart review, pt did not have symptoms for 2 days post fall but by the 3rd day (Friday) he began with severe occipital headaches. On Saturday and Sunday post fall he began with chills and body aches and slept for most of the weekend. Work on Monday, April 8 was challenging. He stayed home the remainder of the week. He was very fatigued and slept most of the time. He was feeling better by April 14 and was able to return to work on the 15th but had low energy. He had an emesis episode on Wednesday and severe constipation. A co-worker also expressed concern regarding his mental acuity, slowness of speech, and lack of clarity in diction. Per Dr. Hartman, some symptoms are suspected to be more so related to a viral infection. Currently, fatigue and lethargy are pt's  "main complaints. He reported that he feels like he could sleep all day. Patient endorsed fatigue. Patient does feel as though he has returned to baseline cognitive communication functioning.    Previous history of:  Previous concussions: denied  Anxiety: denied  Depression: denied  Learning Disabilities: denied  ADHD: denied  Headaches and/or Migraines: denied    Past Medical History: Corazon Jones  has a past medical history of History of Clostridium difficile colitis, History of sarcoidosis, and Hyperlipemia.  Corazon Jones  has a past surgical history that includes Appendectomy; Hernia repair; orthoscopy; and Eye surgery (2006 Lasik).  Medical Hx and Allergies: Corazon has a current medication list which includes the following prescription(s): celecoxib, ergocalciferol, losartan, omeprazole, ondansetron, rosuvastatin, sertraline, tadalafil, tretinoin, and zonisamide.   Review of patient's allergies indicates:   Allergen Reactions    Percocet [oxycodone-acetaminophen]     Suture, silk     Adhesive Rash       Prior Therapy:  none  Social History:   Lives with: son  Family responsibilities: WFL  Occupation: Pain Management MD  Computer usage: 8+  Driving: yes    Education Level: MD    Prior Level of Function: independent   Current Level of Function: remains independent; however patients response time is reduced    Pain:   Location: Headache  8/10 currently  3/10 on average    Description: occipital headaches  Aggravating Factors: screen time  Easing Factors: Tylonel, Celebrex, and rest     Nutrition:  No deficits, Oral, Thin liquids (IDDSI 0) and Regular consistencies (IDDSI 7)   Patient's Therapy Goals:  "I just feel fatigue and because of that it is slower getting out my words."   Objective   Formal Assessment:    The Cognitive Communication Checklist for Acquired Brain Injury (CCCABI): The CCCABI is a referral tool designed to help flag communication difficulties after brain injury. This questionnaire " screens for dysfunction in six domains: Functional Daily Communication, Auditory Comprehension & Information Processing, Expression, Discourse & Social Communication, Reading Comprehension, Written Expression, and Executive Functions & Self-Regulation. The results of the questionnaire are presented below.    Patient completed the questionnaire and no cognitive communication concerns were identified.    For the below categories only the patients self-identified complaints are listed for each domain.    Domain Patient Self-Identified Complaints   Functional Daily Communications Difficulties with:  No difficulties reported in this domain    Auditory Comprehension & Information Processing Difficulties with:  Focusing attention on what is said   Expression, Discourse & Social Communication Difficulties with:  No difficulties reported in this domain    Reading Comprehension Difficulties with:  No difficulties reported in this domain    Written Expression Difficulties with:  No difficulties reported in this domain   Thinking, Reasoning, Problem Solving, Executive Functions, Self-Regulation Difficulties with:  No difficulties reported in this domain    Reference: Catrina Platt (2015) Cognitive Communication Checklist for Acquired Brain Injury (CCCABI) [employer] [facility]; [facility], [place], N1H 6J2 , [email or web address]      The Repeatable Battery for the Assessment of Neuropsychological Status (RBANS) Version B was administered to measure the patient's attention, language, visuospatial/constructional abilities, and immediate and delayed memory. The results are outlined below:    Domain Subtest Total Score Index Score   Immediate Memory List Learning 33   112    Story Memory 19    Visuospatial/  Constructional Figure Copy 20   126    Line Orientation 20    Language Picture Naming 10   109    Semantic Fluency 26    Attention Digit Span 11   115    Coding 57      Delayed Memory List Recall 10     124    List  Recognition 20     Story Recall 12     Figure Recall 18        Total Scale   125     Percentile   95     Descriptor   120-129 = Superior   Interpretation:  Index score: mean of 100, standard deviation of 15. Therefore, 130+ = Very Superior; 120-129 = Superior; 110-119 = High average;  = Average; 80-89 = Low Average; 70-79 = Borderline; 69 and below = Extremely Low. Apparently the most reliable score; factor in education level.    Immediate Memory Score: Recalling information following immediate presentation is assessed through the List Learning and Story Memory subtests. In the List Learning subtest, the patient is given 10 words to remember. This list is presented four times overall. In this subtest, the patient did demonstrate learning over the 4 trials. The patient recalled 4 items on trial one, 9 items on trial two, 10 items on trial three, and 10 items in trial 4. In the Story Memory subtest, the patient recalled 7 details on the first presentation and 12 details on the second presentation. Results of this domain indicate Superior performance.  Visuospatial score: Perceiving spatial relations and constructing a spatially accurate copy of a drawing is assessed through the Figure Copy and Line Orientation subtests. The Figure Copy subtest asks the patient to copy a complex line drawing. The patient adequately completed figure copy. The Line Orientation subtest the presents the patient with 12 line displays and asks the patient to match two given lines at the bottom to the display at the top.  The patient correctly identified 20/20. Results of this domain indicate Very Superior performance.  Language score: Naming common items and retrieving learned material is assessed through the Picture Naming and Semantic Fluency subtests. The Picture Naming subtest asks the patient to name 10 line drawings. The patient was able to accurately name 10/10 items. The Semantic Fluency subtest asks the patient to name as many  animals as he can in 60 seconds. The patient was able to name 22 animals. These results indicate High average performance.   Attention score: Attending to, holding and manipulating information presented visually and orally in working memory is assessed with use of the Digit Span and Coding subtests. The Digit Span subtest asks the patient to repeat progressively lengthening strings of numbers. The Coding subtest asks the patient to alternate attention between a key the given work and then to decode symbols to numbers. The patients results on these subtest indicate Superior performance.  Delayed Memory score: Anterograde memory capacity is assessed through the List Recall, List Recognition, Story Recall, and Figure Recall subtests. The List Recall subtest asks the patient to recall items from the list presented at the beginning of the test. The patient was able to recall 10/10 items. The List Recognition subtest has the patient recall whether a word was or was not in the original list. The patient accurately identified whether a word was or was not on the list in 20 of 20 items. On the Story Recall subtest, the patient was able to recall 12 details. Finally, on the Figure Recall, the patient recalled 20 details. Results of this domain indicate Superior performance.    Treatment   Total Treatment Time Separate from Evaluation: 15 minutes   Treatment included the following:  Concussion education        Education provided:   -role of Speech Therapy, goals/plan of care, scheduling/cancellations, insurance limitations with patient  -Additional Education provided:   General concussion education     Patient expressed understanding.     Home Program: not established  Assessment     Corazon presents to [facility] status post medical diagnosis of Concussion with unknown loss of consciousness status, initial encounter [S06.0XAA].      Interpretation of objective assessment: Overall, according  to the RBANS research, total Scale index is a good indicator of general cognitive functioning. The patient presents without a cognitive communication disorder. It is important to note patient was seen to be extremely lethargic. ST noticed that patient required additional time to react when answering questions. However, information content was sensible and organized.     Demonstrates impairments including limitations as described in the problem list.     Positive prognostic factors: patient motivation  Negative prognostic factors: none  Barriers to therapy: Barriers to therapy include lethargy      Plan       Other Recommendations:   Establish Primary Care Physician (PCP)    Therapist's Name:   Christine Schmidt CCC-SLP   4/23/2024

## 2024-04-23 NOTE — PLAN OF CARE
[facility]  Speech Therapy Evaluation - Concussion [facility]    Date: 4/23/2024     Name: Corazon Jones   MRN: [id number]    Therapy Diagnosis: No diagnosis found.   Physician: Reyes Hartman III, MD  Physician Orders: Ambulatory Referral to Speech Therapy   Medical Diagnosis: Concussion with unknown loss of consciousness status, initial encounter [S06.0XAA]    Visit # / Visits Authorized:  1 / 1   Date of Evaluation:  4/23/2024   Insurance Authorization Period: 4/22/2024 - 4/22/2025   Plan of Care Certification: Evaluation only       Time In: 11:45   Time Out: 12:30   Procedure Min.   Cognitive Communication Evaluation - including administration, scoring and interpretation   45     Precautions: Standard  Subjective   Date of Onset: 4/02/2024  History of Current Condition:  Corazon Jones is a 58 y.o. male who presents to [facility] for evaluation and treatment secondary to post-concussion syndrome. Patient was referred to therapy by Dr. Hartman at the [facility]. Patient's concussion occurred in 04/02/2024 after fall to back of head, did presentation at [facility] subsequently. No known LOC. Per chart review, pt did not have symptoms for 2 days post fall but by the 3rd day (Friday) he began with severe occipital headaches. On Saturday and Sunday post fall he began with chills and body aches and slept for most of the weekend. Work on Monday, April 8 was challenging. He stayed home the remainder of the week. He was very fatigued and slept most of the time. He was feeling better by April 14 and was able to return to work on the 15th but had low energy. He had an emesis episode on Wednesday and severe constipation. A co-worker also expressed concern regarding his mental acuity, slowness of speech, and lack of clarity in diction. Per Dr. Hartman, some symptoms are suspected to be more so related to a viral infection. Currently, fatigue and lethargy are pt's  "main complaints. He reported that he feels like he could sleep all day. Patient endorsed fatigue. Patient does feel as though he has returned to baseline cognitive communication functioning.    Previous history of:  Previous concussions: denied  Anxiety: denied  Depression: denied  Learning Disabilities: denied  ADHD: denied  Headaches and/or Migraines: denied    Past Medical History: Corazon Jones  has a past medical history of History of Clostridium difficile colitis, History of sarcoidosis, and Hyperlipemia.  Corazon Jones  has a past surgical history that includes Appendectomy; Hernia repair; orthoscopy; and Eye surgery (2006 Lasik).  Medical Hx and Allergies: Corazon has a current medication list which includes the following prescription(s): celecoxib, ergocalciferol, losartan, omeprazole, ondansetron, rosuvastatin, sertraline, tadalafil, tretinoin, and zonisamide.   Review of patient's allergies indicates:   Allergen Reactions    Percocet [oxycodone-acetaminophen]     Suture, silk     Adhesive Rash       Prior Therapy:  none  Social History:   Lives with: son  Family responsibilities: WFL  Occupation: Pain Management MD  Computer usage: 8+  Driving: yes    Education Level: MD    Prior Level of Function: independent   Current Level of Function: remains independent; however patients response time is reduced    Pain:   Location: Headache  8/10 currently  3/10 on average    Description: occipital headaches  Aggravating Factors: screen time  Easing Factors: Tylonel, Celebrex, and rest     Nutrition:  No deficits, Oral, Thin liquids (IDDSI 0) and Regular consistencies (IDDSI 7)   Patient's Therapy Goals:  "I just feel fatigue and because of that it is slower getting out my words."   Objective   Formal Assessment:    The Cognitive Communication Checklist for Acquired Brain Injury (CCCABI): The CCCABI is a referral tool designed to help flag communication difficulties after brain injury. This questionnaire " screens for dysfunction in six domains: Functional Daily Communication, Auditory Comprehension & Information Processing, Expression, Discourse & Social Communication, Reading Comprehension, Written Expression, and Executive Functions & Self-Regulation. The results of the questionnaire are presented below.    Patient completed the questionnaire and no cognitive communication concerns were identified.    For the below categories only the patients self-identified complaints are listed for each domain.    Domain Patient Self-Identified Complaints   Functional Daily Communications Difficulties with:  No difficulties reported in this domain    Auditory Comprehension & Information Processing Difficulties with:  Focusing attention on what is said   Expression, Discourse & Social Communication Difficulties with:  No difficulties reported in this domain    Reading Comprehension Difficulties with:  No difficulties reported in this domain    Written Expression Difficulties with:  No difficulties reported in this domain   Thinking, Reasoning, Problem Solving, Executive Functions, Self-Regulation Difficulties with:  No difficulties reported in this domain    Reference: Catrina Platt (2015) Cognitive Communication Checklist for Acquired Brain Injury (CCCABI) [employer] [facility]; [facility], [place], N1H 6J2 , [email or web address]      The Repeatable Battery for the Assessment of Neuropsychological Status (RBANS) Version B was administered to measure the patient's attention, language, visuospatial/constructional abilities, and immediate and delayed memory. The results are outlined below:    Domain Subtest Total Score Index Score   Immediate Memory List Learning 33   112    Story Memory 19    Visuospatial/  Constructional Figure Copy 20   126    Line Orientation 20    Language Picture Naming 10   109    Semantic Fluency 26    Attention Digit Span 11   115    Coding 57      Delayed Memory List Recall 10     124    List  Recognition 20     Story Recall 12     Figure Recall 18        Total Scale   125     Percentile   95     Descriptor   120-129 = Superior   Interpretation:  Index score: mean of 100, standard deviation of 15. Therefore, 130+ = Very Superior; 120-129 = Superior; 110-119 = High average;  = Average; 80-89 = Low Average; 70-79 = Borderline; 69 and below = Extremely Low. Apparently the most reliable score; factor in education level.    Immediate Memory Score: Recalling information following immediate presentation is assessed through the List Learning and Story Memory subtests. In the List Learning subtest, the patient is given 10 words to remember. This list is presented four times overall. In this subtest, the patient did demonstrate learning over the 4 trials. The patient recalled 4 items on trial one, 9 items on trial two, 10 items on trial three, and 10 items in trial 4. In the Story Memory subtest, the patient recalled 7 details on the first presentation and 12 details on the second presentation. Results of this domain indicate Superior performance.  Visuospatial score: Perceiving spatial relations and constructing a spatially accurate copy of a drawing is assessed through the Figure Copy and Line Orientation subtests. The Figure Copy subtest asks the patient to copy a complex line drawing. The patient adequately completed figure copy. The Line Orientation subtest the presents the patient with 12 line displays and asks the patient to match two given lines at the bottom to the display at the top.  The patient correctly identified 20/20. Results of this domain indicate Very Superior performance.  Language score: Naming common items and retrieving learned material is assessed through the Picture Naming and Semantic Fluency subtests. The Picture Naming subtest asks the patient to name 10 line drawings. The patient was able to accurately name 10/10 items. The Semantic Fluency subtest asks the patient to name as many  animals as he can in 60 seconds. The patient was able to name 22 animals. These results indicate High average performance.   Attention score: Attending to, holding and manipulating information presented visually and orally in working memory is assessed with use of the Digit Span and Coding subtests. The Digit Span subtest asks the patient to repeat progressively lengthening strings of numbers. The Coding subtest asks the patient to alternate attention between a key the given work and then to decode symbols to numbers. The patients results on these subtest indicate Superior performance.  Delayed Memory score: Anterograde memory capacity is assessed through the List Recall, List Recognition, Story Recall, and Figure Recall subtests. The List Recall subtest asks the patient to recall items from the list presented at the beginning of the test. The patient was able to recall 10/10 items. The List Recognition subtest has the patient recall whether a word was or was not in the original list. The patient accurately identified whether a word was or was not on the list in 20 of 20 items. On the Story Recall subtest, the patient was able to recall 12 details. Finally, on the Figure Recall, the patient recalled 20 details. Results of this domain indicate Superior performance.    Treatment   Total Treatment Time Separate from Evaluation: 15 minutes   Treatment included the following:  Concussion education        Education provided:   -role of Speech Therapy, goals/plan of care, scheduling/cancellations, insurance limitations with patient  -Additional Education provided:   General concussion education     Patient expressed understanding.     Home Program: not established  Assessment     Corazon presents to [facility] status post medical diagnosis of Concussion with unknown loss of consciousness status, initial encounter [S06.0XAA].      Interpretation of objective assessment: Overall, according  to the RBANS research, total Scale index is a good indicator of general cognitive functioning. The patient presents without a cognitive communication disorder. It is important to note patient was seen to be extremely lethargic. ST noticed that patient required additional time to react when answering questions. However, information content was sensible and organized.     Demonstrates impairments including limitations as described in the problem list.     Positive prognostic factors: patient motivation  Negative prognostic factors: none  Barriers to therapy: Barriers to therapy include lethargy     Plan       Other Recommendations:   Establish Primary Care Physician (PCP) (to aid in address ongoing fatigue)    Therapist's Name:   Christine Schmidt CCC-SLP   4/23/2024

## 2024-04-23 NOTE — PATIENT INSTRUCTIONS
Attention: Remember that inattention and lack of focus are major culprits to forgetting information so be sure and practice paying attention for adequate learning of information. If you rely on passive attention to remembering something (e.g., yeah, uh-huh approach), youll find you cannot recall it later. I recommend the following to improve attention, which may aid in later recall:   Reduce distractions in the area as much as possible.  Look at the person as they are speaking to you.   Paraphrase as they are speaking  Write down important pieces of information   Ask them to repeat if you zone out.   Have them simplify and reduce information that you need to attend to during conversation.   Have visual cues to remind you if you need to do something later.  Processing Speed:   Using multiple modalities (e.g., listening, writing notes, asking questions, recording) to learn new information is likely to allow additional time for processing, thus improving memory for the material.   Allowing sufficient time to complete tasks will reduce frustration and help to ensure completion.  Executive Functioning:  Dont attempt to multi-task.  Separate tasks so that each can be completed one at a time.  Consider using a calendar/day planner, as that may be effective to help you plan and stay on track.  Color-coding specific tasks by importance may add additional benefit to your planner.  Break down large projects into smaller tasks and write down the steps to completing the task.  Taking notes while reading can help with recall.  Storing Information: Use the below strategies to help you further enhance how information is stored.  Rehearse - Immediately after seeing/hearing something, try to recall it.  Wait a few minutes, then check again.  Gradually lengthen the intervals between rehearsals.  Repetition of learned material is critical to ensure storage of information to be learned. Self-test at home to ensure  learning.  Write down important information to improve your attention and focus and to have something to look back on when you need to recall it.  Make sure the person doesnt rattle off, but presents in a clear, logical, and unhurried manner.   Recalling Information:  Jog your memory - Lose something?  Think back to when you last had it.  What did you do next?  And after that?  Mentally walk yourself through each activity that followed.  Prodding your memory this way may enable you to recall the location of the missing item.  Use a cue - Symbolic reminders (the proverbial string around the finger) are helpful.  So too are memos, timers, calendar notes, etc.--keep them in visible, appropriate places.  Get organized - Have fixed locations for all important papers, key phone numbers, medications, keys, wallet, glasses, tools, etc.  Develop routines - Routines can anchor memories so they do not drift away.    Resources: Consider resources for support through the Governors Office of Elderly Affairs (http://goea.[place].gov/), [place] Chapter of the Alzheimers Association (www.alz.org/[facility]/), the Family Caregiver [place] (www.caregiver.org), and the American Psychological Association (http://www.apa.org/pi/about/publications/caregivers/consumers/index.aspxconsumers/index.aspx). See the following website for information about potential clinical trials: https://www.theaftd.org/research-clinical-trials/ulhu-ys-zezmvgpwduw/clinical-trials/     Practice good cognitive/brain health hygiene:  Continue engaging in regular exercise, which increases alertness and arousal and can improve attention and focus.    Get a good nights sleep, as this can enhance alertness and cognition.  Eat healthy foods and balanced meals. It is notable that research indicates certain nutrients may aid in brain function, such as B vitamins (especially B6, B12, and folic acid), antioxidants (such as vitamins C and E, and beta carotene), and  Omega-3 fatty acids. Talk with your physician or nutritionist about whats right for you.   Keep your brain active. Find activities to stay mentally active, such as reading, games (cards, checkers), puzzles (crosswords, Sudoku, jig saw), crafts (models, woodworking), gardening, or participating in activities in the community.  Stay socially engaged. Continue staying active with your family and friends.

## 2024-04-24 PROBLEM — R51.9 HEADACHE: Status: ACTIVE | Noted: 2024-04-24

## 2024-04-24 PROBLEM — Z74.09 IMPAIRED FUNCTIONAL MOBILITY, BALANCE, AND ENDURANCE: Status: ACTIVE | Noted: 2024-04-24

## 2024-04-24 NOTE — PLAN OF CARE
[facility] Occupational Therapy  Initial Neurological Evaluation Post Concussion  CONCUSSION CLINIC     Date: 4/23/2024  Patient: Corazon Jones  Chart Number: [id number]    Therapy Diagnosis:   Encounter Diagnoses   Name Primary?    Concussion with unknown loss of consciousness status, initial encounter     Post concussion syndrome Yes    Smooth pursuit movement deficiency     Saccadic eye movement deficiency     Convergence insufficiency     Deficit of vestibulo-ocular reflex      Physician: Reyes Hartman III, MD    Physician Orders: Eval and Treat - Neuro OT   Medical Diagnosis: S06.0XAA (ICD-10-CM) - Concussion with unknown loss of consciousness status, initial encounter   Evaluation Date: 4/23/2024  Plan of Care Expiration Period: 10 visits; around 6/5/2024  Insurance Authorization period Expiration: 4/22/2025  Date of Return to MD: 7/18/2024 internal medicine   Visit # / Visits Authorized: 1 / 1  FOTO: not administered; unavailable at concussion clinic     Time In: 1027  Time Out: 1111  Total Billable (one on one) Time: 44 minutes    Precautions: Standard    Subjective     History of Current Condition: Corazon Jones is a 58 y.o. male who presents to [facility] Out[facility]ational Therapy for evaluation and treatment secondary to post concussion syndrome. Pt's concussion occurred on Tuesday, 4/2/2024 after mechanical fall to the back of the head after slipping when getting out of the shower. No known LOC. Per chart review, pt did not have symptoms for 2 days post fall but by the 3rd day (Friday) he began with severe occipital headaches. On Saturday and Sunday post fall he began with chills and body aches and slept for most of the weekend. Work on Monday, April 8 was challenging. He stayed home the remainder of the week. He was very fatigued and slept most of the time. He was feeling better by April 14 and was able to return to work on the 15th but had low energy. He  "had an emesis episode on Wednesday and severe constipation. A co-worker also expressed concern regarding his mental acuity, slowness of speech, and lack of clarity in diction. Per Dr. Hartman, some symptoms are suspected to be more so related to a viral infection. Currently, fatigue and lethargy are pt's main complaints. He reported that he feels like he could sleep all day. Since the fall he is also having balance issues and has been avoiding screen time. Headache intensity has improved. Pt reported headaches are mainly occipital, initially 8/10 but now 3/10 on average. PMHx: (--) prior concussions; (--) personal history of headaches/migraines; (--) depression/anxiety; (--) ADD/ADHD; (--) learning disability; (+) neck/shoulder/back pain prior to injury (occasionally).    Falls: 0     Involved Side: N/A  Dominant Side: Right  Date of Onset: 4/2/2024  Surgical Procedure: N/A  Imaging: CT scan films; see full results in imaging   CT Head 4/19/2024: "IMPRESSION - No acute intracranial abnormality."  CT Cervical Spine 4/19/2024: "IMPRESSION - No acute fracture of the cervical and thoracic spine."  CT Thoracic Spine 4/19/2024: "IMPRESSION - No acute fracture of the cervical and thoracic spine."  Previous Therapy: None     Past Medical History/Physical Systems Review:     Past Medical History:  Corazon Jones  has a past medical history of History of Clostridium difficile colitis, History of sarcoidosis, and Hyperlipemia.    Past Surgical History:  Corazon Jones  has a past surgical history that includes Appendectomy; Hernia repair; orthoscopy; and Eye surgery (2006 Lasik).    Current Medications:  Corazon has a current medication list which includes the following prescription(s): celecoxib, ergocalciferol, losartan, methocarbamol, methylprednisolone, omeprazole, ondansetron, rosuvastatin, sertraline, tadalafil, tretinoin, and zonisamide.    Allergies:  Review of patient's allergies indicates:   Allergen Reactions    " Percocet [oxycodone-acetaminophen]     Suture, silk     Adhesive Rash      Patient's Goals for Therapy: Return to baseline PLOF    Pain:  Pain Related Behaviors Observed: Yes   Functional Pain Scale Rating 0-10:   Location: Headache; ~1x/day; mainly occipital   3/10 on average  0/10 at best  8/10 at worst; initially after incident   Description: Throbbing  Aggravating Factors: constipation   Easing Factors: lying down and going to the bathroom  Location: Periscapular pain  1/10 on average  0/10 at best  8/10 at worst  Description: Sharp  Aggravating Factors: Sneezing and coughing   Easing Factors: None stated     Occupation:    Working presently: Pain management physician at [employer]   Duties: Patient care     Functional Limitations/Social History:    Prior Level of Function: Independent with all ADLs, IADLs, and functional mobility   Current Level of Function: Independent with all ADLs, IADLs, and functional mobility but fatigued and lethargic with all daily activities and work related tasks   ADLs/IADLs:  Feeding: Independent   Bathing: Independent    Dressing/Grooming:  Independent   Cooking/Homecare: Independent   Computer/phone use: Avoiding screens including computer, phone, and TV   Reading: No specific complaints  Functional Mobility: Independent     Home/Living environment : lives with son   Home Access: 2 story home; [address] [facility]  DME: none    Driving: Yes     Adult Vision Questionnaire:   Directions: please check the answer that best describes your situation. If you wear glasses or contact lenses, answer the questions assuming that you are wearing them.   Never = never  Occasionally = less than 1 time/week  Frequently = at least 1 time/week  Always = everyday     Do you have headaches or facial pain? Always  Do you have pain in your eyes with eye movement? Never  Do you experience neck or shoulder discomfort? Always  Do you have dizziness, light headed or nausea while performing close up work?  (computer work; reading; writing) Always; dizziness and lightheadedness   Do you have dizziness, light headed or nausea while performing far distance activities? (driving, tv, movies) Never  Do you experience dizziness when bending down and standing back up, or when getting up quickly? Always  Do you feel unsteady with walking, or drift to one side? Always  Do you feel overwhelmed or anxious while walking in a large department store or walking in a crowd? Never  Do you feel dizzy or off balance when walking down a long hallway or on bold patterned carpeting? Always  Does riding in a car make you feel dizzy or uncomfortable? Never  Do you find yourself with your head tilted to one side? Never but has been supporting head   Does your posture tend to be leaning more forward or backward than your used to? Always more forward   Do you experience poor depth perception or have difficulty estimating distances accurately? Never  Do you experience double/overlapping/shadowed vision at far distances? Never  Do you experience double/overlapping/shadowed vision at near distances? Never  Do you experience glare or have sensitivity to bright lights? Always but not described as pain   Do you close or cover one eye with near or far tasks? Never  Do you skip lines or lose your place while reading? Occasionally but did this prior to incident   Do you use your finger to keep your place on the page? Never  Do you tire easily with close-up tasks? Always  Do you experience blurred vision with far distance tasks? (driving, television, movies, chalkboard at school) Never  Do you experience blurred vision with close up tasks? (computer, reading) Never  Do you experience words running together or appearing to move on the page? Never    History:  Have you ever been diagnosed with:  Traumatic brain injury (TBI) or concussion? No  Reading disability? No  Lazy eye? No  Have you ever had an eye operation? LASIK ~15 yrs ago     Objective      Cognitive Exam  Oriented: Person, Place, Time, and Situation  Behaviors: normal, cooperative, lethargic  Follows Commands/attention: Follows multistep  commands  Communication: clear/fluent  Memory: No Deficits noted but not formally assessed   Safety awareness/insight to disability: aware of diagnosis, treatment, and prognosis  Coping skills/emotional control: Appropriate to situation  Comments: See speech therapy evaluation for formal cognitive assessments    Physical Exam  Head Control/Neck Mobility: see PT eval for formal assessments; neck and periscapular pain reported     Oculomotor Exam  Vestibular/Ocular-Motor Screening (VOMS) for Concussion  Vestibular/Ocular Motor Test: Not Tested Headache   0-10 Dizziness  0-10 Nausea   0-10 Fogginess   0-10 Comments   Baseline N/A 1 0 0 10 (fatigue/lethargy)    Smooth Pursuit  (1.5 feet left/right of center; 3 feet away; 2 times; 30 bpm each direction; horizontal and vertical)  1 0 0 10 See below    Saccades - Horizontal  (1.5 feet left/right of center; 3 feet away; 10 times; performed as quickly as possible)  2 0 0 10 See below    Saccades - Vertical   (1.5 feet up/down of center; 3 feet away; 10 times; performed as quickly as possible)  2 (brief) 0 0 10 See below    Convergence (Near Point)  (14 pt font; stop when pt reports diplopia or outward deviation of eye is observed, blurring is ignored; measure distance between target and tip of nose; abnormal finding is >/= 6 cm)  1 0 0 4 (Near Point in cm):  Measure 1: 27  Measure 2: 34.5  Measure 3: 32   VOR - Horizontal  (14 pt font; 20 degrees rotation left/right; 10 reps; 180 bpm)  1 0 0 4 Pt reported unable to keep target in focus at 180 bpm   VOR - Vertical  (14 pt font; 20 degrees rotation up/down; 10 times; 180 bpm)  1 0 0 4 Pt reported able to keep target in focus, but condition completed at slightly slowed speed    Visual Motion Sensitivity Test  (80 degrees left/right; 5 times each direction; 50 bpm)  2 0 0 4  "No visual slippage     Oculomotor ROM: WNL, but pt reported slight diplopia in upper and lower quadrants   Eye Alignment: WNL  Visual Field: NT  Acuity: uses readers    Spontaneous Nystagmus: None  Gaze Holding Nystagmus: None  Gaze Holding (No Fixation): NT  Smooth Pursuits:   Horizontal: Saccadic intrusions when tracking right of midline    Vertical: Saccadic intrusions when tracking downward   Saccades:    Horizontal: Completed at inconsistent speed; pt reported target right of midline was double and eye fatigue/burning    Vertical: Completed at inconsistent speed; pt reported more difficulty gaze shifting from low>high target  Near Point Convergence (cm): Impaired; Average 31.2 cm; left eye more difficult to converge; blinking required for single vision with target at arm's length   Accommodation (gaze shift between near target (16") and far target (10ft)): Impaired; pt reported near and far targets initially appeared double with gaze shifts but able to bring into focus quickly   Fixation (5 second sustained visual attention): NT    VOR Slow Head Movement: Deferred secondary to neck pain  Head Thrust: Deferred secondary to neck pain   Dynamic Visual Acuity (DVA): Deferred secondary to neck pain     FOTO: not administered; unavailable at concussion clinic      Treatment     Treatment Time In: 1103  Treatment Time Out: 1111  Total Treatment time separate from Evaluation time: 8 minutes     Hazem participated in dynamic functional therapeutic activities to improve functional performance for 8 minutes, including:  - Pt was administered and educated on oculomotor HEP including the following:  Smooth pursuits, horizontal/vertical/diagonal   Saccades, horizontal/vertical/diagonal   Gaze shifts from near to far target  VORx1, horizontal/vertical  - Pt educated on frequency and duration to perform each exercise (see pt instructions)     Home Exercises and Patient Education Provided    Education provided:   - Role of OT, " goals for OT, scheduling/cancellations, insurance limitations with patient.  - Additional Education provided: See above     Written Home Exercises Provided: yes.  Exercises were reviewed and Corazon was able to demonstrate them prior to the end of the session.    Corazon demonstrated good  understanding of the education provided.     See EMR under Patient Instructions for exercises provided 4/23/2024.    Assessment     Corazon Jones is a 58 y.o. male referred to outpatient occupational therapy and presents with a medical diagnosis of post concussion syndrome, resulting in impaired function and decreased work ability and demonstrates limitations as described in the chart below. Pt's main complaints are fatigue and lethargy, but Dr. Jones also reported balance issues, headaches that have greatly improved, and avoidance of screen time. Pt's main deficits include oculomotor dysfunction, convergence insufficiency, and impaired gaze stabilization. Saccadic intrusions were noted during pursuits. Saccades were performed at inconsistent speeds and increased pt's headaches and eye fatigue. NPC was significantly impaired at an average of 31.2 cm, and increased time was required to bring targets into focus at near and far distances. Gaze stabilization was impaired as suggested by inability to keep target in focus during horizontal VOR component of the VOMS. No further assessments were administered to address VOR function due to pt's neck/back pain. Following medical record review it is determined that patient will benefit from occupational therapy services in order to maximize oculomotor functioning and gaze stabilization, pt education, and HEP/HAP guidance to improve functional participation with meaningful occupations and work.    Patient prognosis is Good due to acute injury   Patient will benefit from skilled outpatient Occupational Therapy to address the deficits stated above and in the chart below, provide patient/family  education, and to maximize patient's level of independence.     Plan of care discussed with patient: Yes  Patient's spiritual, cultural and educational needs considered and patient is agreeable to the plan of care and goals as stated below:     Anticipated Barriers for therapy: none noted    Medical Necessity is demonstrated by the following  Occupational Profile/History  Co-morbidities and personal factors that may impact the plan of care [x] LOW: Brief chart review  [] MODERATE: Expanded chart review   [] HIGH: Extensive chart review    Moderate / High Support Documentation: N/A     Examination  Performance deficits relating to physical, cognitive or psychosocial skills that result in activity limitations and/or participation restrictions  [] LOW: addressing 1-3 Performance deficits  [x] MODERATE: 3-5 Performance deficits  [] HIGH: 5+ Performance deficits (please support below)    Moderate / High Support Documentation: See below     Physical:  Pain  Smooth pursuits, saccades, convergence, accommodation, VOR    Cognitive:  No Deficits    Psychosocial:    No Deficits     Treatment Options [x] LOW: Limited options  [] MODERATE: Several options  [] HIGH: Multiple options      Decision Making/ Complexity Score: low       The following goals were discussed with the patient and patient is in agreement with them as to be addressed in the treatment plan.     Goals:  Short Term Goals: 5 weeks   1) Patient will be independent with HEP emphasizing gaze stabilization and oculomotor function.   2) Near point convergence will decrease to 20 cm or less to improve oculomotor coordination and ability to fixate on close up work.   3) Pt to perform saccades WFL, in all directions x30 second intervals at 90 bpm without increase in headache, nausea, or dizziness, to improve skills needed for reading.  4) Pt will demonstrate ability to track single target with less than 50% ocular deviation throughout, in all directions x30 second  intervals, without increase in headache, nausea, or dizziness, to improve skills needed for technology use and scanning environment.  5) Patient to perform VORx1 at 110 bpm WFL for improved gaze stabilization.    Long Term Goals: 10 weeks   1) Near point convergence will decrease to 10 cm or less to improve oculomotor coordination and ability to fixate on close up work.   2) Pt will change gaze between near and far targets without complaints of blurred vision or diplopia for improved accommodative flexibility, saccadic eye movements, and oculomotor coordination needed for work related tasks and to maximize safety while driving.  3) Pt to perform saccades WFL, in all directions x60 second intervals at 110 bpm without increase in headache, nausea, or dizziness, to improve skills needed for reading.  4) Pt will demonstrate ability to track single target WFL, in all directions x60 second intervals, without increase in headache, nausea, or dizziness, to improve skills needed for technology use and scanning environment.  5) Patient to perform VORx1 at 140 bpm WFL for improved gaze stabilization.  6) Patient to report usage of computer/phone and TV for equal amount of time as prior to concussion    Plan     Certification Period/Plan of care expiration: 10 visits; 4/23/2024 to around 6/5/2024.    Outpatient Occupational Therapy 1 times weekly for 10 weeks to include the following interventions: Neuromuscular Re-ed, Patient Education, Self Care, Therapeutic Activities, and Therapeutic Exercise.    Other Recommendations: Add pencil push ups to HEP; progress pursuits, saccades, and VOR as indicated     *Follow up at [facility] Obregonde[facility], OT      I certify the need for these services furnished under this plan of treatment and while under my care.  ____________________________________ Physician/Referring Practitioner   Date of Signature

## 2024-04-24 NOTE — PROGRESS NOTES
[facility]   Physical Therapy Treatment Note      Name: Corazon Jones  Clinic Number: [id number]    Therapy Diagnosis:   Encounter Diagnosis   Name Primary?    Acute pain of right shoulder Yes     Physician: Mahendra Kamara MD    Visit Date: 4/23/2024    Physician Orders: PT Eval and Treat   Medical Diagnosis from Referral:   Diagnosis   M25.512 (ICD-10-CM) - Left shoulder pain   G25.89 (ICD-10-CM) - Scapular dyskinesis      Evaluation Date: 12/5/2023  Authorization Period Expiration: 12/3/2024  Plan of Care Expiration: 6/5/2024  Progress Note Due: 3/5/2024  Visit # / Visits authorized: 16/20  Total visits 24  FOTO: 1/1       PTA Visit #: 0/5     Time In: 1500  Time Out:1600  Total Billable Time: 60 minutes    Subjective     Pt reports  I slipped and fell in the shower, hit my head. Diagnosed with a concussion and was out of work for some time. Main symptom is I cannot wake up.   He was compliant with home exercise program.  Response to previous treatment: less pain  Functional change: pain with work ever since the accident       Pain: 3/10  Location: right shoulder      Objective        PCSS: Pre Treatment   Symptom 0-6   Headache  1   Nausea  1   Vomiting   0   Balance Problems   3   Dizziness   0   Visual Problems   1   Fatigue   5   Sensitivty to Light   1   Sensitivity to Noise   1   Numbness & Tingling   0   Pain other than headache  1   Feeling mentally foggy   5   Feeling slowed down   3   Difficulty Concentrating   2   Difficutly Remembering   0   Drowsiness   5   Confusion   0   Irritability   2   Sadness   0   Nervousness  0   Feeling more emotional   0   Total # of symptoms 13/21   Symptom severity score 31/126       PCSS Post Treatment  Symptom 0-6   Headache  3   Nausea  0   Vomiting   0   Balance Problems   1   Dizziness   0   Visual Problems   0   Fatigue   3   Sensitivty to Light   0   Sensitivity to Noise   1   Numbness & Tingling   0   Pain other than headache  1    Feeling mentally foggy   0   Feeling slowed down   0   Difficulty Concentrating   0   Difficutly Remembering   0   Drowsiness   0   Confusion   0   Irritability   0   Sadness   0   Nervousness  0   Feeling more emotional   0   Total # of symptoms 5/21   Symptom severity score 9/126      Ariel CONCUSSION TREADMILL TEST       80% of Age Predicted HR Max: 133    Prior to Test   BP HR Symptom Rating (0-10) Symptoms Pre PCSS-Score     108/77 93 0  Tired 31               Speed Grade HR RPE Symptom Rating (0-10) New Symptom(s)   Minute 1 3.6 mph 0%  112  6  0  drowsy   Minute 2 SAB 1% 117 7 0 Light headed   Minute 3 SAB 2%  120 7 0    Minute 4 SAB 3%  120 8  0    Minute 5 SAB 4% 128 9 0    Minute 6 SAB 5%  129 12 1 headache   Minute 7 SAB 6%  132 10 1                                                                                                                             Termination of Test   Duration BP HRmax RPEmax Symptom Rating 0-10  Post PCSS-Score    7:14 129/81 133 12 1  9                      Treatment     Corazon received the treatments listed below:      therapeutic exercises to develop strength, endurance, ROM, flexibility, posture, and core stabilization for 0 minutes including:      manual therapy techniques: Joint mobilizations, Manual traction, Myofacial release, Soft tissue Mobilization, and Dry Needling were applied to the: shoulder and thoracic spine for 0 minutes, including:      neuromuscular re-education activities to improve: Balance, Coordination, Kinesthetic, Sense, Proprioception, and Posture for 60 minutes. The following activities were included:    Concussion education  BCTT see above  Education        therapeutic activities to improve functional performance for 0  minutes, including:    Bike 8' level 22 for knee strength and cardio endurance training  Leg press eccentrics 260# 5 x 5  RDL 15# kick stand 3 x 12  High row with ER and press GTB 3 x 15  Lateral walks BTB with 6# med ball press 5  "laps turf      NT  D2 extensions 13# 3 x 15  Tricep extension rope CC 17# 3 x 15  Genie IR/ER GTB 3 x 12 eccentric  LAQ 10# eccentric lowering 3 x 10  SLR upright 5# 3 x 10 3" hold  HS curl jc 55# 3 x 15  Cross body stretch BTB   -super set D2 extension 3 x 12    Ice 0' R Knee    Patient Education and Home Exercises       Education provided:   - improve subscap and thoracic mobility    Written Home Exercises Provided: YES. Exercises were reviewed and Corazon was able to demonstrate them prior to the end of the session.  Corazon demonstrated good understanding of the education provided. See EMR under Patient Instructions for exercises provided during therapy sessions    Assessment     Patient returned to clinic today post concussion. Education given on diet, sleep, exercise tolerance, and return to work. See objective data for BCTT performed today. Plan is to start a 20 minutes walking program.     Corazon Is progressing well towards his goals.   Pt prognosis is Excellent.     Pt will continue to benefit from skilled outpatient physical therapy to address the deficits listed in the problem list box on initial evaluation, provide pt/family education and to maximize pt's level of independence in the home and community environment.     Pt's spiritual, cultural and educational needs considered and pt agreeable to plan of care and goals.     Anticipated barriers to physical therapy: work schedule    GOALS: Short Term Goals: 2 weeks  1.Report decreased neck pain  <   / =  4  /10  to increase tolerance for work(Progressing, not met)  2. Increase cervical ROM by 5-10 degrees in order to perform ADLs with decreased difficulty.(Progressing, not met)  3. Increase strength in B shoulders/scapular stabilizers by 1/3 MMT grade to increase tolerance for ADL and work activities.(Progressing, not met)  4. Pt to tolerate HEP to improve ROM and independence with ADL's(Progressing, not met)     Long Term Goals: 6 weeks  1.Report decreased " neck pain  <   / =  0  /10  to increase tolerance for return to ADL without pain(Progressing, not met)  2. Increase UE/neck flexibility in order to improve posture.  (Progressing, not met)  3.Increased strength in B shoulders/scapular stabilizers to >/= 4/5 MMT grade to increase tolerance for ADL and work activities.(Progressing, not met)  4.  Pt will report at goals level on FOTO neck score for neck pain disability to demonstrate decrease in disability and improvement in neck pain. (Progressing, not met)    Plan     Plan of care Certification: 12/5/2023 to 6/5/2024.     Improve subscap strength    Crescencio Amin, PT, DPT, OCS, FAAOMPT

## 2024-04-25 NOTE — PLAN OF CARE
[facility]  Physical Therapy Neurological Rehabilitation Initial Evaluation  [facility]    Name: Corazon Jones  Clinic Number: [id number]    Therapy Diagnosis:   Encounter Diagnoses   Name Primary?    Concussion with unknown loss of consciousness status, initial encounter     Impaired functional mobility, balance, and endurance Yes    Intractable chronic post-traumatic headache      Physician: Reyes Hartman III, MD    Physician Orders: PT Eval and Treat neuro Program  Medical Diagnosis from Referral:   Diagnosis   S06.0XAA (ICD-10-CM) - Concussion with unknown loss of consciousness status, initial encounter     Evaluation Date: 4/23/2024  Authorization Period Expiration: 4/22/25  Plan of Care Expiration: 6/18/24  Visit # / Visits authorized: 1/ 1    PN Due: 5/23/24     Time In: 1117  Time Out: 1144  Total Billable Time: 27 minutes    Precautions: Standard    Subjective   Date of onset: 4/2/24  History of current condition - per medical record: patient fell and hit back of head on 4/2/24. Patient began to experience occipital headache ~3 days later. Patient likely also had a viral infection after he fell.  Corazon reports: current sx: brain fog, fatigue (reports sleeping all day and not rested), daily headache, feels slightly off balance while walking. New onset of intrathoracic spine pain (since fall)- sharp pain. Overall sx are improving. PMHx: hyperlipemia      Medical History:   Past Medical History:   Diagnosis Date    History of Clostridium difficile colitis     treated 2014    History of sarcoidosis     dx 1991, resolved    Hyperlipemia        Surgical History:   Corazon Jones  has a past surgical history that includes Appendectomy; Hernia repair; orthoscopy; and Eye surgery (2006 Lasik).    Medications:   Corazon has a current medication list which includes the following prescription(s): celecoxib, ergocalciferol, losartan, methocarbamol, methylprednisolone, omeprazole,  ondansetron, rosuvastatin, sertraline, tadalafil, tretinoin, and zonisamide.    Allergies:   Review of patient's allergies indicates:   Allergen Reactions    Percocet [oxycodone-acetaminophen]     Suture, silk     Adhesive Rash        Imaging,  CT, head 4/19/24:   FINDINGS:  Intracranial compartment: Brain appears normally formed.     Ventricles and sulci are normal in size for age without evidence of hydrocephalus. No extra-axial blood or fluid collections.     The brain parenchyma appears normal. No parenchymal mass, hemorrhage, edema or major vascular distribution infarct.     Skull/extracranial contents (limited evaluation): No fracture. Mastoid air cells and paranasal sinuses are essentially clear.  Imaged portions of the orbits are within normal limits.     Impression:  No acute intracranial abnormality.     Electronically signed by:Jl Hargrove MD  Date:                                            04/19/2024    CT, cervical/ thoracic 4/19/24:   FINDINGS:  Cervical spine:  No acute cervical fracture.  The prevertebral soft tissues are unremarkable.     Degenerative changes with disc bulging endplate osteophyte formation most notable at C6-7 but no evidence to suggest cord impingement with moderate right foraminal stenosis noted at this level.     Thoracic spine:  No acute compression fracture.  No advanced degenerative changes.     Impression:  No acute fracture of the cervical and thoracic spine.     Electronically signed by:Hank Dillard  Date:                                            04/19/2024      Prior Therapy: physical therapy for shoulder pain/ weakness  Social History:  lives with their family- son  Falls: none since injury   DME: none    Home Environment: 2 story home, 1 step to enter   Exercise Routine / History:  home exercise program for shoulder and knee (home exercise program from physical therapy)  Occupation: pain management physician   Prior Level of Function: working 8 hours/day x 4 days/  week   Current Level of Function: not currently working, lethargic/ poor sleep    Dizziness Handicap Inventory: not tested    16-34= mild   26-53= moderate   >/= 54= severe    Pain:  Current 1/10, worst 8/10, best 0/10   Location: base of skull, top of head   Description: Throbbing, dull  Aggravating Factors: bearing down  Easing Factors: Celebrex- last dose 3-4 days ago     Pts goals: want to wake up and go back to work    Objective     Mental status: alert, oriented to person, place, and time  Appearance: Casually dressed  Behavior:  calm and cooperative  Attention Span and Concentration: slowed processing speed/ speech    Dominant hand:  right     Posture Alignment :mild left thoracic rotation, slight right suboccipital side bending    Sensation:  Light Touch: Intact           Proprioception:   Intact      SPECIAL TESTS:   Vertebral artery test: (-)  Sharp Pulsar Test: (-)   Transverse ligament test: not tested   Alar ligament test: (-)   Anterior Shear/ Sagittal Stress Test: difficulty assessing due to muscle guarding        OCULOMOTOR ASSESSMENT: Refer to OT report for details       Head- Neck Differentiation Test: not tested     VESTIBULAR ASSESSMENT:   Hallpike-Fran Test: not applicable   Roll Test: not applicable       ORTHOPEDIC ASSESSMENT:  Muscle Tone: increased right scalenes, bilateral suboccipitals, bilateral Upper trapezius (right>left), bilateral Levator scapulae (right>left), cervical> thoracic erector spinae   TTP: right Upper trapezius, right side of cervical erector spinae       RANGE OF MOTION:  CERVICAL SPINE AROM:   Flexion: (norm: 80-90 deg) 25 deg*    Extension: (norm: 70-80 deg) 55 deg   Left Sidebend: (norm: 20-45 deg) 32 deg   Right Sidebend: (norm: 20-45 deg) 22 deg   Left Rotation: (norm: 70-90 deg) 65 deg   Right Rotation: (norm: 70-90 deg) 55 deg*   * pain at end range  Joint restrictions: difficulty assessing due to muscle guarding    Craniocervical flexion rotation test: tightness  "with right rotation     UPPER EXTREMITY  (R) UE: WNLs  (L) UE: WNLs         LOWER EXTREMITIES  Not tested        STRENGTH: manual muscle test grades below   Deep neck flexors: 4+/5, able to hold 5 sec     Upper Extremity Strength   BIJAL MALDONADO   Gross shoulder:  5/5 5/5   Lats:  5/5 5/5   Low traps:  5/5 5/5   Mid traps:  5/5 5/5   Rhomboids:  5/5 5/5     Lower Extremity Strength: not tested       POSTURAL CONTROL:  Monica Sensory Testing:  (P= Pass, F= Fail; note any sway; hold each position for 30")  Condition 1: (firm surface/feet together/eyes open) P, minimal-mod sway  Condition 2: (firm surface/feet together/eyes closed) P, moderate sway  Condition 3: (firm surface/feet in tandem/eyes open) F (14 sec),   Condition 4: (firm surface/feet in tandem/eyes closed) not tested   Condition 5: (soft surface/feet together/eyes open) P, minimal-moderate sway  Condition 6: (soft surface/feet together/eyes closed) P, moderate sway  Condition 7: (Fakuda step test), measure distance varied from center starting position started turning left then self corrected to within functional limits      Eyes Open Eyes Closed   Single Limb Stance R LE Not tested  Not tested    Single Limb Stance L LE Not tested  Not tested        GAIT ASSESSMENT:   - AD used: none  - Assistance: I  - Distance: community  - Curb/ Ramp: I  - Stairs:  not tested     GAIT DEVIATIONS:  Corazon displays the following deviations with ambulation: slower chris     Evaluation   Timed Up and Go Not tested    TUG cognitive Not tested    Self Selected Walking Speed  Not tested      Endurance Deficit (per pt report): poor, lethargic during evaluation despite sleeping from 9 pm to 8 am      Special Tests: Functional Gait Assessment TBA       TREATMENT   Treatment Time In: 1145  Treatment Time Out: 1155  Total Treatment time separate from Evaluation: 10 minutes    Corazon participated in dynamic functional therapeutic activities to improve functional performance for 10  minutes, " including:     Review of home exercise program:   Upper trapezius  stretch  Levator scapulae  stretch  sternocleidomastoid stretch  Thoracic rotation at wall  Semi tandem stance   - to progress to head turns as able      Home Exercises and Patient Education Provided    Education provided:   - role of physical therapy/ plan of care  - review of impairments noted  - review of home exercise program     Written Home Exercises Provided: yes.  Exercises were reviewed and Corazon was able to demonstrate them prior to the end of the session.  Corazon demonstrated good  understanding of the education provided.     See EMR under Patient Instructions for exercises provided 4/23/2024.    Assessment   Corazon is a 58 y.o. male referred to outpatient Physical Therapy with a medical diagnosis of concussion. Patient suffered a fall hitting the back of the head on 4/2/24. Patient reports a 3 day delay in the onset of occipital headache. He reports the current impairments: brain fog, fatigue, daily headache, slightly off balance while walking, and intrathoracic spine pain. Patient rests (sitting) in an abnormal posture consisting of mild left thoracic rotation and slight right suboccipital side bending. Pt presents with increased muscle tone in the upper cervical to mid thoracic posterior chain. Muscle knots noted in right Upper trapezius and left side of cervical erector spinae. Cervical active range of motion is decreased for flexion and right sided motions. Limited mobility is noted at the Atlanto-axial joint. Decreased cervical flexor endurance noted. Patient also demonstrates impaired postural responses per Monica. Increased sway noted on all conditions with patient being unable to complete condition 3. Balance worsened without visual support indicating a likely vestibular component.  Fukuda Step Test was negative, but patient was noted to turn significantly towards left side then self correct prior to conclusion of test. Current  impairments are preventing patient from being able to work. Home exercise program to address cervical flexibility and balance responses reviewed and issued to patient. Patient can benefit from skilled physical therapy to address aforementioned impairments to decrease headache and return to prior level of function.     Pt prognosis is Good.   Pt will benefit from skilled outpatient Physical Therapy to address the deficits stated above and in the chart below, provide pt/family education, and to maximize pt's level of independence.     Plan of care discussed with patient: Yes  Pt's spiritual, cultural and educational needs considered and patient is agreeable to the plan of care and goals as stated below:     Anticipated Barriers for therapy: none    Medical Necessity is demonstrated by the following  History  Co-morbidities and personal factors that may impact the plan of care Co-morbidities:   hyperlipemia     Personal Factors:   no deficits     moderate   Examination  Body Structures and Functions, activity limitations and participation restrictions that may impact the plan of care Body Regions:   neck  upper extremities  trunk    Body Systems:    gross symmetry  ROM  strength  gross coordinated movement  balance  gait  transfers  transitions  motor control  edema  skin integrity  Vestibular function    Participation Restrictions:   Unable to work  Daily headache   Impaired balance    Activity limitations:   Learning and applying knowledge  no deficits    General Tasks and Commands  See SLP    Communication  no deficits    Mobility  walking    Self care  no deficits    Domestic Life  doing house work (cleaning house, washing dishes, laundry)    Interactions/Relationships  no deficits    Life Areas  employment    Community and Social Life  community life  recreation and leisure         high   Clinical Presentation stable and uncomplicated low   Decision Making/ Complexity Score: low     Goals:  Short Term Goals: 4  weeks   Patient will report compliance with home exercise program to address cervical impairments at least 3x/week to decrease headache.   Assess Functional Gait Assessment and set goals as needed.   Patient will demonstrate improved cervical flexion to 40 deg to demonstrate improved muscle integrity.   Patient will demonstrate improved cervical flexor endurance to decrease headache by performing chin tuck for >10 seconds.   Patient will report a break in headache cycle by reporting only headache 3 days/ week.     Long Term Goals: 8 weeks   Patient will demonstrate improved cervical right rotation to >65 deg to improve safety with driving.   Patient will deny headache x 2 weeks to demonstrate a return to prior level of function.   Patient will demonstrate improved balance responses by performing tandem stance x 30 sec.   Patient will demonstrate improved vestibular function by performing narrow base of support, foam, x 30 sec with minimal sway.     Plan   Plan of care Certification: 4/23/2024 to 6/18/24.    Outpatient Physical Therapy 2 times weekly for 10 weeks to include the following interventions: Cervical/Lumbar Traction, Electrical Stimulation dry needling, Manual Therapy, Moist Heat/ Ice, Neuromuscular Re-ed, Patient Education, Therapeutic Activities, and Therapeutic Exercise.     Bonny Gordon, PT, DPT,   Board-Certified Clinical Specialist in Neurologic Physical Therapy   Certified Brain Injury Specialist    4/23/2024

## 2024-04-28 ENCOUNTER — TELEPHONE (OUTPATIENT)
Dept: NEUROLOGY | Facility: CLINIC | Age: 59
End: 2024-04-28
Payer: COMMERCIAL

## 2024-04-28 NOTE — TELEPHONE ENCOUNTER
Spoke with patient and followed up on patient's post TBI conditions. Patient states that he's feeling better.     Reminded patient about scheduled virtual appointment tomorrow, 4/29/24 at 12pm.    Patient confirmed. Patient informed about logging in 15 minutes early as there may be pre-check in questions. Patient states that he will try and states that he does not have a lot of procedures scheduled in the morning.

## 2024-04-29 ENCOUNTER — TELEPHONE (OUTPATIENT)
Dept: INTERNAL MEDICINE | Facility: CLINIC | Age: 59
End: 2024-04-29
Payer: COMMERCIAL

## 2024-04-29 ENCOUNTER — OFFICE VISIT (OUTPATIENT)
Dept: NEUROLOGY | Facility: CLINIC | Age: 59
End: 2024-04-29
Payer: COMMERCIAL

## 2024-04-29 DIAGNOSIS — G44.319 ACUTE POST-TRAUMATIC HEADACHE, NOT INTRACTABLE: ICD-10-CM

## 2024-04-29 DIAGNOSIS — F09 COGNITIVE AND NEUROBEHAVIORAL DYSFUNCTION FOLLOWING BRAIN INJURY: ICD-10-CM

## 2024-04-29 DIAGNOSIS — S13.4XXD WHIPLASH INJURY TO NECK, SUBSEQUENT ENCOUNTER: ICD-10-CM

## 2024-04-29 DIAGNOSIS — H51.9 CONVERGENCE INSUFFICIENCY OR PALSY IN BINOCULAR EYE MOVEMENT: ICD-10-CM

## 2024-04-29 DIAGNOSIS — S06.0X0D CONCUSSION WITHOUT LOSS OF CONSCIOUSNESS, SUBSEQUENT ENCOUNTER: Primary | ICD-10-CM

## 2024-04-29 DIAGNOSIS — S06.9XAS COGNITIVE AND NEUROBEHAVIORAL DYSFUNCTION FOLLOWING BRAIN INJURY: ICD-10-CM

## 2024-04-29 DIAGNOSIS — G44.86 CERVICOGENIC HEADACHE: ICD-10-CM

## 2024-04-29 DIAGNOSIS — F07.81 POST CONCUSSIVE SYNDROME: ICD-10-CM

## 2024-04-29 DIAGNOSIS — H81.90 VESTIBULOPATHY, UNSPECIFIED LATERALITY: ICD-10-CM

## 2024-04-29 DIAGNOSIS — G31.89 COGNITIVE AND NEUROBEHAVIORAL DYSFUNCTION FOLLOWING BRAIN INJURY: ICD-10-CM

## 2024-04-29 DIAGNOSIS — M54.12 CERVICAL RADICULOPATHY: ICD-10-CM

## 2024-04-29 PROCEDURE — 1160F RVW MEDS BY RX/DR IN RCRD: CPT | Mod: CPTII,95,, | Performed by: PSYCHIATRY & NEUROLOGY

## 2024-04-29 PROCEDURE — 4010F ACE/ARB THERAPY RXD/TAKEN: CPT | Mod: CPTII,95,, | Performed by: PSYCHIATRY & NEUROLOGY

## 2024-04-29 PROCEDURE — 99214 OFFICE O/P EST MOD 30 MIN: CPT | Mod: 95,,, | Performed by: PSYCHIATRY & NEUROLOGY

## 2024-04-29 PROCEDURE — 1159F MED LIST DOCD IN RCRD: CPT | Mod: CPTII,95,, | Performed by: PSYCHIATRY & NEUROLOGY

## 2024-04-29 PROCEDURE — 3044F HG A1C LEVEL LT 7.0%: CPT | Mod: CPTII,95,, | Performed by: PSYCHIATRY & NEUROLOGY

## 2024-04-29 NOTE — PROGRESS NOTES
The patient location is: Home  The chief complaint leading to consultation is: Concussion  Visit type: Virtual visit with synchronous audio and video  Total time spent with patient: 15  Each patient to whom he or she provides medical services by telemedicine is:  (1) informed of the relationship between the physician and patient and the respective role of any other health care provider with respect to management of the patient; and (2) notified that he or she may decline to receive medical services by telemedicine and may withdraw from such care at any time.    Subjective:       Patient ID: Corazon Jones is a 58 y.o. male.    Interval History:  Corazon Jones is here for follow up. Their condition has improved since I last saw him last week. He did procedures this morning, and did not drink coffee, and feels good. He notes no confusion, 80 to 90% less fatigue than before. He notes he still gets sleepy earlier, and feels tired in the morning.  He notes that his balance is still a problem, but is still doing his HEP from PT/OT. He notes that he can progress his return to work as needed. We have discussed lower patient volumes at this time until his recovery. He notes noise sensitivity, and difficulty with talking for extended periods of time. He notes that he did not take the steroid pack, because he was too fatigued to pick it up.     Objective:   Patient is awake, alert and oriented to person, place and time. Moves all extremities antigravity. Cranial Nerves 2-12 are without gross focal deficit.     Focused telemedicine examination was undertaken today. Over 50% of face to face time of 15 minute visit time was in giving guidance, counseling and discussing treatment options.    Assessment/Plan:     Problem List Items Addressed This Visit          Neuro    Headache     Other Visit Diagnoses       Concussion without loss of consciousness, subsequent encounter    -  Primary    Post concussive syndrome         Convergence insufficiency or palsy in binocular eye movement        Vestibulopathy, unspecified laterality        Whiplash injury to neck, subsequent encounter        Cognitive and neurobehavioral dysfunction following brain injury        Cervical radiculopathy              57yo male seen for concussion sustained in slip and fall at home on 4/2.  He notes he feels 80-90% back to his baseline.  He notes his main issues still end up being his dizziness.  He is doing home exercise programs for this as taught by PT.  He notes he is doing exercises with OT.  I have encouraged him to continue his rehabilitation until he has been discharged from these rehab teams.  I will get a note for work for him to reduce his patient volumes initially as he is trying to recover.  He notes he still gets fatigued in the afternoon he notes that sleep is an issue for him.  If this continues to be an issue for him then consider evaluation with sleep Medicine.  For now we are both pleased with his progress and initial start back to recovery.  We have discussed that he is still dealing with persisting postconcussion symptoms but are encouraged by the improvement with some initial therapy and rehab.    The patient verbalizes understanding and agreement with the treatment plan. I have discussed risks, benefits and alternatives to the treatment plan. Questions were sought and answered to his stated verbal satisfaction.      Reyes Hartman MD, FAAN    This note is dictated on M*Modal Fluency Direct word recognition program. There are word recognition mistakes that are occasionally missed on review.

## 2024-04-29 NOTE — TELEPHONE ENCOUNTER
[employer] form emailed to DD to be forwarded to [facility] office, please    jl

## 2024-04-30 ENCOUNTER — CLINICAL SUPPORT (OUTPATIENT)
Dept: REHABILITATION | Facility: HOSPITAL | Age: 59
End: 2024-04-30
Payer: COMMERCIAL

## 2024-04-30 DIAGNOSIS — Z74.09 IMPAIRED FUNCTIONAL MOBILITY, BALANCE, AND ENDURANCE: Primary | ICD-10-CM

## 2024-04-30 DIAGNOSIS — M25.511 ACUTE PAIN OF RIGHT SHOULDER: ICD-10-CM

## 2024-04-30 PROCEDURE — 97530 THERAPEUTIC ACTIVITIES: CPT | Performed by: PHYSICAL THERAPIST

## 2024-04-30 PROCEDURE — 97112 NEUROMUSCULAR REEDUCATION: CPT | Performed by: PHYSICAL THERAPIST

## 2024-04-30 PROCEDURE — 97140 MANUAL THERAPY 1/> REGIONS: CPT | Performed by: PHYSICAL THERAPIST
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
## 2024-05-02 ENCOUNTER — PATIENT MESSAGE (OUTPATIENT)
Dept: INTERNAL MEDICINE | Facility: CLINIC | Age: 59
End: 2024-05-02
Payer: COMMERCIAL
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
## 2024-05-02 NOTE — TELEPHONE ENCOUNTER
[facility] is asking about the forms    Do we know about what's been going on?  Evidently the deadline is tomorrow    Thanktonio

## 2024-05-03 ENCOUNTER — CLINICAL SUPPORT (OUTPATIENT)
Dept: REHABILITATION | Facility: HOSPITAL | Age: 59
End: 2024-05-03
Payer: COMMERCIAL

## 2024-05-03 DIAGNOSIS — H55.82 SMOOTH PURSUIT MOVEMENT DEFICIENCY: ICD-10-CM

## 2024-05-03 DIAGNOSIS — F07.81 POST CONCUSSION SYNDROME: Primary | ICD-10-CM

## 2024-05-03 DIAGNOSIS — H51.11 CONVERGENCE INSUFFICIENCY: ICD-10-CM

## 2024-05-03 DIAGNOSIS — H55.81 SACCADIC EYE MOVEMENT DEFICIENCY: ICD-10-CM

## 2024-05-03 DIAGNOSIS — H81.8X9 DEFICIT OF VESTIBULO-OCULAR REFLEX: ICD-10-CM

## 2024-05-03 PROCEDURE — 97112 NEUROMUSCULAR REEDUCATION: CPT | Mod: PO

## 2024-05-03 PROCEDURE — 97530 THERAPEUTIC ACTIVITIES: CPT | Mod: PO

## 2024-05-03 NOTE — PATIENT INSTRUCTIONS
Oculomotor: Smooth Pursuits        Holding a target, keep eyes on target and slowly move target side to side, up and down, and diagonals with head still. Perform sitting.  Repeat 1-2 times per session x 45 second intervals. Do 1 sessions per day. Remember to move the target slowly, it should take about 2 seconds to get from point A to point B and vice versa. If you set a metronome, set it to 30 bpm.     Copyright © [employer]. All rights reserved.       Oculomotor: Saccades        Holding two targets positioned side by side, up and down, and diagonally, move eyes quickly from target to target as head stays still. Move 45 seconds each direction.  Perform sitting.  Repeat 1 times per session. Do 1 sessions per day.    Copyright © [employer]. All rights reserved.          Gaze Stabilization: Tip Card    1.Target must remain in focus, not blurry, and appear stationary while head is in motion.  2.Perform exercises with small head movements (45° to either side of midline).  3.Increase speed of head motion so long as target is in focus.  4.If you wear eyeglasses, be sure you can see target through lens (therapist will give specific instructions for bifocal / progressive lenses).  5.These exercises may provoke dizziness or nausea. Work through these symptoms. If too dizzy, slow head movement slightly. Rest between each exercise.  6.Exercises demand concentration; avoid distractions.  7.For safety, perform standing exercises close to a counter, wall, corner, or next to someone.    Copyright © [employer]. All rights reserved.       Gaze Stabilization (VORx1): Sitting        Keeping eyes on target on wall tilt head down 15-30° and move head side to side for 45 seconds. Repeat while moving head up and down for 45 seconds.  Do 1 sessions per day. PERFORM  BPM ON METRONOME     Copyright © [employer]. All rights reserved.         Perform 45 seconds and shift gaze from close to far target. Be sure to bring the targets into focus before switching your  gaze to the other one. Perform 2 times per day.       Perform 2 sets x 10 reps holding nearest point that the target looks single. Perform twice a day. < THIS ONE IS REALLY IMPORTANT, IF YOU CAN'T FIT THEM ALL IN, DO THIS ONE FIRST! Wear readers for this one.

## 2024-05-07 ENCOUNTER — CLINICAL SUPPORT (OUTPATIENT)
Dept: REHABILITATION | Facility: HOSPITAL | Age: 59
End: 2024-05-07
Payer: COMMERCIAL

## 2024-05-07 DIAGNOSIS — Z74.09 IMPAIRED FUNCTIONAL MOBILITY, BALANCE, AND ENDURANCE: Primary | ICD-10-CM

## 2024-05-07 DIAGNOSIS — M25.511 ACUTE PAIN OF RIGHT SHOULDER: ICD-10-CM

## 2024-05-07 PROCEDURE — 97112 NEUROMUSCULAR REEDUCATION: CPT | Performed by: PHYSICAL THERAPIST
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
## 2024-05-10 ENCOUNTER — CLINICAL SUPPORT (OUTPATIENT)
Dept: REHABILITATION | Facility: HOSPITAL | Age: 59
End: 2024-05-10
Payer: COMMERCIAL

## 2024-05-10 DIAGNOSIS — H55.81 SACCADIC EYE MOVEMENT DEFICIENCY: ICD-10-CM

## 2024-05-10 DIAGNOSIS — F07.81 POST CONCUSSION SYNDROME: Primary | ICD-10-CM

## 2024-05-10 DIAGNOSIS — H81.8X9 DEFICIT OF VESTIBULO-OCULAR REFLEX: ICD-10-CM

## 2024-05-10 DIAGNOSIS — H51.11 CONVERGENCE INSUFFICIENCY: ICD-10-CM

## 2024-05-10 DIAGNOSIS — H55.82 SMOOTH PURSUIT MOVEMENT DEFICIENCY: ICD-10-CM

## 2024-05-10 PROCEDURE — 97112 NEUROMUSCULAR REEDUCATION: CPT | Mod: PO

## 2024-05-10 PROCEDURE — 97530 THERAPEUTIC ACTIVITIES: CPT | Mod: PO

## 2024-05-14 ENCOUNTER — CLINICAL SUPPORT (OUTPATIENT)
Dept: REHABILITATION | Facility: HOSPITAL | Age: 59
End: 2024-05-14
Payer: COMMERCIAL

## 2024-05-14 DIAGNOSIS — M25.511 ACUTE PAIN OF RIGHT SHOULDER: Primary | ICD-10-CM

## 2024-05-14 PROCEDURE — 97112 NEUROMUSCULAR REEDUCATION: CPT | Performed by: PHYSICAL THERAPIST
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
## 2024-05-17 ENCOUNTER — CLINICAL SUPPORT (OUTPATIENT)
Dept: REHABILITATION | Facility: HOSPITAL | Age: 59
End: 2024-05-17
Payer: COMMERCIAL

## 2024-05-17 DIAGNOSIS — H51.11 CONVERGENCE INSUFFICIENCY: ICD-10-CM

## 2024-05-17 DIAGNOSIS — H55.81 SACCADIC EYE MOVEMENT DEFICIENCY: ICD-10-CM

## 2024-05-17 DIAGNOSIS — H81.8X9 DEFICIT OF VESTIBULO-OCULAR REFLEX: ICD-10-CM

## 2024-05-17 DIAGNOSIS — H55.82 SMOOTH PURSUIT MOVEMENT DEFICIENCY: ICD-10-CM

## 2024-05-17 DIAGNOSIS — F07.81 POST CONCUSSION SYNDROME: Primary | ICD-10-CM

## 2024-05-17 PROCEDURE — 97112 NEUROMUSCULAR REEDUCATION: CPT | Mod: PO

## 2024-05-21 ENCOUNTER — CLINICAL SUPPORT (OUTPATIENT)
Dept: REHABILITATION | Facility: HOSPITAL | Age: 59
End: 2024-05-21
Payer: COMMERCIAL

## 2024-05-21 DIAGNOSIS — M25.511 ACUTE PAIN OF RIGHT SHOULDER: Primary | ICD-10-CM

## 2024-05-21 PROCEDURE — 97530 THERAPEUTIC ACTIVITIES: CPT | Performed by: PHYSICAL THERAPIST

## 2024-05-21 NOTE — PROGRESS NOTES
[facility]   Physical Therapy Treatment Note      Name: Corazon Jones  Clinic Number: [id number]    Therapy Diagnosis:   Encounter Diagnosis   Name Primary?    Acute pain of right shoulder Yes     Physician: Mahendra Kamara MD    Visit Date: 5/21/2024    Physician Orders: PT Eval and Treat   Medical Diagnosis from Referral:   Diagnosis   M25.512 (ICD-10-CM) - Left shoulder pain   G25.89 (ICD-10-CM) - Scapular dyskinesis      Evaluation Date: 12/5/2023  Authorization Period Expiration: 12/3/2024  Plan of Care Expiration: 6/5/2024  Progress Note Due: 3/5/2024  Visit # / Visits authorized: 19/30  Total visits 24  FOTO: 1/1       PTA Visit #: 0/5     Time In: 1621  Time Out:1715  Total Billable Time: 54 minutes    Subjective     Pt reports  Notes the shoulder is weak, down to about 10 pushups at a time. Knee is bothering me and wants to clean it out  He was compliant with home exercise program.  Response to previous treatment: less pain  Functional change: pain with work ever since the accident       Pain: 3/10  Location: right shoulder      Objective        PCSS: Pre Treatment   Symptom 0-6   Headache  1   Nausea  1   Vomiting   0   Balance Problems   3   Dizziness   0   Visual Problems   1   Fatigue   5   Sensitivty to Light   1   Sensitivity to Noise   1   Numbness & Tingling   0   Pain other than headache  1   Feeling mentally foggy   5   Feeling slowed down   3   Difficulty Concentrating   2   Difficutly Remembering   0   Drowsiness   5   Confusion   0   Irritability   2   Sadness   0   Nervousness  0   Feeling more emotional   0   Total # of symptoms 13/21   Symptom severity score 31/126       PCSS Post Treatment  Symptom 0-6   Headache  3   Nausea  0   Vomiting   0   Balance Problems   1   Dizziness   0   Visual Problems   0   Fatigue   3   Sensitivty to Light   0   Sensitivity to Noise   1   Numbness & Tingling   0   Pain other than headache  1   Feeling mentally foggy   0    Feeling slowed down   0   Difficulty Concentrating   0   Difficutly Remembering   0   Drowsiness   0   Confusion   0   Irritability   0   Sadness   0   Nervousness  0   Feeling more emotional   0   Total # of symptoms 5/21   Symptom severity score 9/126      [place] CONCUSSION TREADMILL TEST       80% of Age Predicted HR Max: 133    Prior to Test   BP HR Symptom Rating (0-10) Symptoms Pre PCSS-Score     108/77 93 0  Tired 31               Speed Grade HR RPE Symptom Rating (0-10) New Symptom(s)   Minute 1 3.6 mph 0%  112  6  0  drowsy   Minute 2 SAB 1% 117 7 0 Light headed   Minute 3 SAB 2%  120 7 0    Minute 4 SAB 3%  120 8  0    Minute 5 SAB 4% 128 9 0    Minute 6 SAB 5%  129 12 1 headache   Minute 7 SAB 6%  132 10 1                                                                                                                             Termination of Test   Duration BP HRmax RPEmax Symptom Rating 0-10  Post PCSS-Score    7:14 129/81 133 12 1  9                      Treatment     Corazon received the treatments listed below:      therapeutic exercises to develop strength, endurance, ROM, flexibility, posture, and core stabilization for 0 minutes including:      manual therapy techniques: Joint mobilizations, Manual traction, Myofacial release, Soft tissue Mobilization, and Dry Needling were applied to the: shoulder and thoracic spine for 0 minutes, including:    Prone thoracic gapping  Medial tibial gapping  Tibial IR Gr IV  Prone Gr V mid thoracic spine    neuromuscular re-education activities to improve: Balance, Coordination, Kinesthetic, Sense, Proprioception, and Posture for 0 minutes. The following activities were included:    Rebounder   -SL basketball toss to #1-5  -semi tandem ball toss  -side ball toss single leg   Tandem 5# weight transfer side to side 5 x 5 alt LE in front  RDL with high knee 5# weight transfer 2 x 15 B   Education      therapeutic activities to improve functional performance for 60  "minutes, including:    SLR upright 2# 3 x 15  Shuttle 4 1/2 bands 3 x 15  -SL 3 1/2 bands 3 x 15  TKE step ups 6" 3 x 15   Lateral walks GTB 5 laps    NT  D2 extensions 13# 3 x 15  IR BTB 3 x 15 subscap focus 3 x 15  Matrix LAQ/HS cursl 65# 3 x 15  HHR with towel gapping in knee  Tricep extension rope CC 17# 3 x 15  Genie IR/ER GTB 3 x 12 eccentric  LAQ 10# eccentric lowering 3 x 10  SLR upright 5# 3 x 10 3" hold  HS curl jc 55# 3 x 15  Cross body stretch BTB   -super set D2 extension 3 x 12    Ice 0' R Knee    Patient Education and Home Exercises       Education provided:   - improve subscap and thoracic mobility    Written Home Exercises Provided: YES. Exercises were reviewed and Corazon was able to demonstrate them prior to the end of the session.  Corazon demonstrated good understanding of the education provided. See EMR under Patient Instructions for exercises provided during therapy sessions    Assessment     Progressed today with loading to the quad. He notes that his knee felt improved at end of session. Needs to progress loading prior to surgery, to get MRI soon    Corazon Is progressing well towards his goals.   Pt prognosis is Excellent.     Pt will continue to benefit from skilled outpatient physical therapy to address the deficits listed in the problem list box on initial evaluation, provide pt/family education and to maximize pt's level of independence in the home and community environment.     Pt's spiritual, cultural and educational needs considered and pt agreeable to plan of care and goals.     Anticipated barriers to physical therapy: work schedule    GOALS: Short Term Goals: 2 weeks  1.Report decreased neck pain  <   / =  4  /10  to increase tolerance for work(Progressing, not met)  2. Increase cervical ROM by 5-10 degrees in order to perform ADLs with decreased difficulty.(Progressing, not met)  3. Increase strength in B shoulders/scapular stabilizers by 1/3 MMT grade to increase tolerance for ADL " and work activities.(Progressing, not met)  4. Pt to tolerate HEP to improve ROM and independence with ADL's(Progressing, not met)     Long Term Goals: 6 weeks  1.Report decreased neck pain  <   / =  0  /10  to increase tolerance for return to ADL without pain(Progressing, not met)  2. Increase UE/neck flexibility in order to improve posture.  (Progressing, not met)  3.Increased strength in B shoulders/scapular stabilizers to >/= 4/5 MMT grade to increase tolerance for ADL and work activities.(Progressing, not met)  4.  Pt will report at goals level on FOTO neck score for neck pain disability to demonstrate decrease in disability and improvement in neck pain. (Progressing, not met)    Plan     Plan of care Certification: 12/5/2023 to 6/5/2024.     Improve subscap strength    Crescencio Amin, PT, DPT, OCS, FAAOMPT

## 2024-05-24 ENCOUNTER — CLINICAL SUPPORT (OUTPATIENT)
Dept: REHABILITATION | Facility: HOSPITAL | Age: 59
End: 2024-05-24
Payer: COMMERCIAL

## 2024-05-24 DIAGNOSIS — H81.8X9 DEFICIT OF VESTIBULO-OCULAR REFLEX: ICD-10-CM

## 2024-05-24 DIAGNOSIS — H55.82 SMOOTH PURSUIT MOVEMENT DEFICIENCY: ICD-10-CM

## 2024-05-24 DIAGNOSIS — H55.81 SACCADIC EYE MOVEMENT DEFICIENCY: ICD-10-CM

## 2024-05-24 DIAGNOSIS — H51.11 CONVERGENCE INSUFFICIENCY: ICD-10-CM

## 2024-05-24 DIAGNOSIS — F07.81 POST CONCUSSION SYNDROME: Primary | ICD-10-CM

## 2024-05-24 PROCEDURE — 97112 NEUROMUSCULAR REEDUCATION: CPT | Mod: PO

## 2024-05-24 NOTE — PROGRESS NOTES
[facility]  Occupational Therapy Reassessment/Treatment Note     Date: 5/24/2024  Name: Corazon Jones  Clinic Number: [id number]    Therapy Diagnosis:   Encounter Diagnoses   Name Primary?    Post concussion syndrome Yes    Smooth pursuit movement deficiency     Saccadic eye movement deficiency     Convergence insufficiency     Deficit of vestibulo-ocular reflex        Physician: Reyes Hartman III, MD    Physician Orders: Eval and Treat - Neuro OT   Medical Diagnosis: S06.0XAA (ICD-10-CM) - Concussion with unknown loss of consciousness status, initial encounter   Evaluation Date: 4/23/2024  Insurance Authorization Period Expiration: 12/31/2024  Plan of Care Expiration: 10 visits; around 6/5/2024   Progress Note Due: Around visit 5   Date of Return to MD: 7/18/2024 internal medicine   FOTO: not administered at concussion clinic     Visit # / Visits authorized: 4 / 20  Time In: 0851  Time Out: 0933  Total Billable Time: 42 minutes    Precautions:  Standard    SUBJECTIVE     Pt reports: He is tired today. He started working out again but slowly. He has not done any of his HEP at home this week. He feels he is almost back to his baseline fatigue at the end of the work week.   He was somewhat compliant with home exercise program due to fatigue level and busy work schedule.   Response to previous treatment: first f/u   Functional change: first f/u     Date of Onset: 4/2/2024     Pain: 0/10  Location: N/A    Patient's Goals for Therapy: Return to baseline PLOF     OBJECTIVE     Objective Measures updated at progress report unless specified.    Adult Vision Questionnaire:   Directions: please check the answer that best describes your situation. If you wear glasses or contact lenses, answer the questions assuming that you are wearing them.   Never = never  Occasionally = less than 1 time/week  Frequently = at least 1 time/week  Always = everyday      Do you have headaches or facial pain?  Never  Do you have pain in your eyes with eye movement? Never  Do you experience neck or shoulder discomfort? Occasionally; B shoulder pain, R>L, but it's positional   Do you have dizziness, light headed or nausea while performing close up work? (computer work; reading; writing) Frequently    Do you have dizziness, light headed or nausea while performing far distance activities? (driving, tv, movies) Never  Do you experience dizziness when bending down and standing back up, or when getting up quickly? Frequently    Do you feel unsteady with walking, or drift to one side? Never  Do you feel overwhelmed or anxious while walking in a large department store or walking in a crowd? Never  Do you feel dizzy or off balance when walking down a long hallway or on bold patterned carpeting? Never  Does riding in a car make you feel dizzy or uncomfortable? Never  Do you find yourself with your head tilted to one side? Never   Does your posture tend to be leaning more forward or backward than your used to? Never  Do you experience poor depth perception or have difficulty estimating distances accurately? Never  Do you experience double/overlapping/shadowed vision at far distances? Never  Do you experience double/overlapping/shadowed vision at near distances? Never  Do you experience glare or have sensitivity to bright lights? Never  Do you close or cover one eye with near or far tasks? Never  Do you skip lines or lose your place while reading? Occasionally but did this prior to incident   Do you use your finger to keep your place on the page? Occasionally but did this prior to incident   Do you tire easily with close-up tasks? Frequently   Do you experience blurred vision with far distance tasks? (driving, television, movies, chalkboard at school) Never  Do you experience blurred vision with close up tasks? (computer, reading) Never  Do you experience words running together or appearing to move on the page? Occasionally when  fatigued      History:  Have you ever been diagnosed with:  Traumatic brain injury (TBI) or concussion? No  Reading disability? No  Lazy eye? No  Have you ever had an eye operation? LASIK ~15 yrs ago     Oculomotor Exam  Vestibular/Ocular-Motor Screening (VOMS) for Concussion  Vestibular/Ocular Motor Test: Not Tested Headache   0-10 Dizziness  0-10 Nausea   0-10 Fogginess   0-10 Comments   Baseline N/A 0 0 0 0     Smooth Pursuit  (1.5 feet left/right of center; 3 feet away; 2 times; 30 bpm each direction; horizontal and vertical)   0 0 0 0 See below    Saccades - Horizontal  (1.5 feet left/right of center; 3 feet away; 10 times; performed as quickly as possible)   0 0 0 0 See below    Saccades - Vertical   (1.5 feet up/down of center; 3 feet away; 10 times; performed as quickly as possible)   0 0 0 0 See below    Convergence (Near Point)  (14 pt font; stop when pt reports diplopia or outward deviation of eye is observed, blurring is ignored; measure distance between target and tip of nose; abnormal finding is >/= 6 cm)   0 0 0 0 (Near Point in cm):  Measure 1: 9   Measure 2: 9  Measure 3: 9   VOR - Horizontal  (14 pt font; 20 degrees rotation left/right; 10 reps; 180 bpm)   0 0 0 0 Pt reported diplopia and had trouble maintaining pace at 180 bpm but no visual slippage    VOR - Vertical  (14 pt font; 20 degrees rotation up/down; 10 times; 180 bpm)   0 0 0 0 Pt reported able to keep target in focus, but completed at slightly slowed speed; no visual slippage    Visual Motion Sensitivity Test  (80 degrees left/right; 5 times each direction; 50 bpm)   0 0 0 0 No visual slippage      Oculomotor ROM: WNL, no diplopia reported   Eye Alignment: WNL  Visual Field: NT  Acuity: uses readers     Spontaneous Nystagmus: None  Gaze Holding Nystagmus: None  Gaze Holding (No Fixation): NT  Smooth Pursuits:   Horizontal: Saccadic intrusion noted 1x when tracking right of midline               Vertical: Saccadic intrusion noted 1x when  "tracking downward   Saccades:               Horizontal: Intact; performed at consistent speed, no diplopia or eye fatigue               Vertical: Grossly intact but slight hesitancy with downward gaze shifts   Near Point Convergence (cm):   Eval: Impaired; Average 31.2 cm; left eye more difficult to converge; blinking required for single vision with target at arm's length   5/17/2024: Impaired; 14 cm (x1 trial; wearing readers)   5/24/2024: WFL; Average 9 (wearing readers)   Accommodation (gaze shift between near target (16") and far target (10ft)): Pt reported bringing far target into focus takes slightly longer, but no increase in symptoms   Fixation (5 second sustained visual attention): NT    Treatment     Hazem received the treatments listed below:     Neuromuscular re-education activities to improve oculomotor function, endurance, coordination, and gaze stabilization for 42 minutes. The following activities were included:  - Reassessment completed: see above for details   - VORx1, horizontal/vertical directions, 1 x 1 minute each at 125 bpm using busy 14pt font target  - Smooth pursuits, horizontal/vertical/diagonal directions, 1 x 1 minute each using busy 14pt font target    Patient Education and Home Exercises      Education provided:   - HEP  - Progress towards goals      Written Home Exercises Provided: Patient instructed to cont prior HEP.  Exercises were reviewed and Corazon was able to demonstrate them prior to the end of the session.    Jassem demonstrated good  understanding of the education provided.     See EMR under Patient Instructions for exercises provided prior visit. (Pursuits, saccades, accommodation, VORx1)  5/3/2024: convergence - pencil push ups; oculomotor progressions; VORx1 at 110 bpm x45 sec   5/10/2024: maddie string   5/24/2024: re-administered HEP with progressions (see pt instructions)     ASSESSMENT     Pt tolerated today's session well and is making great improvement with therapy. " Reassessment was completed today. Post concussion symptoms, ocular coordination, NPC, and sensitivity to eye movements are all improving. Pt only reported mild dizziness with VOR at 125 bpm. Although saccadic intrusions were noted 1x during assessment, none were noted during treatment time. HEP progressions were made and can be accessed via pt instructions; pt understanding of all. Pt is making good progress towards goals and would continue to benefit from skilled occupational therapy services to maximize oculomotor functioning and gaze stabilization, pt education, and HEP/HAP guidance to improve functional participation with meaningful occupations and work.      Corazon is progressing well towards his goals and there are no updates to goals at this time. Pt prognosis is Good.     Pt will continue to benefit from skilled outpatient occupational therapy to address the deficits listed in the problem list on initial evaluation provide pt/family education and to maximize pt's level of independence in the home and community environment.     Pt's spiritual, cultural and educational needs considered and pt agreeable to plan of care and goals.    Anticipated barriers to occupational therapy: none noted    Goals:  Short Term Goals: 5 weeks   1) Patient will be independent with HEP emphasizing gaze stabilization and oculomotor function. MET 5/24/2024; progressing as needed and not always compliant   2) Near point convergence will decrease to 20 cm or less to improve oculomotor coordination and ability to fixate on close up work. MET 5/17/2024  3) Pt to perform saccades WFL, in all directions x30 second intervals at 90 bpm without increase in headache, nausea, or dizziness, to improve skills needed for reading. MET 5/17/2024 for time; speed not added yet  4) Pt will demonstrate ability to track single target with less than 50% ocular deviation throughout, in all directions x30 second intervals, without increase in headache,  nausea, or dizziness, to improve skills needed for technology use and scanning environment. MET 5/17/2024  5) Patient to perform VORx1 at 110 bpm WFL for improved gaze stabilization. MET 5/3/2024     Long Term Goals: 10 weeks   1) Near point convergence will decrease to 10 cm or less to improve oculomotor coordination and ability to fixate on close up work. MET 5/24/2024  2) Pt will change gaze between near and far targets without complaints of blurred vision or diplopia for improved accommodative flexibility, saccadic eye movements, and oculomotor coordination needed for work related tasks and to maximize safety while driving. MET 5/24/2024; no blurred vision or diplopia but still report of slightly increased time to bring far target into focus  3) Pt to perform saccades WFL, in all directions x60 second intervals at 110 bpm without increase in headache, nausea, or dizziness, to improve skills needed for reading. MET 5/17/2024 for time; speed not added but pt is performing at normal speed   4) Pt will demonstrate ability to track single target WFL, in all directions x60 second intervals, without increase in headache, nausea, or dizziness, to improve skills needed for technology use and scanning environment. MET 5/17/2024  5) Patient to perform VORx1 at 140 bpm WFL for improved gaze stabilization. ongoing  6) Patient to report usage of computer/phone and TV for equal amount of time as prior to concussion ongoing     PLAN     Certification Period/Plan of care expiration: 10 visits; 4/23/2024 to around 6/5/2024.     Outpatient Occupational Therapy 1 times weekly for 10 weeks to include the following interventions: Neuromuscular Re-ed, Patient Education, Self Care, Therapeutic Activities, and Therapeutic Exercise.    Updates/Grading for next session: potential d/c on 6/7      Marga Saba OT

## 2024-05-24 NOTE — PATIENT INSTRUCTIONS
Oculomotor: Smooth Pursuits        Holding busy target target, keep eyes on target and slowly move target side to side, up and down, and then diagonally with head still. Perform sitting.  Repeat 1 set x 1 minute each direction.     Copyright © [employer]. All rights reserved.       Oculomotor: Saccades        Holding two busy targets positioned side by side, up and down, and then diagonally, move eyes quickly from target to target as head stays still. Perform 1 set x 1 minute each direction.   Copyright © [employer]. All rights reserved.     Gaze Stabilization: Tip Card    1.Target must remain in focus, not blurry, and appear stationary while head is in motion.  2.Perform exercises with small head movements (45° to either side of midline).  3.Increase speed of head motion so long as target is in focus.  4.If you wear eyeglasses, be sure you can see target through lens (therapist will give specific instructions for bifocal / progressive lenses).  5.These exercises may provoke dizziness or nausea. Work through these symptoms. If too dizzy, slow head movement slightly. Rest between each exercise.  6.Exercises demand concentration; avoid distractions.  7.For safety, perform standing exercises close to a counter, wall, corner, or next to someone.    Copyright © [employer]. All rights reserved.       Gaze Stabilization (VORx1): Sitting        Keeping eyes on busy target, tilt head down 15-30° and move head side to side for 1 minute. Repeat while moving head up and down for 1 minute. Perform at 125 bpm and try to slowly progress to 140 bpm on metronome    Copyright © [employer]. All rights reserved.       Gaze shift between near and far targets. Perform 30 seconds. Be sure to bring the targets into focus before switching your gaze to the other one       Perform 2 sets x 10 reps holding nearest point that the target is single for 5 seconds. Remember it's okay if the target is a little blurred as long as you can still make it out.

## 2024-05-28 ENCOUNTER — CLINICAL SUPPORT (OUTPATIENT)
Dept: REHABILITATION | Facility: HOSPITAL | Age: 59
End: 2024-05-28
Payer: COMMERCIAL

## 2024-05-28 DIAGNOSIS — M25.511 ACUTE PAIN OF RIGHT SHOULDER: Primary | ICD-10-CM

## 2024-05-28 PROCEDURE — 97530 THERAPEUTIC ACTIVITIES: CPT | Performed by: PHYSICAL THERAPIST
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
## 2024-06-07 ENCOUNTER — CLINICAL SUPPORT (OUTPATIENT)
Dept: REHABILITATION | Facility: HOSPITAL | Age: 59
End: 2024-06-07
Payer: COMMERCIAL

## 2024-06-07 DIAGNOSIS — H81.8X9 DEFICIT OF VESTIBULO-OCULAR REFLEX: ICD-10-CM

## 2024-06-07 DIAGNOSIS — H51.11 CONVERGENCE INSUFFICIENCY: ICD-10-CM

## 2024-06-07 DIAGNOSIS — H55.82 SMOOTH PURSUIT MOVEMENT DEFICIENCY: ICD-10-CM

## 2024-06-07 DIAGNOSIS — F07.81 POST CONCUSSION SYNDROME: Primary | ICD-10-CM

## 2024-06-07 DIAGNOSIS — H55.81 SACCADIC EYE MOVEMENT DEFICIENCY: ICD-10-CM

## 2024-06-07 PROCEDURE — 97112 NEUROMUSCULAR REEDUCATION: CPT | Mod: PO
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
## 2024-06-11 ENCOUNTER — CLINICAL SUPPORT (OUTPATIENT)
Dept: REHABILITATION | Facility: HOSPITAL | Age: 59
End: 2024-06-11
Payer: COMMERCIAL

## 2024-06-11 DIAGNOSIS — M25.511 ACUTE PAIN OF RIGHT SHOULDER: Primary | ICD-10-CM

## 2024-06-11 PROBLEM — H81.8X9 DEFICIT OF VESTIBULO-OCULAR REFLEX: Status: RESOLVED | Noted: 2024-04-23 | Resolved: 2024-06-11

## 2024-06-11 PROBLEM — F07.81 POST CONCUSSION SYNDROME: Status: RESOLVED | Noted: 2024-04-23 | Resolved: 2024-06-11

## 2024-06-11 PROBLEM — H55.82 SMOOTH PURSUIT MOVEMENT DEFICIENCY: Status: RESOLVED | Noted: 2024-04-23 | Resolved: 2024-06-11

## 2024-06-11 PROBLEM — H55.81 SACCADIC EYE MOVEMENT DEFICIENCY: Status: RESOLVED | Noted: 2024-04-23 | Resolved: 2024-06-11

## 2024-06-11 PROBLEM — Z74.09 IMPAIRED FUNCTIONAL MOBILITY, BALANCE, AND ENDURANCE: Status: RESOLVED | Noted: 2024-04-24 | Resolved: 2024-06-11

## 2024-06-11 PROBLEM — H51.11 CONVERGENCE INSUFFICIENCY: Status: RESOLVED | Noted: 2024-04-23 | Resolved: 2024-06-11

## 2024-06-11 PROBLEM — R51.9 HEADACHE: Status: RESOLVED | Noted: 2024-04-24 | Resolved: 2024-06-11

## 2024-06-11 PROCEDURE — 97140 MANUAL THERAPY 1/> REGIONS: CPT | Performed by: PHYSICAL THERAPIST

## 2024-06-11 PROCEDURE — 97530 THERAPEUTIC ACTIVITIES: CPT | Performed by: PHYSICAL THERAPIST
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
## 2024-06-12 ENCOUNTER — TELEPHONE (OUTPATIENT)
Dept: SPORTS MEDICINE | Facility: CLINIC | Age: 59
End: 2024-06-12
Payer: COMMERCIAL

## 2024-06-12 DIAGNOSIS — G89.29 CHRONIC PAIN OF RIGHT KNEE: ICD-10-CM

## 2024-06-12 DIAGNOSIS — M25.561 CHRONIC PAIN OF RIGHT KNEE: ICD-10-CM

## 2024-06-12 DIAGNOSIS — M25.561 ACUTE PAIN OF RIGHT KNEE: Primary | ICD-10-CM

## 2024-06-12 DIAGNOSIS — I10 PRIMARY HYPERTENSION: ICD-10-CM

## 2024-06-12 RX ORDER — LOSARTAN POTASSIUM 25 MG/1
25 TABLET ORAL DAILY
Qty: 90 TABLET | Refills: 3 | Status: SHIPPED | OUTPATIENT
Start: 2024-06-12 | End: 2025-06-12

## 2024-06-12 NOTE — TELEPHONE ENCOUNTER
Patient has been trying conservative mgmt for right knee pain for 3 months (PT, NSAIDs, rest and ice) with no improvement. Will get MRI to evaluate for mensicus tear.

## 2024-06-12 NOTE — TELEPHONE ENCOUNTER
----- Message from Crescencio Amin PT sent at 6/11/2024  6:50 PM CDT -----  Regarding: Dr. Karen Tyler is requesting an MRI on the right knee. Believes there is a meniscus tear he wants cleaned out. History of L knee meniscectomy and responded well.    Thanks,  Crescencio

## 2024-06-12 NOTE — TELEPHONE ENCOUNTER
Refill Routing Note   Medication(s) are not appropriate for processing by [facility] for the following reason(s):        New or recently adjusted medication    [facility] action(s):  Defer             Appointments  past 12m or future 3m with PCP    Date Provider   Last Visit   4/19/2024 Baldomero Schwab MD   Next Visit   7/18/2024 Baldomero Schwab MD   ED visits in past 90 days: 0        Note composed:6:01 AM 06/12/2024

## 2024-06-12 NOTE — TELEPHONE ENCOUNTER
No care due was identified.  [facility] Embedded Care Due Messages. Reference number: [id number].   6/12/2024 5:08:43 AM CDT

## 2024-06-19 ENCOUNTER — HOSPITAL ENCOUNTER (OUTPATIENT)
Dept: RADIOLOGY | Facility: HOSPITAL | Age: 59
Discharge: HOME | End: 2024-06-19
Attending: PHYSICIAN ASSISTANT
Payer: COMMERCIAL

## 2024-06-19 DIAGNOSIS — G89.29 CHRONIC PAIN OF RIGHT KNEE: ICD-10-CM

## 2024-06-19 DIAGNOSIS — M25.561 CHRONIC PAIN OF RIGHT KNEE: ICD-10-CM

## 2024-06-19 PROCEDURE — 73721 MRI JNT OF LWR EXTRE W/O DYE: CPT | Mod: 26,RT,, | Performed by: RADIOLOGY

## 2024-06-19 PROCEDURE — 73721 MRI JNT OF LWR EXTRE W/O DYE: CPT | Mod: TC,RT

## 2024-06-23 ENCOUNTER — HOSPITAL ENCOUNTER (OUTPATIENT)
Facility: OTHER | Age: 59
Discharge: HOME | End: 2024-06-24
Attending: EMERGENCY MEDICINE | Admitting: INTERNAL MEDICINE
Payer: COMMERCIAL

## 2024-06-23 DIAGNOSIS — G45.4 TRANSIENT GLOBAL AMNESIA: Primary | ICD-10-CM

## 2024-06-23 DIAGNOSIS — R41.3 MEMORY LOSS: ICD-10-CM

## 2024-06-23 DIAGNOSIS — R07.89 CHEST DISCOMFORT: ICD-10-CM

## 2024-06-23 PROBLEM — R73.03 PREDIABETES: Chronic | Status: ACTIVE | Noted: 2023-07-20

## 2024-06-23 PROBLEM — I10 PRIMARY HYPERTENSION: Chronic | Status: ACTIVE | Noted: 2019-11-06

## 2024-06-23 LAB
ALBUMIN SERPL BCP-MCNC: 4.1 G/DL (ref 3.5–5.2)
ALP SERPL-CCNC: 80 U/L (ref 55–135)
ALT SERPL W/O P-5'-P-CCNC: 26 U/L (ref 10–44)
ANION GAP SERPL CALC-SCNC: 10 MMOL/L (ref 8–16)
AST SERPL-CCNC: 20 U/L (ref 10–40)
BASOPHILS # BLD AUTO: 0.02 K/UL (ref 0–0.2)
BASOPHILS NFR BLD: 0.8 % (ref 0–1.9)
BILIRUB SERPL-MCNC: 0.6 MG/DL (ref 0.1–1)
BILIRUB UR QL STRIP: NEGATIVE
BUN SERPL-MCNC: 13 MG/DL (ref 6–20)
CALCIUM SERPL-MCNC: 9.5 MG/DL (ref 8.7–10.5)
CHLORIDE SERPL-SCNC: 104 MMOL/L (ref 95–110)
CHOLEST SERPL-MCNC: 170 MG/DL (ref 120–199)
CHOLEST/HDLC SERPL: 4.1 {RATIO} (ref 2–5)
CLARITY UR: CLEAR
CO2 SERPL-SCNC: 26 MMOL/L (ref 23–29)
COLOR UR: YELLOW
CREAT SERPL-MCNC: 0.9 MG/DL (ref 0.5–1.4)
CREAT SERPL-MCNC: 0.9 MG/DL (ref 0.5–1.4)
DIFFERENTIAL METHOD BLD: ABNORMAL
EOSINOPHIL # BLD AUTO: 0 K/UL (ref 0–0.5)
EOSINOPHIL NFR BLD: 1.5 % (ref 0–8)
ERYTHROCYTE [DISTWIDTH] IN BLOOD BY AUTOMATED COUNT: 13.2 % (ref 11.5–14.5)
EST. GFR  (NO RACE VARIABLE): >60 ML/MIN/1.73 M^2
ESTIMATED AVG GLUCOSE: 111 MG/DL (ref 68–131)
GLUCOSE SERPL-MCNC: 97 MG/DL (ref 70–110)
GLUCOSE UR QL STRIP: NEGATIVE
HBA1C MFR BLD: 5.5 % (ref 4–5.6)
HCT VFR BLD AUTO: 47 % (ref 40–54)
HDLC SERPL-MCNC: 41 MG/DL (ref 40–75)
HDLC SERPL: 24.1 % (ref 20–50)
HGB BLD-MCNC: 15.7 G/DL (ref 14–18)
HGB UR QL STRIP: NEGATIVE
IMM GRANULOCYTES # BLD AUTO: 0 K/UL (ref 0–0.04)
IMM GRANULOCYTES NFR BLD AUTO: 0 % (ref 0–0.5)
INR PPP: 1.1 (ref 0.8–1.2)
KETONES UR QL STRIP: NEGATIVE
LDLC SERPL CALC-MCNC: 100.4 MG/DL (ref 63–159)
LEUKOCYTE ESTERASE UR QL STRIP: NEGATIVE
LYMPHOCYTES # BLD AUTO: 1.1 K/UL (ref 1–4.8)
LYMPHOCYTES NFR BLD: 41.2 % (ref 18–48)
MCH RBC QN AUTO: 29.5 PG (ref 27–31)
MCHC RBC AUTO-ENTMCNC: 33.4 G/DL (ref 32–36)
MCV RBC AUTO: 88 FL (ref 82–98)
MONOCYTES # BLD AUTO: 0.4 K/UL (ref 0.3–1)
MONOCYTES NFR BLD: 15.3 % (ref 4–15)
NEUTROPHILS # BLD AUTO: 1.1 K/UL (ref 1.8–7.7)
NEUTROPHILS NFR BLD: 41.2 % (ref 38–73)
NITRITE UR QL STRIP: NEGATIVE
NONHDLC SERPL-MCNC: 129 MG/DL
NRBC BLD-RTO: 0 /100 WBC
PH UR STRIP: 8 [PH] (ref 5–8)
PLATELET # BLD AUTO: 258 K/UL (ref 150–450)
PMV BLD AUTO: 9.6 FL (ref 9.2–12.9)
POCT GLUCOSE: 105 MG/DL (ref 70–110)
POCT GLUCOSE: 84 MG/DL (ref 70–110)
POTASSIUM SERPL-SCNC: 3.6 MMOL/L (ref 3.5–5.1)
PROT SERPL-MCNC: 7.5 G/DL (ref 6–8.4)
PROT UR QL STRIP: NEGATIVE
PROTHROMBIN TIME: 12.3 SEC (ref 9–12.5)
RBC # BLD AUTO: 5.33 M/UL (ref 4.6–6.2)
SAMPLE: NORMAL
SODIUM SERPL-SCNC: 140 MMOL/L (ref 136–145)
SP GR UR STRIP: >1.03 (ref 1–1.03)
TRIGL SERPL-MCNC: 143 MG/DL (ref 30–150)
TSH SERPL DL<=0.005 MIU/L-ACNC: 1.26 UIU/ML (ref 0.4–4)
URN SPEC COLLECT METH UR: ABNORMAL
UROBILINOGEN UR STRIP-ACNC: NEGATIVE EU/DL
WBC # BLD AUTO: 2.62 K/UL (ref 3.9–12.7)

## 2024-06-23 PROCEDURE — G0408 INPT/TELE FOLLOW UP 35: HCPCS | Mod: GT,,, | Performed by: STUDENT IN AN ORGANIZED HEALTH CARE EDUCATION/TRAINING PROGRAM

## 2024-06-23 PROCEDURE — 96374 THER/PROPH/DIAG INJ IV PUSH: CPT

## 2024-06-23 PROCEDURE — 27000190 HC CPAP FULL FACE MASK W/VALVE

## 2024-06-23 PROCEDURE — 36415 COLL VENOUS BLD VENIPUNCTURE: CPT | Performed by: EMERGENCY MEDICINE

## 2024-06-23 PROCEDURE — 85610 PROTHROMBIN TIME: CPT | Performed by: EMERGENCY MEDICINE

## 2024-06-23 PROCEDURE — G0378 HOSPITAL OBSERVATION PER HR: HCPCS

## 2024-06-23 PROCEDURE — 99900035 HC TECH TIME PER 15 MIN (STAT)

## 2024-06-23 PROCEDURE — 25000003 PHARM REV CODE 250: Performed by: INTERNAL MEDICINE

## 2024-06-23 PROCEDURE — 63600175 PHARM REV CODE 636 W HCPCS: Performed by: EMERGENCY MEDICINE

## 2024-06-23 PROCEDURE — 82962 GLUCOSE BLOOD TEST: CPT

## 2024-06-23 PROCEDURE — 81003 URINALYSIS AUTO W/O SCOPE: CPT | Performed by: INTERNAL MEDICINE

## 2024-06-23 PROCEDURE — 94761 N-INVAS EAR/PLS OXIMETRY MLT: CPT

## 2024-06-23 PROCEDURE — 25000003 PHARM REV CODE 250: Performed by: NURSE PRACTITIONER

## 2024-06-23 PROCEDURE — 83036 HEMOGLOBIN GLYCOSYLATED A1C: CPT | Performed by: EMERGENCY MEDICINE

## 2024-06-23 PROCEDURE — 80053 COMPREHEN METABOLIC PANEL: CPT | Performed by: EMERGENCY MEDICINE

## 2024-06-23 PROCEDURE — 94660 CPAP INITIATION&MGMT: CPT

## 2024-06-23 PROCEDURE — 82565 ASSAY OF CREATININE: CPT

## 2024-06-23 PROCEDURE — 99285 EMERGENCY DEPT VISIT HI MDM: CPT | Mod: 25

## 2024-06-23 PROCEDURE — 80061 LIPID PANEL: CPT | Performed by: EMERGENCY MEDICINE

## 2024-06-23 PROCEDURE — 85025 COMPLETE CBC W/AUTO DIFF WBC: CPT | Performed by: EMERGENCY MEDICINE

## 2024-06-23 PROCEDURE — 84443 ASSAY THYROID STIM HORMONE: CPT | Performed by: EMERGENCY MEDICINE

## 2024-06-23 PROCEDURE — 25500020 PHARM REV CODE 255: Performed by: EMERGENCY MEDICINE

## 2024-06-23 RX ORDER — SODIUM CHLORIDE 0.9 % (FLUSH) 0.9 %
10 SYRINGE (ML) INJECTION
Status: DISCONTINUED | OUTPATIENT
Start: 2024-06-23 | End: 2024-06-24 | Stop reason: HOSPADM

## 2024-06-23 RX ORDER — ASPIRIN 81 MG/1
81 TABLET ORAL DAILY
Status: DISCONTINUED | OUTPATIENT
Start: 2024-06-23 | End: 2024-06-24 | Stop reason: HOSPADM

## 2024-06-23 RX ORDER — BISACODYL 10 MG/1
10 SUPPOSITORY RECTAL DAILY PRN
Status: DISCONTINUED | OUTPATIENT
Start: 2024-06-23 | End: 2024-06-24 | Stop reason: HOSPADM

## 2024-06-23 RX ORDER — SERTRALINE HYDROCHLORIDE 50 MG/1
50 TABLET, FILM COATED ORAL DAILY
Status: DISCONTINUED | OUTPATIENT
Start: 2024-06-23 | End: 2024-06-24 | Stop reason: HOSPADM

## 2024-06-23 RX ORDER — LORAZEPAM 2 MG/ML
2 INJECTION INTRAMUSCULAR
Status: COMPLETED | OUTPATIENT
Start: 2024-06-23 | End: 2024-06-23

## 2024-06-23 RX ORDER — ATORVASTATIN CALCIUM 20 MG/1
80 TABLET, FILM COATED ORAL NIGHTLY
Status: DISCONTINUED | OUTPATIENT
Start: 2024-06-23 | End: 2024-06-24 | Stop reason: HOSPADM

## 2024-06-23 RX ORDER — SERTRALINE HYDROCHLORIDE 50 MG/1
50 TABLET, FILM COATED ORAL DAILY
Status: DISCONTINUED | OUTPATIENT
Start: 2024-06-24 | End: 2024-06-23

## 2024-06-23 RX ORDER — ENOXAPARIN SODIUM 100 MG/ML
40 INJECTION SUBCUTANEOUS EVERY 24 HOURS
Status: DISCONTINUED | OUTPATIENT
Start: 2024-06-24 | End: 2024-06-24 | Stop reason: HOSPADM

## 2024-06-23 RX ORDER — LABETALOL HYDROCHLORIDE 5 MG/ML
10 INJECTION, SOLUTION INTRAVENOUS
Status: DISCONTINUED | OUTPATIENT
Start: 2024-06-23 | End: 2024-06-24 | Stop reason: HOSPADM

## 2024-06-23 RX ADMIN — ATORVASTATIN CALCIUM 80 MG: 20 TABLET, FILM COATED ORAL at 08:06

## 2024-06-23 RX ADMIN — IOHEXOL 100 ML: 350 INJECTION, SOLUTION INTRAVENOUS at 12:06

## 2024-06-23 RX ADMIN — ASPIRIN 81 MG: 81 TABLET, COATED ORAL at 04:06

## 2024-06-23 RX ADMIN — LORAZEPAM 2 MG: 2 INJECTION INTRAMUSCULAR; INTRAVENOUS at 02:06

## 2024-06-23 RX ADMIN — SERTRALINE HYDROCHLORIDE 50 MG: 50 TABLET ORAL at 09:06

## 2024-06-23 NOTE — SUBJECTIVE & OBJECTIVE
Past Medical History:   Diagnosis Date    History of Clostridium difficile colitis     treated 2014    History of sarcoidosis     dx 1991, resolved    Hyperlipemia        Past Surgical History:   Procedure Laterality Date    APPENDECTOMY      EYE SURGERY  2006 Lasik    HERNIA REPAIR      orthoscopy      shoulder and knee       Review of patient's allergies indicates:   Allergen Reactions    Percocet [oxycodone-acetaminophen]     Suture, silk     Adhesive Rash       No current facility-administered medications on file prior to encounter.     Current Outpatient Medications on File Prior to Encounter   Medication Sig    celecoxib (CELEBREX) 200 MG capsule Take 1 capsule (200 mg total) by mouth once daily.    ergocalciferol (VITAMIN D2) 50,000 unit Cap Take 1 capsule (50,000 Units total) by mouth every 7 days.    losartan (COZAAR) 25 MG tablet Take 1 tablet (25 mg total) by mouth once daily.    methocarbamoL (ROBAXIN) 500 MG Tab Take 1 tablet (500 mg total) by mouth 2 (two) times a day.    methylPREDNISolone (MEDROL DOSEPACK) 4 mg tablet use as directed    omeprazole (PRILOSEC) 40 MG capsule TAKE 1 CAPSULE BY MOUTH EVERY MORNING AS DIRECTED    ondansetron (ZOFRAN-ODT) 4 MG TbDL Take 1 tablet (4 mg total) by mouth every 8 (eight) hours as needed (nausea). nausea. can worsen constipation (Patient not taking: Reported on 4/23/2024)    rosuvastatin (CRESTOR) 40 MG Tab Take 1 tablet (40 mg total) by mouth every evening.    sertraline (ZOLOFT) 50 MG tablet Take 1 tablet (50 mg total) by mouth once daily.    tadalafiL (CIALIS) 20 MG Tab Take 1 tablet (20 mg total) by mouth once daily.    tretinoin (RETIN-A) 0.05 % cream Apply topically to the skin every night    zonisamide (ZONEGRAN) 100 MG Cap Take 1 at bedtime for 3 days, then 2 at bedtime for 3 days, then 3 at bedtime for 3 days, then 4 at bedtime to follow. Stay at the most comfortable dose.     Family History       Problem Relation (Age of Onset)    ADD / ADHD Child     Atrial fibrillation Mother    Diabetes Mellitus Maternal Grandmother, Maternal Uncle, Paternal Uncle    Endometrial cancer Paternal Grandmother    Hyperlipidemia Mother, Father, Brother    Prostate cancer Maternal Uncle    Stroke Father    Vision loss Mother          Tobacco Use    Smoking status: Never    Smokeless tobacco: Never   Substance and Sexual Activity    Alcohol use: No    Drug use: No    Sexual activity: Yes     Partners: Female     Review of Systems   Constitutional:  Negative for chills and fever.   HENT:  Negative for sore throat and trouble swallowing.    Eyes:  Negative for pain and visual disturbance.   Respiratory:  Negative for cough and shortness of breath.    Cardiovascular:  Negative for chest pain and palpitations.   Gastrointestinal:  Negative for abdominal pain, nausea and vomiting.   Genitourinary:  Negative for difficulty urinating and dysuria.   Musculoskeletal:  Negative for arthralgias and joint swelling.   Skin:  Negative for rash and wound.   Neurological:  Negative for weakness and numbness.   Psychiatric/Behavioral:  Positive for confusion.      Objective:     Vital Signs (Most Recent):  Temp: 98 °F (36.7 °C) (06/23/24 1304)  Pulse: 84 (06/23/24 1420)  Resp: 16 (06/23/24 1420)  BP: (!) 148/79 (06/23/24 1420)  SpO2: 97 % (06/23/24 1420) Vital Signs (24h Range):  Temp:  [98 °F (36.7 °C)] 98 °F (36.7 °C)  Pulse:  [] 84  Resp:  [16-18] 16  SpO2:  [96 %-97 %] 97 %  BP: (148-161)/(79-90) 148/79     Weight: 104 kg (229 lb 4.5 oz)  Body mass index is 32.9 kg/m².     Physical Exam  Vitals and nursing note reviewed.   Constitutional:       General: He is not in acute distress.     Appearance: He is well-developed.   HENT:      Head: Normocephalic and atraumatic.   Eyes:      General:         Right eye: No discharge.         Left eye: No discharge.      Conjunctiva/sclera: Conjunctivae normal.   Cardiovascular:      Rate and Rhythm: Normal rate.      Pulses: Normal pulses.   Pulmonary:       Effort: Pulmonary effort is normal. No respiratory distress.   Abdominal:      Palpations: Abdomen is soft.      Tenderness: There is no abdominal tenderness.   Musculoskeletal:         General: Normal range of motion.      Right lower leg: No edema.      Left lower leg: No edema.   Skin:     General: Skin is warm and dry.   Neurological:      Comments: Mental status: Alert, oriented x 3, mildly tangential thought process, impaired memory.  Cranial nerves: CNII: PERRL, visual fields intact. CNIII, IV, VI: EOMI. CNV, VII: Facial muscles symmetrical with no noted weakness. Sensation intact and symmetrical. CNVIII: hearing intact. CNIX, X, XII: tongue and palatal motion intact, normal phonation and cough. CNXI: strength 5/5 and symmetrical.  Muscular: Normal bulk and tone, strength 5/5 all four extremities.  Sensory: Grossly intact to touch all four extremities.        Significant Labs:   CBC:  Recent Labs   Lab 06/23/24  1301   WBC 2.62*   HGB 15.7   HCT 47.0      GRAN 41.2  1.1*   LYMPH 41.2  1.1   MONO 15.3*  0.4   EOS 0.0   BASO 0.02   CMP:  Recent Labs   Lab 06/23/24  1301      K 3.6      CO2 26   BUN 13   CREATININE 0.9   GLU 97   CALCIUM 9.5   ALKPHOS 80   AST 20   ALT 26   BILITOT 0.6   PROT 7.5   ALBUMIN 4.1   ANIONGAP 10     Recent Labs   Lab 06/23/24  1301   INR 1.1   TSH 1.258     Significant Imaging: I have reviewed and interpreted all pertinent imaging results/findings within the past 24 hours.  Imaging Results              MRA Brain without contrast (In process)                      MRI Brain Without Contrast (In process)                       CTA Head and Neck (xpd) (Final result)  Result time 06/23/24 13:44:15      Final result by Vivek Castañeda MD (06/23/24 13:44:15)                   Impression:      No CT findings of large territorial infarction.  Additional evaluation with MRI of the brain may be obtained.    No hemodynamically significant stenosis in the neck  vessels    No large vessel occlusion in the intracranial circulation.    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  Initial assessment with MRA of the brain without contrast is suggested.  Follow-up with conventional cerebral angiogram, as clinically warranted.    This report was flagged in Epic as abnormal.      Electronically signed by: Vivek Castañeda MD  Date:    06/23/2024  Time:    13:44               Narrative:    EXAMINATION:  CTA HEAD AND NECK (XPD)    CLINICAL HISTORY:  Neuro deficit, acute, stroke suspected;    TECHNIQUE:  Non contrast low dose axial images were obtained through the head.  CT angiogram was performed from the level of the kerry to the top of the head following the IV administration of 100mL of Omnipaque 350.   Sagittal and coronal reconstructions and maximum intensity projection reconstructions were performed. Arterial stenosis percentages are based on NASCET measurement criteria.    CT source data was analyzed using artificial intelligence software for detection of a large vessel occlusions (LVO) in order to enable computer assisted triage notification and aid clinical stroke decision making.    COMPARISON:  04/19/2024.    03/13/2018    FINDINGS:  CT scan of the head:    The subcutaneous tissues are unremarkable.  The bony calvarium is intact.  The paranasal sinuses are unremarkable.  The mastoid air cells are clear.  The orbits and intraorbital contents are within normal limits.    The craniocervical junction is intact.  The sellar and parasellar structures are within normal limits.  The midline structures are unremarkable.  There is no evidence of intracranial hemorrhage.  The ventricles and sulci are within normal limits.  There is no dense vessel sign.  There is no evidence of mass effect.    CTA neck:    The great vessels arising for attic arch are within normal limits.  There is a normal 3 vessel configuration.  The visualized subclavian arteries are unremarkable.   The common carotid arteries are within normal limits.  The internal and external carotid arteries are within normal limits.    The origins of the vertebral arteries are within normal limits.  The vertebral arteries are normal in caliber.    CTA head:    The intracranial segments of the right ICA are within normal limits.  There is fusiform dilatation of the left cavernous ICA measuring 0.8 x 0.7 cm.    The A1 segment of the right anterior cerebral artery is within normal limits.  The remainder of the anterior cerebral arteries are within normal limits.  The middle cerebral arteries are unremarkable.    The intracranial segments of the vertebral is unremarkable the basilar is within normal limits.  The posterior cerebral arteries are unremarkable.    There is no evidence of large vessel occlusion in the intracranial circulation.    Additional findings:    The soft tissue the neck are unremarkable.  There is no evidence of lymphadenopathy in the neck.  The trachea is unremarkable.  The visualized lung apices are within normal limits.  There are degenerative changes in the cervical spine.

## 2024-06-23 NOTE — ED NOTES
Assumed care of Pt at this time. Received report from previous nurse.  Pt is currently resting. No distress noted.  Bed low and locked, call light is within reach.

## 2024-06-23 NOTE — SUBJECTIVE & OBJECTIVE
HPI:  58 y.o. male with a history of OTTONIEL, HTN, HLD, sarcoidosis who presents with memory loss.  History obtained primarily from Ed physician and son at bedside.    Patient woke up today and did not remember that his son was in the house.   He could not recall certain other pertinent details such as a recent trip to [place] or the fact that his wife was on the way from [place].  He also has trouble recollecting certain events and conversations from the past few days. Had a concussion in early April 2024 when he slipped and fell in the shower -  he has no memory of this.   He also has a vague recollection of seeing a neurologist for this.     Son states that he was acting off yesterday (had brief periods of memory loss).     Images personally reviewed and interpreted:  Study: Head CT and CTA Head & Neck  Study Interpretation:   No acute ischemia.  No evidence of high-grade/critical stenosis of the extracranial carotids.  No evidence of intracranial occlusion.     Assessment and plan:  57 y/o male with a history as above who presents with retrograde amnesia.  Possibly had onset of symptoms yesterday although seemed more profound when he woke up today morning.    On exam, he is alert and oriented to person, place and time. Could not recollect the President's name.  No motor/sensory deficits.  No facial asymmetry or ophthalmotrope.  No aphasia, dysarthria.    Overall concern for a cerebrovascular etiology is low.  History seems most consistent with transient global amnesia although this tends to resolve within 24 hrs and it seems that his symptoms have been going on for longer than that.  Recommend MRI of the brain W/ W/O contrast. Defer any antiplatelets for now.  Routine EEG  Check Utox and routine labs.          Lytics recommendation: Thrombolytic therapy not recommended due to Outside of treatment window  and Suspected stroke mimic   Thrombectomy recommendation: No; No large vessel occlusion identified on imaging    Placement recommendation: admit to inpatient

## 2024-06-23 NOTE — PLAN OF CARE
Problem: Stroke, Ischemic (Includes Transient Ischemic Attack)  Goal: Optimal Coping  Outcome: Progressing     Problem: Fall Injury Risk  Goal: Absence of Fall and Fall-Related Injury  Outcome: Progressing     Problem: Infection  Goal: Absence of Infection Signs and Symptoms  Outcome: Progressing

## 2024-06-23 NOTE — ED PROVIDER NOTES
"     Source of History:  The patient as well as the patient's son    Chief complaint:  Altered Mental Status (AMS onset when waking up this am around 0800. Per son, last known normal approx 2100 last night before going to bed. Son endorses when he woke up this morning, pt has been unable to recall events that have happened over the past week and was having trouble remembering family members' names. Pt oriented to self and situation, unable to recall events on assessment. Pt speaking clearly in full sentences. No unilateral weakness, limb drift, or facial droop noted.)      HPI:  Corazon Jones is a 58 y.o. male  brought in by the patient's son for complaint of memory loss.  The son states that the patient woke up this morning and did not realize that the son was staying with him.  He also could not remember why they had gone to [place] last week for a trip or that his mother was coming to visit from [place] coming in tomorrow.  The son states he was acting a little off yesterday.  Denies any weakness or difficulty speaking.    This is the extent to the patients complaints today here in the emergency department.    ROS:   See HPI.    Review of patient's allergies indicates:   Allergen Reactions    Percocet [oxycodone-acetaminophen]     Suture, silk     Adhesive Rash       PMH:  As per HPI and below:  Past Medical History:   Diagnosis Date    History of Clostridium difficile colitis     treated 2014    History of sarcoidosis     dx 1991, resolved    Hyperlipemia      Past Surgical History:   Procedure Laterality Date    APPENDECTOMY      EYE SURGERY  2006 Lasik    HERNIA REPAIR      orthoscopy      shoulder and knee       Social History     Tobacco Use    Smoking status: Never    Smokeless tobacco: Never   Substance Use Topics    Alcohol use: No    Drug use: No       Physical Exam:    BP (!) 161/90   Pulse 109   Temp 98 °F (36.7 °C) (Oral)   Resp 18   Ht 5' 10" (1.778 m)   Wt 104 kg (229 lb 4.5 oz)   SpO2 96% "   BMI 32.90 kg/m²   Nursing note and vital signs reviewed.  Constitutional: No acute distress.  Nontoxic  Cardiovascular: Regular rate and rhythm.  No murmurs. No gallops. No rubs  Respiratory: Clear to auscultation bilaterally.  Good air movement.  No wheezes.  No rhonchi. No rales. No accessory muscle use..  Abdomen: Soft.  Not distended.  Nontender.  No guarding.  No rebound. Non-peritoneal.  Extremities:  No pitting edema.  No obvious deformities.  Neuro:  Alert and oriented person place and time.  Is aware that he is unaware of recent events.  Cranial nerves 2-12 are intact.  No focal neurological deficits.  Negative Romberg's.  No ataxia.  Psych:  Anxious appearing concerned of not being able to room things.    Summary of Previous Medical Records:  04/29/2024 reviewed outpatient office visit with Neurology for concussion.  Reportedly was injured on April 2nd when he fell in the shower striking the back of his head.  There was no loss of consciousness.  Symptoms documented from neurology visit states he felt fatigued but no memory loss.  Reportedly he went back to work on April 22nd but was told by colleagues that he sounded slower than usual.      Differential Dx/ MDM/ Workup:  Memory loss with the patient.  Not exact timing known from last seen normal.  Concern of stroke.  Looking at records the patient within the last 2 months had been seen by Neurology for concussion about 2-1/2 months ago.  It has documented that the patient's symptoms were only fatigue and no memory loss.  Will get a stroke workup.  Considered but no evidence of infectious etiology, sepsis or acute coronary syndrome.      ED Course as of 06/23/24 1358   Sun Jun 23, 2024   1301 POC Creatinine: 0.9 [SM]   1301 POCT Glucose: 105 [SM]   1306 ECG 12 lead  EKG independently interpreted by myself shows sinus tachycardia rate of 101, normal intervals, narrow QRS, isolated T-wave inversion in lead 3, no acute ST T wave abnormalities.  Overall  impression no significant findings [SM]   1319 WBC(!): 2.62 [SM]   1319 Hemoglobin: 15.7 [SM]   1319 Platelet Count: 258 [SM]   1319 I spoke with Dr Severino with vascular stroke who evaluated the patient.  Reviewed imaging which was unremarkable.  He suspects more likely transient global amnesia.  Recommended admission with MRI and EEG. []   5506 Updated the patient on the results of studies.  Also discussed with Hospital Medicine and will admit to Dr. Funes. []      ED Course User Index  [] Ruslan Zuleta,                  Diagnostic Impression:    1. Memory loss         ED Disposition Condition    Observation Stable                  Ruslan Zuleta,   06/23/24 9619

## 2024-06-23 NOTE — Clinical Note
Diagnosis: Acute focal neurological deficit [942867]   Future Attending Provider: ANGELITA NEGRETE [[id number]]   Is the patient being sent to ED Observation?: No

## 2024-06-23 NOTE — LETTER
[facility]  [address] [place]  [address]  Phone: [phone]  Fax: [phone] June 24, 2024     Patient: Corazon Jones   YOB: 1965       To Whom It May Concern:    Corazon Jones was admitted to the hospital on 6/23/2024 12:35 PM and discharged on 6/24/2024.  He may return to work on 6/26/2024 .  If you have any questions or concerns, please don't hesitate to call our office at [phone].      Sincerely,        ANGELITA Funes MD  Department of Hospital Medicine

## 2024-06-23 NOTE — H&P
"  [facility] Medicine  History & Physical    Patient Name: Corazon Jones  MRN: [id number]  Patient Class: OP- Observation  Admission Date: 6/23/2024  Attending Physician: JAY Funes MD  Primary Care Provider: Baldomero Schwab MD    Patient information was obtained from patient, past medical records, ER records, and son .     Subjective:     Principal Problem:Memory loss    Chief Complaint:   Chief Complaint   Patient presents with    Altered Mental Status     AMS onset when waking up this am around 0800. Per son, last known normal approx 2100 last night before going to bed. Son endorses when he woke up this morning, pt has been unable to recall events that have happened over the past week and was having trouble remembering family members' names. Pt oriented to self and situation, unable to recall events on assessment. Pt speaking clearly in full sentences. No unilateral weakness, limb drift, or facial droop noted.        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 History obtained from patient and his son (via phone). They report he appeared to be in his usual state of health up until the night prior to presentation; his son notes that he had asked an unusual question ("did you just wake up?" in late evening) which he presumed to be a joke but otherwise seemed his usual self. In the morning he reports waking around 0800 and feeling unusual. His son reports he had entered his son's room around 1000, briefly asked a question and left before receiving an answer and doing the same 15 minutes later. The patient's wife (in [place] at time of admission) subsequently called their son to bring him to the ED for further evaluation. Patient reports impaired memory of some events over the past six months (his concussion in 04/2024, family verbal altercation with an older son six " months ago without recent communication) as well as events from the previous night and the morning of presentation (cooking dinner the night before, drinking a protein drink / working out, eating fruit). During interview he gradually recalls more detail from these events. ED workup was notable for mild leukopenia stable at baseline, otherwise unremarkable CBC and CMP, normal TSH, and CTA Head/Neck demonstrating no hemodynamically significant stenosis of vasculature or infarction. 0.8x0.7cm fusiform dilation of left cavernous ICA was noted. Tele-stroke Neurology was consulted and recommended MRI/MRA and EEG. Hospital medicine was subsequently contacted for observation placement.    Past Medical History:   Diagnosis Date    History of Clostridium difficile colitis     treated 2014    History of sarcoidosis     dx 1991, resolved    Hyperlipemia        Past Surgical History:   Procedure Laterality Date    APPENDECTOMY      EYE SURGERY  2006 Lasik    HERNIA REPAIR      orthoscopy      shoulder and knee       Review of patient's allergies indicates:   Allergen Reactions    Percocet [oxycodone-acetaminophen]     Suture, silk     Adhesive Rash       No current facility-administered medications on file prior to encounter.     Current Outpatient Medications on File Prior to Encounter   Medication Sig    celecoxib (CELEBREX) 200 MG capsule Take 1 capsule (200 mg total) by mouth once daily.    ergocalciferol (VITAMIN D2) 50,000 unit Cap Take 1 capsule (50,000 Units total) by mouth every 7 days.    losartan (COZAAR) 25 MG tablet Take 1 tablet (25 mg total) by mouth once daily.    methocarbamoL (ROBAXIN) 500 MG Tab Take 1 tablet (500 mg total) by mouth 2 (two) times a day.    methylPREDNISolone (MEDROL DOSEPACK) 4 mg tablet use as directed    omeprazole (PRILOSEC) 40 MG capsule TAKE 1 CAPSULE BY MOUTH EVERY MORNING AS DIRECTED    ondansetron (ZOFRAN-ODT) 4 MG TbDL Take 1 tablet (4 mg total) by mouth every 8 (eight) hours as  needed (nausea). nausea. can worsen constipation (Patient not taking: Reported on 4/23/2024)    rosuvastatin (CRESTOR) 40 MG Tab Take 1 tablet (40 mg total) by mouth every evening.    sertraline (ZOLOFT) 50 MG tablet Take 1 tablet (50 mg total) by mouth once daily.    tadalafiL (CIALIS) 20 MG Tab Take 1 tablet (20 mg total) by mouth once daily.    tretinoin (RETIN-A) 0.05 % cream Apply topically to the skin every night    zonisamide (ZONEGRAN) 100 MG Cap Take 1 at bedtime for 3 days, then 2 at bedtime for 3 days, then 3 at bedtime for 3 days, then 4 at bedtime to follow. Stay at the most comfortable dose.     Family History       Problem Relation (Age of Onset)    ADD / ADHD Child    Atrial fibrillation Mother    Diabetes Mellitus Maternal Grandmother, Maternal Uncle, Paternal Uncle    Endometrial cancer Paternal Grandmother    Hyperlipidemia Mother, Father, Brother    Prostate cancer Maternal Uncle    Stroke Father    Vision loss Mother          Tobacco Use    Smoking status: Never    Smokeless tobacco: Never   Substance and Sexual Activity    Alcohol use: No    Drug use: No    Sexual activity: Yes     Partners: Female     Review of Systems   Constitutional:  Negative for chills and fever.   HENT:  Negative for sore throat and trouble swallowing.    Eyes:  Negative for pain and visual disturbance.   Respiratory:  Negative for cough and shortness of breath.    Cardiovascular:  Negative for chest pain and palpitations.   Gastrointestinal:  Negative for abdominal pain, nausea and vomiting.   Genitourinary:  Negative for difficulty urinating and dysuria.   Musculoskeletal:  Negative for arthralgias and joint swelling.   Skin:  Negative for rash and wound.   Neurological:  Negative for weakness and numbness.   Psychiatric/Behavioral:  Positive for confusion.      Objective:     Vital Signs (Most Recent):  Temp: 98 °F (36.7 °C) (06/23/24 1304)  Pulse: 84 (06/23/24 1420)  Resp: 16 (06/23/24 1420)  BP: (!) 148/79 (06/23/24  1420)  SpO2: 97 % (06/23/24 1420) Vital Signs (24h Range):  Temp:  [98 °F (36.7 °C)] 98 °F (36.7 °C)  Pulse:  [] 84  Resp:  [16-18] 16  SpO2:  [96 %-97 %] 97 %  BP: (148-161)/(79-90) 148/79     Weight: 104 kg (229 lb 4.5 oz)  Body mass index is 32.9 kg/m².     Physical Exam  Vitals and nursing note reviewed.   Constitutional:       General: He is not in acute distress.     Appearance: He is well-developed.   HENT:      Head: Normocephalic and atraumatic.   Eyes:      General:         Right eye: No discharge.         Left eye: No discharge.      Conjunctiva/sclera: Conjunctivae normal.   Cardiovascular:      Rate and Rhythm: Normal rate.      Pulses: Normal pulses.   Pulmonary:      Effort: Pulmonary effort is normal. No respiratory distress.   Abdominal:      Palpations: Abdomen is soft.      Tenderness: There is no abdominal tenderness.   Musculoskeletal:         General: Normal range of motion.      Right lower leg: No edema.      Left lower leg: No edema.   Skin:     General: Skin is warm and dry.   Neurological:      Comments: Mental status: Alert, oriented x 3, mildly tangential thought process, impaired memory.  Cranial nerves: CNII: PERRL, visual fields intact. CNIII, IV, VI: EOMI. CNV, VII: Facial muscles symmetrical with no noted weakness. Sensation intact and symmetrical. CNVIII: hearing intact. CNIX, X, XII: tongue and palatal motion intact, normal phonation and cough. CNXI: strength 5/5 and symmetrical.  Muscular: Normal bulk and tone, strength 5/5 all four extremities.  Sensory: Grossly intact to touch all four extremities.        Significant Labs:   CBC:  Recent Labs   Lab 06/23/24  1301   WBC 2.62*   HGB 15.7   HCT 47.0      GRAN 41.2  1.1*   LYMPH 41.2  1.1   MONO 15.3*  0.4   EOS 0.0   BASO 0.02   CMP:  Recent Labs   Lab 06/23/24  1301      K 3.6      CO2 26   BUN 13   CREATININE 0.9   GLU 97   CALCIUM 9.5   ALKPHOS 80   AST 20   ALT 26   BILITOT 0.6   PROT 7.5   ALBUMIN  4.1   ANIONGAP 10     Recent Labs   Lab 06/23/24  1301   INR 1.1   TSH 1.258     Significant Imaging: I have reviewed and interpreted all pertinent imaging results/findings within the past 24 hours.  Imaging Results              MRA Brain without contrast (In process)                      MRI Brain Without Contrast (In process)                       CTA Head and Neck (xpd) (Final result)  Result time 06/23/24 13:44:15      Final result by Vivek Castañeda MD (06/23/24 13:44:15)                   Impression:      No CT findings of large territorial infarction.  Additional evaluation with MRI of the brain may be obtained.    No hemodynamically significant stenosis in the neck vessels    No large vessel occlusion in the intracranial circulation.    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  Initial assessment with MRA of the brain without contrast is suggested.  Follow-up with conventional cerebral angiogram, as clinically warranted.    This report was flagged in Epic as abnormal.      Electronically signed by: Vivek Castañeda MD  Date:    06/23/2024  Time:    13:44               Narrative:    EXAMINATION:  CTA HEAD AND NECK (XPD)    CLINICAL HISTORY:  Neuro deficit, acute, stroke suspected;    TECHNIQUE:  Non contrast low dose axial images were obtained through the head.  CT angiogram was performed from the level of the kerry to the top of the head following the IV administration of 100mL of Omnipaque 350.   Sagittal and coronal reconstructions and maximum intensity projection reconstructions were performed. Arterial stenosis percentages are based on NASCET measurement criteria.    CT source data was analyzed using artificial intelligence software for detection of a large vessel occlusions (LVO) in order to enable computer assisted triage notification and aid clinical stroke decision making.    COMPARISON:  04/19/2024.    03/13/2018    FINDINGS:  CT scan of the head:    The subcutaneous tissues are  unremarkable.  The bony calvarium is intact.  The paranasal sinuses are unremarkable.  The mastoid air cells are clear.  The orbits and intraorbital contents are within normal limits.    The craniocervical junction is intact.  The sellar and parasellar structures are within normal limits.  The midline structures are unremarkable.  There is no evidence of intracranial hemorrhage.  The ventricles and sulci are within normal limits.  There is no dense vessel sign.  There is no evidence of mass effect.    CTA neck:    The great vessels arising for attic arch are within normal limits.  There is a normal 3 vessel configuration.  The visualized subclavian arteries are unremarkable.  The common carotid arteries are within normal limits.  The internal and external carotid arteries are within normal limits.    The origins of the vertebral arteries are within normal limits.  The vertebral arteries are normal in caliber.    CTA head:    The intracranial segments of the right ICA are within normal limits.  There is fusiform dilatation of the left cavernous ICA measuring 0.8 x 0.7 cm.    The A1 segment of the right anterior cerebral artery is within normal limits.  The remainder of the anterior cerebral arteries are within normal limits.  The middle cerebral arteries are unremarkable.    The intracranial segments of the vertebral is unremarkable the basilar is within normal limits.  The posterior cerebral arteries are unremarkable.    There is no evidence of large vessel occlusion in the intracranial circulation.    Additional findings:    The soft tissue the neck are unremarkable.  There is no evidence of lymphadenopathy in the neck.  The trachea is unremarkable.  The visualized lung apices are within normal limits.  There are degenerative changes in the cervical spine.                                    Assessment/Plan:     * Memory loss  Stroke vs TIA vs transient global amnesia, HTN, HLD, prediabetes  - Acute mental status  change with impaired memory without noted strength or sensory deficits beginning night prior to presentation vs AM of presentation.  - Differential as above; stroke risk factors in terms of HTN/HLD and prediabetic. CTA Head/Neck without significant ischemic findings. TSH WNL. Repeat HgbA1c. Check MRI/MRA as recommended, EEG. Start aspirin 81mg PO daily. Continue statin; rosuvastatin not on formulary, continue with atorvastatin 80mg PO qHS.  - PT/OT/SLP evaluations.  - Permissive hypertension for now pending MRI/MRA results; labetalol 10mg IV PRN for SBP > 220, DBP > 110.    OTTONIEL (obstructive sleep apnea)  - Continue CPAP qHS.      VTE Risk Mitigation (From admission, onward)           Ordered     enoxaparin injection 40 mg  Daily         06/23/24 1450     IP VTE HIGH RISK PATIENT  Once         06/23/24 1450     Place sequential compression device  Until discontinued         06/23/24 1450                  No charge intended for this encounter.    JAY Funes MD  Department of Hospital Medicine  [facility] - Emergency Dept

## 2024-06-23 NOTE — TELEMEDICINE CONSULT
[facility]  Vascular Neurology  Comprehensive Stroke Center  TeleVascular Neurology Acute Consultation Note        Consult Information  Consult to Telemedicine - Acute Stroke  Consult performed by: Ruel Severino MD  Consult ordered by: Ruslan Yoo DO          Consulting Provider: RUSLAN YOO   Current Providers  No providers found    Patient Location:  [facility] EMERGENCY DEPARTMENT Emergency Department    Spoke hospital nurse at bedside with patient assisting consultant.  Patient information was obtained from patient, ER records, and son .       Stroke Documentation  Acute Stroke Times   Last Known Normal Date: 06/22/24  Last Known Normal Time: 2100  Unknown Normal Time: Unknown Time  Stroke Team Called Date: 06/23/24  Stroke Team Called Time: 1239  Stroke Team Arrival Date: 06/23/24  Stroke Team Arrival Time: 1246  CT Interpretation Time: 1310  Thrombolytic Therapy Recommended: No  CTA Interpretation Time: 1310  Thrombectomy Recommended: No    NIH Scale:  1a. Level of Consciousness: 0-->Alert, keenly responsive  1b. LOC Questions: 0-->Answers both questions correctly  1c. LOC Commands: 0-->Performs both tasks correctly  2. Best Gaze: 0-->Normal  3. Visual: 0-->No visual loss  4. Facial Palsy: 0-->Normal symmetrical movements  5a. Motor Arm, Left: 0-->No drift, limb holds 90 (or 45) degrees for full 10 secs  5b. Motor Arm, Right: 0-->No drift, limb holds 90 (or 45) degrees for full 10 secs  6a. Motor Leg, Left: 0-->No drift, leg holds 30 degree position for full 5 secs  6b. Motor Leg, Right: 0-->No drift, leg holds 30 degree position for full 5 secs  7. Limb Ataxia: 0-->Absent  8. Sensory: 0-->Normal, no sensory loss  9. Best Language: 0-->No aphasia, normal  10. Dysarthria: 0-->Normal  11. Extinction and Inattention (formerly Neglect): 0-->No abnormality  Total (NIH Stroke Scale): 0      Modified [place]:    Lorenzo Coma Scale:     ABCD2 Score:    [id number]  Score:    HAS -BLED Score:    ICH Score:    Hunt & Stewart Classification:      Blood pressure (!) 161/90, pulse 109, resp. rate 18, weight 104 kg (229 lb 4.5 oz), SpO2 96%.    Van Negative  In your opinion, this was a: Tier 2     Medical Decision Making  HPI:  58 y.o. male with a history of OTTONIEL, HTN, HLD, sarcoidosis who presents with memory loss.  History obtained primarily from Ed physician and son at bedside.    Patient woke up today and did not remember that his son was in the house.   He could not recall certain other pertinent details such as a recent trip to [place] or the fact that his wife was on the way from [place].  He also has trouble recollecting certain events and conversations from the past few days. Had a concussion in early April 2024 when he slipped and fell in the shower -  he has no memory of this.   He also has a vague recollection of seeing a neurologist for this.     Son states that he was acting off yesterday (had brief periods of memory loss).     Images personally reviewed and interpreted:  Study: Head CT and CTA Head & Neck  Study Interpretation:   No acute ischemia.  No evidence of high-grade/critical stenosis of the extracranial carotids.  No evidence of intracranial occlusion.     Assessment and plan:  57 y/o male with a history as above who presents with retrograde amnesia.  Possibly had onset of symptoms yesterday although seemed more profound when he woke up today morning.    On exam, he is alert and oriented to person, place and time. Could not recollect the President's name.  No motor/sensory deficits.  No facial asymmetry or ophthalmotrope.  No aphasia, dysarthria.    Overall concern for a cerebrovascular etiology is low.  History seems most consistent with transient global amnesia although this tends to resolve within 24 hrs and it seems that his symptoms have been going on for longer than that.  Recommend MRI of the brain W/ W/O contrast. Defer any antiplatelets for now.  Routine  EEG  Check Utox and routine labs.          Lytics recommendation: Thrombolytic therapy not recommended due to Outside of treatment window  and Suspected stroke mimic   Thrombectomy recommendation: No; No large vessel occlusion identified on imaging   Placement recommendation: admit to inpatient               ROS  Physical Exam  Past Medical History:   Diagnosis Date    History of Clostridium difficile colitis     treated 2014    History of sarcoidosis     dx 1991, resolved    Hyperlipemia      Past Surgical History:   Procedure Laterality Date    APPENDECTOMY      EYE SURGERY  2006 Lasik    HERNIA REPAIR      orthoscopy      shoulder and knee     Family History   Problem Relation Name Age of Onset    Hyperlipidemia Mother Magdalena     Vision loss Mother Magdalena     Atrial fibrillation Mother Magdalena     Hyperlipidemia Father Essam     Stroke Father Essam         hemorrhagic/ also with trauma, unknown    Hyperlipidemia Brother      Diabetes Mellitus Maternal Grandmother      Endometrial cancer Paternal Grandmother      ADD / ADHD Child      Diabetes Mellitus Maternal Uncle      Prostate cancer Maternal Uncle      Diabetes Mellitus Paternal Uncle      Colon cancer Neg Hx         Diagnoses  Problem Noted   Memory Loss 6/23/2024       Ruel Severino MD      Emergent/Acute neurological consultation requested by spoke provider due to critical concerns for possible cerebrovascular event that could result in permanent loss of neurologic/bodily function, severe disability or death of this patient.  Immediate/timely evaluation by a highly prepared expert is paramount for optimal outcomes  High risk for neurological deterioration if not properly diagnosed  High risk for neurological deterioration if not treated promplty/as soon as possible  Complex diagnostic evaluation may be required (advanced imaging)  High risk treatment options (thrombolytics and/or thrombectomy)    Patient care was coordinated with spoke provider,  including but not limted to    Discussing likely diagnosis/etiology of symptoms  Making recommendations for further diagnostic studies  Making recommendations for intravenous thrombolytics or other advanced therapies  Making recommendations for disposition (admission/transfer for higher level of care)

## 2024-06-23 NOTE — HPI
"Quyen Karen is a 58/M with PMH HTN, HLD, prediabetes, OTTONIEL on CPAP, concussion 04/2024 who presented to [facility] 06/23 with acute mental status changes. History obtained from patient and his son (via phone). They report he appeared to be in his usual state of health up until the night prior to presentation; his son notes that he had asked an unusual question ("did you just wake up?" in late evening) which he presumed to be a joke but otherwise seemed his usual self. In the morning he reports waking around 0800 and feeling unusual. His son reports he had entered his son's room around 1000, briefly asked a question and left before receiving an answer and doing the same 15 minutes later. The patient's wife (in [place] at time of admission) subsequently called their son to bring him to the ED for further evaluation. Patient reports impaired memory of some events over the past six months (his concussion in 04/2024, family verbal altercation with an older son six months ago without recent communication) as well as events from the previous night and the morning of presentation (cooking dinner the night before, drinking a protein drink / working out, eating fruit). During interview he gradually recalls more detail from these events. ED workup was notable for mild leukopenia stable at baseline, otherwise unremarkable CBC and CMP, normal TSH, and CTA Head/Neck demonstrating no hemodynamically significant stenosis of vasculature or infarction. 0.8x0.7cm fusiform dilation of left cavernous ICA was noted. Tele-stroke Neurology was consulted and recommended MRI/MRA and EEG. Hospital medicine was subsequently contacted for observation placement.  "

## 2024-06-23 NOTE — LETTER
[facility]  [address] [place]  [address]  Phone: [phone]  Fax: [phone] June 24, 2024     Patient: Corazon Jones   YOB: 1965       To Whom It May Concern:    Corazon Jones was admitted to the hospital on 6/23/2024 12:35 PM and discharged on 6/24/2024.  He may return to work on 7/1/2024 .  If you have any questions or concerns, please don't hesitate to call our office at [phone].      Sincerely,        ANGELITA Funes MD  Department of Hospital Medicine

## 2024-06-24 ENCOUNTER — DOCUMENTATION ONLY (OUTPATIENT)
Dept: NEUROLOGY | Facility: CLINIC | Age: 59
End: 2024-06-24
Payer: COMMERCIAL

## 2024-06-24 ENCOUNTER — PATIENT MESSAGE (OUTPATIENT)
Dept: INTERNAL MEDICINE | Facility: CLINIC | Age: 59
End: 2024-06-24
Payer: COMMERCIAL

## 2024-06-24 VITALS
WEIGHT: 229.25 LBS | OXYGEN SATURATION: 93 % | SYSTOLIC BLOOD PRESSURE: 126 MMHG | HEART RATE: 77 BPM | DIASTOLIC BLOOD PRESSURE: 80 MMHG | HEIGHT: 70 IN | TEMPERATURE: 98 F | RESPIRATION RATE: 18 BRPM | BODY MASS INDEX: 32.82 KG/M2

## 2024-06-24 PROBLEM — G45.4 TRANSIENT GLOBAL AMNESIA: Status: ACTIVE | Noted: 2024-06-23

## 2024-06-24 LAB
ALBUMIN SERPL BCP-MCNC: 3.7 G/DL (ref 3.5–5.2)
ALP SERPL-CCNC: 75 U/L (ref 55–135)
ALT SERPL W/O P-5'-P-CCNC: 25 U/L (ref 10–44)
ANION GAP SERPL CALC-SCNC: 8 MMOL/L (ref 8–16)
APTT PPP: 27.4 SEC (ref 21–32)
AST SERPL-CCNC: 20 U/L (ref 10–40)
BASOPHILS # BLD AUTO: 0.02 K/UL (ref 0–0.2)
BASOPHILS NFR BLD: 0.6 % (ref 0–1.9)
BILIRUB SERPL-MCNC: 0.6 MG/DL (ref 0.1–1)
BUN SERPL-MCNC: 14 MG/DL (ref 6–20)
CALCIUM SERPL-MCNC: 9.2 MG/DL (ref 8.7–10.5)
CHLORIDE SERPL-SCNC: 105 MMOL/L (ref 95–110)
CO2 SERPL-SCNC: 24 MMOL/L (ref 23–29)
CREAT SERPL-MCNC: 0.8 MG/DL (ref 0.5–1.4)
DIFFERENTIAL METHOD BLD: ABNORMAL
EOSINOPHIL # BLD AUTO: 0.1 K/UL (ref 0–0.5)
EOSINOPHIL NFR BLD: 2.8 % (ref 0–8)
ERYTHROCYTE [DISTWIDTH] IN BLOOD BY AUTOMATED COUNT: 13.1 % (ref 11.5–14.5)
EST. GFR  (NO RACE VARIABLE): >60 ML/MIN/1.73 M^2
GLUCOSE SERPL-MCNC: 94 MG/DL (ref 70–110)
HCT VFR BLD AUTO: 44.5 % (ref 40–54)
HGB BLD-MCNC: 14.9 G/DL (ref 14–18)
IMM GRANULOCYTES # BLD AUTO: 0.01 K/UL (ref 0–0.04)
IMM GRANULOCYTES NFR BLD AUTO: 0.3 % (ref 0–0.5)
INR PPP: 1.1 (ref 0.8–1.2)
LYMPHOCYTES # BLD AUTO: 1.5 K/UL (ref 1–4.8)
LYMPHOCYTES NFR BLD: 40.7 % (ref 18–48)
MAGNESIUM SERPL-MCNC: 2 MG/DL (ref 1.6–2.6)
MCH RBC QN AUTO: 29.3 PG (ref 27–31)
MCHC RBC AUTO-ENTMCNC: 33.5 G/DL (ref 32–36)
MCV RBC AUTO: 87 FL (ref 82–98)
MONOCYTES # BLD AUTO: 0.7 K/UL (ref 0.3–1)
MONOCYTES NFR BLD: 19.1 % (ref 4–15)
NEUTROPHILS # BLD AUTO: 1.3 K/UL (ref 1.8–7.7)
NEUTROPHILS NFR BLD: 36.5 % (ref 38–73)
NRBC BLD-RTO: 0 /100 WBC
OHS QRS DURATION: 82 MS
OHS QTC CALCULATION: 436 MS
OHS QTC CALCULATION: 462 MS
OHS QTC CALCULATION: 469 MS
PHOSPHATE SERPL-MCNC: 3.4 MG/DL (ref 2.7–4.5)
PLATELET # BLD AUTO: 219 K/UL (ref 150–450)
PMV BLD AUTO: 9.6 FL (ref 9.2–12.9)
POCT GLUCOSE: 81 MG/DL (ref 70–110)
POTASSIUM SERPL-SCNC: 4 MMOL/L (ref 3.5–5.1)
PROT SERPL-MCNC: 6.8 G/DL (ref 6–8.4)
PROTHROMBIN TIME: 12.5 SEC (ref 9–12.5)
RBC # BLD AUTO: 5.09 M/UL (ref 4.6–6.2)
SODIUM SERPL-SCNC: 137 MMOL/L (ref 136–145)
TROPONIN I SERPL DL<=0.01 NG/ML-MCNC: 0.01 NG/ML (ref 0–0.03)
WBC # BLD AUTO: 3.56 K/UL (ref 3.9–12.7)

## 2024-06-24 PROCEDURE — 85025 COMPLETE CBC W/AUTO DIFF WBC: CPT | Performed by: INTERNAL MEDICINE

## 2024-06-24 PROCEDURE — 92523 SPEECH SOUND LANG COMPREHEN: CPT

## 2024-06-24 PROCEDURE — G0378 HOSPITAL OBSERVATION PER HR: HCPCS

## 2024-06-24 PROCEDURE — 25000003 PHARM REV CODE 250: Performed by: NURSE PRACTITIONER

## 2024-06-24 PROCEDURE — 80053 COMPREHEN METABOLIC PANEL: CPT | Performed by: INTERNAL MEDICINE

## 2024-06-24 PROCEDURE — 97165 OT EVAL LOW COMPLEX 30 MIN: CPT

## 2024-06-24 PROCEDURE — 25000003 PHARM REV CODE 250: Performed by: INTERNAL MEDICINE

## 2024-06-24 PROCEDURE — 94660 CPAP INITIATION&MGMT: CPT

## 2024-06-24 PROCEDURE — 93010 ELECTROCARDIOGRAM REPORT: CPT | Mod: ,,, | Performed by: INTERNAL MEDICINE

## 2024-06-24 PROCEDURE — 85730 THROMBOPLASTIN TIME PARTIAL: CPT | Performed by: INTERNAL MEDICINE

## 2024-06-24 PROCEDURE — 84484 ASSAY OF TROPONIN QUANT: CPT | Performed by: PHYSICIAN ASSISTANT

## 2024-06-24 PROCEDURE — 85610 PROTHROMBIN TIME: CPT | Performed by: INTERNAL MEDICINE

## 2024-06-24 PROCEDURE — 93005 ELECTROCARDIOGRAM TRACING: CPT

## 2024-06-24 PROCEDURE — 95812 EEG 41-60 MINUTES: CPT

## 2024-06-24 PROCEDURE — 83735 ASSAY OF MAGNESIUM: CPT | Performed by: INTERNAL MEDICINE

## 2024-06-24 PROCEDURE — 97161 PT EVAL LOW COMPLEX 20 MIN: CPT

## 2024-06-24 PROCEDURE — 84100 ASSAY OF PHOSPHORUS: CPT | Performed by: INTERNAL MEDICINE

## 2024-06-24 PROCEDURE — 99900035 HC TECH TIME PER 15 MIN (STAT)

## 2024-06-24 RX ORDER — LOSARTAN POTASSIUM 25 MG/1
25 TABLET ORAL DAILY
Status: DISCONTINUED | OUTPATIENT
Start: 2024-06-24 | End: 2024-06-24 | Stop reason: HOSPADM

## 2024-06-24 RX ORDER — PANTOPRAZOLE SODIUM 40 MG/1
40 TABLET, DELAYED RELEASE ORAL DAILY
Status: DISCONTINUED | OUTPATIENT
Start: 2024-06-24 | End: 2024-06-24 | Stop reason: HOSPADM

## 2024-06-24 RX ORDER — ASPIRIN 81 MG/1
81 TABLET ORAL DAILY
Qty: 30 TABLET | Refills: 0 | Status: SHIPPED | OUTPATIENT
Start: 2024-06-25

## 2024-06-24 RX ADMIN — SERTRALINE HYDROCHLORIDE 50 MG: 50 TABLET ORAL at 09:06

## 2024-06-24 RX ADMIN — PANTOPRAZOLE SODIUM 40 MG: 40 TABLET, DELAYED RELEASE ORAL at 11:06

## 2024-06-24 RX ADMIN — LOSARTAN POTASSIUM 25 MG: 25 TABLET, FILM COATED ORAL at 11:06

## 2024-06-24 RX ADMIN — ASPIRIN 81 MG: 81 TABLET, COATED ORAL at 09:06

## 2024-06-24 NOTE — PLAN OF CARE
Problem: Occupational Therapy  Goal: Occupational Therapy Goal  Outcome: Met     OT evaluation complete and POC established.  PTA reports being (I) with ADLs and functional mobility.  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  Pt is performing at baseline and does not require OT services in the acute care setting at this time.  Pt to d/c from OT; no further therapy needs recommended upon d/c from hospital.    SEVERINO Biggs  6/24/2024

## 2024-06-24 NOTE — CONSULTS
Nutrition consult acknowledged for stroke pathway: cardiac diet education. CTA Head/Neck without significant ischemic findings and normal EEG findings. Education not warranted as primary diagnosis was memory loss r/t transient global amnesia. Patient pending discharge this date. RD to continue to monitor. Please re-consult as needed.

## 2024-06-24 NOTE — PT/OT/SLP EVAL
Physical Therapy Evaluation    Patient Name:  Corazon Jones   MRN:  [id number]    Recommendations:     Discharge Recommendations: No Therapy Indicated   Discharge Equipment Recommendations: none   Barriers to discharge: None    Assessment:     Corazon Jones is a 58 y.o. male admitted with a medical diagnosis of Transient global amnesia.  He presents with the following impairments/functional limitations:  (None) . Upon entry, pt was long sitting in bed w/ coworkers. Pt was Aox4. Word recall was excellent and was able to associate words to assist w/ memorization. Pt was indep w/ all bed mob, transfers and amb >100ft w/o AD. DCPT.    Rehab Prognosis: Good; patient would benefit from acute skilled PT services to address these deficits and reach maximum level of function.    Recent Surgery: * No surgery found *      Plan:     During this hospitalization, patient to be seen   to address the identified rehab impairments via  (DCPT) and progress toward the following goals:    Plan of Care Expires:   (DCPT)    Subjective     Chief Complaint: None  Patient/Family Comments/goals: None  Pain/Comfort:  Pain Rating 1: 0/10    Patients cultural, spiritual, Mormon conflicts given the current situation: no    Living Environment:  Pt lives w/ son in a 2SH. Pt lives on the 2nd floor, has 18steps w/ BHR, WIS and tub shower.  Prior to admission, patients level of function was Indep.  Equipment used at home: none.  DME owned (not currently used): none.  Upon discharge, patient will have assistance from son.    Objective:     Communicated with RN prior to session.  Patient found sitting edge of bed with peripheral IV  upon PT entry to room.    General Precautions: Standard,  (None)  Orthopedic Precautions: (N/A)   Braces:  (N/A)  Respiratory Status: Room air    Exams:  Cognitive Exam:  Patient is oriented to Person, Place, Time, and Situation  Gross Motor Coordination:  WFL  Postural Exam:  Patient presented with the following  abnormalities:    -       Rounded shoulders  -       Forward head  Sensation:    -       Intact  RLE ROM: WFL  RLE Strength: WFL  LLE ROM: WFL  LLE Strength: WFL    Functional Mobility:  Bed Mobility:     Supine to Sit: independence  Sit to Supine: independence  Transfers:     Sit to Stand:  independence with no AD  Gait: >100ft w/o AD indep  Balance: Good dynamic and static balance      AM-PAC 6 CLICK MOBILITY  Total Score:24       Treatment & Education:  Safety, word recall    Patient left sitting edge of bed with all lines intact, call button in reach, RN notified, and SLP present.    GOALS:   Multidisciplinary Problems       Physical Therapy Goals       Not on file                    History:     Past Medical History:   Diagnosis Date    History of Clostridium difficile colitis     treated 2014    History of sarcoidosis     dx 1991, resolved    Hyperlipidemia     Hypertension     OTTONIEL (obstructive sleep apnea)     Prediabetes        Past Surgical History:   Procedure Laterality Date    APPENDECTOMY      ARTHROSCOPY OF KNEE      EYE SURGERY  2006 Lasik    HERNIA REPAIR      SHOULDER ARTHROSCOPY         Time Tracking:     PT Received On: 06/24/24  PT Start Time: 1223     PT Stop Time: 1235  PT Total Time (min): 12 min     Billable Minutes: Evaluation 12      06/24/2024

## 2024-06-24 NOTE — PROGRESS NOTES
EEG Hook up   Electrodes had to be fixed.No    Skin Integrity: Normal      EXT Jason Kumar   06/24/2024 12:54 PM

## 2024-06-24 NOTE — PROGRESS NOTES
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    Pain began after slipping down some stair around [place] 2023. He reports progressively worsening medial right knee pain that has not been improved with rest, ice, NSAIDs, some PT.    He reports that the pain is worse with deep squats.  It also bothers him at night.    Is affecting ADLs.     + mechanical symptoms, no instability      Review of Systems   Constitution: Negative. Negative for chills, fever and night sweats.   HENT: Negative for congestion and headaches.    Eyes: Negative for blurred vision, left vision loss and right vision loss.   Cardiovascular: Negative for chest pain and syncope.   Respiratory: Negative for cough and shortness of breath.    Endocrine: Negative for polydipsia, polyphagia and polyuria.   Hematologic/Lymphatic: Negative for bleeding problem. Does not bruise/bleed easily.   Skin: Negative for dry skin, itching and rash.   Musculoskeletal: Negative for falls and muscle weakness.   Gastrointestinal: Negative for abdominal pain and bowel incontinence.   Genitourinary: Negative for bladder incontinence and nocturia.   Neurological: Negative for disturbances in coordination, loss of balance and seizures.   Psychiatric/Behavioral: Negative for depression. The patient does not have insomnia.    Allergic/Immunologic: Negative for hives and persistent infections.     PAST MEDICAL HISTORY:   Past Medical History:   Diagnosis Date    History of Clostridium difficile colitis     treated 2014    History of sarcoidosis     dx 1991, resolved    Hyperlipidemia     Hypertension     OTTONIEL (obstructive sleep apnea)     Prediabetes      PAST SURGICAL HISTORY:   Past Surgical History:   Procedure Laterality Date    APPENDECTOMY      ARTHROSCOPY OF KNEE      EYE SURGERY  2006 Lasik    HERNIA REPAIR      SHOULDER ARTHROSCOPY       FAMILY HISTORY:   Family History   Problem Relation Name Age of  Onset    Hyperlipidemia Mother Magdalena     Vision loss Mother Magdalena     Atrial fibrillation Mother Magdalena     Hyperlipidemia Father Essam     Stroke Father Essam         hemorrhagic/ also with trauma, unknown    Hyperlipidemia Brother      Diabetes Mellitus Maternal Grandmother      Endometrial cancer Paternal Grandmother      ADD / ADHD Child      Diabetes Mellitus Maternal Uncle      Prostate cancer Maternal Uncle      Diabetes Mellitus Paternal Uncle      Colon cancer Neg Hx       SOCIAL HISTORY:   Social History     Socioeconomic History    Marital status:     Number of children: 3   Occupational History    Occupation: physician     Employer: [employer]   Tobacco Use    Smoking status: Never    Smokeless tobacco: Never   Substance and Sexual Activity    Alcohol use: No    Drug use: No    Sexual activity: Yes     Partners: Female   Social History Narrative    Intermittent exercise     Social Determinants of Health     Financial Resource Strain: Patient Declined (3/15/2024)    Overall Financial Resource Strain (CARDIA)     Difficulty of Paying Living Expenses: Patient declined   Food Insecurity: Patient Declined (3/15/2024)    Hunger Vital Sign     Worried About Running Out of Food in the Last Year: Patient declined     Ran Out of Food in the Last Year: Patient declined   Transportation Needs: Patient Declined (3/15/2024)    PRAPARE - Transportation     Lack of Transportation (Medical): Patient declined     Lack of Transportation (Non-Medical): Patient declined   Physical Activity: Unknown (3/15/2024)    Exercise Vital Sign     Days of Exercise per Week: 1 day   Stress: Stress Concern Present (3/15/2024)    [employer] - Occupational Stress Questionnaire     Feeling of Stress : To some extent   Housing Stability: Unknown (3/15/2024)    Housing Stability Vital Sign     Unable to Pay for Housing in the Last Year: No     Unstable Housing in the Last Year: No        MEDICATIONS: No current facility-administered medications for this visit.  No current outpatient medications on file.    Facility-Administered Medications Ordered in Other Visits:     aspirin EC tablet 81 mg, 81 mg, Oral, Daily, ANGELITA Funes MD, 81 mg at 06/24/24 0919    atorvastatin tablet 80 mg, 80 mg, Oral, QHS, ANGELITA Funes MD, 80 mg at 06/23/24 2039    bisacodyL suppository 10 mg, 10 mg, Rectal, Daily PRN, ANGELITA Funes MD    enoxaparin injection 40 mg, 40 mg, Subcutaneous, Daily, ANGELITA Funes MD    labetaloL injection 10 mg, 10 mg, Intravenous, Q15 Min PRN, ANGELITA Funes MD    losartan tablet 25 mg, 25 mg, Oral, Daily, ANGELITA Funes MD, 25 mg at 06/24/24 1130    nitroGLYCERIN 2% TD oint ointment 0.5 inch, 0.5 inch, Topical (Top), Once PRN, Lea Verdugo PA-C    pantoprazole EC tablet 40 mg, 40 mg, Oral, Daily, ANGELITA Funes MD, 40 mg at 06/24/24 1130    sertraline tablet 50 mg, 50 mg, Oral, Daily, Ariadne Wallace NP, 50 mg at 06/24/24 0919    sodium chloride 0.9% bolus 500 mL 500 mL, 500 mL, Intravenous, PRN, ANGELITA Funes MD    sodium chloride 0.9% flush 10 mL, 10 mL, Intravenous, PRN, ANGELITA Funes MD  ALLERGIES:   Review of patient's allergies indicates:   Allergen Reactions    Percocet [oxycodone-acetaminophen]     Suture, silk     Adhesive Rash       VITAL SIGNS: There were no vitals taken for this visit.       PHYSICAL EXAMINATION    General:  The patient is alert and oriented x 3.  Mood is pleasant.  Observation of ears, eyes and nose reveal no gross abnormalities.  HEENT: NCAT, sclera nonicteric  Lungs: Respirations are equal and unlabored.      Right KNEE EXAMINATION     OBSERVATION / INSPECTION   Gait:   Nonantalgic   Alignment:  Neutral   Scars:   None   Muscle atrophy: Mild  Effusion:  None   Warmth:  None   Discoloration:   none     TENDERNESS / CREPITUS (T / C):          T / C      T / C   Patella   - / -   Lateral joint line   - /  -   Peripatellar medial  -  Medial joint line    + / -   Peripatellar lateral -  Medial plica   - / -   Patellar tendon -   Popliteal fossa  - / -   Quad tendon   -   Gastrocnemius   -   Prepatellar Bursa - / -   Quadricep   -   Tibial tubercle  -  Thigh/hamstring  -   Pes anserine/HS -  Fibula    -   ITB   - / -  Tibia     -   Tib/fib joint  - / -  LCL    -     MFC   - / -   MCL: Proximal  -    LFC   - / -    Distal   -          ROM: (* = pain)  PASSIVE   ACTIVE    Left :   5 / 0 / 145   5 / 0 / 145     Right :    5 / 0 / 145   5 / 0 / 145    PATELLOFEMORAL EXAMINATION:  See above noted areas of tenderness.   Patella position    Subluxation / dislocation: Centered           Sup. / Inf;   Normal   Crepitus (PF):    Absent   Patellar Mobility:       Medial-lateral:   Normal    Superior-inferior:  Normal    Inferior tilt   Normal    Patellar tendon:  Normal   Lateral tilt:    Normal   J-sign:     None   Patellofemoral grind:   No pain       MENISCAL SIGNS:     Pain on terminal extension:  +  Pain on terminal flexion:  +  Geralds maneuver:  + for pain  Squat     + posterior joint pain    LIGAMENT EXAMINATION:  ACL / Lachman:  normal (-1 to 2mm)    PCL-Post.  drawer: normal 0 to 2mm  MCL- Valgus:  normal 0 to 2mm  LCL- Varus:  normal 0 to 2mm  Pivot shift: normal (Equal)   Dial Test: difference c/w other side   At 30° flexion: normal (< 5°)    At 90° flexion: normal (< 5°)   Reverse Pivot Shift:   normal (Equal)     STRENGTH: (* = with pain) PAINFUL SIDE   Quadricep   5/5   Hamstrin/5    EXTREMITY NEURO-VASCULAR EXAMINATION:   Sensation:  Grossly intact to light touch all dermatomal regions.   Motor Function:  Fully intact motor function at hip, knee, foot and ankle    DTRs;  quadriceps and  achilles 2+.  No clonus and downgoing Babinski.    Vascular status:  DP and PT pulses 2+, brisk capillary refill, symmetric.     Other Findings:          Xrays: reviewed personally by me (standing AP/flexion, lateral,  sunrise) show: no evidence of arthritis or fracture or dislocation. Mild right knee tricompartmental DJD. Kellgren Walter 2 or greater noted.       MRI reviewed personally by me:  Shows Right knee medial meniscal tear, chondromalacia.     ASSESSMENT:    Right Knee Meniscus tear.      he would benefit from knee arthroscopy, possible plica excision, possible chondroplasty with possible meniscectomy given the above.     PLAN: We have discussed the surgery and recovery of arthroscopic knee surgery. he understands that there may be limited weightbearing up to several weeks after surgery depending on procedures that are performed at the time of surgery.    The spectrum of treatment options were discussed with the patient, including nonoperative and operative options.  After thorough discussion, the patient has elected to undergo surgical treatment to include:  right   a. Knee arthroscopic medial meniscectomy  possible meniscus repair   b. Knee arthroscopic possible plica excision   c. Knee arthroscopic possible chondroplasty       The details of the surgical procedure were explained, including the location of probable incisions and a description of likely hardware and/or grafts to be used.  The patient understands the likely convalescence after surgery.  Also, we have thoroughly discussed the risks, benefits and alternatives to surgery, including, but not limited to, the risk of infection, joint stiffness, blood clot (including DVT and/or pulmonary embolus), neurologic and vascular injury.  It was explained that, if tissue has been repaired or reconstructed, there is a chance of failure, which may require further management.       He will need clearance from his PCP.
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
## 2024-06-24 NOTE — PLAN OF CARE
Follow up PCP/Neurology appointment scheduled and added to AVS. Family to provide transportation home.   06/24/24 1413   Final Note   Assessment Type Final Discharge Note   Anticipated Discharge Disposition Home   Hospital Resources/Appts/Education Provided Provided patient/caregiver with written discharge plan information;Appointments scheduled and added to AVS   Post-Acute Status   Discharge Delays None known at this time     [facility] ([place])  Discharge Final Note    Primary Care Provider: Baldomero Schwab MD    Expected Discharge Date: 6/24/2024    Final Discharge Note (most recent)       Final Note - 06/24/24 1413          Final Note    Assessment Type Final Discharge Note (P)      Anticipated Discharge Disposition Home or Self Care (P)      Hospital Resources/Appts/Education Provided Provided patient/caregiver with written discharge plan information;Appointments scheduled and added to AVS (P)         Post-Acute Status    Discharge Delays None known at this time (P)                      Important Message from Medicare             Contact Info       Baldomero Schwab MD   Specialty: Family Medicine   Relationship: PCP - General    [address] [place] [address]  [facility] [address]   Phone: [phone]       Next Steps: Follow up in 2 week(s)    Instructions: post-hospital follow-up    [facility]   Specialty: Neurology    [facility] [address] [address]   Phone: [phone]       Next Steps: Follow up in 2 week(s)    Instructions: post-hospital follow-up

## 2024-06-24 NOTE — PROCEDURES
EEG REPORT      Corazon Jones  [id number]  1965    DATE OF SERVICE: 6/24:2024    METHODOLOGY      Extended electroencephalographic recording is made while the patient is ambulatory and continuing normal daily activities.  Electrodes are placed according to the International 10-20 placement system and included T1 and T2 electrode placement.  Twenty four (24) channels of digital signal (sampling rate of 512/sec) was simultaneously recorded from the scalp including EKG and eye monitors.  Recording band pass was 0.1 to 100 hz and all data was stored digitally on the recorder.  The patient is instructed to press an event button when clinical symptoms occur and write the symptoms into a diary. Activation procedures which include photic stimulation, hyperventilation and instructing patients to perform simple task are done in selected patients.        The EEG is displayed on a monitor screen and can be reformatted into different montages for evaluation.  The entire recoding is submitted for computer assisted analysis to detect spike and electrographic seizure activity.  The entire recording is visually reviewed and the times identified by computer analysis as being spikes or seizures are reviewed again.  Compresses spectral analysis (CSA) is also performed on the activity recorded from each individual channel.  This is displayed as a power display of frequencies from 0 to 30 Hz over time.   The CSA analysis is done and displayed continuously.  This is reviewed for asymmetries in power between homologous areas of the scalp and for presence of changes in power which canbe seen when seizures occur.  Sections of suspected abnormalities on the CSA is then compared with the original EEG recording.  .     [employer] software was also utilized in the review of this study.  This software suite analyzes the EEG recording in multiple domains.  Coherence and rhythmicity is computed to identify EEG sections which may contain  organized seizures.  Each channel undergoes analysis to detect presence of spike and sharp waves which have special and morphological characteristic of epileptic activity.  The routine EEG recording is converted from spacial into frequency domain.  This is then displayed comparing homologous areas to identify areas of significant asymmetry.  Algorithm to identify non-cortically generated artifact is used to separate eye movement, EMG and other artifact from the EEG     Recording Times    A total of 1:00:19 hours of EEG was recorded.      EEG FINDINGS:  Background activity:   The background rhythm was characterized by alpha and anterior dominant beta activity with a 11-12Hz posterior dominant alpha rhythm at 30-70 microvolts.   Symmetry and continuity: the background was continuous and symmetric     Sleep:   Normal sleep transients including K complexes were seen.    Activation procedures:   Answers questions correctly    Abnormal activity:   No epileptiform discharges, periodic discharges, lateralized rhythmic delta activity or electrographic seizures were seen.    IMPRESSION:   Normal EEG of light sleep and the waking state.      Agustin Finley MD  Neurology-Epilepsy.  [facility].

## 2024-06-24 NOTE — NURSING
Patient complained of chest pain of a short duration. BECKIE Tate notified. EKG and Trop ordered. Lea made aware of results. No s/s of acute distress noted. Patient remains NSR on tele monitor. Will continue to monitor.

## 2024-06-24 NOTE — PLAN OF CARE
Problem: Adult Inpatient Plan of Care  Goal: Plan of Care Review  Outcome: Met  Goal: Patient-Specific Goal (Individualized)  Outcome: Met  Goal: Absence of Hospital-Acquired Illness or Injury  Outcome: Met  Goal: Optimal Comfort and Wellbeing  Outcome: Met  Goal: Readiness for Transition of Care  Outcome: Met     Problem: Stroke, Ischemic (Includes Transient Ischemic Attack)  Goal: Optimal Coping  Outcome: Met  Goal: Effective Bowel Elimination  Outcome: Met  Goal: Optimal Cerebral Tissue Perfusion  Outcome: Met  Goal: Optimal Cognitive Function  Outcome: Met  Goal: Improved Communication Skills  Outcome: Met  Goal: Optimal Functional Ability  Outcome: Met  Goal: Optimal Nutrition Intake  Outcome: Met  Goal: Effective Oxygenation and Ventilation  Outcome: Met  Goal: Improved Sensorimotor Function  Outcome: Met  Goal: Safe and Effective Swallow  Outcome: Met  Goal: Effective Urinary Elimination  Outcome: Met     Problem: Fall Injury Risk  Goal: Absence of Fall and Fall-Related Injury  Outcome: Met     Problem: Infection  Goal: Absence of Infection Signs and Symptoms  Outcome: Met

## 2024-06-24 NOTE — PT/OT/SLP EVAL
"Speech Language Pathology Evaluation  Cognitive Communication    Patient Name:  Corazon Jones   MRN:  [id number]  Admitting Diagnosis: Transient global amnesia    Recommendations:                 General Recommendations: Follow up with PCP and related specialities upon discharge from acute care setting.   Diet Recommendations: Pt is on a regular diet with thin liquids-- pt denies coughing or choking while eating and drinking.  Aspiration Precautions: Standard aspiration precautions   General Precautions: Standard,    Communication Strategies:  none    Assessment:     Corazon Jones is a 58 y.o. male with an admitting diagnosis of transient global amnesia-- assessment of cognitive-linguistic function completed via the Ross Information Processing Assessment Second Edition (RIPA-2); please see details outlined below under the objective section.     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 History obtained from patient and his son (via phone). They report he appeared to be in his usual state of health up until the night prior to presentation; his son notes that he had asked an unusual question ("did you just wake up?" in late evening) which he presumed to be a joke but otherwise seemed his usual self. In the morning he reports waking around 0800 and feeling unusual. His son reports he had entered his son's room around 1000, briefly asked a question and left before receiving an answer and doing the same 15 minutes later. The patient's wife (in [place] at time of admission) subsequently called their son to bring him to the ED for further evaluation. Patient reports impaired memory of some events over the past six months (his concussion in 04/2024, family verbal altercation with an older son six months ago without recent communication) as well as events from " the previous night and the morning of presentation (cooking dinner the night before, drinking a protein drink / working out, eating fruit). During interview he gradually recalls more detail from these events. ED workup was notable for mild leukopenia stable at baseline, otherwise unremarkable CBC and CMP, normal TSH, and CTA Head/Neck demonstrating no hemodynamically significant stenosis of vasculature or infarction. 0.8x0.7cm fusiform dilation of left cavernous ICA was noted. Tele-stroke Neurology was consulted and recommended MRI/MRA and EEG. Hospital medicine was subsequently contacted for observation placement.     Past Medical History:   Diagnosis Date    History of Clostridium difficile colitis     treated 2014    History of sarcoidosis     dx 1991, resolved    Hyperlipidemia     Hypertension     OTTONIEL (obstructive sleep apnea)     Prediabetes      Past Surgical History:   Procedure Laterality Date    APPENDECTOMY      ARTHROSCOPY OF KNEE      EYE SURGERY  2006 Lasik    HERNIA REPAIR      SHOULDER ARTHROSCOPY       Prior Intubation HX: no history per chart review    Modified Barium Swallow: none per chart review     MRI Brain: FINDINGS: MRA brain: The craniocervical junction is intact.  The sellar and parasellar structures are unremarkable.  The midline structures are intact.  The intracranial flow voids are within normal limits.     No diffusion-weighted signal abnormality is identified.  There is minimal T2/FLAIR signal hyperintense within the periventricular white matter.  There are no extra-axial fluid collections.  There is no evidence of intracranial hemorrhage.  There is no evidence of mass effect.     The orbits and intraorbital contents are unremarkable.  The paranasal sinuses are unremarkable.  The mastoid air cells are clear.  The calvarium is intact     MRA brain: The right cavernous ICA is within normal limits.  There is tortuosity of the left cavernous ICA with no aneurysm identified.  The A1  "segment of the anterior cerebral artery on the right is hypoplastic.  The remainder of the anterior cerebral is are within normal limits.  The middle cerebral arteries are within normal limits.     There is a dominant left vertebral artery.  The basilar is unremarkable.  The posterior cerebral arteries are within normal limits.     Impression: MRI brain: No acute intracranial process.  Specifically, no evidence of acute infarction.     MRA brain: No evidence of aneurysm of the left cavernous ICA. The findings on the CTA represent tortuosity of the left cavernous ICA.     No large vessel occlusion in the intracranial circulation.    Prior Diet: Regular diet with thin liquids     Subjective     RN provided clearance for pt to be seen-- physical therapist leaving room as this SLP entered; pt sitting on edge of bed for entirety of evaluation this date.     Pain/Comfort: no pain/discomfort reported this date     Respiratory Status: Room air    Objective:     Cognitive-Linguistic Status: in today's evaluation, pt...  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    Assessment of cognitive-linguistic function completed via the Ross Information Processing Assessment Second Edition (RIPA-2). Patient completed the following subtests with scores and " interpretations per the RIPA-2 scoring manual:    Subtest I (Immediate Memory) = Raw Score = 28/30, Standard Score= 15, equating to 95th percentile  Subtest II (Recent Memory) = Raw Score = 30/30, Standard Score= 15, equating to 95th percentile  Subtest III (Temporal Orientation- Recent Memory) = Raw Score =30/30, Standard Score= 14, equating to 91st percentile  Subtest IX (Organization) = Raw score = 30/30, Standard Score= 18, equating to > 99th percentile    Motor Speech: Patient's motor speech appears intact-- dysarthria not noted and speech is judged to be 100% intelligible to this unfamiliar listener.     IMPRESSION: Ross Information Processing Assessment Second Edition (RIPA-2) subtests administered this date for assessment of cognitive-linguistic function-- pt appears to be performing at his baseline; no acute care SLP needs identified at this time and no further therapy needs recommended upon discharge from the hospital.      Education: SLP educated patient re: therapist's role and purpose of evaluation-- patient verbalized understanding of all discussed this date; all questions answered.     Goals:   Multidisciplinary Problems       SLP Goals       Not on file              Multidisciplinary Problems (Resolved)          Problem: SLP    Goal Priority Disciplines Outcome   SLP Goal   (Resolved)     SLP Met                     Plan:     Plan of Care reviewed with:  patient (RN, MD)   SLP Follow-Up:  No       Discharge Recommendations:  Therapy Intensity Recommendations at Discharge: No Therapy Indicated   Barriers to Discharge:  None    Time Tracking:     SLP Treatment Date:   06/24/24  Speech Start Time:  1230  Speech Stop Time:  1308     Speech Total Time (min):  38 min    Billable Minutes: Maryan 38     06/24/2024

## 2024-06-24 NOTE — PLAN OF CARE
Problem: Adult Inpatient Plan of Care  Goal: Plan of Care Review  6/24/2024 0118 by Yvonne Horowitz RN  Outcome: Progressing  Flowsheets (Taken 6/24/2024 0118)  Plan of Care Reviewed With: patient  6/23/2024 2048 by Yvonne Hroowitz RN  Outcome: Progressing  Goal: Patient-Specific Goal (Individualized)  6/24/2024 0118 by Yvonne Horowitz RN  Outcome: Progressing  6/23/2024 2048 by Yvonne Horowitz RN  Outcome: Progressing  Goal: Absence of Hospital-Acquired Illness or Injury  6/24/2024 0118 by Yvonne Horowitz RN  Outcome: Progressing  6/23/2024 2048 by Yvonne Horowitz RN  Outcome: Progressing  Goal: Optimal Comfort and Wellbeing  6/24/2024 0118 by Yvonne Horowitz RN  Outcome: Progressing  6/23/2024 2048 by Yvonne Horowitz RN  Outcome: Progressing  Goal: Readiness for Transition of Care  6/24/2024 0118 by Yvonne Horowitz RN  Outcome: Progressing  6/23/2024 2048 by Yvonne Horowitz RN  Outcome: Progressing     Problem: Stroke, Ischemic (Includes Transient Ischemic Attack)  Goal: Optimal Coping  6/24/2024 0118 by Yvonne Horowitz RN  Outcome: Progressing  6/23/2024 2048 by Yvonne Horowitz RN  Outcome: Progressing  Goal: Effective Bowel Elimination  6/24/2024 0118 by Yvonne Horowitz RN  Outcome: Progressing  6/23/2024 2048 by Yvonne Horowitz, RN  Outcome: Progressing  Goal: Optimal Cerebral Tissue Perfusion  6/24/2024 0118 by Yvonne Hoorwitz RN  Outcome: Progressing  6/23/2024 2048 by Yvonne Horowitz RN  Outcome: Progressing  Goal: Optimal Cognitive Function  6/24/2024 0118 by Yvonne Horowitz RN  Outcome: Progressing  6/23/2024 2048 by Yvonne Horowitz RN  Outcome: Progressing  Goal: Improved Communication Skills  6/24/2024 0118 by Yvonne Horowitz RN  Outcome: Progressing  6/23/2024 2048 by Yvonne Horowitz RN  Outcome: Progressing  Goal: Optimal Functional Ability  6/24/2024 0118 by Carlos Manuel, Yvonne, RN  Outcome: Progressing  6/23/2024 2048 by Yvonne Horowitz, RN  Outcome: Progressing  Goal: Optimal Nutrition Intake  6/24/2024 0118 by Yvonne Horowitz, RN  Outcome:  Progressing  6/23/2024 2048 by Yvonne Horowitz RN  Outcome: Progressing  Goal: Effective Oxygenation and Ventilation  6/24/2024 0118 by Yvonne Horowitz RN  Outcome: Progressing  6/23/2024 2048 by Yvonne Horowitz RN  Outcome: Progressing  Goal: Improved Sensorimotor Function  6/24/2024 0118 by Yvonne Horowitz RN  Outcome: Progressing  6/23/2024 2048 by Yvonne Horowitz RN  Outcome: Progressing  Goal: Safe and Effective Swallow  6/24/2024 0118 by Yvonne Horowitz RN  Outcome: Progressing  6/23/2024 2048 by Yvonne Horowitz RN  Outcome: Progressing  Goal: Effective Urinary Elimination  6/24/2024 0118 by Yvonne Horowitz RN  Outcome: Progressing  6/23/2024 2048 by Yvonne Horowitz RN  Outcome: Progressing     Problem: Fall Injury Risk  Goal: Absence of Fall and Fall-Related Injury  6/24/2024 0118 by Yvonne Horowitz RN  Outcome: Progressing  6/23/2024 2048 by Yvonne Horowitz RN  Outcome: Progressing     Problem: Infection  Goal: Absence of Infection Signs and Symptoms  6/24/2024 0118 by Yvonne oHrowitz RN  Outcome: Progressing  6/23/2024 2048 by Yvonne Horowitz RN  Outcome: Progressing

## 2024-06-24 NOTE — PLAN OF CARE
Patient AAOx3, independent at baseline. PCP correct on facesheet. Denies owning any DME. Family to provide transportation home.   06/24/24 1200   Discharge Assessment   Assessment Type Discharge Planning Assessment   Confirmed/corrected address, phone number and insurance Yes   Confirmed Demographics Correct on Facesheet   Source of Information patient   Communicated FANTASMA with patient/caregiver Date not available/Unable to determine   People in Home child(johnny), adult   Do you expect to return to your current living situation? Yes   Do you have help at home or someone to help you manage your care at home? Yes   Prior to hospitilization cognitive status: Alert/Oriented   Current cognitive status: Alert/Oriented   Walking or Climbing Stairs Difficulty no   Dressing/Bathing Difficulty no   Equipment Currently Used at Home none   Readmission within 30 days? No   Do you currently have service(s) that help you manage your care at home? No   Do you take prescription medications? Yes   Do you have prescription coverage? Yes   Do you have any problems affording any of your prescribed medications? No   Is the patient taking medications as prescribed? yes   How do you get to doctors appointments? car, drives self   Are you on dialysis? No   Do you take coumadin? No   Discharge Plan A Home with family   Discharge Plan B Home   DME Needed Upon Discharge  none   Discharge Plan discussed with: Patient   Transition of Care Barriers None     [facility] - Med Surg (Lori)  Initial Discharge Assessment       Primary Care Provider: Baldomero Schwab MD    Admission Diagnosis: Memory loss [R41.3]  Acute focal neurological deficit [R29.818]    Admission Date: 6/23/2024  Expected Discharge Date:     Transition of Care Barriers: (P) None    Payor: BLUE CROSS BLUE SHIELD / Plan: [facility] FEDERAL STANDARD / Product Type: PPO /     Extended Emergency Contact Information  Primary Emergency Contact: Sujatha Jones  Address: [address]            [place] [address] [facility]  Mobile Phone: [phone]  Relation: Spouse    Discharge Plan A: (P) Home with family  Discharge Plan B: (P) Home      [facility] [place] [address] - [place] - [address]  [address]  [place] [address]  Phone: [phone] Fax: [phone]    [facility]  [address]  [place] [address]  Phone: [phone] Fax: [phone]    [id number] - [place] - [address] AT [address] [address] [facility]  [address]  [place] [address]  Phone: [phone] Fax: [phone]      Initial Assessment (most recent)       Adult Discharge Assessment - 06/24/24 1200          Discharge Assessment    Assessment Type Discharge Planning Assessment (P)      Confirmed/corrected address, phone number and insurance Yes (P)      Confirmed Demographics Correct on Facesheet (P)      Source of Information patient (P)      Communicated FANTASMA with patient/caregiver Date not available/Unable to determine (P)      People in Home child(johnny), adult (P)      Do you expect to return to your current living situation? Yes (P)      Do you have help at home or someone to help you manage your care at home? Yes (P)      Prior to hospitilization cognitive status: Alert/Oriented (P)      Current cognitive status: Alert/Oriented (P)      Walking or Climbing Stairs Difficulty no (P)      Dressing/Bathing Difficulty no (P)      Equipment Currently Used at Home none (P)      Readmission within 30 days? No (P)      Do you currently have service(s) that help you manage your care at home? No (P)      Do you take prescription medications? Yes (P)      Do you have prescription coverage? Yes (P)      Do you have any problems affording any of your prescribed medications? No (P)      Is the patient taking medications as prescribed? yes (P)      How do you get to doctors appointments? car, drives self (P)      Are you on  dialysis? No (P)      Do you take coumadin? No (P)      Discharge Plan A Home with family (P)      Discharge Plan B Home (P)      DME Needed Upon Discharge  none (P)      Discharge Plan discussed with: Patient (P)      Transition of Care Barriers None (P)

## 2024-06-24 NOTE — PLAN OF CARE
Ross Information Processing Assessment Second Edition (RIPA-2) subtests administered this date for assessment of cognitive-linguistic function-- pt appears to be performing at his baseline; no acute care SLP needs identified at this time and no further therapy needs recommended upon discharge from the hospital.     Problem: SLP  Goal: SLP Goal  Outcome: Met

## 2024-06-24 NOTE — PLAN OF CARE
Problem: Adult Inpatient Plan of Care  Goal: Plan of Care Review  6/24/2024 0430 by Yvonne Horowitz RN  Outcome: Progressing  6/24/2024 0118 by Yvonne Horowitz RN  Outcome: Progressing  Flowsheets (Taken 6/24/2024 0118)  Plan of Care Reviewed With: patient  6/23/2024 2048 by Yvonne Horowitz RN  Outcome: Progressing  Goal: Patient-Specific Goal (Individualized)  6/24/2024 0430 by Yvonne Horowitz RN  Outcome: Progressing  6/24/2024 0118 by Yvonne Horowitz RN  Outcome: Progressing  6/23/2024 2048 by Yvonne Horowitz RN  Outcome: Progressing  Goal: Absence of Hospital-Acquired Illness or Injury  6/24/2024 0430 by Yvonne Horowitz RN  Outcome: Progressing  6/24/2024 0118 by Yvonne Horowitz RN  Outcome: Progressing  6/23/2024 2048 by Yvonne Horowitz RN  Outcome: Progressing  Goal: Optimal Comfort and Wellbeing  6/24/2024 0430 by Yvonne Horowitz RN  Outcome: Progressing  6/24/2024 0118 by Yvonne Horowitz RN  Outcome: Progressing  6/23/2024 2048 by Yvonne Horowitz RN  Outcome: Progressing  Goal: Readiness for Transition of Care  6/24/2024 0430 by Yvonne Horowitz RN  Outcome: Progressing  6/24/2024 0118 by Yvonne Horowitz RN  Outcome: Progressing  6/23/2024 2048 by Yvonne Horowitz RN  Outcome: Progressing

## 2024-06-24 NOTE — PT/OT/SLP EVAL
Occupational Therapy   Evaluation & Discharge Summary    Name: Coraozn Jones  MRN: [id number]  Admitting Diagnosis: Memory loss  Recent Surgery: * No surgery found *      Recommendations:     Discharge Recommendations: No Therapy Indicated  Discharge Equipment Recommendations:  none  Barriers to discharge:   None    Assessment:     Corazon Jones is a 58 y.o. male with a medical diagnosis of Memory loss.  He presents with no pain. Performance deficits affecting function: none  .  Pt agreeable to participating in therapy upon arrival to room.  Pt demonstrates strength and ROM in (B) UE needed for ADLs that is WNL. Pt alert, oriented, and able to follow 100% of all commands.  Pt able to ambulate in room and perform grooming task standing at sink independently.    Overall, pt tolerated therapy well.  Pt reports he previously was having some difficulty recalling recent events; however, at this time he feels like his memory is clear and he is not having difficulty recalling things.  PTA pt reports being (I) with ADLs, iADLs, and functional mobility.  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  Pt appears to be performing at baseline and does not require OT services in the acute care setting at this time.  Pt to d/c from OT; no further therapy needs recommended upon d/c from hospital.    Rehab Prognosis: Good; patient would benefit from acute skilled OT services to address these deficits and reach maximum level of function.       Plan:     Patient to d/c from OT.  No further therapy needs recommended upon d/c from hospital.      Subjective     Chief Complaint: none stated  Patient/Family Comments/goals: Return home and to work; resume PLOF    Occupational Profile:  Living Environment: Pt lives with son in 2SH, 1 threshold [place].  Bathroom has walk-in shower and tub/shower available.  Pt's bedroom is on 2nd floor.  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  Patient found  sitting up in bed  with peripheral IV upon OT entry to room.    General Precautions: Standard,  (none)  Orthopedic Precautions: N/A  Braces: N/A  Respiratory Status: Room air    Occupational Performance:    Bed Mobility:    Supine <> sit: Independent; HOB elevated  Scooting: Independent    Functional Mobility/Transfers:  Sit <> Stand: Independent x 1 trial from EOB  Functional Mobility: Pt ambulated ~20 ft in room independently.  No instances of postural sway or LOB    Activities of Daily Living:  Grooming: Independent for washing hands while standing at sink.  Upper Body Dressing: Independent for doffing gown on backside and donning it on front while standing.    Cognitive/Visual Perceptual:  Cognitive/Psychosocial Skills:    -       Oriented to: Person, Place, Time, and Situation   -       Follows Commands/attention: Follows 100% of all commands  -       Communication: clear/fluent  -       Memory: No Deficits noted  -       Safety awareness/insight to disability: intact   -       Mood/Affect/Coping skills/emotional control: Cooperative and Pleasant    Physical Exam:  Postural examination/scapula alignment:    -       No postural abnormalities identified  Skin integrity: Visible skin intact  Edema:  None noted  Sensation: -       Intact  Motor Planning: -       WNL  Dominant hand: -       right  Upper Extremity Range of Motion:    -       Right Upper Extremity: WNL  -       Left Upper Extremity: WNL  Upper Extremity Strength:   -       Right Upper Extremity: WNL; 5/5 all  muscle groups  -       Left Upper Extremity: WNL; 5/5 all muscle groups   Strength: 5/5 both hands  Fine Motor Coordination: Intact  Gross motor coordination: WFL  Balance: Sitting- Independent; Standing- Independent     AMPAC 6 Click ADL:  AMPAC Total Score: 24    Treatment & Education:  *Pt educated on role of OT in acute care setting    Patient left sitting edge of bed with call button in reach and RN (Loyda) notified    GOALS:   Multidisciplinary Problems       Occupational Therapy Goals       Not on file              Multidisciplinary Problems (Resolved)          Problem: Occupational Therapy    Goal Priority Disciplines Outcome Interventions   Occupational Therapy Goal   (Resolved)     OT, PT/OT Met                        History:     Past Medical History:   Diagnosis Date    History of Clostridium difficile colitis     treated 2014    History of sarcoidosis     dx 1991, resolved    Hyperlipidemia     Hypertension     OTTONIEL (obstructive sleep apnea)     Prediabetes          Past Surgical History:   Procedure Laterality Date    APPENDECTOMY      ARTHROSCOPY OF KNEE      EYE SURGERY  2006 Lasik    HERNIA REPAIR      SHOULDER ARTHROSCOPY         Time Tracking:     OT Date of Treatment: 06/24/24  OT Start Time: 1117  OT Stop Time: 1127  OT Total Time (min): 10 min    Billable Minutes:Evaluation 10    SEVERINO Biggs  6/24/2024

## 2024-06-25 ENCOUNTER — TELEPHONE (OUTPATIENT)
Dept: SPORTS MEDICINE | Facility: CLINIC | Age: 59
End: 2024-06-25
Payer: COMMERCIAL

## 2024-06-25 NOTE — TELEPHONE ENCOUNTER
Spoke to patient about knee arthroscopy. He would like to wait until later this year to do it. He will let us know.

## 2024-06-27 ENCOUNTER — PATIENT OUTREACH (OUTPATIENT)
Dept: ADMINISTRATIVE | Facility: CLINIC | Age: 59
End: 2024-06-27
Payer: COMMERCIAL

## 2024-06-28 ENCOUNTER — TELEPHONE (OUTPATIENT)
Dept: NEUROLOGY | Facility: CLINIC | Age: 59
End: 2024-06-28
Payer: COMMERCIAL

## 2024-06-28 ENCOUNTER — TELEPHONE (OUTPATIENT)
Dept: INTERNAL MEDICINE | Facility: CLINIC | Age: 59
End: 2024-06-28
Payer: COMMERCIAL

## 2024-06-28 DIAGNOSIS — E55.9 VITAMIN D INSUFFICIENCY: Primary | ICD-10-CM

## 2024-06-28 DIAGNOSIS — R19.7 DIARRHEA, UNSPECIFIED TYPE: ICD-10-CM

## 2024-06-28 NOTE — TELEPHONE ENCOUNTER
----- Message from Lucy Mo sent at 6/28/2024  2:50 PM CDT -----  Regarding: labs  Patient requesting to have labs done ( virus titer and vit d) before his next visit July 3 2024.        Thanks

## 2024-06-28 NOTE — TELEPHONE ENCOUNTER
Typically we don't check stool samples until there has been diarrhea for approx 2 weeks (text book answer is a month), but ordered  Also Vit D ordered.    Please check Covid test  Also recommend urgent care if symptoms worsen over the weekend    jl

## 2024-06-28 NOTE — TELEPHONE ENCOUNTER
Called pt to see what titer order he is requesting. He states he's not sure. He was in the hospital, and he had diarrhea. He says whatever she wants to order, she can - if anything. He states its just a suggestion.

## 2024-06-28 NOTE — TELEPHONE ENCOUNTER
Spoke to patient. Have advice. Patient will come to clinic on Monday to  stool collection supplies. He would like to know if he can get a COVID swab while here in clinic. Please let me know if approved.

## 2024-07-01 ENCOUNTER — OFFICE VISIT (OUTPATIENT)
Dept: NEUROLOGY | Facility: CLINIC | Age: 59
End: 2024-07-01
Payer: COMMERCIAL

## 2024-07-01 VITALS — DIASTOLIC BLOOD PRESSURE: 86 MMHG | SYSTOLIC BLOOD PRESSURE: 119 MMHG | HEART RATE: 96 BPM

## 2024-07-01 DIAGNOSIS — H51.11 CONVERGENCE INSUFFICIENCY: ICD-10-CM

## 2024-07-01 DIAGNOSIS — S13.4XXA WHIPLASH INJURY TO NECK, INITIAL ENCOUNTER: ICD-10-CM

## 2024-07-01 DIAGNOSIS — G45.4 TRANSIENT GLOBAL AMNESIA: Primary | ICD-10-CM

## 2024-07-01 PROCEDURE — 99999 PR PBB SHADOW E&M-EST. PATIENT-LVL V: CPT | Mod: PBBFAC,,, | Performed by: PSYCHIATRY & NEUROLOGY

## 2024-07-01 PROCEDURE — 3079F DIAST BP 80-89 MM HG: CPT | Mod: CPTII,S$GLB,, | Performed by: PSYCHIATRY & NEUROLOGY

## 2024-07-01 PROCEDURE — 3044F HG A1C LEVEL LT 7.0%: CPT | Mod: CPTII,S$GLB,, | Performed by: PSYCHIATRY & NEUROLOGY

## 2024-07-01 PROCEDURE — 3074F SYST BP LT 130 MM HG: CPT | Mod: CPTII,S$GLB,, | Performed by: PSYCHIATRY & NEUROLOGY

## 2024-07-01 PROCEDURE — 1160F RVW MEDS BY RX/DR IN RCRD: CPT | Mod: CPTII,S$GLB,, | Performed by: PSYCHIATRY & NEUROLOGY

## 2024-07-01 PROCEDURE — 99417 PROLNG OP E/M EACH 15 MIN: CPT | Mod: S$GLB,,, | Performed by: PSYCHIATRY & NEUROLOGY

## 2024-07-01 PROCEDURE — 99215 OFFICE O/P EST HI 40 MIN: CPT | Mod: S$GLB,,, | Performed by: PSYCHIATRY & NEUROLOGY

## 2024-07-01 PROCEDURE — 4010F ACE/ARB THERAPY RXD/TAKEN: CPT | Mod: CPTII,S$GLB,, | Performed by: PSYCHIATRY & NEUROLOGY

## 2024-07-01 PROCEDURE — 1159F MED LIST DOCD IN RCRD: CPT | Mod: CPTII,S$GLB,, | Performed by: PSYCHIATRY & NEUROLOGY

## 2024-07-01 NOTE — PATIENT INSTRUCTIONS
Continue vestibular PT exercises  Can increase physical activity as tolerated  Okay to return to work on 7/8/24

## 2024-07-01 NOTE — PROGRESS NOTES
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 hours............<12.0 mg/dL  3-5 days..................<15.0 mg/dL  6-29 days.................<15.0 mg/dL      Alkaline Phosphatase 03/15/2024 72  55 - 135 U/L Final    AST 03/15/2024 33  10 - 40 U/L Final    ALT 03/15/2024 44  10 - 44 U/L Final    eGFR 03/15/2024 >60  >60 mL/min/1.73 m^2 Final    Anion Gap 03/15/2024 8  8 - 16 mmol/L Final    Cholesterol 03/15/2024 175  120 - 199 mg/dL Final    Comment: The National Cholesterol Education Program (NCEP) has set the  following guidelines (reference ranges) for Cholesterol:  Optimal.....................<200 mg/dL  Borderline High.............200-239 mg/dL  High........................> or = 240 mg/dL      Triglycerides 03/15/2024 81  30 - 150 mg/dL Final    Comment: The National Cholesterol Education Program (NCEP) has set the  following guidelines (reference values) for triglycerides:  Normal......................<150 mg/dL  Borderline High.............150-199 mg/dL  High........................200-499 mg/dL      HDL 03/15/2024 42  40 - 75 mg/dL Final    Comment: The National Cholesterol Education Program (NCEP) has set the  following guidelines (reference values) for HDL Cholesterol:  Low...............<40 mg/dL  Optimal...........>60 mg/dL      LDL Cholesterol 03/15/2024 116.8  63.0 - 159.0 mg/dL Final    Comment: The National Cholesterol Education Program (NCEP) has set the  following guidelines (reference values) for LDL  Cholesterol:  Optimal.......................<130 mg/dL  Borderline High...............130-159 mg/dL  High..........................160-189 mg/dL  Very High.....................>190 mg/dL      HDL/Cholesterol Ratio 03/15/2024 24.0  20.0 - 50.0 % Final    Total Cholesterol/HDL Ratio 03/15/2024 4.2  2.0 - 5.0 Final    Non-HDL Cholesterol 03/15/2024 133  mg/dL Final    Comment: Risk category and Non-HDL cholesterol goals:  Coronary heart disease (CHD)or equivalent (10-year risk of CHD >20%):  Non-HDL cholesterol goal     <130 mg/dL  Two or more CHD risk factors and 10-year risk of CHD <= 20%:  Non-HDL cholesterol goal     <160 mg/dL  0 to 1 CHD risk factor:  Non-HDL cholesterol goal     <190 mg/dL      TSH 03/15/2024 0.935  0.400 - 4.000 uIU/mL Final    WBC 03/15/2024 2.84 (L)  3.90 - 12.70 K/uL Final    RBC 03/15/2024 5.32  4.60 - 6.20 M/uL Final    Hemoglobin 03/15/2024 15.7  14.0 - 18.0 g/dL Final    Hematocrit 03/15/2024 47.1  40.0 - 54.0 % Final    MCV 03/15/2024 89  82 - 98 fL Final    MCH 03/15/2024 29.5  27.0 - 31.0 pg Final    MCHC 03/15/2024 33.3  32.0 - 36.0 g/dL Final    RDW 03/15/2024 13.1  11.5 - 14.5 % Final    Platelets 03/15/2024 254  150 - 450 K/uL Final    MPV 03/15/2024 9.6  9.2 - 12.9 fL Final    Immature Granulocytes 03/15/2024 0.4  0.0 - 0.5 % Final    Gran # (ANC) 03/15/2024 1.1 (L)  1.8 - 7.7 K/uL Final    Immature Grans (Abs) 03/15/2024 0.01  0.00 - 0.04 K/uL Final    Comment: Mild elevation in immature granulocytes is non specific and   can be seen in a variety of conditions including stress response,   acute inflammation, trauma and pregnancy. Correlation with other   laboratory and clinical findings is essential.      Lymph # 03/15/2024 1.3  1.0 - 4.8 K/uL Final    Mono # 03/15/2024 0.4  0.3 - 1.0 K/uL Final    Eos # 03/15/2024 0.0  0.0 - 0.5 K/uL Final    Baso # 03/15/2024 0.02  0.00 - 0.20 K/uL Final    nRBC 03/15/2024 0  0 /100 WBC Final    Gran % 03/15/2024 37.3 (L)  38.0 - 73.0 % Final     Lymph % 03/15/2024 45.8  18.0 - 48.0 % Final    Mono % 03/15/2024 14.4  4.0 - 15.0 % Final    Eosinophil % 03/15/2024 1.4  0.0 - 8.0 % Final    Basophil % 03/15/2024 0.7  0.0 - 1.9 % Final    Differential Method 03/15/2024 Automated   Final    Hemoglobin A1C 03/15/2024 5.6  4.0 - 5.6 % Final    Comment: ADA Screening Guidelines:  5.7-6.4%  Consistent with prediabetes  >or=6.5%  Consistent with diabetes    High levels of fetal hemoglobin interfere with the HbA1C  assay. Heterozygous hemoglobin variants (HbS, HgC, etc)do  not significantly interfere with this assay.   However, presence of multiple variants may affect accuracy.      Estimated Avg Glucose 03/15/2024 114  68 - 131 mg/dL Final    Vit D, 25-Hydroxy 03/15/2024 25 (L)  30 - 96 ng/mL Final    Comment: Vitamin D deficiency.........<10 ng/mL                              Vitamin D insufficiency......10-29 ng/mL       Vitamin D sufficiency........> or equal to 30 ng/mL  Vitamin D toxicity............>100 ng/mL      PSA, Screen 03/15/2024 0.21  0.00 - 4.00 ng/mL Final    Comment: The testing method is a chemiluminescent microparticle immunoassay   manufactured by [employer] and performed on the    or   [employer] system. Values obtained with different assay manufacturers   for   methods may be different and cannot be used interchangeably.  PSA Expected levels:  Hormonal Therapy: <0.05 ng/ml  Prostatectomy: <0.01 ng/ml  Radiation Therapy: <1.00 ng/ml        I personally reviewed all current labs noted in today's chart.    Imaging:  I personally reviewed all diagnostic images and reports and agree with findings in the radiographical report as noted below unless otherwise specified.    CT Head 4/19/24:  FINDINGS:  Intracranial compartment: Brain appears normally formed.     Ventricles and sulci are normal in size for age without evidence of hydrocephalus. No extra-axial blood or fluid collections.     The brain parenchyma appears normal. No  parenchymal mass, hemorrhage, edema or major vascular distribution infarct.     Skull/extracranial contents (limited evaluation): No fracture. Mastoid air cells and paranasal sinuses are essentially clear.  Imaged portions of the orbits are within normal limits.     Impression:     No acute intracranial abnormality.    My read: No acute intracranial abnormalities. Normal sella    CT C-/T-Spine 4/19/24:  FINDINGS:  Cervical spine:     No acute cervical fracture.     The prevertebral soft tissues are unremarkable.     Degenerative changes with disc bulging endplate osteophyte formation most notable at C6-7 but no evidence to suggest cord impingement with moderate right foraminal stenosis noted at this level.     Thoracic spine:     No acute compression fracture.     No advanced degenerative changes.     Impression:     No acute fracture of the cervical and thoracic spine.    My read: No acute cervical cord abnormalities. Please see report for T-Spine    CTA Head/Neck 6/23/24:  FINDINGS:  CT scan of the head:     The subcutaneous tissues are unremarkable.  The bony calvarium is intact.  The paranasal sinuses are unremarkable.  The mastoid air cells are clear.  The orbits and intraorbital contents are within normal limits.     The craniocervical junction is intact.  The sellar and parasellar structures are within normal limits.  The midline structures are unremarkable.  There is no evidence of intracranial hemorrhage.  The ventricles and sulci are within normal limits.  There is no dense vessel sign.  There is no evidence of mass effect.     CTA neck:     The great vessels arising for attic arch are within normal limits.  There is a normal 3 vessel configuration.  The visualized subclavian arteries are unremarkable.  The common carotid arteries are within normal limits.  The internal and external carotid arteries are within normal limits.     The origins of the vertebral arteries are within normal limits.  The vertebral  arteries are normal in caliber.     CTA head:     The intracranial segments of the right ICA are within normal limits.  There is fusiform dilatation of the left cavernous ICA measuring 0.8 x 0.7 cm.     The A1 segment of the right anterior cerebral artery is within normal limits.  The remainder of the anterior cerebral arteries are within normal limits.  The middle cerebral arteries are unremarkable.     The intracranial segments of the vertebral is unremarkable the basilar is within normal limits.  The posterior cerebral arteries are unremarkable.     There is no evidence of large vessel occlusion in the intracranial circulation.     Additional findings:     The soft tissue the neck are unremarkable.  There is no evidence of lymphadenopathy in the neck.  The trachea is unremarkable.  The visualized lung apices are within normal limits.  There are degenerative changes in the cervical spine.     Impression:     No CT findings of large territorial infarction.  Additional evaluation with MRI of the brain may be obtained.     No hemodynamically significant stenosis in the neck vessels     No large vessel occlusion in the intracranial circulation.     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  Initial assessment with MRA of the brain without contrast is suggested.  Follow-up with conventional cerebral angiogram, as clinically warranted.    My read: No large vessel occlusion. No acute intracranial abnormalities    MRI/MRA Brain 6/23/24:  FINDINGS:  MRA brain:     The craniocervical junction is intact.  The sellar and parasellar structures are unremarkable.  The midline structures are intact.  The intracranial flow voids are within normal limits.     No diffusion-weighted signal abnormality is identified.  There is minimal T2/FLAIR signal hyperintense within the periventricular white matter.  There are no extra-axial fluid collections.  There is no evidence of intracranial hemorrhage.  There is no  evidence of mass effect.     The orbits and intraorbital contents are unremarkable.  The paranasal sinuses are unremarkable.  The mastoid air cells are clear.  The calvarium is intact     MRA brain:     The right cavernous ICA is within normal limits.  There is tortuosity of the left cavernous ICA with no aneurysm identified.  The A1 segment of the anterior cerebral artery on the right is hypoplastic.  The remainder of the anterior cerebral is are within normal limits.  The middle cerebral arteries are within normal limits.     There is a dominant left vertebral artery.  The basilar is unremarkable.  The posterior cerebral arteries are within normal limits.     Impression:     MRI brain:     No acute intracranial process.  Specifically, no evidence of acute infarction.     MRA brain:     No evidence of aneurysm of the left cavernous ICA. The findings on the CTA represent tortuosity of the left cavernous ICA.     No large vessel occlusion in the intracranial circulation.    My read: No acute intracranial abnormalities. No large vessel occlusion. Normal sella. Motion artifact on GRE sequences but no obvious prior bleed    EEG 6/24/24:  IMPRESSION:  Normal EEG of light sleep and the waking state.     Assessment:       ICD-10-CM ICD-9-CM    1. Transient global amnesia  G45.4 437.7 Ambulatory referral/consult to Neurology      2. Whiplash injury to neck, initial encounter  S13.4XXA 847.0       3. 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 male with h/o sarcoid, HLD, HTN, OTTONIEL on CPAP who presents for concussion evaluation. On exam, he has imbalance, convergence insufficiency. We discussed that there is currently no universally accepted definition of concussion. We discussed that concussion is a traumatic brain injury. We discussed that concussion can occur as a result of an impact to the head or to the body. We discussed that our clinic considers concussion definitive if there is transient disruption of brain  activity such as with loss of consciousness, amnesia as it relates to the day of the injury, or reports of neurological dysfunction on the day of injury. We discussed typical course, signs & symptoms, diagnostic findings, and treatment for concussion. We discussed for tachycardia, can be seen in setting of TBI and there is a correlation with POTS and TBI. We discussed that his injury per description was more of a whiplash injury. We discussed for episode of amnesia, description is transient global amnesia that can be idiopathic or caused by episodes of significant stress and that the first episode should have a stroke workup, which was negative in his case and that he had an EEG that was normal. We discussed because he did not have a stroke, does not need a daily aspirin. We discussed transient global amnesia may occur again.    Plan:     Continue vestibular PT exercises  Can increase physical activity as tolerated  Okay to return to work on 7/8/24    81 minutes were spent on the date of this patient encounter, which includes: preparing to see the patient, reviewing previous history, obtaining new patient history, performing the physical exam, counseling and educating the patient and/or family/caregiver, ordering necessary medications or tests or referrals, documenting in the electronic medical record, coordinating care.    Alonso Dorsey MD  Sports Neurology

## 2024-07-02 ENCOUNTER — TELEPHONE (OUTPATIENT)
Dept: INTERNAL MEDICINE | Facility: CLINIC | Age: 59
End: 2024-07-02
Payer: COMMERCIAL

## 2024-07-02 NOTE — TELEPHONE ENCOUNTER
----- Message from Viridiana Fox sent at 7/2/2024 11:32 AM CDT -----  Regarding: missed call  Type:  Patient Returning Call    Who Called: pt  Who Left Message for Patient: Carolyn Zarate   Does the patient know what this is regarding?:   Would the patient rather a call back or a response via [employer]? call  Best Call Back Number: [phone]   Additional Information:

## 2024-07-03 ENCOUNTER — PATIENT MESSAGE (OUTPATIENT)
Dept: NEUROLOGY | Facility: CLINIC | Age: 59
End: 2024-07-03
Payer: COMMERCIAL

## 2024-07-03 ENCOUNTER — PATIENT MESSAGE (OUTPATIENT)
Dept: INTERNAL MEDICINE | Facility: CLINIC | Age: 59
End: 2024-07-03

## 2024-07-03 ENCOUNTER — OFFICE VISIT (OUTPATIENT)
Dept: INTERNAL MEDICINE | Facility: CLINIC | Age: 59
End: 2024-07-03
Payer: COMMERCIAL

## 2024-07-03 VITALS
BODY MASS INDEX: 33.77 KG/M2 | HEART RATE: 74 BPM | HEIGHT: 70 IN | DIASTOLIC BLOOD PRESSURE: 92 MMHG | SYSTOLIC BLOOD PRESSURE: 126 MMHG | OXYGEN SATURATION: 95 % | WEIGHT: 235.88 LBS

## 2024-07-03 DIAGNOSIS — R73.03 PREDIABETES: Chronic | ICD-10-CM

## 2024-07-03 DIAGNOSIS — G45.4 TRANSIENT GLOBAL AMNESIA: ICD-10-CM

## 2024-07-03 DIAGNOSIS — E78.00 PURE HYPERCHOLESTEROLEMIA: Chronic | ICD-10-CM

## 2024-07-03 DIAGNOSIS — R42 ORTHOSTATIC DIZZINESS: Primary | ICD-10-CM

## 2024-07-03 DIAGNOSIS — I10 PRIMARY HYPERTENSION: Chronic | ICD-10-CM

## 2024-07-03 DIAGNOSIS — N48.9 PENILE ABNORMALITY: ICD-10-CM

## 2024-07-03 PROCEDURE — 3074F SYST BP LT 130 MM HG: CPT | Mod: CPTII,S$GLB,, | Performed by: STUDENT IN AN ORGANIZED HEALTH CARE EDUCATION/TRAINING PROGRAM

## 2024-07-03 PROCEDURE — 3044F HG A1C LEVEL LT 7.0%: CPT | Mod: CPTII,S$GLB,, | Performed by: STUDENT IN AN ORGANIZED HEALTH CARE EDUCATION/TRAINING PROGRAM

## 2024-07-03 PROCEDURE — 1159F MED LIST DOCD IN RCRD: CPT | Mod: CPTII,S$GLB,, | Performed by: STUDENT IN AN ORGANIZED HEALTH CARE EDUCATION/TRAINING PROGRAM

## 2024-07-03 PROCEDURE — 99215 OFFICE O/P EST HI 40 MIN: CPT | Mod: S$GLB,,, | Performed by: STUDENT IN AN ORGANIZED HEALTH CARE EDUCATION/TRAINING PROGRAM

## 2024-07-03 PROCEDURE — 3080F DIAST BP >= 90 MM HG: CPT | Mod: CPTII,S$GLB,, | Performed by: STUDENT IN AN ORGANIZED HEALTH CARE EDUCATION/TRAINING PROGRAM

## 2024-07-03 PROCEDURE — 4010F ACE/ARB THERAPY RXD/TAKEN: CPT | Mod: CPTII,S$GLB,, | Performed by: STUDENT IN AN ORGANIZED HEALTH CARE EDUCATION/TRAINING PROGRAM

## 2024-07-03 PROCEDURE — 3008F BODY MASS INDEX DOCD: CPT | Mod: CPTII,S$GLB,, | Performed by: STUDENT IN AN ORGANIZED HEALTH CARE EDUCATION/TRAINING PROGRAM

## 2024-07-03 PROCEDURE — 99999 PR PBB SHADOW E&M-EST. PATIENT-LVL IV: CPT | Mod: PBBFAC,,, | Performed by: STUDENT IN AN ORGANIZED HEALTH CARE EDUCATION/TRAINING PROGRAM

## 2024-07-03 PROCEDURE — G2211 COMPLEX E/M VISIT ADD ON: HCPCS | Mod: S$GLB,,, | Performed by: STUDENT IN AN ORGANIZED HEALTH CARE EDUCATION/TRAINING PROGRAM

## 2024-07-03 NOTE — PROGRESS NOTES
[facility]    Subjective:       Patient ID: Corazon Jones is a 58 y.o. male.    Chief Complaint: Hospital Follow Up      History was obtained from the patient and supplemented through chart review.  This patient is known to me.     HPI:    Patient is a 58 y.o. male who presents for post-viral syndrome, whiplash and post-concussive syndrome    4/16/2024 seen with viral syndrome, post-concussive syndrome s/p mechanical fall, whiplash(? Pt doubts)  6/23/2024 hospital admission transient global amnenia  Subsequent sensation of orthostatics (continued), extensive diarrhea (resolved)  Increased sleep that is unusual patient  Generally feeling better and well-rested    Reviewed prior imaging, MRI/MR[place]/CTA head and neck  Not a stroke  Cleared for work 7/8/2024, still on FMLA    HTN  high bp today  High sodium seafood boil at home lately, BP high  Monitor here  Encouraged home bp monitoring  Is taking losartan 25 daily, has had hypotension in the past    Concerned about potential post-concussive cardiac irregularities      Medical History  Past Medical History:   Diagnosis Date    History of Clostridium difficile colitis     treated 2014    History of sarcoidosis     dx 1991, resolved    Hyperlipidemia     Hypertension     OTTONIEL (obstructive sleep apnea)        Review of Systems   Constitutional:  Negative for appetite change and diaphoresis.   HENT:  Negative for trouble swallowing.    Respiratory:  Negative for shortness of breath.    Cardiovascular:  Negative for chest pain.   Gastrointestinal:  Negative for diarrhea, nausea and vomiting.   Musculoskeletal:  Negative for gait problem.   Neurological:  Positive for dizziness and light-headedness. Negative for seizures.   Psychiatric/Behavioral:  Negative for hallucinations.          Surgical hx, family hx, social hx   Have been reviewed      Current Outpatient Medications:     celecoxib (CELEBREX) 200 MG capsule, Take 1 capsule (200 mg total) by  "mouth once daily., Disp: 90 capsule, Rfl: 3    ergocalciferol (VITAMIN D2) 50,000 unit Cap, Take 1 capsule (50,000 Units total) by mouth every 7 days., Disp: 12 capsule, Rfl: 1    losartan (COZAAR) 25 MG tablet, Take 1 tablet (25 mg total) by mouth once daily. (Patient taking differently: Take 25 mg by mouth every evening.), Disp: 90 tablet, Rfl: 3    omeprazole (PRILOSEC) 40 MG capsule, TAKE 1 CAPSULE BY MOUTH EVERY MORNING AS DIRECTED, Disp: 90 capsule, Rfl: 3    ondansetron (ZOFRAN-ODT) 4 MG TbDL, Take 1 tablet (4 mg total) by mouth every 8 (eight) hours as needed (nausea). nausea. can worsen constipation, Disp: 20 tablet, Rfl: 0    rosuvastatin (CRESTOR) 40 MG Tab, Take 1 tablet (40 mg total) by mouth every evening., Disp: 90 tablet, Rfl: 3    sertraline (ZOLOFT) 50 MG tablet, Take 1 tablet (50 mg total) by mouth once daily. (Patient taking differently: Take 50 mg by mouth every evening.), Disp: 90 tablet, Rfl: 3    tadalafiL (CIALIS) 20 MG Tab, Take 1 tablet (20 mg total) by mouth once daily., Disp: 30 tablet, Rfl: 5    aspirin (ECOTRIN) 81 MG EC tablet, Take 1 tablet (81 mg total) by mouth once daily. (Patient not taking: Reported on 7/3/2024), Disp: 30 tablet, Rfl: 0    tretinoin (RETIN-A) 0.05 % cream, Apply topically to the skin every night (Patient not taking: Reported on 7/3/2024), Disp: 20 g, Rfl: 0    zonisamide (ZONEGRAN) 100 MG Cap, Take 1 at bedtime for 3 days, then 2 at bedtime for 3 days, then 3 at bedtime for 3 days, then 4 at bedtime to follow. Stay at the most comfortable dose. (Patient not taking: Reported on 7/3/2024), Disp: 360 capsule, Rfl: 5    Objective:        Body mass index is 33.85 kg/m².  Vitals:    07/03/24 1035 07/03/24 1124   BP: (!) (P) 126/94 (!) 126/92   Pulse: 74    SpO2: 95%    Weight: 107 kg (235 lb 14.3 oz)    Height: 5' 10" (1.778 m)    PainSc: 0-No pain      Physical Exam  Vitals and nursing note reviewed.   Constitutional:       General: He is not in acute distress.     " Appearance: Normal appearance. He is well-developed. He is not diaphoretic.   HENT:      Head: Normocephalic and atraumatic.   Eyes:      General: No scleral icterus.  Neck:      Vascular: No JVD.   Cardiovascular:      Rate and Rhythm: Normal rate and regular rhythm.      Heart sounds: Normal heart sounds. No murmur heard.  Pulmonary:      Effort: Pulmonary effort is normal. No respiratory distress.      Breath sounds: Normal breath sounds. No wheezing or rales.   Musculoskeletal:         General: Normal range of motion.      Cervical back: Normal range of motion and neck supple.   Skin:     General: Skin is warm and dry.   Neurological:      Mental Status: He is alert and oriented to person, place, and time.      Cranial Nerves: No cranial nerve deficit.   Psychiatric:         Behavior: Behavior normal.           Lab Results   Component Value Date    WBC 3.56 (L) 06/24/2024    HGB 14.9 06/24/2024    HCT 44.5 06/24/2024     06/24/2024    CHOL 170 06/23/2024    TRIG 143 06/23/2024    HDL 41 06/23/2024    ALT 25 06/24/2024    AST 20 06/24/2024     06/24/2024    K 4.0 06/24/2024     06/24/2024    CREATININE 0.8 06/24/2024    BUN 14 06/24/2024    CO2 24 06/24/2024    TSH 1.258 06/23/2024    PSA 0.21 03/15/2024    INR 1.1 06/24/2024    HGBA1C 5.5 06/23/2024       The 10-year ASCVD risk score (Davina BARILLAS, et al., 2019) is: 8.9%    Values used to calculate the score:      Age: 58 years      Sex: Male      Is Non- : No      Diabetic: No      Tobacco smoker: No      Systolic Blood Pressure: 126 mmHg      Is BP treated: Yes      HDL Cholesterol: 41 mg/dL      Total Cholesterol: 170 mg/dL    (Imaging have been independently reviewed)    Mri/mra brain    Assessment:         1. Orthostatic dizziness    2. Penile abnormality    3. Transient global amnesia    4. Pure hypercholesterolemia    5. Primary hypertension    6. Prediabetes            Plan:     Corazon was seen today for \[facility] [place] [facility]\""  follow up.    Diagnoses and all orders for this visit:    Orthostatic dizziness  -     Echo; Future    Penile abnormality  -     Ambulatory referral/consult to Urology; Future    Transient global amnesia    Pure hypercholesterolemia    Primary hypertension    Prediabetes        46 min were used in chart review, evaluation and counseling of patient, documentation and review of results on same day of service.     Visit today is associated with current or anticipated ongoing medical care related to this patient's single serious condition/complex condition of post-viral, post-concussive syndrome. The patient will return to see me as these issues will be followed longitudinally.      Follow up in about 3 months (around 10/3/2024) for htn.        All medications were reviewed including potential side effects and risks/benefits.  Pt was counseled to call back if anything worsens or if questions arise.    Baldomero Schwab MD  Family Medicine  [facility]  [facility] [address] [address]  Phone [phone]  Fax [phone]

## 2024-07-08 ENCOUNTER — HOSPITAL ENCOUNTER (OUTPATIENT)
Dept: CARDIOLOGY | Facility: OTHER | Age: 59
Discharge: HOME | End: 2024-07-08
Attending: STUDENT IN AN ORGANIZED HEALTH CARE EDUCATION/TRAINING PROGRAM
Payer: COMMERCIAL

## 2024-07-08 VITALS
HEART RATE: 74 BPM | BODY MASS INDEX: 33.64 KG/M2 | SYSTOLIC BLOOD PRESSURE: 126 MMHG | HEIGHT: 70 IN | WEIGHT: 235 LBS | DIASTOLIC BLOOD PRESSURE: 92 MMHG

## 2024-07-08 DIAGNOSIS — R42 ORTHOSTATIC DIZZINESS: ICD-10-CM

## 2024-07-08 LAB
AV INDEX (PROSTH): 0.62
AV MEAN GRADIENT: 6 MMHG
AV PEAK GRADIENT: 10 MMHG
AV VALVE AREA BY VELOCITY RATIO: 2.4 CM²
AV VALVE AREA: 2.57 CM²
AV VELOCITY RATIO: 0.58
BSA FOR ECHO PROCEDURE: 2.29 M2
CV ECHO LV RWT: 0.34 CM
DOP CALC AO PEAK VEL: 1.59 M/S
DOP CALC AO VTI: 36 CM
DOP CALC LVOT AREA: 4.2 CM2
DOP CALC LVOT DIAMETER: 2.3 CM
DOP CALC LVOT PEAK VEL: 0.92 M/S
DOP CALC LVOT STROKE VOLUME: 92.6 CM3
DOP CALCLVOT PEAK VEL VTI: 22.3 CM
E WAVE DECELERATION TIME: 182.8 MSEC
E/A RATIO: 1.67
E/E' RATIO: 8.89 M/S
ECHO LV POSTERIOR WALL: 0.77 CM (ref 0.6–1.1)
FRACTIONAL SHORTENING: 37 % (ref 28–44)
INTERVENTRICULAR SEPTUM: 0.81 CM (ref 0.6–1.1)
IVC DIAMETER: 2.14 CM
IVRT: 85.63 MSEC
LA MAJOR: 5.71 CM
LA MINOR: 6.09 CM
LA WIDTH: 3.5 CM
LEFT ATRIUM AREA SYSTOLIC (APICAL 2 CHAMBER): 21.68 CM2
LEFT ATRIUM AREA SYSTOLIC (APICAL 4 CHAMBER): 18.22 CM2
LEFT ATRIUM SIZE: 4.34 CM
LEFT ATRIUM VOLUME INDEX MOD: 24.9 ML/M2
LEFT ATRIUM VOLUME INDEX: 34 ML/M2
LEFT ATRIUM VOLUME MOD: 55.74 CM3
LEFT ATRIUM VOLUME: 76.1 CM3
LEFT INTERNAL DIMENSION IN SYSTOLE: 2.83 CM (ref 2.1–4)
LEFT VENTRICLE DIASTOLIC VOLUME INDEX: 40.85 ML/M2
LEFT VENTRICLE DIASTOLIC VOLUME: 91.5 ML
LEFT VENTRICLE END SYSTOLIC VOLUME APICAL 2 CHAMBER: 60.29 ML
LEFT VENTRICLE END SYSTOLIC VOLUME APICAL 4 CHAMBER: 47.12 ML
LEFT VENTRICLE MASS INDEX: 50 G/M2
LEFT VENTRICLE SYSTOLIC VOLUME INDEX: 13.5 ML/M2
LEFT VENTRICLE SYSTOLIC VOLUME: 30.32 ML
LEFT VENTRICULAR INTERNAL DIMENSION IN DIASTOLE: 4.48 CM (ref 3.5–6)
LEFT VENTRICULAR MASS: 110.95 G
LV LATERAL E/E' RATIO: 8 M/S
LV SEPTAL E/E' RATIO: 10 M/S
LVED V (TEICH): 91.5 ML
LVES V (TEICH): 30.32 ML
LVOT MG: 2.22 MMHG
LVOT MV: 0.71 CM/S
MV PEAK A VEL: 0.48 M/S
MV PEAK E VEL: 0.8 M/S
MV STENOSIS PRESSURE HALF TIME: 53 MS
MV VALVE AREA P 1/2 METHOD: 4.15 CM2
PISA TR MAX VEL: 1.73 M/S
PULM VEIN S/D RATIO: 1.63
PV PEAK D VEL: 0.24 M/S
PV PEAK GRADIENT: 3 MMHG
PV PEAK S VEL: 0.39 M/S
PV PEAK VELOCITY: 0.92 M/S
RA MAJOR: 5.04 CM
RA PRESSURE ESTIMATED: 3 MMHG
RA WIDTH: 3.2 CM
RV TB RVSP: 5 MMHG
TDI LATERAL: 0.1 M/S
TDI SEPTAL: 0.08 M/S
TDI: 0.09 M/S
TR MAX PG: 12 MMHG
TV REST PULMONARY ARTERY PRESSURE: 15 MMHG
Z-SCORE OF LEFT VENTRICULAR DIMENSION IN END DIASTOLE: -5.81
Z-SCORE OF LEFT VENTRICULAR DIMENSION IN END SYSTOLE: -4.27

## 2024-07-08 PROCEDURE — 93306 TTE W/DOPPLER COMPLETE: CPT | Mod: 26,,, | Performed by: INTERNAL MEDICINE

## 2024-07-08 PROCEDURE — 93306 TTE W/DOPPLER COMPLETE: CPT

## 2024-07-10 ENCOUNTER — OFFICE VISIT (OUTPATIENT)
Dept: CARDIOLOGY | Facility: CLINIC | Age: 59
End: 2024-07-10
Payer: COMMERCIAL

## 2024-07-10 VITALS
HEART RATE: 76 BPM | BODY MASS INDEX: 33.61 KG/M2 | SYSTOLIC BLOOD PRESSURE: 138 MMHG | DIASTOLIC BLOOD PRESSURE: 84 MMHG | HEIGHT: 71 IN | WEIGHT: 240.06 LBS

## 2024-07-10 DIAGNOSIS — R20.2 PARESTHESIA: Primary | ICD-10-CM

## 2024-07-10 DIAGNOSIS — I10 PRIMARY HYPERTENSION: Chronic | ICD-10-CM

## 2024-07-10 DIAGNOSIS — G47.33 OSA (OBSTRUCTIVE SLEEP APNEA): Chronic | ICD-10-CM

## 2024-07-10 DIAGNOSIS — E78.00 PURE HYPERCHOLESTEROLEMIA: Chronic | ICD-10-CM

## 2024-07-10 PROCEDURE — 99999 PR PBB SHADOW E&M-EST. PATIENT-LVL IV: CPT | Mod: PBBFAC,,, | Performed by: INTERNAL MEDICINE

## 2024-07-11 NOTE — PROGRESS NOTES
Subjective:   Chief Complaint: Hypertension (Recent hospital; discharge) and Dizziness  Last Clinic Visit: New Patient    History of Present Illness: Corazon Jones is a 58 y.o. gentleman with hypertension, hyperlipidemia, coronary artery disease by calcium, obstructive sleep apnea, and recent fall with postconcussive syndrome, who presents for cardiology evaluation.  He reports hitting his head in April after a mechanical fall, no clear loss of consciousness, and then had symptoms over the course of the next several days including chills, possible rigors, fatigue, pain, and spent 48 hours in bed.  He came in for evaluation was felt to have symptoms related to a possible concussion.  Then 2 months later he had an episode of global amnesia.  Most recently over the past 1-2 weeks he reports a different sensation, that is of tingling in his head, arms, and across the shoulders, present most notably when he ambulates, resolves with rest.  Intermittently however in between these episodes he is able to form a moderate level of exertion on the lower, and lifting weights and does not have symptoms during this time.  Does not check blood pressure at home however does have a watch and notes that heart rate mildly elevated above baseline but in the 70s to 90s.  Checks his blood pressure at work running in the 110 to 120 range systolic.  He has noted orthostasis in the past, noted once last year with Dr. Munson.  Has been on sertraline a long-acting medication for many years predating his current symptoms.  Previously he did have very elevated lipids however in 2019 started on statin therapy Crestor 40 mg, had some issues with other statins before this, and LDL has come down nicely to 100.  He did have a coronary calcium score performed in 2019 in the 30 range, most recently in the 50-60 range.  Denies any exertional chest discomfort preceding these episodes.  He has had his antihypertensive therapy modified somewhat over the  course the past several years, previously was on a thiazide but stopped with orthostatic type symptoms, currently only on losartan 25 mg.    Dx:  Hypertension   Hyperlipidemia   Coronary artery disease by calcium   Obstructive sleep apnea   Post concussive syndrome    Medications:  Outpatient Encounter Medications as of 7/10/2024   Medication Sig Dispense Refill    celecoxib (CELEBREX) 200 MG capsule Take 1 capsule (200 mg total) by mouth once daily. 90 capsule 3    ergocalciferol (VITAMIN D2) 50,000 unit Cap Take 1 capsule (50,000 Units total) by mouth every 7 days. 12 capsule 1    losartan (COZAAR) 25 MG tablet Take 1 tablet (25 mg total) by mouth once daily. (Patient taking differently: Take 25 mg by mouth every evening.) 90 tablet 3    omeprazole (PRILOSEC) 40 MG capsule TAKE 1 CAPSULE BY MOUTH EVERY MORNING AS DIRECTED (Patient taking differently: Take by mouth nightly.) 90 capsule 3    rosuvastatin (CRESTOR) 40 MG Tab Take 1 tablet (40 mg total) by mouth every evening. 90 tablet 3    sertraline (ZOLOFT) 50 MG tablet Take 1 tablet (50 mg total) by mouth once daily. (Patient taking differently: Take 50 mg by mouth every evening.) 90 tablet 3    tadalafiL (CIALIS) 20 MG Tab Take 1 tablet (20 mg total) by mouth once daily. 30 tablet 5    aspirin (ECOTRIN) 81 MG EC tablet Take 1 tablet (81 mg total) by mouth once daily. (Patient not taking: Reported on 7/3/2024) 30 tablet 0    ondansetron (ZOFRAN-ODT) 4 MG TbDL Take 1 tablet (4 mg total) by mouth every 8 (eight) hours as needed (nausea). nausea. can worsen constipation (Patient not taking: Reported on 7/10/2024) 20 tablet 0    tretinoin (RETIN-A) 0.05 % cream Apply topically to the skin every night (Patient not taking: Reported on 7/3/2024) 20 g 0    zonisamide (ZONEGRAN) 100 MG Cap Take 1 at bedtime for 3 days, then 2 at bedtime for 3 days, then 3 at bedtime for 3 days, then 4 at bedtime to follow. Stay at the most comfortable dose. (Patient not taking: Reported  "on 7/3/2024) 360 capsule 5     No facility-administered encounter medications on file as of 7/10/2024.     Social History:  Corazon reports that he has never smoked. He has never used smokeless tobacco. He reports that he does not drink alcohol and does not use drugs.  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  Objective:   /84   Pulse 76   Ht 5' 11" (1.803 m)   Wt 108.9 kg (240 lb 1.3 oz)   BMI 33.48 kg/m²     Physical Exam   Constitutional: He does not appear ill. No distress.   HENT:   Head: Normocephalic and atraumatic.   Mouth/Throat: Mucous membranes are moist.   Cardiovascular: Normal rate, regular rhythm, normal heart sounds and normal pulses. Exam reveals no gallop and no friction rub.   No murmur heard.  Pulmonary/Chest: Effort normal and breath sounds normal. No stridor. No respiratory distress. He has no wheezes. He has no rhonchi. He has no rales. He exhibits no tenderness.   Abdominal: Normal appearance.   Musculoskeletal:      Right lower leg: No edema.      Left lower leg: No edema.   Neurological: He is alert.   Skin: Skin is warm.      EKG:  My independent visualization of most recent EKG is normal sinus rhythm, low anterior forces    TTE:  07/08/2024     Left Ventricle: The left ventricle is normal in size. Normal wall thickness. There is normal systolic function with a visually estimated ejection fraction of 55 - 60%. There is normal diastolic function.    Right Ventricle: Normal right ventricular cavity size. Wall thickness is normal. Systolic function is normal.     Lipids:  Recent Labs   Lab 06/23/24  1301   LDL Cholesterol 100.4   HDL 41      Renal:  Recent Labs   Lab 06/24/24  0341   Creatinine 0.8   Potassium 4.0   CO2 24   BUN 14     Liver:  Recent Labs   Lab 06/24/24  0341   AST 20   ALT 25     Stress ECG   08/11/2023     The ECG portion of the study is negative for ischemia.    During stress, frequent PVCs are noted.    The exercise capacity was average.   "   Assessment:     1. Paresthesia    2. Primary hypertension    3. Pure hypercholesterolemia    4. OTTONIEL (obstructive sleep apnea)      Plan:   1. Paresthesia  Episode with tingling in his head, shoulders, arms with ambulation, resolving with rest, while he performs moderate levels of strenuous activity other times without symptoms, does not appear to be a clear anginal equivalent.  Stress test last year without any significant ischemia, echocardiogram recently without any significant abnormalities, and given lack of concordance between the 2 episodes less likely to be cardiac in nature.  Suspect moreover that this is musculoskeletal in nature, versus post concussive related.  Discussed potential changes in symptoms that would modify this evaluation.  At this time I do not feel strongly about any additional stress testing given overall clinical history, and description of symptoms.    2. Primary hypertension  Blood pressure noted to be normal today higher than he is typically running at work, encouraged him to obtain a blood pressure cuff at home, and if his blood pressure was running low normal to stop losartan and see if any of his orthostasis type symptoms improved.  If his blood pressure was running high normal, they would recommend continuing losartan.  Did explain that there could be relationship between sertraline and orthostasis type symptoms however this is a longstanding medication.    3. Pure hypercholesterolemia  Severely elevated lipids in the past well-controlled on Crestor 40 mg continue at this time, noted to have coronary artery disease by calcium, but risk factors including hypertension, cholesterol all appear to be reasonably well-controlled.    4. OTTONIEL (obstructive sleep apnea)  Discussed this as a potential etiology of some of his episodes and symptoms however he reports very good compliance with CPAP, and no other symptoms to suggest therapy is currently ineffective.    Follow up in p.r.n., 1  year.      Eric Lee MD FACC

## 2024-07-15 ENCOUNTER — OFFICE VISIT (OUTPATIENT)
Dept: UROLOGY | Facility: CLINIC | Age: 59
End: 2024-07-15
Payer: COMMERCIAL

## 2024-07-15 VITALS
HEART RATE: 64 BPM | RESPIRATION RATE: 18 BRPM | WEIGHT: 240.06 LBS | BODY MASS INDEX: 33.61 KG/M2 | HEIGHT: 71 IN | DIASTOLIC BLOOD PRESSURE: 73 MMHG | SYSTOLIC BLOOD PRESSURE: 128 MMHG

## 2024-07-15 DIAGNOSIS — N48.9 PENILE ABNORMALITY: Primary | ICD-10-CM

## 2024-07-15 PROCEDURE — 3008F BODY MASS INDEX DOCD: CPT | Mod: CPTII,S$GLB,, | Performed by: NURSE PRACTITIONER

## 2024-07-15 PROCEDURE — 3044F HG A1C LEVEL LT 7.0%: CPT | Mod: CPTII,S$GLB,, | Performed by: NURSE PRACTITIONER

## 2024-07-15 PROCEDURE — 3074F SYST BP LT 130 MM HG: CPT | Mod: CPTII,S$GLB,, | Performed by: NURSE PRACTITIONER

## 2024-07-15 PROCEDURE — 1159F MED LIST DOCD IN RCRD: CPT | Mod: CPTII,S$GLB,, | Performed by: NURSE PRACTITIONER

## 2024-07-15 PROCEDURE — 99203 OFFICE O/P NEW LOW 30 MIN: CPT | Mod: S$GLB,,, | Performed by: NURSE PRACTITIONER

## 2024-07-15 PROCEDURE — 1160F RVW MEDS BY RX/DR IN RCRD: CPT | Mod: CPTII,S$GLB,, | Performed by: NURSE PRACTITIONER

## 2024-07-15 PROCEDURE — 4010F ACE/ARB THERAPY RXD/TAKEN: CPT | Mod: CPTII,S$GLB,, | Performed by: NURSE PRACTITIONER

## 2024-07-15 PROCEDURE — 3078F DIAST BP <80 MM HG: CPT | Mod: CPTII,S$GLB,, | Performed by: NURSE PRACTITIONER

## 2024-07-15 NOTE — PROGRESS NOTES
"Subjective:      Corazon Jones is a 58 y.o. male who was referred by Dr. Schwab for evaluation of his penile abnormality.      The patient presents today reporting a "bump" in the middle of his penis that he first noticed several weeks ago. Prior to he reports possible rolling over in bed on his erection. Denies feeling/hearing a pop. Denies detumescence. He denies seeing bruising. He reports mild pain/discomfort when palpated. He has had no issues with erections since then. Denies painful or curved erections.     Denies dysuria and gross hematuria. Good urinary stream.     The following portions of the patient's history were reviewed and updated as appropriate: allergies, current medications, past family history, past medical history, past social history, past surgical history and problem list.    Review of Systems  Constitutional: no fever or chills  ENT: no nasal congestion or sore throat  Respiratory: no cough or shortness of breath  Cardiovascular: no chest pain or palpitations  Gastrointestinal: no nausea or vomiting, tolerating diet  Genitourinary: as per HPI  Hematologic/Lymphatic: no easy bruising or lymphadenopathy  Musculoskeletal: no arthralgias or myalgias  Neurological: no seizures or tremors  Behavioral/Psych: no auditory or visual hallucinations     Objective:   Vitals:   Vitals:    07/15/24 0726   BP: 128/73   Pulse: 64   Resp: 18     Physical Exam   General: alert and oriented, no acute distress  Head: normocephalic, atraumatic  Neck: supple, normal ROM  Respiratory: Symmetric expansion, non-labored breathing  Cardiovascular: regular rate and rhythm  Abdomen: soft, non tender, non distended  Genitourinary:   Penis: circumcised, small appox 2 cm "bump" noted to distal penis (palpated deeply), just below the glans, patent orthotopic meatus, no plaques  Skin: normal coloration and turgor, no rashes, no suspicious skin lesions noted  Neuro: alert and oriented x3, no gross deficits  Psych: normal " judgment and insight, normal mood/affect, and non-anxious    Lab Review   Urinalysis demonstrates negative for all components  Lab Results   Component Value Date    WBC 3.56 (L) 06/24/2024    HGB 14.9 06/24/2024    HCT 44.5 06/24/2024    MCV 87 06/24/2024     06/24/2024     Lab Results   Component Value Date    CREATININE 0.8 06/24/2024    BUN 14 06/24/2024     Lab Results   Component Value Date    PSA 0.21 03/15/2024     Imaging   None    Assessment:     1. Penile abnormality      Plan:   Diagnoses and all orders for this visit:    Penile abnormality  -     Ambulatory referral/consult to Urology  -     US Soft Tissue Penis; Future    Plan:  --No concerns for acute fracture  --US penis for further eval

## 2024-07-16 DIAGNOSIS — M15.9 PRIMARY OSTEOARTHRITIS INVOLVING MULTIPLE JOINTS: ICD-10-CM

## 2024-07-16 DIAGNOSIS — M79.18 MUSCULOSKELETAL PAIN: ICD-10-CM

## 2024-07-16 RX ORDER — CELECOXIB 200 MG/1
200 CAPSULE ORAL DAILY
Qty: 90 CAPSULE | Refills: 3 | Status: SHIPPED | OUTPATIENT
Start: 2024-07-16

## 2024-07-16 NOTE — TELEPHONE ENCOUNTER
No care due was identified.  [facility] Embedded Care Due Messages. Reference number: [id number].   7/16/2024 10:27:18 AM CDT

## 2024-07-17 ENCOUNTER — HOSPITAL ENCOUNTER (OUTPATIENT)
Dept: RADIOLOGY | Facility: OTHER | Age: 59
Discharge: HOME | End: 2024-07-17
Attending: NURSE PRACTITIONER
Payer: COMMERCIAL

## 2024-07-17 DIAGNOSIS — N48.9 PENILE ABNORMALITY: ICD-10-CM

## 2024-07-17 PROCEDURE — 76857 US EXAM PELVIC LIMITED: CPT | Mod: TC

## 2024-07-17 PROCEDURE — 76857 US EXAM PELVIC LIMITED: CPT | Mod: 26,,, | Performed by: RADIOLOGY

## 2024-07-31 ENCOUNTER — DOCUMENTATION ONLY (OUTPATIENT)
Dept: SPORTS MEDICINE | Facility: CLINIC | Age: 59
End: 2024-07-31
Payer: COMMERCIAL

## 2024-07-31 NOTE — PROGRESS NOTES
CC: Right knee pain    58 y.o. Male [facility] who reports knee pain refractory to conservative mgmt.    Pain has been for 6+ months. He has failed ice, NSAIDs, tylenol, PT/HEP, activity modifications for 8 weeks.     Pain continued. Worse with activity. Medial pain    He reports that the pain is worse with deep squats.  It also bothers him at night.    Is affecting ADLs.     + mechanical symptoms, no instability      Review of Systems   Constitution: Negative. Negative for chills, fever and night sweats.   HENT: Negative for congestion and headaches.    Eyes: Negative for blurred vision, left vision loss and right vision loss.   Cardiovascular: Negative for chest pain and syncope.   Respiratory: Negative for cough and shortness of breath.    Endocrine: Negative for polydipsia, polyphagia and polyuria.   Hematologic/Lymphatic: Negative for bleeding problem. Does not bruise/bleed easily.   Skin: Negative for dry skin, itching and rash.   Musculoskeletal: Negative for falls and muscle weakness.   Gastrointestinal: Negative for abdominal pain and bowel incontinence.   Genitourinary: Negative for bladder incontinence and nocturia.   Neurological: Negative for disturbances in coordination, loss of balance and seizures.   Psychiatric/Behavioral: Negative for depression. The patient does not have insomnia.    Allergic/Immunologic: Negative for hives and persistent infections.     PAST MEDICAL HISTORY:   Past Medical History:   Diagnosis Date    History of Clostridium difficile colitis     treated 2014    History of sarcoidosis     dx 1991, resolved    Hyperlipidemia     Hypertension     OTTONIEL (obstructive sleep apnea)      PAST SURGICAL HISTORY:   Past Surgical History:   Procedure Laterality Date    APPENDECTOMY      ARTHROSCOPY OF KNEE      EYE SURGERY  2006 Lasik    HERNIA REPAIR      SHOULDER ARTHROSCOPY       FAMILY HISTORY:   Family History   Problem Relation Name Age of Onset    Hyperlipidemia Mother Magdalena      Vision loss Mother Magdalena     Atrial fibrillation Mother Magdalena     Hyperlipidemia Father Essam     Stroke Father Essam         hemorrhagic/ also with trauma, unknown    Hyperlipidemia Brother      Diabetes Mellitus Maternal Grandmother      Endometrial cancer Paternal Grandmother      ADD / ADHD Child      Diabetes Mellitus Maternal Uncle      Prostate cancer Maternal Uncle      Diabetes Mellitus Paternal Uncle      Colon cancer Neg Hx       SOCIAL HISTORY:   Social History     Socioeconomic History    Marital status:     Number of children: 3   Occupational History    Occupation: physician     Employer: [employer]   Tobacco Use    Smoking status: Never    Smokeless tobacco: Never   Substance and Sexual Activity    Alcohol use: No    Drug use: No    Sexual activity: Yes     Partners: Female   Social History Narrative    Intermittent exercise     Social Determinants of Health     Financial Resource Strain: Patient Declined (3/15/2024)    Overall Financial Resource Strain (CARDIA)     Difficulty of Paying Living Expenses: Patient declined   Food Insecurity: Patient Declined (3/15/2024)    Hunger Vital Sign     Worried About Running Out of Food in the Last Year: Patient declined     Ran Out of Food in the Last Year: Patient declined   Transportation Needs: Patient Declined (3/15/2024)    PRAPARE - Transportation     Lack of Transportation (Medical): Patient declined     Lack of Transportation (Non-Medical): Patient declined   Physical Activity: Unknown (3/15/2024)    Exercise Vital Sign     Days of Exercise per Week: 1 day   Stress: Stress Concern Present (3/15/2024)    Citizen of [place] [employer] - Occupational Stress Questionnaire     Feeling of Stress : To some extent   Housing Stability: Unknown (3/15/2024)    Housing Stability Vital Sign     Unable to Pay for Housing in the Last Year: No     Unstable Housing in the Last Year: No       MEDICATIONS:   Current Outpatient  Medications:     aspirin (ECOTRIN) 81 MG EC tablet, Take 1 tablet (81 mg total) by mouth once daily. (Patient not taking: Reported on 7/3/2024), Disp: 30 tablet, Rfl: 0    celecoxib (CELEBREX) 200 MG capsule, Take 1 capsule (200 mg total) by mouth once daily., Disp: 90 capsule, Rfl: 3    ergocalciferol (VITAMIN D2) 50,000 unit Cap, Take 1 capsule (50,000 Units total) by mouth every 7 days., Disp: 12 capsule, Rfl: 1    losartan (COZAAR) 25 MG tablet, Take 1 tablet (25 mg total) by mouth once daily. (Patient taking differently: Take 25 mg by mouth every evening.), Disp: 90 tablet, Rfl: 3    omeprazole (PRILOSEC) 40 MG capsule, TAKE 1 CAPSULE BY MOUTH EVERY MORNING AS DIRECTED (Patient taking differently: Take by mouth nightly.), Disp: 90 capsule, Rfl: 3    ondansetron (ZOFRAN-ODT) 4 MG TbDL, Take 1 tablet (4 mg total) by mouth every 8 (eight) hours as needed (nausea). nausea. can worsen constipation (Patient not taking: Reported on 7/10/2024), Disp: 20 tablet, Rfl: 0    rosuvastatin (CRESTOR) 40 MG Tab, Take 1 tablet (40 mg total) by mouth every evening., Disp: 90 tablet, Rfl: 3    sertraline (ZOLOFT) 50 MG tablet, Take 1 tablet (50 mg total) by mouth once daily. (Patient taking differently: Take 50 mg by mouth every evening.), Disp: 90 tablet, Rfl: 3    tadalafiL (CIALIS) 20 MG Tab, Take 1 tablet (20 mg total) by mouth once daily., Disp: 30 tablet, Rfl: 5    tretinoin (RETIN-A) 0.05 % cream, Apply topically to the skin every night (Patient not taking: Reported on 7/3/2024), Disp: 20 g, Rfl: 0    zonisamide (ZONEGRAN) 100 MG Cap, Take 1 at bedtime for 3 days, then 2 at bedtime for 3 days, then 3 at bedtime for 3 days, then 4 at bedtime to follow. Stay at the most comfortable dose. (Patient not taking: Reported on 7/3/2024), Disp: 360 capsule, Rfl: 5  ALLERGIES:   Review of patient's allergies indicates:   Allergen Reactions    Percocet [oxycodone-acetaminophen]     Suture, silk     Adhesive Rash       VITAL SIGNS:  There were no vitals taken for this visit.       PHYSICAL EXAMINATION    General:  The patient is alert and oriented x 3.  Mood is pleasant.  Observation of ears, eyes and nose reveal no gross abnormalities.  HEENT: NCAT, sclera nonicteric  Lungs: Respirations are equal and unlabored.      Right KNEE EXAMINATION     OBSERVATION / INSPECTION   Gait:   Nonantalgic   Alignment:  Neutral   Scars:   None   Muscle atrophy: Mild  Effusion:  None   Warmth:  None   Discoloration:   none     TENDERNESS / CREPITUS (T / C):          T / C      T / C   Patella   - / -   Lateral joint line   - / -   Peripatellar medial  -      Medial joint line    + / -   Peripatellar lateral -  Medial plica   - / -   Patellar tendon -   Popliteal fossa  - / -   Quad tendon   -   Gastrocnemius   -   Prepatellar Bursa - / -   Quadricep   -   Tibial tubercle  -  Thigh/hamstring  -   Pes anserine/HS -  Fibula    -   ITB   - / -  Tibia     -   Tib/fib joint  - / -  LCL    -     MFC   - / -   MCL: Proximal  -    LFC   - / -    Distal   -          ROM: (* = pain)  PASSIVE   ACTIVE    Left :   5 / 0 / 145   5 / 0 / 145     Right :    5 / 0 / 145   5 / 0 / 145    PATELLOFEMORAL EXAMINATION:  See above noted areas of tenderness.   Patella position    Subluxation / dislocation: Centered           Sup. / Inf;   Normal   Crepitus (PF):    Absent   Patellar Mobility:       Medial-lateral:   Normal    Superior-inferior:  Normal    Inferior tilt   Normal    Patellar tendon:  Normal   Lateral tilt:    Normal   J-sign:     None   Patellofemoral grind:   No pain       MENISCAL SIGNS:     Pain on terminal extension:  +  Pain on terminal flexion:  +  Geralds maneuver:  + for pain  Squat     + posterior joint pain    LIGAMENT EXAMINATION:  ACL / Lachman:  normal (-1 to 2mm)    PCL-Post.  drawer: normal 0 to 2mm  MCL- Valgus:  normal 0 to 2mm  LCL- Varus:  normal 0 to 2mm  Pivot shift: normal (Equal)   Dial Test: difference c/w other side   At 30° flexion: normal  (< 5°)    At 90° flexion: normal (< 5°)   Reverse Pivot Shift:   normal (Equal)     STRENGTH: (* = with pain) PAINFUL SIDE   Quadricep   5/5   Hamstrin/5    EXTREMITY NEURO-VASCULAR EXAMINATION:   Sensation:  Grossly intact to light touch all dermatomal regions.   Motor Function:  Fully intact motor function at hip, knee, foot and ankle    DTRs;  quadriceps and  achilles 2+.  No clonus and downgoing Babinski.    Vascular status:  DP and PT pulses 2+, brisk capillary refill, symmetric.     Other Findings:          Xrays: ordered and reviewed personally by me (standing AP/flexion, lateral, sunrise) show: no evidence of arthritis or fracture or dislocation. Mild DJD     MRI reviewed personally by me:  Shows Right knee medial meniscal tear, chondromalacia.    Complex tear posterior horn medial meniscus (with radial component) extending into body segment and to the level of the posteromedial meniscal root.  Associated flap extending to the meniscal tibial recess.      ASSESSMENT:    Right Knee Meniscus tear.      he would benefit from knee arthroscopy, possible plica excision, possible chondroplasty with possible meniscectomy given the above.     PLAN: We have discussed the surgery and recovery of arthroscopic knee surgery. he understands that there may be limited weightbearing up to several weeks after surgery depending on procedures that are performed at the time of surgery.    The spectrum of treatment options were discussed with the patient, including nonoperative and operative options.  After thorough discussion, the patient has elected to undergo surgical treatment to include:  right   a. Knee arthroscopic medial meniscectomy  possible meniscus repair   b. Knee arthroscopic possible plica excision   c. Knee arthroscopic possible chondroplasty     The details of the surgical procedure were explained, including the location of probable incisions and a description of likely hardware and/or grafts to be used.   The patient understands the likely convalescence after surgery.  Also, we have thoroughly discussed the risks, benefits and alternatives to surgery, including, but not limited to, the risk of infection, joint stiffness, blood clot (including DVT and/or pulmonary embolus), neurologic and vascular injury.  It was explained that, if tissue has been repaired or reconstructed, there is a chance of failure, which may require further management.

## 2024-08-02 DIAGNOSIS — M67.50 PLICA SYNDROME: ICD-10-CM

## 2024-08-02 DIAGNOSIS — S83.241A ACUTE MEDIAL MENISCUS TEAR OF RIGHT KNEE, INITIAL ENCOUNTER: Primary | ICD-10-CM

## 2024-08-02 DIAGNOSIS — M94.261 CHONDROMALACIA OF RIGHT KNEE: ICD-10-CM

## 2024-08-02 DIAGNOSIS — M17.11 OSTEOARTHRITIS OF RIGHT KNEE, UNSPECIFIED OSTEOARTHRITIS TYPE: ICD-10-CM

## 2024-08-12 ENCOUNTER — PATIENT MESSAGE (OUTPATIENT)
Dept: INTERNAL MEDICINE | Facility: CLINIC | Age: 59
End: 2024-08-12
Payer: COMMERCIAL

## 2024-08-22 ENCOUNTER — OFFICE VISIT (OUTPATIENT)
Dept: INTERNAL MEDICINE | Facility: CLINIC | Age: 59
End: 2024-08-22
Payer: COMMERCIAL

## 2024-08-22 VITALS
HEART RATE: 81 BPM | HEIGHT: 71 IN | OXYGEN SATURATION: 96 % | DIASTOLIC BLOOD PRESSURE: 82 MMHG | SYSTOLIC BLOOD PRESSURE: 112 MMHG | WEIGHT: 237.63 LBS | BODY MASS INDEX: 33.27 KG/M2

## 2024-08-22 DIAGNOSIS — E78.00 PURE HYPERCHOLESTEROLEMIA: Chronic | ICD-10-CM

## 2024-08-22 DIAGNOSIS — I10 PRIMARY HYPERTENSION: Chronic | ICD-10-CM

## 2024-08-22 DIAGNOSIS — Z01.818 PRE-OP EVALUATION: Primary | ICD-10-CM

## 2024-08-22 PROCEDURE — 1159F MED LIST DOCD IN RCRD: CPT | Mod: CPTII,S$GLB,, | Performed by: STUDENT IN AN ORGANIZED HEALTH CARE EDUCATION/TRAINING PROGRAM

## 2024-08-22 PROCEDURE — 3074F SYST BP LT 130 MM HG: CPT | Mod: CPTII,S$GLB,, | Performed by: STUDENT IN AN ORGANIZED HEALTH CARE EDUCATION/TRAINING PROGRAM

## 2024-08-22 PROCEDURE — 4010F ACE/ARB THERAPY RXD/TAKEN: CPT | Mod: CPTII,S$GLB,, | Performed by: STUDENT IN AN ORGANIZED HEALTH CARE EDUCATION/TRAINING PROGRAM

## 2024-08-22 PROCEDURE — 99213 OFFICE O/P EST LOW 20 MIN: CPT | Mod: S$GLB,,, | Performed by: STUDENT IN AN ORGANIZED HEALTH CARE EDUCATION/TRAINING PROGRAM

## 2024-08-22 PROCEDURE — 3044F HG A1C LEVEL LT 7.0%: CPT | Mod: CPTII,S$GLB,, | Performed by: STUDENT IN AN ORGANIZED HEALTH CARE EDUCATION/TRAINING PROGRAM

## 2024-08-22 PROCEDURE — 3008F BODY MASS INDEX DOCD: CPT | Mod: CPTII,S$GLB,, | Performed by: STUDENT IN AN ORGANIZED HEALTH CARE EDUCATION/TRAINING PROGRAM

## 2024-08-22 PROCEDURE — 3079F DIAST BP 80-89 MM HG: CPT | Mod: CPTII,S$GLB,, | Performed by: STUDENT IN AN ORGANIZED HEALTH CARE EDUCATION/TRAINING PROGRAM

## 2024-08-22 PROCEDURE — 99999 PR PBB SHADOW E&M-EST. PATIENT-LVL III: CPT | Mod: PBBFAC,,, | Performed by: STUDENT IN AN ORGANIZED HEALTH CARE EDUCATION/TRAINING PROGRAM

## 2024-08-22 PROCEDURE — G2211 COMPLEX E/M VISIT ADD ON: HCPCS | Mod: S$GLB,,, | Performed by: STUDENT IN AN ORGANIZED HEALTH CARE EDUCATION/TRAINING PROGRAM

## 2024-08-22 NOTE — PROGRESS NOTES
[facility]    Subjective:       Patient ID: Corazon Jones is a 58 y.o. male.    Chief Complaint: Pre-op Exam (Right knee surgery)      History was obtained from the patient and supplemented through chart review.  This patient is known to me.     HPI:    Pt is a 58 y.o. male who presents for a pre-operative clearance prior to surgery.  Pt is followed by Dr. Wisdom  and will have knee arthroscopy for meniscus tear on 10/1/2024. He denies any personal or family history of chronic lung disease, bleeding disorders, hypercoaguable disorders, blood clots, pulmonary embolisms, difficulty with anesthesia or sudden death.  Patient is able to walk up a flight of stairs without difficulty breathing, functional capacity > 4 mets.  Denies orthopnea, pnd, CP , SOB.    Walks up stairs to floor 9 once a week     Pt has no history of DM and is a never smoker .     Post-viral and post-concussive syndrome April 2024 resolved  Transient Global Amnesia ADmission June 2024 resolved    HTN  -orthostasis in the past  -resumed losartan 25, and stable now, doing well    Vit D insufficiency  -taking supplement 2000 mg  recheck    Hx of Sarcoidosis diagnosis in 1991 erroneous diagnosis  -Also was treated as tb at time time  -Environmental exposure in [place]  -Abn chest CT findings stable    Prediabetes  -stable, counseled  -pt intermittent fasts, poor diet in evenings    Medical History  Past Medical History:   Diagnosis Date    History of Clostridium difficile colitis     treated 2014    History of sarcoidosis     dx 1991, resolved    Hyperlipidemia     Hypertension     OTTONIEL (obstructive sleep apnea)        Review of Systems   Constitutional:  Negative for appetite change and diaphoresis.   HENT:  Negative for trouble swallowing.    Respiratory:  Negative for shortness of breath.    Cardiovascular:  Negative for chest pain.   Gastrointestinal:  Negative for diarrhea, nausea and vomiting.   Musculoskeletal:  Negative for gait  problem.   Neurological:  Negative for dizziness and seizures.   Psychiatric/Behavioral:  Negative for hallucinations.          Surgical hx, family hx, social hx   Have been reviewed      Current Outpatient Medications:     celecoxib (CELEBREX) 200 MG capsule, Take 1 capsule (200 mg total) by mouth once daily., Disp: 90 capsule, Rfl: 3    ergocalciferol (VITAMIN D2) 50,000 unit Cap, Take 1 capsule (50,000 Units total) by mouth every 7 days., Disp: 12 capsule, Rfl: 1    losartan (COZAAR) 25 MG tablet, Take 1 tablet (25 mg total) by mouth once daily. (Patient taking differently: Take 25 mg by mouth every evening.), Disp: 90 tablet, Rfl: 3    omeprazole (PRILOSEC) 40 MG capsule, TAKE 1 CAPSULE BY MOUTH EVERY MORNING AS DIRECTED (Patient taking differently: Take by mouth nightly.), Disp: 90 capsule, Rfl: 3    rosuvastatin (CRESTOR) 40 MG Tab, Take 1 tablet (40 mg total) by mouth every evening., Disp: 90 tablet, Rfl: 3    sertraline (ZOLOFT) 50 MG tablet, Take 1 tablet (50 mg total) by mouth once daily. (Patient taking differently: Take 50 mg by mouth every evening.), Disp: 90 tablet, Rfl: 3    tadalafiL (CIALIS) 20 MG Tab, Take 1 tablet (20 mg total) by mouth once daily., Disp: 30 tablet, Rfl: 5    aspirin (ECOTRIN) 81 MG EC tablet, Take 1 tablet (81 mg total) by mouth once daily. (Patient not taking: Reported on 7/3/2024), Disp: 30 tablet, Rfl: 0    ondansetron (ZOFRAN-ODT) 4 MG TbDL, Take 1 tablet (4 mg total) by mouth every 8 (eight) hours as needed (nausea). nausea. can worsen constipation (Patient not taking: Reported on 7/10/2024), Disp: 20 tablet, Rfl: 0    tretinoin (RETIN-A) 0.05 % cream, Apply topically to the skin every night (Patient not taking: Reported on 7/3/2024), Disp: 20 g, Rfl: 0    zonisamide (ZONEGRAN) 100 MG Cap, Take 1 at bedtime for 3 days, then 2 at bedtime for 3 days, then 3 at bedtime for 3 days, then 4 at bedtime to follow. Stay at the most comfortable dose. (Patient not taking: Reported on  "7/3/2024), Disp: 360 capsule, Rfl: 5    Objective:        Body mass index is 33.15 kg/m².  Vitals:    08/22/24 0835   BP: 112/82   Pulse: 81   SpO2: 96%   Weight: 107.8 kg (237 lb 10.5 oz)   Height: 5' 11" (1.803 m)   PainSc:   6   PainLoc: Knee     Physical Exam  Vitals and nursing note reviewed.   Constitutional:       General: He is not in acute distress.     Appearance: Normal appearance. He is not ill-appearing.   HENT:      Head: Normocephalic and atraumatic.   Eyes:      General: No scleral icterus.  Cardiovascular:      Rate and Rhythm: Normal rate and regular rhythm.      Heart sounds: Normal heart sounds.   Pulmonary:      Effort: Pulmonary effort is normal.   Musculoskeletal:         General: No deformity.   Neurological:      Mental Status: He is alert and oriented to person, place, and time.   Psychiatric:         Behavior: Behavior normal.           Lab Results   Component Value Date    WBC 3.56 (L) 06/24/2024    HGB 14.9 06/24/2024    HCT 44.5 06/24/2024     06/24/2024    CHOL 170 06/23/2024    TRIG 143 06/23/2024    HDL 41 06/23/2024    ALT 25 06/24/2024    AST 20 06/24/2024     06/24/2024    K 4.0 06/24/2024     06/24/2024    CREATININE 0.8 06/24/2024    BUN 14 06/24/2024    CO2 24 06/24/2024    TSH 1.258 06/23/2024    PSA 0.21 03/15/2024    INR 1.1 06/24/2024    HGBA1C 5.5 06/23/2024       The 10-year ASCVD risk score (Davina BARILLAS, et al., 2019) is: 7.3%    Values used to calculate the score:      Age: 58 years      Sex: Male      Is Non- : No      Diabetic: No      Tobacco smoker: No      Systolic Blood Pressure: 112 mmHg      Is BP treated: Yes      HDL Cholesterol: 41 mg/dL      Total Cholesterol: 170 mg/dL    (Imaging have been independently reviewed)    US penis, MRI brain, MRA brain    Reviewed labs, recent EKG, cardiac work-up, PT/INR, PTT, ct calcium score last year    Assessment:         1. Pre-op evaluation    2. Primary hypertension    3. Pure " hypercholesterolemia          Plan:     Corazon was seen today for pre-op exam.    Diagnoses and all orders for this visit:    Pre-op evaluation    Primary hypertension    Pure hypercholesterolemia      .    Revised [place] Cardiac Risk Index - 3.9%  risk for serious cardiac event 2/2 planned surgery based on the following parameters                        - low risk surgery                        - no history of ischemic heart disease                        - no hx of heart failure                        - no history of CVA                        - no history of DM treated with insulin                        - preoperative Scr <2.0        No further testing required prior to surgery in this patient who is at 3.9% risk for cardiac complications resulting from this low risk procedure    Follow up in about 2 months (around 10/22/2024) for already scheduled.        Visit today is associated with current or anticipated ongoing medical care related to this patient's single serious condition/complex condition of recent post-concussive syndrome, post-viral syndrome, transient global amnesia now with pre-op with htn sometimes orthostatic. The patient will return to see me as these issues will be followed longitudinally.      All medications were reviewed including potential side effects and risks/benefits.  Pt was counseled to call back if anything worsens or if questions arise.    Baldomero Schwab MD  Family Medicine  [facility]  [address]  [address]  [place] [address]  Phone [phone]  Fax [phone]

## 2024-08-27 ENCOUNTER — TELEPHONE (OUTPATIENT)
Dept: SPORTS MEDICINE | Facility: CLINIC | Age: 59
End: 2024-08-27
Payer: COMMERCIAL

## 2024-08-27 DIAGNOSIS — S83.241A ACUTE MEDIAL MENISCUS TEAR OF RIGHT KNEE, INITIAL ENCOUNTER: Primary | ICD-10-CM

## 2024-08-27 DIAGNOSIS — M94.261 CHONDROMALACIA OF RIGHT KNEE: ICD-10-CM

## 2024-09-06 ENCOUNTER — OFFICE VISIT (OUTPATIENT)
Dept: SPORTS MEDICINE | Facility: CLINIC | Age: 59
End: 2024-09-06
Payer: COMMERCIAL

## 2024-09-06 VITALS
DIASTOLIC BLOOD PRESSURE: 84 MMHG | BODY MASS INDEX: 32.72 KG/M2 | HEART RATE: 67 BPM | WEIGHT: 233.69 LBS | SYSTOLIC BLOOD PRESSURE: 127 MMHG | HEIGHT: 71 IN

## 2024-09-06 DIAGNOSIS — M17.11 OSTEOARTHRITIS OF RIGHT KNEE, UNSPECIFIED OSTEOARTHRITIS TYPE: ICD-10-CM

## 2024-09-06 DIAGNOSIS — M17.12 PRIMARY OSTEOARTHRITIS OF LEFT KNEE: ICD-10-CM

## 2024-09-06 DIAGNOSIS — S83.241A ACUTE MEDIAL MENISCUS TEAR OF RIGHT KNEE, INITIAL ENCOUNTER: Primary | ICD-10-CM

## 2024-09-06 DIAGNOSIS — M94.261 CHONDROMALACIA OF RIGHT KNEE: ICD-10-CM

## 2024-09-06 PROCEDURE — 99999 PR PBB SHADOW E&M-EST. PATIENT-LVL V: CPT | Mod: PBBFAC,,, | Performed by: PHYSICIAN ASSISTANT

## 2024-09-06 RX ORDER — TRAMADOL HYDROCHLORIDE 50 MG/1
50-100 TABLET ORAL EVERY 6 HOURS PRN
Qty: 21 TABLET | Refills: 0 | Status: SHIPPED | OUTPATIENT
Start: 2024-09-06

## 2024-09-06 RX ORDER — ONDANSETRON 4 MG/1
4 TABLET, ORALLY DISINTEGRATING ORAL EVERY 8 HOURS PRN
Qty: 8 TABLET | Refills: 0 | Status: SHIPPED | OUTPATIENT
Start: 2024-09-06

## 2024-09-06 RX ORDER — HYDROCODONE BITARTRATE AND ACETAMINOPHEN 10; 325 MG/1; MG/1
TABLET ORAL
Qty: 8 TABLET | Refills: 0 | Status: SHIPPED | OUTPATIENT
Start: 2024-09-06 | End: 2024-09-06 | Stop reason: CLARIF

## 2024-09-06 RX ORDER — CEFAZOLIN SODIUM 2 G/50ML
2 SOLUTION INTRAVENOUS
OUTPATIENT
Start: 2024-09-06

## 2024-09-06 RX ORDER — SODIUM CHLORIDE 9 MG/ML
INJECTION, SOLUTION INTRAVENOUS CONTINUOUS
OUTPATIENT
Start: 2024-09-06

## 2024-09-06 RX ORDER — TRAMADOL HYDROCHLORIDE 50 MG/1
50-100 TABLET ORAL EVERY 6 HOURS PRN
Qty: 12 TABLET | Refills: 0 | Status: SHIPPED | OUTPATIENT
Start: 2024-09-06 | End: 2024-09-06

## 2024-09-06 NOTE — H&P
Corazon Jones  is here for a completion of his perioperative paperwork. he  Is scheduled to undergo     right              a. Knee arthroscopic medial meniscectomy  possible meniscus repair              b. Knee arthroscopic possible plica excision              c. Knee arthroscopic possible chondroplasty on 10/1/24.      He is a healthy individual and does need clearance for this procedure which he has received from internal med.     PAST MEDICAL HISTORY:   Past Medical History:   Diagnosis Date    History of Clostridium difficile colitis     treated 2014    History of sarcoidosis     dx 1991, resolved    Hyperlipidemia     Hypertension     OTTONIEL (obstructive sleep apnea)      PAST SURGICAL HISTORY:   Past Surgical History:   Procedure Laterality Date    APPENDECTOMY      ARTHROSCOPY OF KNEE      EYE SURGERY  2006 Lasik    HERNIA REPAIR      SHOULDER ARTHROSCOPY       FAMILY HISTORY:   Family History   Problem Relation Name Age of Onset    Hyperlipidemia Mother Magdalena     Vision loss Mother Magdalena     Atrial fibrillation Mother Magdalena     Hyperlipidemia Father Essam     Stroke Father Essam         hemorrhagic/ also with trauma, unknown    Hyperlipidemia Brother      Diabetes Mellitus Maternal Grandmother      Endometrial cancer Paternal Grandmother      ADD / ADHD Child      Diabetes Mellitus Maternal Uncle      Prostate cancer Maternal Uncle      Diabetes Mellitus Paternal Uncle      Colon cancer Neg Hx       SOCIAL HISTORY:   Social History     Socioeconomic History    Marital status:     Number of children: 3   Occupational History    Occupation: physician     Employer: [employer]   Tobacco Use    Smoking status: Never    Smokeless tobacco: Never   Substance and Sexual Activity    Alcohol use: No    Drug use: No    Sexual activity: Yes     Partners: Female   Social History Narrative    Intermittent exercise     Social Determinants of Health     Financial Resource Strain: Patient Declined  (3/15/2024)    Overall Financial Resource Strain (CARDIA)     Difficulty of Paying Living Expenses: Patient declined   Food Insecurity: Patient Declined (3/15/2024)    Hunger Vital Sign     Worried About Running Out of Food in the Last Year: Patient declined     Ran Out of Food in the Last Year: Patient declined   Transportation Needs: Patient Declined (3/15/2024)    PRAPARE - Transportation     Lack of Transportation (Medical): Patient declined     Lack of Transportation (Non-Medical): Patient declined   Physical Activity: Unknown (3/15/2024)    Exercise Vital Sign     Days of Exercise per Week: 1 day   Stress: Stress Concern Present (3/15/2024)    [place] [place] [employer] - Occupational Stress Questionnaire     Feeling of Stress : To some extent   Housing Stability: Unknown (3/15/2024)    Housing Stability Vital Sign     Unable to Pay for Housing in the Last Year: No     Unstable Housing in the Last Year: No       MEDICATIONS:   Current Outpatient Medications:     aspirin (ECOTRIN) 81 MG EC tablet, Take 1 tablet (81 mg total) by mouth once daily. (Patient not taking: Reported on 7/3/2024), Disp: 30 tablet, Rfl: 0    celecoxib (CELEBREX) 200 MG capsule, Take 1 capsule (200 mg total) by mouth once daily., Disp: 90 capsule, Rfl: 3    ergocalciferol (VITAMIN D2) 50,000 unit Cap, Take 1 capsule (50,000 Units total) by mouth every 7 days., Disp: 12 capsule, Rfl: 1    losartan (COZAAR) 25 MG tablet, Take 1 tablet (25 mg total) by mouth once daily. (Patient taking differently: Take 25 mg by mouth every evening.), Disp: 90 tablet, Rfl: 3    omeprazole (PRILOSEC) 40 MG capsule, TAKE 1 CAPSULE BY MOUTH EVERY MORNING AS DIRECTED (Patient taking differently: Take by mouth nightly.), Disp: 90 capsule, Rfl: 3    ondansetron (ZOFRAN-ODT) 4 MG TbDL, Take 1 tablet (4 mg total) by mouth every 8 (eight) hours as needed (nausea). nausea. can worsen constipation (Patient not taking: Reported on 7/10/2024), Disp: 20  tablet, Rfl: 0    rosuvastatin (CRESTOR) 40 MG Tab, Take 1 tablet (40 mg total) by mouth every evening., Disp: 90 tablet, Rfl: 3    sertraline (ZOLOFT) 50 MG tablet, Take 1 tablet (50 mg total) by mouth once daily. (Patient taking differently: Take 50 mg by mouth every evening.), Disp: 90 tablet, Rfl: 3    tadalafiL (CIALIS) 20 MG Tab, Take 1 tablet (20 mg total) by mouth once daily., Disp: 30 tablet, Rfl: 5    tretinoin (RETIN-A) 0.05 % cream, Apply topically to the skin every night (Patient not taking: Reported on 7/3/2024), Disp: 20 g, Rfl: 0    zonisamide (ZONEGRAN) 100 MG Cap, Take 1 at bedtime for 3 days, then 2 at bedtime for 3 days, then 3 at bedtime for 3 days, then 4 at bedtime to follow. Stay at the most comfortable dose. (Patient not taking: Reported on 7/3/2024), Disp: 360 capsule, Rfl: 5  ALLERGIES:   Review of patient's allergies indicates:   Allergen Reactions    Percocet [oxycodone-acetaminophen]     Suture, silk     Adhesive Rash       VITAL SIGNS: There were no vitals taken for this visit.     Risks, indications and benefits of the surgical procedure were discussed with the patient. All questions with regard to surgery, rehab, expected return to functional activities, activities of daily living and recreational endeavors were answered to his satisfaction.    It was explained to the patient that there may be an increase in surgical risks if the patient has certain co-morbidities such as but not limited to: Obesity, Cardiovascular issues (CHF, CAD, Arrhythmias), chronic pulmonary issues, previous or current neurovascular/neurological issues, previous strokes, diabetes mellitus, previous wound healing issues, previous wound or skin infections, PVD, clotting disorders, if the patient uses chronic steroids, if the patient takes or has immune compromising medications or diseases, or has previously or currently used tobacco products.     The patient verbalized that he/she does not have any additional  clotting, bleeding, or blood disorders, other than what is list in her chart on today's review.     Then a brief history and physical exam were performed.    Review of Systems   Constitution: Negative. Negative for chills, fever and night sweats.   HENT: Negative for congestion and headaches.    Eyes: Negative for blurred vision, left vision loss and right vision loss.   Cardiovascular: Negative for chest pain and syncope.   Respiratory: Negative for cough and shortness of breath.    Endocrine: Negative for polydipsia, polyphagia and polyuria.   Hematologic/Lymphatic: Negative for bleeding problem. Does not bruise/bleed easily.   Skin: Negative for dry skin, itching and rash.   Musculoskeletal: Negative for falls and muscle weakness.   Gastrointestinal: Negative for abdominal pain and bowel incontinence.   Genitourinary: Negative for bladder incontinence and nocturia.   Neurological: Negative for disturbances in coordination, loss of balance and seizures.   Psychiatric/Behavioral: Negative for depression. The patient does not have insomnia.    Allergic/Immunologic: Negative for hives and persistent infections.     PHYSICAL EXAM:  GEN: A&Ox3, WD WN NAD  HEENT: WNL  CHEST: CTAB, no W/R/R  HEART: RRR, no M/R/G  ABD: Soft, NT ND, BS x4 QUADS  MS; See Epic  NEURO: CN II-XII intact       The surgical consent was then reviewed with the patient, who agreed with all the contents of the consent form and it was signed. he was then given the surgery packet to bring with him to surgery center for the anesthesia portion of his perioperative paperwork (if needed)   For all physicians except for Dr. Grullon, we will email and possibly fax the consent forms and booking sheets to [facility].    The patient was given the opportunity to ask questions about the surgical plan and consent form, and once no other questions were asked, I proceeded with the pre-op appointment.    PHYSICAL THERAPY:  He was also instructed  regarding physical therapy and will begin on  POD1. He was given a copy of the original prescription to schedule. Another copy of this prescription was also faxed to [facility].    POST OP CARE:instructions were reviewed including care of the wound and dressing after surgery and when he can shower. Patient was told not sleep or lay on there surgical extremity following surgery as this could cause repair damage, tissue damage, or nerve injury.    An extensive amount of time was spent on discussion of the following information based on what type of surgery the patient was having. Patient expressed understanding of the material below:    Shoulder surgery or upper extremity surgery requiring post-op sling:  It was explained to the patient that they should remove their arm from the sling approximately 6 times per day to do full elbow ROM (flexion and extension) and full supination and pronation of the elbow for approximately 5 minutes at a time to help prevent elbow stiffness, nerve pain or problems, or nerve injury. They were told to contact us if they begin having numbness and tingling of there surgical extremity that persists longer then 1 day without relief.     Extremity surgery requiring a splint:   It was explained to patient on how to properly elevated position there extremity to prevent pressure ulcers from occurring. I made sure that the patient understood that that surgical site may be numb following surgery and prevent them from feeling pressure pain that they would normal feel if a pressure injury was occurring. Pressure ulcers and there causes were discussed with the patient today.     Post-operative splint:  It was explain to the patient that they can contact us at anytime if they feel that there is a problem with their splint or under their splint that needs evaluation. If there is concern, questions, or discomfort with the splint then they can present to either our clinic or the [facility]  for removal, evaluation, and replacement of the splint.    CRUTCHES OR WALKER: It was explained to the patient that if they are having a lower extremity surgery that they will require either a walker or crutches to ambulate safely with after surgery. It was explained that a cane or other assistive devices are not sufficient to safely ambulate with after surgery. I explained to the patient that I will place an order for them to receive either crutches or a walker after surgery to go home with. It was explained that if they have crutches or a walker at home already, that they are REQUIRED to bring them to the hospital on the day of surgery. It was explained that if they do not have them at the hospital on the day of surgery that they WILL be provided a new pair or crutches or a walker to go home with to ensure ambulation will be safe if the patient needs to stop somewhere on the way home.      If the patient's mobility limitation cannot be sufficiently resolved by the use of crutches then it is necessary for the patient's functional mobility deficit to be sufficiently resolved with the use of a (Rolling Walker or Walker). Patient's mobility limitation significantly impairs their ability to participate in one of more activities of daily living. The use of a (Rolling Walker or Walker) will significantly improve the patient's ability to participate in MRADLS and the patient will use it on regular basis in the home.      PAIN MANAGEMENT: Corazon Jones was also given a pain management regime, which includes the option of getting a TENS unit given to him by [facility] (if patient chooses) along with the education required for its use. He was also instructed regarding the Polar ice unit or gel ice packs (chosen by patient) that will be in place after surgery and his postoperative pain medications.     Patient understands that Polar Ice machine has to be bought today if they want it. It cannot be bought on day of surgery  at surgery center.     PAIN MEDICATION:  Norco 10/325mg 1 po q 4-6 hours prn pain  Ultram 50 mg Take 1-2 p.o. q.6 hours p.r.n. breakthrough pain,   Phenergan 25 mg one p.o. q.6 hours p.r.n. nausea and vomiting.    DVT prophylaxis was discussed with the patient today including risk factors for developing DVTs and history of DVTs. The patient was asked if any specific recommendations were given from the doctor/s that did pre-operative surgical clearance. The patient was then given an education sheet about DVTs and PE with warning signs and symptoms of both and steps to take if they suspect either of these.    This along with the Modified Caprini risk assessment model for VTE in general surgical patients was used to determine the patient's DVT risk.     From: Livia HORTON, Leon DA, Selena SAMUEL, et al. Prevention of VTE in nonorthopedic surgical patients: antithrombotic therapy and prevention of thrombosis, 9th ed: American College of Chest Physicians evidence-based clinical practical guidelines. Chest 2012; 141:e227S. Copyright © 2012. Reproduced with permission from the American College of Chest Physicians.    The below listed DVT prophylaxis regimen along with bilateral STEVEN compression stockings will be used post-op. Length of treatment has been determined to be 10-42 days post-op by the above noted Caprini assessment model.     The patient was instructed to buy and take:  Aspirin 81mg QD x 4 weeks for DVT prophylaxis starting on the morning after surgery.  Patient will also use bilateral TEDs on lower extremities, SCDs during surgery, and early ambulation post-op. If the patient was previously taking 81mg baby aspirin, they were told to not take it starting 5 days prior to surgery and to restart the 81mg aspirin after surgery.       Patient was also told to buy over the counter Prilosec medication if needed and take it once daily for GI protection as long as they are taking NSAIDs or Aspirin.   Patient denies history of  seizures.      The patient was told that narcotic pain medications may make them drowsy and instructions were given to not sign legal documents, drive or operate heavy machinery, cars, or equipment while under the influence of narcotic medications. The patient was told and understands that narcotic pain medications should only be used as needed to control pain and that other options of pain control include TENs unit and ice packs/unit.     As there were no other questions to be asked, he was given my business card along with Laura Wisdom MD business card if he has any questions or concerns prior to surgery or in the postop period.     At the end of our encounter today, the patient was given the option and asked if they would like to see the surgeon regarding questions or concerns that they have about the consent form or the surgical procedure. The patient is okay with no physically seeing Dr. Wisdom prior to surgery.

## 2024-09-06 NOTE — H&P (VIEW-ONLY)
Corazon Jones  is here for a completion of his perioperative paperwork. he  Is scheduled to undergo     right              a. Knee arthroscopic medial meniscectomy  possible meniscus repair              b. Knee arthroscopic possible plica excision              c. Knee arthroscopic possible chondroplasty on 10/1/24.      He is a healthy individual and does need clearance for this procedure which he has received from internal med.     PAST MEDICAL HISTORY:   Past Medical History:   Diagnosis Date    History of Clostridium difficile colitis     treated 2014    History of sarcoidosis     dx 1991, resolved    Hyperlipidemia     Hypertension     OTTONIEL (obstructive sleep apnea)      PAST SURGICAL HISTORY:   Past Surgical History:   Procedure Laterality Date    APPENDECTOMY      ARTHROSCOPY OF KNEE      EYE SURGERY  2006 Lasik    HERNIA REPAIR      SHOULDER ARTHROSCOPY       FAMILY HISTORY:   Family History   Problem Relation Name Age of Onset    Hyperlipidemia Mother Magdalena     Vision loss Mother Magdalena     Atrial fibrillation Mother Magdalena     Hyperlipidemia Father Essam     Stroke Father Essam         hemorrhagic/ also with trauma, unknown    Hyperlipidemia Brother      Diabetes Mellitus Maternal Grandmother      Endometrial cancer Paternal Grandmother      ADD / ADHD Child      Diabetes Mellitus Maternal Uncle      Prostate cancer Maternal Uncle      Diabetes Mellitus Paternal Uncle      Colon cancer Neg Hx       SOCIAL HISTORY:   Social History     Socioeconomic History    Marital status:     Number of children: 3   Occupational History    Occupation: physician     Employer: [employer]   Tobacco Use    Smoking status: Never    Smokeless tobacco: Never   Substance and Sexual Activity    Alcohol use: No    Drug use: No    Sexual activity: Yes     Partners: Female   Social History Narrative    Intermittent exercise     Social Determinants of Health     Financial Resource Strain: Patient Declined  (3/15/2024)    Overall Financial Resource Strain (CARDIA)     Difficulty of Paying Living Expenses: Patient declined   Food Insecurity: Patient Declined (3/15/2024)    Hunger Vital Sign     Worried About Running Out of Food in the Last Year: Patient declined     Ran Out of Food in the Last Year: Patient declined   Transportation Needs: Patient Declined (3/15/2024)    PRAPARE - Transportation     Lack of Transportation (Medical): Patient declined     Lack of Transportation (Non-Medical): Patient declined   Physical Activity: Unknown (3/15/2024)    Exercise Vital Sign     Days of Exercise per Week: 1 day   Stress: Stress Concern Present (3/15/2024)    [employer] [place] [employer] - Occupational Stress Questionnaire     Feeling of Stress : To some extent   Housing Stability: Unknown (3/15/2024)    Housing Stability Vital Sign     Unable to Pay for Housing in the Last Year: No     Unstable Housing in the Last Year: No       MEDICATIONS:   Current Outpatient Medications:     aspirin (ECOTRIN) 81 MG EC tablet, Take 1 tablet (81 mg total) by mouth once daily. (Patient not taking: Reported on 7/3/2024), Disp: 30 tablet, Rfl: 0    celecoxib (CELEBREX) 200 MG capsule, Take 1 capsule (200 mg total) by mouth once daily., Disp: 90 capsule, Rfl: 3    ergocalciferol (VITAMIN D2) 50,000 unit Cap, Take 1 capsule (50,000 Units total) by mouth every 7 days., Disp: 12 capsule, Rfl: 1    losartan (COZAAR) 25 MG tablet, Take 1 tablet (25 mg total) by mouth once daily. (Patient taking differently: Take 25 mg by mouth every evening.), Disp: 90 tablet, Rfl: 3    omeprazole (PRILOSEC) 40 MG capsule, TAKE 1 CAPSULE BY MOUTH EVERY MORNING AS DIRECTED (Patient taking differently: Take by mouth nightly.), Disp: 90 capsule, Rfl: 3    ondansetron (ZOFRAN-ODT) 4 MG TbDL, Take 1 tablet (4 mg total) by mouth every 8 (eight) hours as needed (nausea). nausea. can worsen constipation (Patient not taking: Reported on 7/10/2024), Disp: 20  tablet, Rfl: 0    rosuvastatin (CRESTOR) 40 MG Tab, Take 1 tablet (40 mg total) by mouth every evening., Disp: 90 tablet, Rfl: 3    sertraline (ZOLOFT) 50 MG tablet, Take 1 tablet (50 mg total) by mouth once daily. (Patient taking differently: Take 50 mg by mouth every evening.), Disp: 90 tablet, Rfl: 3    tadalafiL (CIALIS) 20 MG Tab, Take 1 tablet (20 mg total) by mouth once daily., Disp: 30 tablet, Rfl: 5    tretinoin (RETIN-A) 0.05 % cream, Apply topically to the skin every night (Patient not taking: Reported on 7/3/2024), Disp: 20 g, Rfl: 0    zonisamide (ZONEGRAN) 100 MG Cap, Take 1 at bedtime for 3 days, then 2 at bedtime for 3 days, then 3 at bedtime for 3 days, then 4 at bedtime to follow. Stay at the most comfortable dose. (Patient not taking: Reported on 7/3/2024), Disp: 360 capsule, Rfl: 5  ALLERGIES:   Review of patient's allergies indicates:   Allergen Reactions    Percocet [oxycodone-acetaminophen]     Suture, silk     Adhesive Rash       VITAL SIGNS: There were no vitals taken for this visit.     Risks, indications and benefits of the surgical procedure were discussed with the patient. All questions with regard to surgery, rehab, expected return to functional activities, activities of daily living and recreational endeavors were answered to his satisfaction.    It was explained to the patient that there may be an increase in surgical risks if the patient has certain co-morbidities such as but not limited to: Obesity, Cardiovascular issues (CHF, CAD, Arrhythmias), chronic pulmonary issues, previous or current neurovascular/neurological issues, previous strokes, diabetes mellitus, previous wound healing issues, previous wound or skin infections, PVD, clotting disorders, if the patient uses chronic steroids, if the patient takes or has immune compromising medications or diseases, or has previously or currently used tobacco products.     The patient verbalized that he/she does not have any additional  clotting, bleeding, or blood disorders, other than what is list in her chart on today's review.     Then a brief history and physical exam were performed.    Review of Systems   Constitution: Negative. Negative for chills, fever and night sweats.   HENT: Negative for congestion and headaches.    Eyes: Negative for blurred vision, left vision loss and right vision loss.   Cardiovascular: Negative for chest pain and syncope.   Respiratory: Negative for cough and shortness of breath.    Endocrine: Negative for polydipsia, polyphagia and polyuria.   Hematologic/Lymphatic: Negative for bleeding problem. Does not bruise/bleed easily.   Skin: Negative for dry skin, itching and rash.   Musculoskeletal: Negative for falls and muscle weakness.   Gastrointestinal: Negative for abdominal pain and bowel incontinence.   Genitourinary: Negative for bladder incontinence and nocturia.   Neurological: Negative for disturbances in coordination, loss of balance and seizures.   Psychiatric/Behavioral: Negative for depression. The patient does not have insomnia.    Allergic/Immunologic: Negative for hives and persistent infections.     PHYSICAL EXAM:  GEN: A&Ox3, WD WN NAD  HEENT: WNL  CHEST: CTAB, no W/R/R  HEART: RRR, no M/R/G  ABD: Soft, NT ND, BS x4 QUADS  MS; See Epic  NEURO: CN II-XII intact       The surgical consent was then reviewed with the patient, who agreed with all the contents of the consent form and it was signed. he was then given the surgery packet to bring with him to surgery center for the anesthesia portion of his perioperative paperwork (if needed)   For all physicians except for Dr. Grullon, we will email and possibly fax the consent forms and booking sheets to [facility].    The patient was given the opportunity to ask questions about the surgical plan and consent form, and once no other questions were asked, I proceeded with the pre-op appointment.    PHYSICAL THERAPY:  He was also instructed  regarding physical therapy and will begin on  POD1. He was given a copy of the original prescription to schedule. Another copy of this prescription was also faxed to [facility].    POST OP CARE:instructions were reviewed including care of the wound and dressing after surgery and when he can shower. Patient was told not sleep or lay on there surgical extremity following surgery as this could cause repair damage, tissue damage, or nerve injury.    An extensive amount of time was spent on discussion of the following information based on what type of surgery the patient was having. Patient expressed understanding of the material below:    Shoulder surgery or upper extremity surgery requiring post-op sling:  It was explained to the patient that they should remove their arm from the sling approximately 6 times per day to do full elbow ROM (flexion and extension) and full supination and pronation of the elbow for approximately 5 minutes at a time to help prevent elbow stiffness, nerve pain or problems, or nerve injury. They were told to contact us if they begin having numbness and tingling of there surgical extremity that persists longer then 1 day without relief.     Extremity surgery requiring a splint:   It was explained to patient on how to properly elevated position there extremity to prevent pressure ulcers from occurring. I made sure that the patient understood that that surgical site may be numb following surgery and prevent them from feeling pressure pain that they would normal feel if a pressure injury was occurring. Pressure ulcers and there causes were discussed with the patient today.     Post-operative splint:  It was explain to the patient that they can contact us at anytime if they feel that there is a problem with their splint or under their splint that needs evaluation. If there is concern, questions, or discomfort with the splint then they can present to either our clinic or the [facility] ED  for removal, evaluation, and replacement of the splint.    CRUTCHES OR WALKER: It was explained to the patient that if they are having a lower extremity surgery that they will require either a walker or crutches to ambulate safely with after surgery. It was explained that a cane or other assistive devices are not sufficient to safely ambulate with after surgery. I explained to the patient that I will place an order for them to receive either crutches or a walker after surgery to go home with. It was explained that if they have crutches or a walker at home already, that they are REQUIRED to bring them to the hospital on the day of surgery. It was explained that if they do not have them at the hospital on the day of surgery that they WILL be provided a new pair or crutches or a walker to go home with to ensure ambulation will be safe if the patient needs to stop somewhere on the way home.      If the patient's mobility limitation cannot be sufficiently resolved by the use of crutches then it is necessary for the patient's functional mobility deficit to be sufficiently resolved with the use of a (Rolling Walker or Walker). Patient's mobility limitation significantly impairs their ability to participate in one of more activities of daily living. The use of a (Rolling Walker or Walker) will significantly improve the patient's ability to participate in MRADLS and the patient will use it on regular basis in the home.      PAIN MANAGEMENT: Corazon Jones was also given a pain management regime, which includes the option of getting a TENS unit given to him by [facility] (if patient chooses) along with the education required for its use. He was also instructed regarding the Polar ice unit or gel ice packs (chosen by patient) that will be in place after surgery and his postoperative pain medications.     Patient understands that Polar Ice machine has to be bought today if they want it. It cannot be bought on day of surgery  at surgery center.     PAIN MEDICATION:  Norco 10/325mg 1 po q 4-6 hours prn pain  Ultram 50 mg Take 1-2 p.o. q.6 hours p.r.n. breakthrough pain,   Phenergan 25 mg one p.o. q.6 hours p.r.n. nausea and vomiting.    DVT prophylaxis was discussed with the patient today including risk factors for developing DVTs and history of DVTs. The patient was asked if any specific recommendations were given from the doctor/s that did pre-operative surgical clearance. The patient was then given an education sheet about DVTs and PE with warning signs and symptoms of both and steps to take if they suspect either of these.    This along with the Modified Caprini risk assessment model for VTE in general surgical patients was used to determine the patient's DVT risk.     From: Livia HORTON, Leon DA, Selena SAMUEL, et al. Prevention of VTE in nonorthopedic surgical patients: antithrombotic therapy and prevention of thrombosis, 9th ed: American College of Chest Physicians evidence-based clinical practical guidelines. Chest 2012; 141:e227S. Copyright © 2012. Reproduced with permission from the American College of Chest Physicians.    The below listed DVT prophylaxis regimen along with bilateral STEVEN compression stockings will be used post-op. Length of treatment has been determined to be 10-42 days post-op by the above noted Caprini assessment model.     The patient was instructed to buy and take:  Aspirin 81mg QD x 4 weeks for DVT prophylaxis starting on the morning after surgery.  Patient will also use bilateral TEDs on lower extremities, SCDs during surgery, and early ambulation post-op. If the patient was previously taking 81mg baby aspirin, they were told to not take it starting 5 days prior to surgery and to restart the 81mg aspirin after surgery.       Patient was also told to buy over the counter Prilosec medication if needed and take it once daily for GI protection as long as they are taking NSAIDs or Aspirin.   Patient denies history of  seizures.      The patient was told that narcotic pain medications may make them drowsy and instructions were given to not sign legal documents, drive or operate heavy machinery, cars, or equipment while under the influence of narcotic medications. The patient was told and understands that narcotic pain medications should only be used as needed to control pain and that other options of pain control include TENs unit and ice packs/unit.     As there were no other questions to be asked, he was given my business card along with Laura Wisdom MD business card if he has any questions or concerns prior to surgery or in the postop period.     At the end of our encounter today, the patient was given the option and asked if they would like to see the surgeon regarding questions or concerns that they have about the consent form or the surgical procedure. The patient is okay with no physically seeing Dr. Wisdom prior to surgery.

## 2024-09-23 ENCOUNTER — PATIENT MESSAGE (OUTPATIENT)
Dept: PREADMISSION TESTING | Facility: HOSPITAL | Age: 59
End: 2024-09-23
Payer: COMMERCIAL

## 2024-09-23 NOTE — ANESTHESIA PAT ROS NOTE
09/23/2024  Corazon Jones is a 59 y.o., male.      Pre-op Assessment    I have reviewed the Patient Summary Reports.       I have reviewed the Medications.     Review of Systems  Anesthesia Hx:  No problems with previous Anesthesia               Denies Personal Hx of Anesthesia complications.                    Social:  Non-Smoker, No Alcohol Use       Hematology/Oncology:    Oncology Normal    -- Denies Anemia:               Hematology Comments: Chronic familial neutropenia,  Vit D insufficiency                    EENT/Dental:  chronic allergic rhinitis H/O Eye surgery,  Tinnitus of both ears,  Acute seasonal allergic rhinitis,  Nasal septal deviation            Cardiovascular:  Exercise tolerance: good   Hypertension, well controlled    Denies CAD.       Denies Angina.     hyperlipidemia  Denies RINCON.  ECG has been reviewed. Agatston calcium score equals 51, indicates: Mild plaque burden.                         Pulmonary:    Denies COPD.  Denies Asthma.   Denies Shortness of breath.  Sleep Apnea, CPAP H/O Sarcoidosis dx in 1991, resolved,  Lung nodule          Education provided regarding risk of obstructive sleep apnea            Renal/:  Renal/ Normal                 Hepatic/GI:      Denies GERD. Denies Liver Disease.  H/O Appendectomy,  Lactose intolerance          Musculoskeletal:  Arthritis   Acute medial meniscus tear of right knee,   Chondromalacia of right knee,  Plica syndrome,  Osteoarthritis of right knee,   H/O Knee Arthroscopy            Neurological:    Denies CVA.    Denies Headaches. Denies Seizures.    Post-viral and post-concussive syndrome following a fall- April 2024 resolved  Transient Global Amnesia ADmission June 2024 resolved                              Endocrine:   Denies Hypothyroidism.  Prediabetes, HA1C = 5.5 on 6/23/2024      Obesity / BMI > 30  Psych:  Psychiatric  Normal                   Past Medical History:   Diagnosis Date    History of Clostridium difficile colitis     treated 2014    History of sarcoidosis     dx 1991, resolved    Hyperlipidemia     Hypertension     OTTONIEL (obstructive sleep apnea)      Past Surgical History:   Procedure Laterality Date    APPENDECTOMY      ARTHROSCOPY OF KNEE      EYE SURGERY  2006 Lasik    HERNIA REPAIR      SHOULDER ARTHROSCOPY         Anesthesia Assessment: Preoperative EQUATION    Planned Procedure: Procedure(s) (LRB):  ARTHROSCOPY, KNEE, WITH MENISCECTOMY (Right)  CHONDROPLASTY, KNEE (Right)  EXCISION, PLICA, KNEE, ARTHROSCOPIC (Right)  Requested Anesthesia Type:General  Surgeon: Laura Wisdom MD  Service: Orthopedics  Known or anticipated Date of Surgery:10/1/2024    Surgeon notes: reviewed    Electronic QUestionnaire Assessment completed via nurse interview with patient.        Triage considerations:     The patient has no apparent active cardiac condition (No unstable coronary Syndrome such as severe unstable angina or recent [<1 month] myocardial infarction, decompensated CHF, severe valvular   disease or significant arrhythmia)    Previous anesthesia records:No problems and Not available    [facility]   Subspecialty notes: Cardiology: General    Other important co-morbidities: HLD, HTN, Obesity, and OTTONIEL       EKG 6/24/2024:  Vent. Rate : 073 BPM     Atrial Rate : 073 BPM      P-R Int : 196 ms          QRS Dur : 082 ms       QT Int : 396 ms       P-R-T Axes : 070 031 054 degrees      QTc Int : 436 ms   Normal sinus rhythm   Anterior infarct ,age undetermined   Abnormal ECG   Confirmed by Quan Corley MD ([id number]) on 6/24/2024 8:17:17 AM       Echo 7/8/2024:  Summary     Left Ventricle: The left ventricle is normal in size. Normal wall thickness. There is normal systolic function with a visually estimated ejection fraction of 55 - 60%. There is normal diastolic function.    Right Ventricle: Normal  right ventricular cavity size. Wall thickness is normal. Systolic function is normal.      Exercise Stress EKG 8/11/2023:  Interpretation Summary     The ECG portion of the study is negative for ischemia.    During stress, frequent PVCs are noted.    The exercise capacity was average.       Tests already available:  Results have been reviewed.             Instructions given. (See in Nurse's note)  Preop medication instructions sent via portal message.     Optimization:  Anesthesia Preop Clinic Assessment Not Indicated    Medical Opinion Indicated: No       Sub-specialist consult indicated:  No      Plan:  Consultation:Patient's PCP for a statement of optimization    Patient  has previously scheduled Medical Appointment: 8/22/2024    Navigation: Patient is cleared for surgery by PCP at low risk for cardiac complications.      Ht: 5'11  Wt: 106 kg (233 lb)  BMI: 32.59

## 2024-09-30 ENCOUNTER — TELEPHONE (OUTPATIENT)
Dept: SPORTS MEDICINE | Facility: CLINIC | Age: 59
End: 2024-09-30
Payer: COMMERCIAL

## 2024-10-01 ENCOUNTER — HOSPITAL ENCOUNTER (OUTPATIENT)
Facility: HOSPITAL | Age: 59
Discharge: HOME | End: 2024-10-01
Attending: ORTHOPAEDIC SURGERY | Admitting: ORTHOPAEDIC SURGERY
Payer: COMMERCIAL

## 2024-10-01 ENCOUNTER — TELEPHONE (OUTPATIENT)
Dept: SPORTS MEDICINE | Facility: CLINIC | Age: 59
End: 2024-10-01
Payer: COMMERCIAL

## 2024-10-01 ENCOUNTER — ANESTHESIA (OUTPATIENT)
Dept: SURGERY | Facility: HOSPITAL | Age: 59
End: 2024-10-01
Payer: COMMERCIAL

## 2024-10-01 ENCOUNTER — ANESTHESIA EVENT (OUTPATIENT)
Dept: SURGERY | Facility: HOSPITAL | Age: 59
End: 2024-10-01
Payer: COMMERCIAL

## 2024-10-01 VITALS
DIASTOLIC BLOOD PRESSURE: 74 MMHG | OXYGEN SATURATION: 92 % | BODY MASS INDEX: 32.62 KG/M2 | TEMPERATURE: 98 F | RESPIRATION RATE: 6 BRPM | HEIGHT: 71 IN | WEIGHT: 233 LBS | SYSTOLIC BLOOD PRESSURE: 136 MMHG | HEART RATE: 85 BPM

## 2024-10-01 DIAGNOSIS — G47.00 INSOMNIA, UNSPECIFIED TYPE: Primary | ICD-10-CM

## 2024-10-01 DIAGNOSIS — M94.261 CHONDROMALACIA OF RIGHT KNEE: ICD-10-CM

## 2024-10-01 DIAGNOSIS — S83.241D ACUTE MEDIAL MENISCUS TEAR OF RIGHT KNEE, SUBSEQUENT ENCOUNTER: Primary | ICD-10-CM

## 2024-10-01 DIAGNOSIS — S83.241A ACUTE MEDIAL MENISCUS TEAR OF RIGHT KNEE, INITIAL ENCOUNTER: ICD-10-CM

## 2024-10-01 PROCEDURE — 25000242 PHARM REV CODE 250 ALT 637 W/ HCPCS: Performed by: NURSE ANESTHETIST, CERTIFIED REGISTERED

## 2024-10-01 PROCEDURE — 27201423 OPTIME MED/SURG SUP & DEVICES STERILE SUPPLY: Performed by: ORTHOPAEDIC SURGERY

## 2024-10-01 PROCEDURE — 37000008 HC ANESTHESIA 1ST 15 MINUTES: Performed by: ORTHOPAEDIC SURGERY

## 2024-10-01 PROCEDURE — 37000009 HC ANESTHESIA EA ADD 15 MINS: Performed by: ORTHOPAEDIC SURGERY

## 2024-10-01 PROCEDURE — 63600175 PHARM REV CODE 636 W HCPCS: Performed by: NURSE ANESTHETIST, CERTIFIED REGISTERED

## 2024-10-01 PROCEDURE — 94761 N-INVAS EAR/PLS OXIMETRY MLT: CPT

## 2024-10-01 PROCEDURE — 25000003 PHARM REV CODE 250: Performed by: PHYSICIAN ASSISTANT

## 2024-10-01 PROCEDURE — 25000003 PHARM REV CODE 250: Performed by: NURSE ANESTHETIST, CERTIFIED REGISTERED

## 2024-10-01 PROCEDURE — 63600175 PHARM REV CODE 636 W HCPCS: Performed by: ORTHOPAEDIC SURGERY

## 2024-10-01 PROCEDURE — 71000039 HC RECOVERY, EACH ADD'L HOUR: Performed by: ORTHOPAEDIC SURGERY

## 2024-10-01 PROCEDURE — 36000710: Performed by: ORTHOPAEDIC SURGERY

## 2024-10-01 PROCEDURE — 29880 ARTHRS KNE SRG MNISECTMY M&L: CPT | Mod: AS,RT,, | Performed by: PHYSICIAN ASSISTANT

## 2024-10-01 PROCEDURE — 25000003 PHARM REV CODE 250: Performed by: ANESTHESIOLOGY

## 2024-10-01 PROCEDURE — 63600175 PHARM REV CODE 636 W HCPCS: Performed by: ANESTHESIOLOGY

## 2024-10-01 PROCEDURE — 36000711: Performed by: ORTHOPAEDIC SURGERY

## 2024-10-01 PROCEDURE — 71000033 HC RECOVERY, INTIAL HOUR: Performed by: ORTHOPAEDIC SURGERY

## 2024-10-01 PROCEDURE — 29880 ARTHRS KNE SRG MNISECTMY M&L: CPT | Mod: RT,,, | Performed by: ORTHOPAEDIC SURGERY

## 2024-10-01 PROCEDURE — 71000015 HC POSTOP RECOV 1ST HR: Performed by: ORTHOPAEDIC SURGERY

## 2024-10-01 PROCEDURE — 99900035 HC TECH TIME PER 15 MIN (STAT)

## 2024-10-01 PROCEDURE — 25000003 PHARM REV CODE 250: Performed by: ORTHOPAEDIC SURGERY

## 2024-10-01 RX ORDER — HALOPERIDOL 5 MG/ML
0.5 INJECTION INTRAMUSCULAR EVERY 10 MIN PRN
Status: DISCONTINUED | OUTPATIENT
Start: 2024-10-01 | End: 2024-10-01 | Stop reason: HOSPADM

## 2024-10-01 RX ORDER — ALBUTEROL SULFATE 90 UG/1
INHALANT RESPIRATORY (INHALATION)
Status: DISCONTINUED | OUTPATIENT
Start: 2024-10-01 | End: 2024-10-01

## 2024-10-01 RX ORDER — ROPIVACAINE HYDROCHLORIDE 5 MG/ML
INJECTION, SOLUTION EPIDURAL; INFILTRATION; PERINEURAL
Status: DISCONTINUED | OUTPATIENT
Start: 2024-10-01 | End: 2024-10-01 | Stop reason: HOSPADM

## 2024-10-01 RX ORDER — SODIUM CHLORIDE 9 MG/ML
INJECTION, SOLUTION INTRAVENOUS CONTINUOUS
Status: DISCONTINUED | OUTPATIENT
Start: 2024-10-01 | End: 2024-10-01 | Stop reason: HOSPADM

## 2024-10-01 RX ORDER — ROCURONIUM BROMIDE 10 MG/ML
INJECTION, SOLUTION INTRAVENOUS
Status: DISCONTINUED | OUTPATIENT
Start: 2024-10-01 | End: 2024-10-01

## 2024-10-01 RX ORDER — ONDANSETRON HYDROCHLORIDE 2 MG/ML
4 INJECTION, SOLUTION INTRAVENOUS DAILY PRN
Status: DISCONTINUED | OUTPATIENT
Start: 2024-10-01 | End: 2024-10-01 | Stop reason: HOSPADM

## 2024-10-01 RX ORDER — PHENYLEPHRINE HYDROCHLORIDE 10 MG/ML
INJECTION INTRAVENOUS
Status: DISCONTINUED | OUTPATIENT
Start: 2024-10-01 | End: 2024-10-01

## 2024-10-01 RX ORDER — HYDROCODONE BITARTRATE AND ACETAMINOPHEN 5; 325 MG/1; MG/1
1 TABLET ORAL EVERY 4 HOURS PRN
Status: CANCELLED | OUTPATIENT
Start: 2024-10-01

## 2024-10-01 RX ORDER — PROPOFOL 10 MG/ML
VIAL (ML) INTRAVENOUS
Status: DISCONTINUED | OUTPATIENT
Start: 2024-10-01 | End: 2024-10-01

## 2024-10-01 RX ORDER — KETAMINE HCL IN 0.9 % NACL 50 MG/5 ML
SYRINGE (ML) INTRAVENOUS
Status: DISCONTINUED | OUTPATIENT
Start: 2024-10-01 | End: 2024-10-01

## 2024-10-01 RX ORDER — LIDOCAINE HYDROCHLORIDE 10 MG/ML
INJECTION, SOLUTION INTRAVENOUS
Status: DISCONTINUED | OUTPATIENT
Start: 2024-10-01 | End: 2024-10-01

## 2024-10-01 RX ORDER — MORPHINE SULFATE 2 MG/ML
2 INJECTION, SOLUTION INTRAMUSCULAR; INTRAVENOUS EVERY 10 MIN PRN
Status: CANCELLED | OUTPATIENT
Start: 2024-10-01

## 2024-10-01 RX ORDER — ACETAMINOPHEN 500 MG
1000 TABLET ORAL
Status: COMPLETED | OUTPATIENT
Start: 2024-10-01 | End: 2024-10-01

## 2024-10-01 RX ORDER — KETOROLAC TROMETHAMINE 30 MG/ML
INJECTION, SOLUTION INTRAMUSCULAR; INTRAVENOUS
Status: DISCONTINUED | OUTPATIENT
Start: 2024-10-01 | End: 2024-10-01 | Stop reason: HOSPADM

## 2024-10-01 RX ORDER — KETAMINE HYDROCHLORIDE 100 MG/ML
INJECTION, SOLUTION INTRAMUSCULAR; INTRAVENOUS
Status: DISCONTINUED | OUTPATIENT
Start: 2024-10-01 | End: 2024-10-01 | Stop reason: HOSPADM

## 2024-10-01 RX ORDER — HYDROCODONE BITARTRATE AND ACETAMINOPHEN 5; 325 MG/1; MG/1
1 TABLET ORAL
Status: DISCONTINUED | OUTPATIENT
Start: 2024-10-01 | End: 2024-10-01 | Stop reason: HOSPADM

## 2024-10-01 RX ORDER — ONDANSETRON HYDROCHLORIDE 2 MG/ML
4 INJECTION, SOLUTION INTRAVENOUS EVERY 12 HOURS PRN
Status: CANCELLED | OUTPATIENT
Start: 2024-10-01

## 2024-10-01 RX ORDER — EPHEDRINE SULFATE 50 MG/ML
INJECTION, SOLUTION INTRAVENOUS
Status: DISCONTINUED | OUTPATIENT
Start: 2024-10-01 | End: 2024-10-01

## 2024-10-01 RX ORDER — HYDROMORPHONE HYDROCHLORIDE 1 MG/ML
0.2 INJECTION, SOLUTION INTRAMUSCULAR; INTRAVENOUS; SUBCUTANEOUS EVERY 5 MIN PRN
Status: DISCONTINUED | OUTPATIENT
Start: 2024-10-01 | End: 2024-10-01 | Stop reason: HOSPADM

## 2024-10-01 RX ORDER — SUCCINYLCHOLINE CHLORIDE 20 MG/ML
INJECTION INTRAMUSCULAR; INTRAVENOUS
Status: DISCONTINUED | OUTPATIENT
Start: 2024-10-01 | End: 2024-10-01

## 2024-10-01 RX ORDER — DEXMEDETOMIDINE HYDROCHLORIDE 100 UG/ML
INJECTION, SOLUTION INTRAVENOUS
Status: DISCONTINUED | OUTPATIENT
Start: 2024-10-01 | End: 2024-10-01

## 2024-10-01 RX ORDER — EPINEPHRINE 1 MG/ML
INJECTION, SOLUTION, CONCENTRATE INTRAVENOUS
Status: DISCONTINUED | OUTPATIENT
Start: 2024-10-01 | End: 2024-10-01 | Stop reason: HOSPADM

## 2024-10-01 RX ORDER — FENTANYL CITRATE 50 UG/ML
INJECTION, SOLUTION INTRAMUSCULAR; INTRAVENOUS
Status: DISCONTINUED | OUTPATIENT
Start: 2024-10-01 | End: 2024-10-01

## 2024-10-01 RX ORDER — ONDANSETRON HYDROCHLORIDE 2 MG/ML
INJECTION, SOLUTION INTRAVENOUS
Status: DISCONTINUED | OUTPATIENT
Start: 2024-10-01 | End: 2024-10-01

## 2024-10-01 RX ORDER — FENTANYL CITRATE 50 UG/ML
25 INJECTION, SOLUTION INTRAMUSCULAR; INTRAVENOUS EVERY 5 MIN PRN
Status: DISCONTINUED | OUTPATIENT
Start: 2024-10-01 | End: 2024-10-01 | Stop reason: HOSPADM

## 2024-10-01 RX ORDER — DEXAMETHASONE SODIUM PHOSPHATE 4 MG/ML
INJECTION, SOLUTION INTRA-ARTICULAR; INTRALESIONAL; INTRAMUSCULAR; INTRAVENOUS; SOFT TISSUE
Status: DISCONTINUED | OUTPATIENT
Start: 2024-10-01 | End: 2024-10-01

## 2024-10-01 RX ORDER — PROMETHAZINE HYDROCHLORIDE 25 MG/1
25 TABLET ORAL EVERY 6 HOURS PRN
Status: CANCELLED | OUTPATIENT
Start: 2024-10-01

## 2024-10-01 RX ORDER — TRAMADOL HYDROCHLORIDE 50 MG/1
100 TABLET ORAL EVERY 6 HOURS PRN
Status: DISCONTINUED | OUTPATIENT
Start: 2024-10-01 | End: 2024-10-01 | Stop reason: HOSPADM

## 2024-10-01 RX ORDER — GLUCAGON 1 MG
1 KIT INJECTION
Status: DISCONTINUED | OUTPATIENT
Start: 2024-10-01 | End: 2024-10-01 | Stop reason: HOSPADM

## 2024-10-01 RX ORDER — CEFAZOLIN SODIUM 1 G/3ML
INJECTION, POWDER, FOR SOLUTION INTRAMUSCULAR; INTRAVENOUS
Status: DISCONTINUED | OUTPATIENT
Start: 2024-10-01 | End: 2024-10-01

## 2024-10-01 RX ORDER — LIDOCAINE HYDROCHLORIDE 10 MG/ML
1 INJECTION, SOLUTION EPIDURAL; INFILTRATION; INTRACAUDAL; PERINEURAL ONCE
Status: DISCONTINUED | OUTPATIENT
Start: 2024-10-01 | End: 2024-10-01 | Stop reason: HOSPADM

## 2024-10-01 RX ORDER — ZOLPIDEM TARTRATE 10 MG/1
10 TABLET ORAL NIGHTLY PRN
Qty: 14 TABLET | Refills: 0 | Status: SHIPPED | OUTPATIENT
Start: 2024-10-01 | End: 2025-04-01

## 2024-10-01 RX ORDER — MIDAZOLAM HYDROCHLORIDE 1 MG/ML
INJECTION INTRAMUSCULAR; INTRAVENOUS
Status: DISCONTINUED | OUTPATIENT
Start: 2024-10-01 | End: 2024-10-01

## 2024-10-01 RX ORDER — METHOCARBAMOL 500 MG/1
500 TABLET, FILM COATED ORAL ONCE AS NEEDED
Status: COMPLETED | OUTPATIENT
Start: 2024-10-01 | End: 2024-10-01

## 2024-10-01 RX ORDER — FAMOTIDINE 10 MG/ML
INJECTION INTRAVENOUS
Status: DISCONTINUED | OUTPATIENT
Start: 2024-10-01 | End: 2024-10-01

## 2024-10-01 RX ADMIN — HYDROMORPHONE HYDROCHLORIDE 0.2 MG: 1 INJECTION, SOLUTION INTRAMUSCULAR; INTRAVENOUS; SUBCUTANEOUS at 08:10

## 2024-10-01 RX ADMIN — ROCURONIUM BROMIDE 10 MG: 10 INJECTION, SOLUTION INTRAVENOUS at 07:10

## 2024-10-01 RX ADMIN — METHOCARBAMOL 500 MG: 500 TABLET ORAL at 08:10

## 2024-10-01 RX ADMIN — LIDOCAINE HYDROCHLORIDE 100 MG: 10 INJECTION, SOLUTION INTRAVENOUS at 07:10

## 2024-10-01 RX ADMIN — HALOPERIDOL LACTATE 0.5 MG: 5 INJECTION, SOLUTION INTRAMUSCULAR at 08:10

## 2024-10-01 RX ADMIN — EPHEDRINE SULFATE 10 MG: 50 INJECTION INTRAVENOUS at 07:10

## 2024-10-01 RX ADMIN — LIDOCAINE HYDROCHLORIDE 100 MG: 10 INJECTION, SOLUTION INTRAVENOUS at 08:10

## 2024-10-01 RX ADMIN — HYDROMORPHONE HYDROCHLORIDE 0.2 MG: 1 INJECTION, SOLUTION INTRAMUSCULAR; INTRAVENOUS; SUBCUTANEOUS at 09:10

## 2024-10-01 RX ADMIN — SUGAMMADEX 200 MG: 100 INJECTION, SOLUTION INTRAVENOUS at 08:10

## 2024-10-01 RX ADMIN — SUCCINYLCHOLINE CHLORIDE 140 MG: 20 INJECTION, SOLUTION INTRAMUSCULAR; INTRAVENOUS at 07:10

## 2024-10-01 RX ADMIN — MIDAZOLAM HYDROCHLORIDE 2 MG: 1 INJECTION, SOLUTION INTRAMUSCULAR; INTRAVENOUS at 06:10

## 2024-10-01 RX ADMIN — ACETAMINOPHEN 1000 MG: 500 TABLET ORAL at 06:10

## 2024-10-01 RX ADMIN — ROCURONIUM BROMIDE 30 MG: 10 INJECTION, SOLUTION INTRAVENOUS at 07:10

## 2024-10-01 RX ADMIN — FENTANYL CITRATE 100 MCG: 50 INJECTION, SOLUTION INTRAMUSCULAR; INTRAVENOUS at 07:10

## 2024-10-01 RX ADMIN — PROPOFOL 50 MG: 10 INJECTION, EMULSION INTRAVENOUS at 07:10

## 2024-10-01 RX ADMIN — ALBUTEROL SULFATE 5 PUFF: 108 AEROSOL, METERED RESPIRATORY (INHALATION) at 08:10

## 2024-10-01 RX ADMIN — DEXMEDETOMIDINE 8 MCG: 100 INJECTION, SOLUTION, CONCENTRATE INTRAVENOUS at 07:10

## 2024-10-01 RX ADMIN — FENTANYL CITRATE 25 MCG: 50 INJECTION INTRAMUSCULAR; INTRAVENOUS at 09:10

## 2024-10-01 RX ADMIN — HYDROCODONE BITARTRATE AND ACETAMINOPHEN 1 TABLET: 5; 325 TABLET ORAL at 08:10

## 2024-10-01 RX ADMIN — PHENYLEPHRINE HYDROCHLORIDE 100 MCG: 10 INJECTION INTRAVENOUS at 07:10

## 2024-10-01 RX ADMIN — FAMOTIDINE 20 MG: 10 INJECTION, SOLUTION INTRAVENOUS at 07:10

## 2024-10-01 RX ADMIN — ONDANSETRON 4 MG: 2 INJECTION INTRAMUSCULAR; INTRAVENOUS at 07:10

## 2024-10-01 RX ADMIN — DEXAMETHASONE SODIUM PHOSPHATE 8 MG: 4 INJECTION, SOLUTION INTRAMUSCULAR; INTRAVENOUS at 07:10

## 2024-10-01 RX ADMIN — DEXMEDETOMIDINE 12 MCG: 100 INJECTION, SOLUTION, CONCENTRATE INTRAVENOUS at 07:10

## 2024-10-01 RX ADMIN — SODIUM CHLORIDE: 9 INJECTION, SOLUTION INTRAVENOUS at 07:10

## 2024-10-01 RX ADMIN — SODIUM CHLORIDE: 0.9 INJECTION, SOLUTION INTRAVENOUS at 06:10

## 2024-10-01 RX ADMIN — CEFAZOLIN 2 G: 330 INJECTION, POWDER, FOR SOLUTION INTRAMUSCULAR; INTRAVENOUS at 07:10

## 2024-10-01 RX ADMIN — Medication 20 MG: at 07:10

## 2024-10-01 RX ADMIN — PROPOFOL 150 MG: 10 INJECTION, EMULSION INTRAVENOUS at 07:10

## 2024-10-01 NOTE — DISCHARGE SUMMARY
[place] - Surgery ([facility])  Brief Operative Note    Surgery Date: 10/1/2024     Surgeons and Role:     * Laura Wisdom MD - Primary    Assisting Surgeon: None    ISABELLA Zhao PA-C - 1st Assistant     Pre-op Diagnosis:  Acute medial meniscus tear of right knee, initial encounter [S83.241A]  Chondromalacia of right knee [M94.261]  Plica syndrome [M67.50]  Osteoarthritis of right knee, unspecified osteoarthritis type [M17.11]    Post-op Diagnosis:  Post-Op Diagnosis Codes:     * Acute medial meniscus tear of right knee, initial encounter [S83.241A]     * Chondromalacia of right knee [M94.261]     * 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    DISPOSITION: Home or Self Care    FINAL DIAGNOSIS:  right knee meniscus tear     FOLLOWUP: In clinic    DISCHARGE INSTRUCTIONS:    Discharge Procedure Orders   Diet general     Call MD for:  temperature >100.4     Call MD for:  persistent nausea and vomiting     Call MD for:  severe uncontrolled pain     Call MD for:  difficulty breathing, headache or visual disturbances     Call MD for:  redness, tenderness, or signs of infection (pain, swelling, redness, odor or green/yellow discharge around incision site)     Call MD for:  hives     Call MD for:  persistent dizziness or light-headedness     Ice to affected area   Order Comments: using barrier between ice and skin (specify duration&frequency)     No driving, operating heavy equipment or signing legal documents while taking pain medication      Remove dressing in 72 hours     Shower on day dressing removed (No bath)

## 2024-10-01 NOTE — ANESTHESIA PREPROCEDURE EVALUATION
10/01/2024  Corazon Jones is a 59 y.o., male.  Pre-operative evaluation for Procedure(s) (LRB):  ARTHROSCOPY, KNEE, WITH MENISCECTOMY (Right)  CHONDROPLASTY, KNEE (Right)  EXCISION, PLICA, KNEE, ARTHROSCOPIC (Right)    Corazon Jones is a 59 y.o. male       Patient Active Problem List   Diagnosis    Lactose intolerance    Chronic familial neutropenia    OTTONIEL (obstructive sleep apnea)    Pure hypercholesterolemia    History of Clostridium difficile colitis    Tinnitus of both ears    Acute seasonal allergic rhinitis    Nasal septal deviation    Primary hypertension    Lung nodule    History of sarcoidosis    Prediabetes    Acute pain of right shoulder       Past Surgical History:   Procedure Laterality Date    APPENDECTOMY      ARTHROSCOPY OF KNEE      EYE SURGERY  2006 Lasik    HERNIA REPAIR      SHOULDER ARTHROSCOPY         Social History     Socioeconomic History    Marital status:     Number of children: 3   Occupational History    Occupation: physician     Employer: [employer]   Tobacco Use    Smoking status: Never    Smokeless tobacco: Never   Substance and Sexual Activity    Alcohol use: No    Drug use: No    Sexual activity: Yes     Partners: Female   Social History Narrative    Intermittent exercise     Social Drivers of Health     Financial Resource Strain: Patient Declined (3/15/2024)    Overall Financial Resource Strain (CARDIA)     Difficulty of Paying Living Expenses: Patient declined   Food Insecurity: Patient Declined (3/15/2024)    Hunger Vital Sign     Worried About Running Out of Food in the Last Year: Patient declined     Ran Out of Food in the Last Year: Patient declined   Transportation Needs: Patient Declined (3/15/2024)    PRAPARE - Transportation     Lack of Transportation (Medical): Patient declined     Lack of Transportation (Non-Medical): Patient  declined   Physical Activity: Unknown (3/15/2024)    Exercise Vital Sign     Days of Exercise per Week: 1 day   Stress: Stress Concern Present (3/15/2024)    [place] [facility] [employer] - Occupational Stress Questionnaire     Feeling of Stress : To some extent   Housing Stability: Unknown (3/15/2024)    Housing Stability Vital Sign     Unable to Pay for Housing in the Last Year: No     Unstable Housing in the Last Year: No       No current facility-administered medications on file prior to encounter.     Current Outpatient Medications on File Prior to Encounter   Medication Sig Dispense Refill    aspirin (ECOTRIN) 81 MG EC tablet Take 1 tablet (81 mg total) by mouth once daily. (Patient not taking: Reported on 7/3/2024) 30 tablet 0    celecoxib (CELEBREX) 200 MG capsule Take 1 capsule (200 mg total) by mouth once daily. 90 capsule 3    ergocalciferol (VITAMIN D2) 50,000 unit Cap Take 1 capsule (50,000 Units total) by mouth every 7 days. 12 capsule 1    losartan (COZAAR) 25 MG tablet Take 1 tablet (25 mg total) by mouth once daily. (Patient not taking: Reported on 9/6/2024) 90 tablet 3    omeprazole (PRILOSEC) 40 MG capsule TAKE 1 CAPSULE BY MOUTH EVERY MORNING AS DIRECTED (Patient not taking: Reported on 9/6/2024) 90 capsule 3    ondansetron (ZOFRAN-ODT) 4 MG TbDL Take 1 tablet (4 mg total) by mouth every 8 (eight) hours as needed (nausea). nausea. can worsen constipation (Patient not taking: Reported on 7/10/2024) 20 tablet 0    rosuvastatin (CRESTOR) 40 MG Tab Take 1 tablet (40 mg total) by mouth every evening. 90 tablet 3    sertraline (ZOLOFT) 50 MG tablet Take 1 tablet (50 mg total) by mouth once daily. (Patient taking differently: Take 50 mg by mouth every evening.) 90 tablet 3    tadalafiL (CIALIS) 20 MG Tab Take 1 tablet (20 mg total) by mouth once daily. 30 tablet 5    tretinoin (RETIN-A) 0.05 % cream Apply topically to the skin every night (Patient not taking: Reported on 7/3/2024) 20 g 0     "zonisamide (ZONEGRAN) 100 MG Cap Take 1 at bedtime for 3 days, then 2 at bedtime for 3 days, then 3 at bedtime for 3 days, then 4 at bedtime to follow. Stay at the most comfortable dose. (Patient not taking: Reported on 7/3/2024) 360 capsule 5       Review of patient's allergies indicates:   Allergen Reactions    Percocet [oxycodone-acetaminophen]     Suture, silk     Adhesive Rash         CBC: No results for input(s): "WBC", "RBC", "HGB", "HCT", "PLT", "MCV", "MCH", "MCHC" in the last 72 hours.    CMP: No results for input(s): "NA", "K", "CL", "CO2", "BUN", "CREATININE", "GLU", "MG", "PHOS", "CALCIUM", "ALBUMIN", "PROT", "ALKPHOS", "ALT", "AST", "BILITOT" in the last 72 hours.    INR  No results for input(s): "PT", "INR", "PROTIME", "APTT" in the last 72 hours.      Diagnostic Studies:    EKG:   Results for orders placed or performed during the hospital encounter of 06/23/24   EKG 12-lead    Collection Time: 06/24/24  2:50 AM    Bonny Linares, RRT     6/24/2024  2:50 AM  EKG completed and given to RN.       TTE:  Results for orders placed or performed during the hospital encounter of 07/08/24   Echo   Result Value Ref Range    BSA 2.29 m2    LVOT stroke volume 92.60 cm3    LVIDd 4.48 3.5 - 6.0 cm    LV Systolic Volume 30.32 mL    LV Systolic Volume Index 13.5 mL/m2    LVIDs 2.83 2.1 - 4.0 cm    LV ESV A2C 60.29 mL    LV Diastolic Volume 91.50 mL    LV ESV A4C 47.12 mL    LV Diastolic Volume Index 40.85 mL/m2    Left Ventricular End Systolic Volume by Teichholz Method 30.32 mL    Left Ventricular End Diastolic Volume by Teichholz Method 91.50 mL    IVS 0.81 0.6 - 1.1 cm    LVOT diameter 2.30 cm    LVOT area 4.2 cm2    FS 37 28 - 44 %    Left Ventricle Relative Wall Thickness 0.34 cm    PW 0.77 0.6 - 1.1 cm    LV mass 110.95 g    LV Mass Index 50 g/m2    MV Peak E Sheng 0.80 m/s    TDI LATERAL 0.10 m/s    TDI SEPTAL 0.08 m/s    E/E' ratio 8.89 m/s    MV Peak A Sheng 0.48 m/s    TR Max Sheng 1.73 m/s    E/A " "ratio 1.67     IVRT 85.63 msec    E wave deceleration time 182.80 msec    LV SEPTAL E/E' RATIO 10.00 m/s    LV LATERAL E/E' RATIO 8.00 m/s    PV Peak S Sheng 0.39 m/s    PV Peak D Sheng 0.24 m/s    Pulm vein S/D ratio 1.63     LVOT peak sheng 0.92 m/s    Left Ventricular Outflow Tract Mean Velocity 0.71 cm/s    Left Ventricular Outflow Tract Mean Gradient 2.22 mmHg    LA size 4.34 cm    Left Atrium Minor Axis 6.09 cm    Left Atrium Major Axis 5.71 cm    LA Vol (MOD) 55.74 cm3    MICHAEL (MOD) 24.9 mL/m2    RA Major Axis 5.04 cm    AV mean gradient 6 mmHg    AV peak gradient 10 mmHg    Ao peak sheng 1.59 m/s    Ao VTI 36.00 cm    LVOT peak VTI 22.30 cm    AV valve area 2.57 cm²    AV Velocity Ratio 0.58     AV index (prosthetic) 0.62     FRANK by Velocity Ratio 2.40 cm²    MV stenosis pressure 1/2 time 53.00 ms    MV valve area p 1/2 method 4.15 cm2    Triscuspid Valve Regurgitation Peak Gradient 12 mmHg    PV PEAK VELOCITY 0.92 m/s    PV peak gradient 3 mmHg    IVC diameter 2.14 cm    Mean e' 0.09 m/s    ZLVIDS -4.27     ZLVIDD -5.81     LA area A4C 18.22 cm2    LA area A2C 21.68 cm2    MICHAEL 34.0 mL/m2    LA Vol 76.10 cm3    LA WIDTH 3.5 cm    RA Width 3.2 cm    TV resting pulmonary artery pressure 15 mmHg    RV TB RVSP 5 mmHg    Est. RA pres 3 mmHg    Narrative      Left Ventricle: The left ventricle is normal in size. Normal wall   thickness. There is normal systolic function with a visually estimated   ejection fraction of 55 - 60%. There is normal diastolic function.    Right Ventricle: Normal right ventricular cavity size. Wall thickness   is normal. Systolic function is normal.       No results found for: "EF"   No results found for this or any previous visit.    EMEKA:  No results found for this or any previous visit.    Stress Test:  Results for orders placed during the hospital encounter of 08/11/23    Exercise Stress - EKG    Interpretation Summary    The ECG portion of the study is negative for ischemia.    During " "stress, frequent PVCs are noted.    The exercise capacity was average.       LHC:  No results found for this or any previous visit.       PFT:  No results found for: "FEV1", "FVC", "PKR1FGV", "TLC", "DLCO"     ALLERGIES:     Review of patient's allergies indicates:   Allergen Reactions    Percocet [oxycodone-acetaminophen]     Suture, silk     Adhesive Rash     LDA:          Lines/Drains/Airways       None                  Anesthesia Evaluation      Airway   Mallampati: II  TM distance: > 6 cm  Neck ROM: Normal ROM  Dental    (+) Intact    Pulmonary    (+) sleep apnea  Cardiovascular   (+) hypertension    Rate: Normal    Neuro/Psych      GI/Hepatic/Renal      Endo/Other    Abdominal                           Pre-op Assessment    I have reviewed the Patient Summary Reports.     I have reviewed the Nursing Notes. I have reviewed the NPO Status.   I have reviewed the Medications.     Review of Systems  Anesthesia Hx:  No problems with previous Anesthesia             Denies Family Hx of Anesthesia complications.    Denies Personal Hx of Anesthesia complications.                    Cardiovascular:     Hypertension                                        Pulmonary:        Sleep Apnea                Renal/:  Renal/ Normal                 Hepatic/GI:  Hepatic/GI Normal                 Neurological:  Neurology Normal                                      Endocrine:  Endocrine Normal                Physical Exam  General: Well nourished    Airway:  Mallampati: II   Mouth Opening: Normal  TM Distance: > 6 cm  Tongue: Normal  Neck ROM: Normal ROM    Dental:  Intact    Chest/Lungs:  Normal Respiratory Rate    Heart:  Rate: Normal        Anesthesia Plan  Type of Anesthesia, risks & benefits discussed:    Anesthesia Type: Gen ETT  Intra-op Monitoring Plan: Standard ASA Monitors  Post Op Pain Control Plan: multimodal analgesia and IV/PO Opioids PRN  Induction:  IV  Airway Plan: Direct, Post-Induction  Informed Consent: Informed " consent signed with the Patient and all parties understand the risks and agree with anesthesia plan.  All questions answered. Patient consented to blood products? Yes  ASA Score: 2  Day of Surgery Review of History & Physical: H&P Update referred to the surgeon/provider.    Ready For Surgery From Anesthesia Perspective.     .

## 2024-10-01 NOTE — PLAN OF CARE
"Nursing pre-op complete. Pt calm, will continue to monitor. Call light within reach. Pt's son visiting at bedside, might leave facility during surgery and will return for post-op. Spouse out of state. Ok to call son or spouse with medical information. Belongings placed in locker #5, 1 bag. Pt needs crutches, Ht 5'11".    "

## 2024-10-01 NOTE — OPERATIVE NOTE ADDENDUM
Certification of Assistant at Surgery       Surgery Date: 10/1/2024     Participating Surgeons:  Surgeons and Role:     * Laura Wisdom MD - Primary    ISABELLA Zhao PA-C - 1st Assistant     Procedures:  Procedure(s) (LRB):  ARTHROSCOPY, KNEE, WITH MENISCECTOMY (Right)  CHONDROPLASTY, KNEE (Right)  EXCISION, PLICA, KNEE, ARTHROSCOPIC (Right)    Assistant Surgeon's Certification of Necessity:  I understand that section 1842 (b) (6) (d) of the Social Security Act generally prohibits Medicare Part B reasonable charge payment for the services of assistants at surgery in teaching hospitals when qualified residents are available to furnish such services. I certify that the services for which payment is claimed were medically necessary, and that no qualified resident was available to perform the services. I further understand that these services are subject to post-payment review by the Medicare carrier.         Saran Zhao PA-C    10/01/2024  8:27 AM

## 2024-10-01 NOTE — OP NOTE
DATE OF PROCEDURE: 10/01/2024     SURGEON:  Laura Wisdom M.D     ASSISTANT: ISABELLA Zhao PA-C  The use of an assistant was medically necessary for positioning, skin retraction, and assistance with this procedure. The procedure could not be performed without the use of an assistant.   There was no qualified resident/fellow available for assistance with this procedure.    PREOPERATIVE DIAGNOSES:   right  1. knee medial and lateral meniscus tear   2. knee plica.   3. knee possible chondromalacia  4. knee synovitis  5. Knee adhesions  6. Knee loose body    POSTOPERATIVE DIAGNOSES:   right  1. knee medial and lateral meniscus tear   2. knee plica.   3. knee chondromalacia  4. knee synovitis.   5. Knee adhesions  6. Knee loose body    PROCEDURE PERFORMED:   right  1. knee arthroscopic chondroplasty (CPT 50666)  2. knee arthroscopic medial and lateral (CPT 01190) meniscectomy   3. knee arthroscopic partial synovectomy/debridement (CPT 72280).   4. knee arthroscopic plica excision(CPT 10476).    5. Knee arthroscopic lysis of adhesions and anterior interval release (CPT 44264)  6. Knee arthroscopic loose body removal (CPT 46869)    ANESTHESIA: General with local 0.5% ropivicaine 30ml (5mg/ml), 60 mg ketamine, 60mg toradol (2ml toradol (30mg/ml))    BLOOD LOSS: Minimal.     DRAINS: None.     TOURNIQUET TIME: None.     COMPLICATIONS: None.     CONDITION ON TRANSFER: The patient was extubated and moved to the recovery room in stable condition, with compartments soft and capillary refill less than a   second in all digits.     BRIEF INDICATION OF MEDICAL NECESSITY: The patient is a 59 y.o. year-old male who has history and physical examination findings consistent with the above. Nonoperative versus operative options were discussed. The risks and benefits were discussed with the patient. The patient acknowledged understanding and wished to proceed with operative intervention. Informed consent was obtained prior to the procedure.  Details of the surgical procedure were explained, including incisions and probable rehabilitation course. The patient understands the likely length of convalescence after surgery; and we have explained the risks, benefits, and alternatives of surgery. Reasonable expectations and potential complications were discussed and acknowledged, including but not limited to infection, bleeding, blood clots, (DVT and/or PE), nerve injury, retear, instability, continued pain and stiffness. It was also explained that there was a chance of failure which may require further management. The patient agreed and understood and wished to proceed.     EXAMINATION UNDER ANESTHESIA of the OPERATIVE right KNEE: ROM 0-135 degrees, negative Lachman, negative pivot shift, stable to varus-valgus stress testing, negative effusion.     EXAMINATION UNDER ANESTHESIA of the NON-OPERATED left KNEE: ROM 0-135 degrees, negative Lachman, negative pivot shift, stable to varus-valgus stress testing, negative effusion.     PROCEDURE IN DETAIL: The correct operative site was marked by the operative surgeon.  The patient was then taken to the operating room and placed supine on the operating room table. General anesthesia was administered by the anesthesia team. All pressure points were carefully padded and checked. Preoperative antibiotics were administered. A well-padded tourniquet was placed high on the operative thigh. Examination was begun with the above findings. The non-operative leg was then placed a well-padded well-leg sousa, in a comfortable position. The operative leg was placed in an arthroscopic leg sousa at the level of the tourniquet. The operative leg was prepped and draped in the usual sterile fashion. After prepping and draping, the appropriate landmarks were noted on the skin.  2cc skin and sub-cutaneous tissue was infilttrated with local anesthetic mixture superolaterally at needle insufflation site. The knee was insufflated  supero-laterally with saline. 9cc skin wheal and sub-cutaneous tissue and fat pad was infilttrated with local anesthetic mixture at both portal sites; mid-lateral followed by infero-medial portals were created, and a systematic examination of the joint revealed the following:    There was evidence of significant suprapatellar pouch adhesions and compartmentalization extending down to the medial and lateral gutters.  Using thermal device, these adhesions were taken down and lysis of adhesions was performed and synovectomy was performed as needed.  There was no evidence of any loose bodies in the medial or lateral gutters.      In the patellofemoral compartment, there was chondral damage to:  Trochlea 10 x 5 mm grade 3  Chondroplasty was performed using arthroscopic shaver.    There was chondral damage to:  Medial femoral condyle 30 x 30 mm grade 3  Chondroplasty was performed using arthroscopic shaver.    There was chondral damage to:  Lateral femoral condyle 10 x 20 mm grade 3  Chondroplasty was performed using arthroscopic shaver.    In the medial compartment there was no evidence of meniscal instability.   Posterior near root horn medial meniscus tear,  complex was debrided with arthroscopic shaver and biter.  66% was debrided over an area of 2 cm.  Root and hoop fibers remained intact.    Attention was then turned to the notch. The ACL and PCL were probed carefully and found to be stable.     There was a hypertrophic infrapatellar plica.  This was debrided using arthroscopic biter and shaver.    There was some scar about the ACL.  This was debrided in the standard fashion and lysis of adhesions was performed.    In the lateral compartment there was no evidence of chondral damage.   In the lateral compartment there was no evidence of meniscal instability.   Middle horn lateral meniscus tear,  complex was debrided with arthroscopic shaver and biter.  10% was debrided over an area of 2 cm.  Root and hoop fibers  remained intact.    Loose body measuring 5 x 5 x 7 mm was removed from medial and suprapatellar compartments using arthroscopic grasper.    Synovitis was debrided in the knee as needed to the areas of concern in medial and lateral compartments.      Anterior interval release was performed with thermal device, restoring meniscal translation nicely.    The knee and incisions were then copiously irrigated and fluid was extravasated using suction.  The arthroscopic portal incisions were closed using inverted 4-0 Monocryl suture.  5cc skin and sub-cutaneous tissue and around portals were infilttrated with local anesthetic mixture at both portal sites.  Steri-Strips were placed with Mastisol. Sterile TENS unit pads were placed which were medically necessary for pain relief. Wounds were dressed with Xeroform, 4x4s, and cast padding. STEVEN hose was placed on the operative leg to match that of the STEVEN hose placed preoperatively on the non-operative leg. Iceman was secured.  The patient was extubated and moved to the recovery room in stable condition with compartments soft and capillary refill less than a second in all digits.     POSTOPERATIVE PLAN: We will be following the arthroscopic partial meniscectomy guidelines with emphasis on patellar mobility.  This was discussed this with the patient's family after surgery.

## 2024-10-01 NOTE — TRANSFER OF CARE
"Anesthesia Transfer of Care Note    Patient: Corazon Jones    Procedure(s) Performed: Procedure(s) (LRB):  ARTHROSCOPY, KNEE, WITH MENISCECTOMY (Right)  CHONDROPLASTY, KNEE (Right)  EXCISION, PLICA, KNEE, ARTHROSCOPIC (Right)    Patient location: [facility]    Anesthesia Type: general    Transport from OR: Transported from OR on room air with adequate spontaneous ventilation. Transported from OR on 6-10 L/min O2 by face mask with adequate spontaneous ventilation    Post pain: adequate analgesia    Post assessment: no apparent anesthetic complications and tolerated procedure well    Post vital signs: stable    Level of consciousness: awake    Nausea/Vomiting: no nausea/vomiting    Complications: none    Transfer of care protocol was followed      Last vitals: Visit Vitals  /79 (Patient Position: Lying)   Pulse 81   Temp 36.7 °C (98 °F) (Oral)   Resp 16   Ht 5' 11" (1.803 m)   Wt 105.7 kg (233 lb)   SpO2 96%   BMI 32.50 kg/m²     "

## 2024-10-01 NOTE — TELEPHONE ENCOUNTER
Spoke to patient who is doing well following knee arthroscopy. No issues and pain well controlled. He is requesting ambien which he has taken prior for an upcoming trip to [place]. Will call in one time for him.

## 2024-10-01 NOTE — ANESTHESIA PROCEDURE NOTES
Intubation    Date/Time: 10/1/2024 7:08 AM    Performed by: Sanaz Hay CRNA  Authorized by: Erika Carmona MD    Intubation:     Induction:  Intravenous    Intubated:  Postinduction    Mask Ventilation:  Not attempted    Attempts:  1    Attempted By:  CRNA    Method of Intubation:  Video laryngoscopy    Blade:  Stone 3    Laryngeal View Grade: Grade I - full view of cords      Difficult Airway Encountered?: No      Complications:  None    Airway Device:  Oral endotracheal tube    Airway Device Size:  7.5    Style/Cuff Inflation:  Cuffed    Inflation Amount (mL):  5    Tube secured:  24    Secured at:  The lips    Placement Verified By:  Capnometry    Complicating Factors:  None, oropharyngeal edema or fat, obesity and small mouth    Findings Post-Intubation:  BS equal bilateral

## 2024-10-01 NOTE — ANESTHESIA POSTPROCEDURE EVALUATION
Anesthesia Post Evaluation    Patient: Corazon Jones    Procedure(s) Performed: Procedure(s) (LRB):  ARTHROSCOPY, KNEE, WITH MENISCECTOMY (Right)  CHONDROPLASTY, KNEE (Right)  EXCISION, PLICA, KNEE, ARTHROSCOPIC (Right)    Final Anesthesia Type: general      Patient location during evaluation: PACU  Patient participation: Yes- Able to Participate  Level of consciousness: awake and alert  Post-procedure vital signs: reviewed and stable  Pain management: adequate  Airway patency: patent    PONV status at discharge: No PONV  Anesthetic complications: no      Cardiovascular status: hemodynamically stable  Respiratory status: spontaneous ventilation  Follow-up not needed.              Vitals Value Taken Time   /86 10/01/24 0902   Temp 36.5 °C (97.7 °F) 10/01/24 0825   Pulse 85 10/01/24 0906   Resp 10 10/01/24 0906   SpO2 92 % 10/01/24 0906   Vitals shown include unfiled device data.      No case tracking events are documented in the log.      Pain/Chris Score: Pain Rating Prior to Med Admin: 8 (10/1/2024  8:54 AM)  Chris Score: 8 (10/1/2024  8:23 AM)

## 2024-10-02 ENCOUNTER — CLINICAL SUPPORT (OUTPATIENT)
Dept: REHABILITATION | Facility: HOSPITAL | Age: 59
End: 2024-10-02
Payer: COMMERCIAL

## 2024-10-02 DIAGNOSIS — M94.261 CHONDROMALACIA OF RIGHT KNEE: ICD-10-CM

## 2024-10-02 DIAGNOSIS — M17.12 PRIMARY OSTEOARTHRITIS OF LEFT KNEE: ICD-10-CM

## 2024-10-02 DIAGNOSIS — M25.561 ACUTE PAIN OF RIGHT KNEE: Primary | ICD-10-CM

## 2024-10-02 DIAGNOSIS — S83.241A ACUTE MEDIAL MENISCUS TEAR OF RIGHT KNEE, INITIAL ENCOUNTER: ICD-10-CM

## 2024-10-02 PROCEDURE — 97161 PT EVAL LOW COMPLEX 20 MIN: CPT | Performed by: PHYSICAL THERAPIST

## 2024-10-02 PROCEDURE — 97112 NEUROMUSCULAR REEDUCATION: CPT | Performed by: PHYSICAL THERAPIST

## 2024-10-02 PROCEDURE — 97140 MANUAL THERAPY 1/> REGIONS: CPT | Performed by: PHYSICAL THERAPIST

## 2024-10-02 NOTE — PLAN OF CARE
[facility]   Physical Therapy Initial Evaluation      Name: Corazon Maldonado [facility] Number: [id number]    Therapy Diagnosis:   Encounter Diagnoses   Name Primary?    Acute medial meniscus tear of right knee, initial encounter     Chondromalacia of right knee     Primary osteoarthritis of left knee     Acute pain of right knee Yes        Physician: Saran Zhao III, *    Physician Orders: PT Eval and Treat   Medical Diagnosis from Referral:   Diagnosis   S83.241A (ICD-10-CM) - Acute medial meniscus tear of right knee, initial encounter   M94.261 (ICD-10-CM) - Chondromalacia of right knee   M17.12 (ICD-10-CM) - Primary osteoarthritis of left knee     Evaluation Date: 10/2/2024  Authorization Period Expiration: 9/6/2025  Plan of Care Expiration: 4/2/2025  Progress Note Due: 2/2/2025  Visit # / Visits authorized: 1/ 1   FOTO: 1/3    Precautions: Weightbearing     Time In: 902  Time Out: 1000  Total Appointment Time (timed & untimed codes): 58 minutes    PT following right knee arthroscopy surgery by Dr. Wisdom on 10/1/24. PT should start POD1 after surgery.        PT with Crescencio Amin     Also include PT for left knee medial pain.    Subjective     Date of onset: 10/1/2024  History of current condition - Corazon reports: Patient underwent a meniscectomy yesterday. He reports being pain free yesterday and moving around well. He was able to climb the stairs at his house a few times as needed. He slept well yesterday and is planning to be out of working for the next month. He is joined in the clinic today by his niece.   Falls: none    Imaging: MRI studies:     Impression:     Complex tear posterior horn medial meniscus (with radial component) extending into body segment and to the level of the posteromedial meniscal root.  Associated flap extending to the meniscal tibial recess.       Prior Therapy: Yes - neck/shoulder/concussion  Social History: He lives with their family  Occupation: Pain  management physician  Prior Level of Function: Ind  Current Level of Function: Mod Ind no assistive device, leg heavily banged with limited mobility.    Pain:  Current 2/10, worst 5/10, best 1/10   Location: right knee   Description: Aching and Throbbing  Aggravating Factors: Bending, Walking, Extension, and Flexing  Easing Factors: pain medication, ice, TENS unit, rest, and elevation    Patients goals: return to work without pain     Medical History:   Past Medical History:   Diagnosis Date    History of Clostridium difficile colitis     treated 2014    History of sarcoidosis     dx 1991, resolved    Hyperlipidemia     Hypertension     OTTONIEL (obstructive sleep apnea)        Surgical History:   Corazon Jones  has a past surgical history that includes Appendectomy; Hernia repair; Eye surgery (2006 Lasik); Arthroscopy of knee; Shoulder arthroscopy; Bunionectomy (Right); Knee arthroscopy w/ meniscectomy (Right, 10/1/2024); Chondroplasty of knee (Right, 10/1/2024); and Knee arthroscopy w/ plica excision (Right, 10/1/2024).    Medications:   Corazon has a current medication list which includes the following prescription(s): aspirin, celecoxib, ergocalciferol, losartan, omeprazole, ondansetron, ondansetron, rosuvastatin, sertraline, tadalafil, tramadol, tretinoin, zolpidem, and zonisamide.    Allergies:   Review of patient's allergies indicates:   Allergen Reactions    Percocet [oxycodone-acetaminophen]     Suture, silk     Adhesive Rash        Objective      Observation: Patient is a 59 y.o. male alert and oriented    Functional Tests:  Gait: WBAT without AD, limited knee extension     Knee Passive Range of Motion:   Right  Left    Flexion 90 125   Extension 0 0     Quad Set: Good activation, improved with [place]    Joint Mobility: limited patellar mobility     Palpation: Tender joint line    Sensation: diminished under the incisions    Edema: mod present - educated limit time spent on feet first 72 hours    Intake  Outcome Measure for FOTO Knee Survey    Therapist reviewed FOTO scores for Corazon Jones on 10/2/2024.   FOTO documents entered into [facility] - see Media section.    Intake Score: see media       Treatment     Total Treatment time (time-based codes) separate from Evaluation: 20 minutes     Corazon received the treatments listed below:      manual therapy techniques: Joint mobilizations, Manual Lymphatic Drainage, and Soft tissue Mobilization were applied to the: R knee for 10 minutes, including:  Gr II patellar mobs  Bandage change  Compression wrap applied - size D    neuromuscular re-education activities to improve: Coordination, Sense, Proprioception, and Posture for 10 minutes. The following activities were included:  NMES Russian stem quad activation 10'        Patient Education and Home Exercises     Education provided:   - importance of restoring knee extension  - quad sets every hour  - s/s of infection and DVT    Written Home Exercises Provided: yes. Exercises were reviewed and Corazon was able to demonstrate them prior to the end of the session.  Corazon demonstrated good understanding of the education provided. See EMR under Patient Instructions for exercises provided during therapy sessions.    Assessment     Corazon is a 59 y.o. male referred to outpatient Physical Therapy with a medical diagnosis of right meniscus tear. Patient presents with limited knee range of motion, strength deficits to the LE, pain with ADL and time spent on his feet, and unable to work the next month.     Patient prognosis is Excellent.   Patient will benefit from skilled outpatient Physical Therapy to address the deficits stated above and in the chart below, provide patient /family education, and to maximize patientt's level of independence.     Plan of care discussed with patient: Yes  Patient's spiritual, cultural and educational needs considered and patient is agreeable to the plan of care and goals as stated below:     Anticipated  Barriers for therapy: none    Medical Necessity is demonstrated by the following  History  Co-morbidities and personal factors that may impact the plan of care [x] LOW: no personal factors / co-morbidities  [] MODERATE: 1-2 personal factors / co-morbidities  [] HIGH: 3+ personal factors / co-morbidities    Moderate / High Support Documentation:   Co-morbidities affecting plan of care:     Personal Factors:   no deficits     Examination  Body Structures and Functions, activity limitations and participation restrictions that may impact the plan of care [x] LOW: addressing 1-2 elements  [] MODERATE: 3+ elements  [] HIGH: 4+ elements (please support below)    Moderate / High Support Documentation:      Clinical Presentation [x] LOW: stable  [] MODERATE: Evolving  [] HIGH: Unstable     Decision Making/ Complexity Score: Low       GOALS: Short Term Goals:  6 weeks  1.Report decreased knee pain  < / =  0/10  to increase tolerance for return to work  2. Increase knee ROM to 0-130 in order to be able to perform ADLs without difficulty.  3. Increase strength by 1/3 MMT grade in quad  to increase tolerance for ADL and work activities.  4. Pt to tolerate HEP to improve ROM and independence with ADL's    Long Term Goals: 12 weeks  1.Report decreased knee pain < / = 0/10  to increase tolerance for return to gym/exercise  2.Patient goal: return to work without knee pain  3.Increase strength to >/= 4+/5 in quad and glute  to increase tolerance for ADL and work activities.  4. Pt will report at CJ level (20-40% impaired) on FOTO knee to demonstrate increase in LE function with every day tasks.    Plan     Plan of care Certification: 10/2/2024 to 4/2/2025.    Outpatient Physical Therapy 2 times weekly for 24 weeks to include the following interventions: Electrical Stimulation NMES, Manual Therapy, Moist Heat/ Ice, Neuromuscular Re-ed, Patient Education, Self Care, Therapeutic Activities, and Therapeutic Exercise.     Crescencio Amin,  PT, DPT, OCS, FAAOMPT

## 2024-10-04 ENCOUNTER — CLINICAL SUPPORT (OUTPATIENT)
Dept: REHABILITATION | Facility: HOSPITAL | Age: 59
End: 2024-10-04
Payer: COMMERCIAL

## 2024-10-04 DIAGNOSIS — M25.561 ACUTE PAIN OF RIGHT KNEE: Primary | ICD-10-CM

## 2024-10-04 PROCEDURE — 97140 MANUAL THERAPY 1/> REGIONS: CPT | Performed by: PHYSICAL THERAPIST

## 2024-10-04 PROCEDURE — 97530 THERAPEUTIC ACTIVITIES: CPT | Performed by: PHYSICAL THERAPIST

## 2024-10-04 PROCEDURE — 97112 NEUROMUSCULAR REEDUCATION: CPT | Performed by: PHYSICAL THERAPIST

## 2024-10-04 NOTE — PROGRESS NOTES
"[facility]   Physical Therapy Treatment Note      Name: Corazon Maldonado [facility]  Clinic Number: [id number]    Therapy Diagnosis:   Encounter Diagnosis   Name Primary?    Acute pain of right knee Yes     Physician: Saran Zhao III, *    Visit Date: 10/4/2024    Physician Orders: PT Eval and Treat   Medical Diagnosis from Referral:   Diagnosis   S83.241A (ICD-10-CM) - Acute medial meniscus tear of right knee, initial encounter   M94.261 (ICD-10-CM) - Chondromalacia of right knee   M17.12 (ICD-10-CM) - Primary osteoarthritis of left knee      Evaluation Date: 10/2/2024  Authorization Period Expiration: 9/6/2025  Plan of Care Expiration: 4/2/2025  Progress Note Due: 2/2/2025  Visit # / Visits authorized: 2/20   FOTO: 1/3    PTA Visit #: 0/5     Time In: 905  Time Out: 1000  Total Billable Time: 55 minutes    Subjective     Pt reports: Notes unable to sleep and a lot of swelling in the knee. Getting better the past night, was worse night one. Ambulating without assistive device. .  He was compliant with home exercise program.  Response to previous treatment: increased swelling and pain  Functional change: ambulating no AD    Pain: 6/10  Location: bilateral knee      Objective      Objective Measures updated at progress report unless specified.     Knee   R: at knee 16.75in / 4in above 18.5  L: at knee 16 in/ 4 in above 19.25    Treatment     Corazon received the treatments listed below:      therapeutic exercises to develop strength, endurance, ROM, flexibility, posture, and core stabilization for 5 minutes including:  GSS strap 30" 5x    manual therapy techniques: Joint mobilizations, Manual traction, Myofacial release, Manual Lymphatic Drainage, Soft tissue Mobilization, and Friction Massage were applied to the: R knee for 25 minutes, including:  Patellar mobs all planes  Gr III flexion with gapping  Gr IV AP femur  Medial/lateral gapping   Knee flexion EOT gapping  Inferior mob with knee " flexion EOT  Distraction seated EOT  Compression bandage change    neuromuscular re-education activities to improve: Balance, Coordination, Kinesthetic, Sense, Proprioception, and Posture for 20 minutes. The following activities were included:  NMES Russian quad sets 10'  NMES SLR 10'    therapeutic activities to improve functional performance for 10  minutes, including:  Bike 10' for knee ROM, cardio endurance training, and swelling reduction in akanksha knees    gait training to improve functional mobility and safety for 0  minutes, including:      cold pack for 0 minutes to R knee.    Patient Education and Home Exercises       Education provided:   - importance of restoring knee extension  - quad sets every hour  - s/s of infection and DVT    Written Home Exercises Provided: YES. Exercises were reviewed and Corazon was able to demonstrate them prior to the end of the session.  Corazon demonstrated good understanding of the education provided. See EMR under Patient Instructions for exercises provided during therapy sessions    Assessment     Corazon presented with increased edema and warmth to the knee, more swelling than we'd like at this phase. Noted some pitting edema into the anterior tib. Given new sleeve for compression to the knee to assist in edema control. Motion was up to 105 degrees full revolutions and able to tolerate SLR with NMES today.    Corazon Is progressing well towards his goals.   Pt prognosis is Excellent.     Pt will continue to benefit from skilled outpatient physical therapy to address the deficits listed in the problem list box on initial evaluation, provide pt/family education and to maximize pt's level of independence in the home and community environment.     Pt's spiritual, cultural and educational needs considered and pt agreeable to plan of care and goals.     Anticipated barriers to physical therapy: work schedule    GOALS: Short Term Goals:  6 weeks  1.Report decreased knee pain  < / =  0/10   to increase tolerance for return to (Progressing, not met)  2. Increase knee ROM to 0-130 in order to be able to perform ADLs without difficulty.(Progressing, not met)  3. Increase strength by 1/3 MMT grade in quad  to increase tolerance for ADL and work activities.(Progressing, not met)  4. Pt to tolerate HEP to improve ROM and independence with ADL's(Progressing, not met)     Long Term Goals: 12 weeks  1.Report decreased knee pain < / = 0/10  to increase tolerance for return to gym/exercise(Progressing, not met)  2.Patient goal: return to work without knee pain(Progressing, not met)  3.Increase strength to >/= 4+/5 in quad and glute  to increase tolerance for ADL and work activities.(Progressing, not met)  4. Pt will report at CJ level (20-40% impaired) on FOTO knee to demonstrate increase in LE function with every day tasks.(Progressing, not met)      Plan     Plan of care Certification: 10/2/2024 to 4/2/2025.     Progress quad strength and ROM    Crescencio Amin, PT, DPT, OCS, FAAOMPT

## 2024-10-07 ENCOUNTER — CLINICAL SUPPORT (OUTPATIENT)
Dept: REHABILITATION | Facility: HOSPITAL | Age: 59
End: 2024-10-07
Payer: COMMERCIAL

## 2024-10-07 DIAGNOSIS — M25.561 ACUTE PAIN OF RIGHT KNEE: Primary | ICD-10-CM

## 2024-10-07 PROCEDURE — 97140 MANUAL THERAPY 1/> REGIONS: CPT | Performed by: PHYSICAL THERAPIST

## 2024-10-07 PROCEDURE — 97530 THERAPEUTIC ACTIVITIES: CPT | Performed by: PHYSICAL THERAPIST

## 2024-10-07 PROCEDURE — 97112 NEUROMUSCULAR REEDUCATION: CPT | Performed by: PHYSICAL THERAPIST

## 2024-10-07 NOTE — PROGRESS NOTES
"[facility]   Physical Therapy Treatment Note      Name: Corazon Maldonado [facility]  Clinic Number: [id number]    Therapy Diagnosis:   Encounter Diagnosis   Name Primary?    Acute pain of right knee Yes     Physician: Saran Zhao III, *    Visit Date: 10/7/2024    Physician Orders: PT Eval and Treat   Medical Diagnosis from Referral:   Diagnosis   S83.241A (ICD-10-CM) - Acute medial meniscus tear of right knee, initial encounter   M94.261 (ICD-10-CM) - Chondromalacia of right knee   M17.12 (ICD-10-CM) - Primary osteoarthritis of left knee      Evaluation Date: 10/2/2024  Authorization Period Expiration: 9/6/2025  Plan of Care Expiration: 4/2/2025  Progress Note Due: 2/2/2025  Visit # / Visits authorized: 3/20   FOTO: 1/3    PTA Visit #: 0/5     Time In: 1008  Time Out: 1125  Total Billable Time: 67 minutes    Subjective     Pt reports: It felt good after Friday, notes on Saturday he was active and it was swollen. Not too painful today, just swelling and warm to the touch. Motion coming along  He was compliant with home exercise program.  Response to previous treatment: increased swelling and pain  Functional change: ambulating no AD    Pain: 6/10  Location: bilateral knee      Objective      Objective Measures updated at progress report unless specified.     Knee   R: at knee 16.75in / 4in above 18.5  L: at knee 16 in/ 4 in above 19.25    Treatment     Corazon received the treatments listed below:      therapeutic exercises to develop strength, endurance, ROM, flexibility, posture, and core stabilization for 5 minutes including:  GSS strap 30" 5x    manual therapy techniques: Joint mobilizations, Manual traction, Myofacial release, Manual Lymphatic Drainage, Soft tissue Mobilization, and Friction Massage were applied to the: R knee for 15 minutes, including:  Patellar mobs all planes  Gr III flexion with gapping  Gr IV AP femur  Medial/lateral gapping   Knee flexion EOT gapping  Inferior mob with " knee flexion EOT  Distraction seated EOT  Compression bandage change    neuromuscular re-education activities to improve: Balance, Coordination, Kinesthetic, Sense, Proprioception, and Posture for 20 minutes. The following activities were included:  NMES LAQ 3# 10'  NMES SLR 10'    therapeutic activities to improve functional performance for 27  minutes, including:  Bike 10' for knee ROM, cardio endurance training, and swelling reduction in akanksha knees  Shuttle 2 1/2 bands 3  x 12  Lateral walks GTB 3 laps ladder  TKE BTB 30x    gait training to improve functional mobility and safety for 0  minutes, including:      cold pack for 10 minutes to R knee.    Patient Education and Home Exercises       Education provided:   - importance of restoring knee extension  - quad sets every hour  - s/s of infection and DVT    Written Home Exercises Provided: YES. Exercises were reviewed and Corazon was able to demonstrate them prior to the end of the session.  Corazon demonstrated good understanding of the education provided. See EMR under Patient Instructions for exercises provided during therapy sessions    Assessment     Presented with edema present in the knee but good motion. Began by restoring passive hyperextension and patellar mobility, progressing NMES to LAQ with light weight. Unable perform a step up with TKE today without pain but mostly due to feeling too weak for the step up  Corazon Is progressing well towards his goals.   Pt prognosis is Excellent.     Pt will continue to benefit from skilled outpatient physical therapy to address the deficits listed in the problem list box on initial evaluation, provide pt/family education and to maximize pt's level of independence in the home and community environment.     Pt's spiritual, cultural and educational needs considered and pt agreeable to plan of care and goals.     Anticipated barriers to physical therapy: work schedule    GOALS: Short Term Goals:  6 weeks  1.Report decreased  knee pain  < / =  0/10  to increase tolerance for return to (Progressing, not met)  2. Increase knee ROM to 0-130 in order to be able to perform ADLs without difficulty.(Progressing, not met)  3. Increase strength by 1/3 MMT grade in quad  to increase tolerance for ADL and work activities.(Progressing, not met)  4. Pt to tolerate HEP to improve ROM and independence with ADL's(Progressing, not met)     Long Term Goals: 12 weeks  1.Report decreased knee pain < / = 0/10  to increase tolerance for return to gym/exercise(Progressing, not met)  2.Patient goal: return to work without knee pain(Progressing, not met)  3.Increase strength to >/= 4+/5 in quad and glute  to increase tolerance for ADL and work activities.(Progressing, not met)  4. Pt will report at CJ level (20-40% impaired) on FOTO knee to demonstrate increase in LE function with every day tasks.(Progressing, not met)      Plan     Plan of care Certification: 10/2/2024 to 4/2/2025.     Progress quad strength and ROM    Crescencio Amin, PT, DPT, OCS, FAAOMPT

## 2024-10-09 ENCOUNTER — OFFICE VISIT (OUTPATIENT)
Dept: SPORTS MEDICINE | Facility: CLINIC | Age: 59
End: 2024-10-09
Payer: COMMERCIAL

## 2024-10-09 ENCOUNTER — CLINICAL SUPPORT (OUTPATIENT)
Dept: REHABILITATION | Facility: HOSPITAL | Age: 59
End: 2024-10-09
Payer: COMMERCIAL

## 2024-10-09 VITALS
DIASTOLIC BLOOD PRESSURE: 87 MMHG | WEIGHT: 233.69 LBS | BODY MASS INDEX: 32.72 KG/M2 | HEART RATE: 74 BPM | SYSTOLIC BLOOD PRESSURE: 129 MMHG | HEIGHT: 71 IN

## 2024-10-09 DIAGNOSIS — M25.561 ACUTE PAIN OF RIGHT KNEE: Primary | ICD-10-CM

## 2024-10-09 DIAGNOSIS — Z98.890 S/P ARTHROSCOPIC SURGERY OF RIGHT KNEE: Primary | ICD-10-CM

## 2024-10-09 PROCEDURE — 3044F HG A1C LEVEL LT 7.0%: CPT | Mod: CPTII,S$GLB,, | Performed by: PHYSICIAN ASSISTANT

## 2024-10-09 PROCEDURE — 4010F ACE/ARB THERAPY RXD/TAKEN: CPT | Mod: CPTII,S$GLB,, | Performed by: PHYSICIAN ASSISTANT

## 2024-10-09 PROCEDURE — 1159F MED LIST DOCD IN RCRD: CPT | Mod: CPTII,S$GLB,, | Performed by: PHYSICIAN ASSISTANT

## 2024-10-09 PROCEDURE — 99999 PR PBB SHADOW E&M-EST. PATIENT-LVL III: CPT | Mod: PBBFAC,,, | Performed by: PHYSICIAN ASSISTANT

## 2024-10-09 PROCEDURE — 99024 POSTOP FOLLOW-UP VISIT: CPT | Mod: S$GLB,,, | Performed by: PHYSICIAN ASSISTANT

## 2024-10-09 PROCEDURE — 3074F SYST BP LT 130 MM HG: CPT | Mod: CPTII,S$GLB,, | Performed by: PHYSICIAN ASSISTANT

## 2024-10-09 PROCEDURE — 1160F RVW MEDS BY RX/DR IN RCRD: CPT | Mod: CPTII,S$GLB,, | Performed by: PHYSICIAN ASSISTANT

## 2024-10-09 PROCEDURE — 3079F DIAST BP 80-89 MM HG: CPT | Mod: CPTII,S$GLB,, | Performed by: PHYSICIAN ASSISTANT

## 2024-10-09 PROCEDURE — 97112 NEUROMUSCULAR REEDUCATION: CPT | Performed by: PHYSICAL THERAPIST

## 2024-10-09 PROCEDURE — 97140 MANUAL THERAPY 1/> REGIONS: CPT | Performed by: PHYSICAL THERAPIST

## 2024-10-09 PROCEDURE — 97530 THERAPEUTIC ACTIVITIES: CPT | Performed by: PHYSICAL THERAPIST

## 2024-10-09 NOTE — PROGRESS NOTES
"[facility]   Physical Therapy Treatment Note      Name: Corazon Maldonado [facility]  Clinic Number: [id number]    Therapy Diagnosis:   No diagnosis found.    Physician: Saran Zhao III, *    Visit Date: 10/9/2024    Physician Orders: PT Eval and Treat   Medical Diagnosis from Referral:   Diagnosis   S83.241A (ICD-10-CM) - Acute medial meniscus tear of right knee, initial encounter   M94.261 (ICD-10-CM) - Chondromalacia of right knee   M17.12 (ICD-10-CM) - Primary osteoarthritis of left knee      Evaluation Date: 10/2/2024  Authorization Period Expiration: 9/6/2025  Plan of Care Expiration: 4/2/2025  Progress Note Due: 2/2/2025  Visit # / Visits authorized: 4/20   FOTO: 1/3    PTA Visit #: 0/5     Time In: 956  Time Out: 1130  Total Billable Time: 62 minutes - with PA-c for follow-up within session    Subjective     Pt reports: sharp pain for a second on/off. Notes not doing as much on his feet but really felt it after last treatment    He was compliant with home exercise program.  Response to previous treatment: increased swelling and pain  Functional change: ambulating no AD    Pain: 6/10  Location: bilateral knee      Objective      Objective Measures updated at progress report unless specified.     Knee   R: at knee 16.75in / 4in above 18.5  L: at knee 16 in/ 4 in above 19.25    10/9/24:  Atrophy noted to the R quad on arrival  Hit head with scab on forehead - notes no loss of consciousness  Edema noted to the knee - addressed by PA-c within session    Treatment     Corazon received the treatments listed below:      therapeutic exercises to develop strength, endurance, ROM, flexibility, posture, and core stabilization for 0 minutes including:  GSS strap 30" 5x    manual therapy techniques: Joint mobilizations, Manual traction, Myofacial release, Manual Lymphatic Drainage, Soft tissue Mobilization, and Friction Massage were applied to the: R knee for 15 minutes, including:  Patellar mobs all " planes  Gr III flexion with gapping  Gr IV AP femur  Medial/lateral gapping   Knee flexion EOT gapping-nt  Inferior mob with knee flexion EOT-nt  Distraction seated EOT-nt  Compression bandage collier-nt    neuromuscular re-education activities to improve: Balance, Coordination, Kinesthetic, Sense, Proprioception, and Posture for 20 minutes. The following activities were included:  NMES LAQ 5# 10'  NMES SLR 10'    therapeutic activities to improve functional performance for 27  minutes, including:  Bike 12'  level 8 for knee ROM, cardio endurance training, and swelling reduction in akanksha knees  Shuttle 2 1/2 bands DL 3  x 12  Shuttle 1 1/2 bands SL 3 x 12  Lateral walks GTB 3 laps ladder  TKE BTB 30x-nt    gait training to improve functional mobility and safety for 0  minutes, including:      cold pack for 10 minutes to R knee.    Patient Education and Home Exercises       Education provided:   - importance of restoring knee extension  - quad sets every hour  - s/s of infection and DVT    Written Home Exercises Provided: YES. Exercises were reviewed and Corazon was able to demonstrate them prior to the end of the session.  Corazon demonstrated good understanding of the education provided. See EMR under Patient Instructions for exercises provided during therapy sessions    Assessment     Continues to have swelling and warmth to the touch in the knee, likely the cause of atrophy. Was able to increase load with LAQ pain free and notes relief with full extension in the knee. Progressed shuttle DL and SL today as tolerated and better endurance on the bike at level 8.    Corazon Is progressing well towards his goals.   Pt prognosis is Excellent.     Pt will continue to benefit from skilled outpatient physical therapy to address the deficits listed in the problem list box on initial evaluation, provide pt/family education and to maximize pt's level of independence in the home and community environment.     Pt's spiritual, cultural  and educational needs considered and pt agreeable to plan of care and goals.     Anticipated barriers to physical therapy: work schedule    GOALS: Short Term Goals:  6 weeks  1.Report decreased knee pain  < / =  0/10  to increase tolerance for return to (Progressing, not met)  2. Increase knee ROM to 0-130 in order to be able to perform ADLs without difficulty.(Progressing, not met)  3. Increase strength by 1/3 MMT grade in quad  to increase tolerance for ADL and work activities.(Progressing, not met)  4. Pt to tolerate HEP to improve ROM and independence with ADL's(Progressing, not met)     Long Term Goals: 12 weeks  1.Report decreased knee pain < / = 0/10  to increase tolerance for return to gym/exercise(Progressing, not met)  2.Patient goal: return to work without knee pain(Progressing, not met)  3.Increase strength to >/= 4+/5 in quad and glute  to increase tolerance for ADL and work activities.(Progressing, not met)  4. Pt will report at CJ level (20-40% impaired) on FOTO knee to demonstrate increase in LE function with every day tasks.(Progressing, not met)      Plan     Plan of care Certification: 10/2/2024 to 4/2/2025.     Progress quad strength and ROM    Crescencio Amin, PT, DPT, OCS, FAAOMPT

## 2024-10-10 ENCOUNTER — OFFICE VISIT (OUTPATIENT)
Dept: INTERNAL MEDICINE | Facility: CLINIC | Age: 59
End: 2024-10-10
Payer: COMMERCIAL

## 2024-10-10 VITALS
HEIGHT: 71 IN | HEART RATE: 93 BPM | BODY MASS INDEX: 33.12 KG/M2 | WEIGHT: 236.56 LBS | SYSTOLIC BLOOD PRESSURE: 110 MMHG | DIASTOLIC BLOOD PRESSURE: 80 MMHG | OXYGEN SATURATION: 93 %

## 2024-10-10 DIAGNOSIS — Z00.00 ANNUAL PHYSICAL EXAM: Primary | ICD-10-CM

## 2024-10-10 DIAGNOSIS — R23.9 RECENT SKIN CHANGES: ICD-10-CM

## 2024-10-10 DIAGNOSIS — N52.9 ERECTILE DYSFUNCTION, UNSPECIFIED ERECTILE DYSFUNCTION TYPE: ICD-10-CM

## 2024-10-10 DIAGNOSIS — E78.00 PURE HYPERCHOLESTEROLEMIA: Chronic | ICD-10-CM

## 2024-10-10 DIAGNOSIS — D70.0: ICD-10-CM

## 2024-10-10 DIAGNOSIS — Z86.2 HISTORY OF SARCOIDOSIS: ICD-10-CM

## 2024-10-10 DIAGNOSIS — G47.33 OSA (OBSTRUCTIVE SLEEP APNEA): Chronic | ICD-10-CM

## 2024-10-10 DIAGNOSIS — R73.03 PREDIABETES: ICD-10-CM

## 2024-10-10 DIAGNOSIS — I10 PRIMARY HYPERTENSION: Chronic | ICD-10-CM

## 2024-10-10 DIAGNOSIS — Z91.89 AT RISK FOR BLEEDING: ICD-10-CM

## 2024-10-10 DIAGNOSIS — E55.9 VITAMIN D INSUFFICIENCY: ICD-10-CM

## 2024-10-10 PROCEDURE — 99999 PR PBB SHADOW E&M-EST. PATIENT-LVL IV: CPT | Mod: PBBFAC,,, | Performed by: STUDENT IN AN ORGANIZED HEALTH CARE EDUCATION/TRAINING PROGRAM

## 2024-10-10 RX ORDER — TRIAMCINOLONE ACETONIDE 1 MG/G
CREAM TOPICAL 2 TIMES DAILY PRN
Qty: 30 G | Refills: 0 | Status: SHIPPED | OUTPATIENT
Start: 2024-10-10

## 2024-10-10 NOTE — PROGRESS NOTES
[facility]    Subjective:       Patient ID: Corazon Jones is a 59 y.o. male.    Chief Complaint: Hypertension (F/u)      History was obtained from the patient and supplemented through chart review.  This patient is known to me.     HPI:    Patient is a 59 y.o. male w/ pmhc HTN, HLD, prediabetes, presents for annual    6/23/2024 Transient global Amnesia  4/2024 post-concussive syndrome, viral syndrome with subsequent     HTN  losartan 25  Stable, doing well  No CP/HA/SOB/Vision changes     New rash  But also possibly psoriasis dx as a kid, in remission,not current issue before now  Some itching  Trial steroid cream    Hx of Sarcoidosis diagnosis in 1991  -Resolved, potentially erroneous diagnosis  -Also was treated as tb at time time  -Environmental exposure in [place]  -Abn chest CT findings stable    Carpal tunnel resolved, some triggers    Prediabetes  -stable, counseled    Vit D insufficiency  -taking supplement 2000 mg  recheck    Hx of 5 mm Stone in left kidney  Hx of Prior imaging with possible hepatic steatosis    Recent bilat epicondylitis  S/p injections doing well    Medical History  Past Medical History:   Diagnosis Date    History of Clostridium difficile colitis     treated 2014    History of sarcoidosis     dx 1991, resolved    Hyperlipidemia     Hypertension     OTTONIEL (obstructive sleep apnea)        Review of Systems   Constitutional:  Negative for appetite change and diaphoresis.   HENT:  Negative for trouble swallowing.    Respiratory:  Negative for shortness of breath.    Cardiovascular:  Negative for chest pain.   Gastrointestinal:  Negative for diarrhea, nausea and vomiting.   Musculoskeletal:  Negative for gait problem.   Skin:  Positive for rash (right shoulder, left forearm).   Neurological:  Negative for dizziness and seizures.   Psychiatric/Behavioral:  Negative for hallucinations.          Surgical hx, family hx, social hx   Have been reviewed      Current Outpatient  Medications:     aspirin (ECOTRIN) 81 MG EC tablet, Take 1 tablet (81 mg total) by mouth once daily., Disp: 30 tablet, Rfl: 0    celecoxib (CELEBREX) 200 MG capsule, Take 1 capsule (200 mg total) by mouth once daily., Disp: 90 capsule, Rfl: 3    ergocalciferol (VITAMIN D2) 50,000 unit Cap, Take 1 capsule (50,000 Units total) by mouth every 7 days., Disp: 12 capsule, Rfl: 1    losartan (COZAAR) 25 MG tablet, Take 1 tablet (25 mg total) by mouth once daily., Disp: 90 tablet, Rfl: 3    omeprazole (PRILOSEC) 40 MG capsule, TAKE 1 CAPSULE BY MOUTH EVERY MORNING AS DIRECTED, Disp: 90 capsule, Rfl: 3    ondansetron (ZOFRAN-ODT) 4 MG TbDL, Take 1 tablet (4 mg total) by mouth every 8 (eight) hours as needed (nausea). nausea. can worsen constipation, Disp: 20 tablet, Rfl: 0    ondansetron (ZOFRAN-ODT) 4 MG TbDL, Dissolve 1 tablet (4 mg total) by mouth every 8 (eight) hours as needed (nausea)., Disp: 8 tablet, Rfl: 0    rosuvastatin (CRESTOR) 40 MG Tab, Take 1 tablet (40 mg total) by mouth every evening., Disp: 90 tablet, Rfl: 3    sertraline (ZOLOFT) 50 MG tablet, Take 1 tablet (50 mg total) by mouth once daily., Disp: 90 tablet, Rfl: 3    tadalafiL (CIALIS) 20 MG Tab, Take 1 tablet (20 mg total) by mouth once daily., Disp: 30 tablet, Rfl: 5    traMADoL (ULTRAM) 50 mg tablet, Take 1-2 tablets ( mg total) by mouth every 6 (six) hours as needed for Pain., Disp: 21 tablet, Rfl: 0    zolpidem (AMBIEN) 10 mg Tab, Take 1 tablet (10 mg total) by mouth nightly as needed (insomnia)., Disp: 14 tablet, Rfl: 0    tretinoin (RETIN-A) 0.05 % cream, Apply topically to the skin every night (Patient not taking: Reported on 10/10/2024), Disp: 20 g, Rfl: 0    triamcinolone acetonide 0.1% (KENALOG) 0.1 % cream, Apply topically 2 (two) times daily as needed (skin changes)., Disp: 30 g, Rfl: 0    zonisamide (ZONEGRAN) 100 MG Cap, Take 1 at bedtime for 3 days, then 2 at bedtime for 3 days, then 3 at bedtime for 3 days, then 4 at bedtime to  "follow. Stay at the most comfortable dose. (Patient not taking: Reported on 10/10/2024), Disp: 360 capsule, Rfl: 5    Objective:        Body mass index is 32.99 kg/m².  Vitals:    10/10/24 1453   BP: 110/80   Pulse: 93   SpO2: (!) 93%   Weight: 107.3 kg (236 lb 8.9 oz)   Height: 5' 11" (1.803 m)   PainSc:   2   PainLoc: Knee     Physical Exam  Vitals and nursing note reviewed.   Constitutional:       General: He is not in acute distress.     Appearance: Normal appearance. He is not ill-appearing.   HENT:      Head: Normocephalic and atraumatic.   Eyes:      General: No scleral icterus.  Cardiovascular:      Rate and Rhythm: Normal rate and regular rhythm.      Heart sounds: Normal heart sounds.   Pulmonary:      Effort: Pulmonary effort is normal.   Abdominal:      Palpations: There is no mass.      Tenderness: There is no abdominal tenderness.   Musculoskeletal:         General: No deformity.   Neurological:      Mental Status: He is alert and oriented to person, place, and time.   Psychiatric:         Behavior: Behavior normal.           Lab Results   Component Value Date    WBC 3.56 (L) 06/24/2024    HGB 14.9 06/24/2024    HCT 44.5 06/24/2024     06/24/2024    CHOL 170 06/23/2024    TRIG 143 06/23/2024    HDL 41 06/23/2024    ALT 25 06/24/2024    AST 20 06/24/2024     06/24/2024    K 4.0 06/24/2024     06/24/2024    CREATININE 0.8 06/24/2024    BUN 14 06/24/2024    CO2 24 06/24/2024    TSH 1.258 06/23/2024    PSA 0.21 03/15/2024    INR 1.1 06/24/2024    HGBA1C 5.5 06/23/2024       The 10-year ASCVD risk score (Davina BARILLAS, et al., 2019) is: 7.7%    Values used to calculate the score:      Age: 59 years      Sex: Male      Is Non- : No      Diabetic: No      Tobacco smoker: No      Systolic Blood Pressure: 110 mmHg      Is BP treated: Yes      HDL Cholesterol: 41 mg/dL      Total Cholesterol: 170 mg/dL    (Imaging have been independently reviewed)    US, MRI brain, " MRA    Assessment:         1. Annual physical exam    2. Recent skin changes    3. Vitamin D insufficiency    4. Prediabetes    5. Primary hypertension    6. Erectile dysfunction, unspecified erectile dysfunction type    7. At risk for bleeding    8. Pure hypercholesterolemia    9. Chronic familial neutropenia    10. History of sarcoidosis    11. OTTONIEL (obstructive sleep apnea)            Plan:     Corazon was seen today for hypertension.    Diagnoses and all orders for this visit:    Annual physical exam  -     CBC Without Differential; Future  -     Lipid Panel; Future  -     TSH; Future    Recent skin changes  -     triamcinolone acetonide 0.1% (KENALOG) 0.1 % cream; Apply topically 2 (two) times daily as needed (skin changes).    Vitamin D insufficiency  -     Vitamin D; Future    Prediabetes  -     Hemoglobin A1C; Future    Primary hypertension  -     COMPREHENSIVE METABOLIC PANEL; Future    Erectile dysfunction, unspecified erectile dysfunction type  -     TESTOSTERONE; Future    At risk for bleeding  -     CBC Without Differential; Future    Pure hypercholesterolemia  -     Lipid Panel; Future    Chronic familial neutropenia    History of sarcoidosis    OTTONIEL (obstructive sleep apnea)      Potential psoriasis- trial triamcinolone cream in addition to annual  Vaccines encouraged      Health Maintenance  - Lipids:   - A1C:   - Colon Ca Screen:  11/2020 [facility], repeat 5 years  - Immunizations: encouraged PNA, shingles, covid, flu, RSV. Pt will consider  - PSA normal : normal 3/2024    Follow up in about 1 year (around 10/10/2025).        All medications were reviewed including potential side effects and risks/benefits.  Pt was counseled to call back if anything worsens or if questions arise.    Baldomero Schwab MD  Family Medicine  [facility]  [address] [address]  Phone [phone]  Fax [phone]

## 2024-10-10 NOTE — PROGRESS NOTES
CC: Right knee pain    History of present illness: Pt is here today for post-operative followup of knee arthroscopy.  he is doing well.  We have reviewed his findings and discussed plan of care and future treatment options.                                        He is doing well.   Pain at 5/10 today.   Doing PT here. No issues reported.       DATE OF PROCEDURE: 10/01/2024  SURGEON:  Laura Wisdom M.D  PROCEDURE PERFORMED:   right  1. knee arthroscopic chondroplasty (CPT 09558)  2. knee arthroscopic medial and lateral (CPT 07071) meniscectomy   3. knee arthroscopic partial synovectomy/debridement (CPT 65671).   4. knee arthroscopic plica excision(CPT 75982).    5. Knee arthroscopic lysis of adhesions and anterior interval release (CPT 57887)  6. Knee arthroscopic loose body removal (CPT 94690)     In the patellofemoral compartment, there was chondral damage to:  Trochlea 10 x 5 mm grade 3  Chondroplasty was performed using arthroscopic shaver.     There was chondral damage to:  Medial femoral condyle 30 x 30 mm grade 3  Chondroplasty was performed using arthroscopic shaver.     There was chondral damage to:  Lateral femoral condyle 10 x 20 mm grade 3  Chondroplasty was performed using arthroscopic shaver.                            PHYSICAL EXAMINATION:     Incision sites healed well  No evidence of any erythema, infection or induration  Range of motion 0-120 degrees  Mild effusion  2+ DP pulse  No swelling, no calf tenderness  - Valery's sign  Negative medial joint line tenderness  Moderate quad atrophy                                                                                 ASSESSMENT:                                                                                                                                               1. Status post above, doing well.                                                                                                                               PLAN:                                                                                                                                                      1. Continue with PT  2. Emphasized quad function.  3. I have discussed return to activity in detail.  4.Patient will see us back in 4 weeks                                    5. Discussed case with PT.   All questions were answered, surgical technique was reviewed and interpreted, and patient should contact us with any questions or concerns in the interim.

## 2024-10-11 ENCOUNTER — CLINICAL SUPPORT (OUTPATIENT)
Dept: REHABILITATION | Facility: HOSPITAL | Age: 59
End: 2024-10-11
Payer: COMMERCIAL

## 2024-10-11 ENCOUNTER — LAB VISIT (OUTPATIENT)
Dept: LAB | Facility: HOSPITAL | Age: 59
End: 2024-10-11
Attending: STUDENT IN AN ORGANIZED HEALTH CARE EDUCATION/TRAINING PROGRAM
Payer: COMMERCIAL

## 2024-10-11 DIAGNOSIS — Z00.00 ANNUAL PHYSICAL EXAM: ICD-10-CM

## 2024-10-11 DIAGNOSIS — R73.03 PREDIABETES: ICD-10-CM

## 2024-10-11 DIAGNOSIS — E78.00 PURE HYPERCHOLESTEROLEMIA: Chronic | ICD-10-CM

## 2024-10-11 DIAGNOSIS — E55.9 VITAMIN D INSUFFICIENCY: ICD-10-CM

## 2024-10-11 DIAGNOSIS — Z91.89 AT RISK FOR BLEEDING: ICD-10-CM

## 2024-10-11 DIAGNOSIS — M25.561 ACUTE PAIN OF RIGHT KNEE: Primary | ICD-10-CM

## 2024-10-11 DIAGNOSIS — I10 PRIMARY HYPERTENSION: Chronic | ICD-10-CM

## 2024-10-11 DIAGNOSIS — N52.9 ERECTILE DYSFUNCTION, UNSPECIFIED ERECTILE DYSFUNCTION TYPE: ICD-10-CM

## 2024-10-11 LAB
25(OH)D3+25(OH)D2 SERPL-MCNC: 33 NG/ML (ref 30–96)
ALBUMIN SERPL BCP-MCNC: 3.7 G/DL (ref 3.5–5.2)
ALP SERPL-CCNC: 79 U/L (ref 55–135)
ALT SERPL W/O P-5'-P-CCNC: 23 U/L (ref 10–44)
ANION GAP SERPL CALC-SCNC: 9 MMOL/L (ref 8–16)
AST SERPL-CCNC: 22 U/L (ref 10–40)
BILIRUB SERPL-MCNC: 0.4 MG/DL (ref 0.1–1)
BUN SERPL-MCNC: 22 MG/DL (ref 6–20)
CALCIUM SERPL-MCNC: 9.5 MG/DL (ref 8.7–10.5)
CHLORIDE SERPL-SCNC: 108 MMOL/L (ref 95–110)
CHOLEST SERPL-MCNC: 154 MG/DL (ref 120–199)
CHOLEST/HDLC SERPL: 3.5 {RATIO} (ref 2–5)
CO2 SERPL-SCNC: 25 MMOL/L (ref 23–29)
CREAT SERPL-MCNC: 0.9 MG/DL (ref 0.5–1.4)
ERYTHROCYTE [DISTWIDTH] IN BLOOD BY AUTOMATED COUNT: 13.8 % (ref 11.5–14.5)
EST. GFR  (NO RACE VARIABLE): >60 ML/MIN/1.73 M^2
ESTIMATED AVG GLUCOSE: 114 MG/DL (ref 68–131)
GLUCOSE SERPL-MCNC: 111 MG/DL (ref 70–110)
HBA1C MFR BLD: 5.6 % (ref 4–5.6)
HCT VFR BLD AUTO: 45 % (ref 40–54)
HDLC SERPL-MCNC: 44 MG/DL (ref 40–75)
HDLC SERPL: 28.6 % (ref 20–50)
HGB BLD-MCNC: 14.8 G/DL (ref 14–18)
LDLC SERPL CALC-MCNC: 89.2 MG/DL (ref 63–159)
MCH RBC QN AUTO: 30.1 PG (ref 27–31)
MCHC RBC AUTO-ENTMCNC: 32.9 G/DL (ref 32–36)
MCV RBC AUTO: 92 FL (ref 82–98)
NONHDLC SERPL-MCNC: 110 MG/DL
PLATELET # BLD AUTO: 272 K/UL (ref 150–450)
PMV BLD AUTO: 9.9 FL (ref 9.2–12.9)
POTASSIUM SERPL-SCNC: 4.5 MMOL/L (ref 3.5–5.1)
PROT SERPL-MCNC: 7 G/DL (ref 6–8.4)
RBC # BLD AUTO: 4.91 M/UL (ref 4.6–6.2)
SODIUM SERPL-SCNC: 142 MMOL/L (ref 136–145)
TESTOST SERPL-MCNC: 599 NG/DL (ref 304–1227)
TRIGL SERPL-MCNC: 104 MG/DL (ref 30–150)
TSH SERPL DL<=0.005 MIU/L-ACNC: 1.13 UIU/ML (ref 0.4–4)
WBC # BLD AUTO: 3.75 K/UL (ref 3.9–12.7)

## 2024-10-11 PROCEDURE — 83036 HEMOGLOBIN GLYCOSYLATED A1C: CPT | Performed by: STUDENT IN AN ORGANIZED HEALTH CARE EDUCATION/TRAINING PROGRAM

## 2024-10-11 PROCEDURE — 97530 THERAPEUTIC ACTIVITIES: CPT | Performed by: PHYSICAL THERAPIST

## 2024-10-11 PROCEDURE — 97112 NEUROMUSCULAR REEDUCATION: CPT | Performed by: PHYSICAL THERAPIST

## 2024-10-11 PROCEDURE — 82306 VITAMIN D 25 HYDROXY: CPT | Performed by: STUDENT IN AN ORGANIZED HEALTH CARE EDUCATION/TRAINING PROGRAM

## 2024-10-11 PROCEDURE — 80061 LIPID PANEL: CPT | Performed by: STUDENT IN AN ORGANIZED HEALTH CARE EDUCATION/TRAINING PROGRAM

## 2024-10-11 PROCEDURE — 85027 COMPLETE CBC AUTOMATED: CPT | Performed by: STUDENT IN AN ORGANIZED HEALTH CARE EDUCATION/TRAINING PROGRAM

## 2024-10-11 PROCEDURE — 97140 MANUAL THERAPY 1/> REGIONS: CPT | Performed by: PHYSICAL THERAPIST

## 2024-10-11 PROCEDURE — 80053 COMPREHEN METABOLIC PANEL: CPT | Performed by: STUDENT IN AN ORGANIZED HEALTH CARE EDUCATION/TRAINING PROGRAM

## 2024-10-11 PROCEDURE — 84403 ASSAY OF TOTAL TESTOSTERONE: CPT | Performed by: STUDENT IN AN ORGANIZED HEALTH CARE EDUCATION/TRAINING PROGRAM

## 2024-10-11 PROCEDURE — 84443 ASSAY THYROID STIM HORMONE: CPT | Performed by: STUDENT IN AN ORGANIZED HEALTH CARE EDUCATION/TRAINING PROGRAM

## 2024-10-11 NOTE — PROGRESS NOTES
"[facility]   Physical Therapy Treatment Note      Name: Corazon Maldonado [facility]  Clinic Number: [id number]    Therapy Diagnosis:   Encounter Diagnosis   Name Primary?    Acute pain of right knee Yes       Physician: Saran Zhao III, *    Visit Date: 10/11/2024    Physician Orders: PT Eval and Treat   Medical Diagnosis from Referral:   Diagnosis   S83.241A (ICD-10-CM) - Acute medial meniscus tear of right knee, initial encounter   M94.261 (ICD-10-CM) - Chondromalacia of right knee   M17.12 (ICD-10-CM) - Primary osteoarthritis of left knee      Evaluation Date: 10/2/2024Authorization Period Expiration: 9/6/2025  Plan of Care Expiration: 4/2/2025  Progress Note Due: 2/2/2025  Visit # / Visits authorized: 5/20   FOTO: 1/3    PTA Visit #: 0/5     Time In: 900  Time Out: 950  Total Billable Time: 50 minutes     Subjective     Pt reports: Leaving on Monday morning. Notes that his knee felt good after last session. Still very sensitive nerve over the anterior tib    He was compliant with home exercise program.  Response to previous treatment: increased swelling and pain  Functional change: ambulating no AD    Pain: 6/10  Location: bilateral knee      Objective      Objective Measures updated at progress report unless specified.     Knee   R: at knee 16.75in / 4in above 18.5  L: at knee 16 in/ 4 in above 19.25    10/9/24:  Atrophy noted to the R quad on arrival  Hit head with scab on forehead - notes no loss of consciousness  Edema noted to the knee - addressed by PA-c within session    Treatment     Corazon received the treatments listed below:      therapeutic exercises to develop strength, endurance, ROM, flexibility, posture, and core stabilization for 0 minutes including:  GSS strap 30" 5x    manual therapy techniques: Joint mobilizations, Manual traction, Myofacial release, Manual Lymphatic Drainage, Soft tissue Mobilization, and Friction Massage were applied to the: R knee for 15 minutes, " including:  Patellar mobs all planes  Gr III flexion with gapping  Gr IV AP femur  Medial/lateral gapping   Knee flexion EOT gapping-nt  Inferior mob with knee flexion EOT-nt  Distraction seated EOT-nt  Compression bandage collier-nt    neuromuscular re-education activities to improve: Balance, Coordination, Kinesthetic, Sense, Proprioception, and Posture for 20 minutes. The following activities were included:  NMES LAQ 4# 10'  NMES SLR 10'    therapeutic activities to improve functional performance for 15 minutes, including:  Bike 10' level 8 for knee ROM, cardio endurance training, and swelling reduction in akanksha knees  Shuttle 3 bands DL 3  x 12  Shuttle 2 bands SL 3 x 12  Lateral walks GTB 3 laps ladder  TKE BTB 30x-nt    gait training to improve functional mobility and safety for 0  minutes, including:      cold pack for 10 minutes to R knee.    Patient Education and Home Exercises       Education provided:   - importance of restoring knee extension  - quad sets every hour  - s/s of infection and DVT    Written Home Exercises Provided: YES. Exercises were reviewed and Corazon was able to demonstrate them prior to the end of the session.  Corazon demonstrated good understanding of the education provided. See EMR under Patient Instructions for exercises provided during therapy sessions    Assessment     Limited fat pad mobility today on arrival. Improved tolerance to loading on the shuttle today. Patient to continue HEP independently on his trip. Will continue to progress next month in the clinic.     Corazon Is progressing well towards his goals.   Pt prognosis is Excellent.     Pt will continue to benefit from skilled outpatient physical therapy to address the deficits listed in the problem list box on initial evaluation, provide pt/family education and to maximize pt's level of independence in the home and community environment.     Pt's spiritual, cultural and educational needs considered and pt agreeable to plan of  care and goals.     Anticipated barriers to physical therapy: work schedule    GOALS: Short Term Goals:  6 weeks  1.Report decreased knee pain  < / =  0/10  to increase tolerance for return to (Progressing, not met)  2. Increase knee ROM to 0-130 in order to be able to perform ADLs without difficulty.(Progressing, not met)  3. Increase strength by 1/3 MMT grade in quad  to increase tolerance for ADL and work activities.(Progressing, not met)  4. Pt to tolerate HEP to improve ROM and independence with ADL's(Progressing, not met)     Long Term Goals: 12 weeks  1.Report decreased knee pain < / = 0/10  to increase tolerance for return to gym/exercise(Progressing, not met)  2.Patient goal: return to work without knee pain(Progressing, not met)  3.Increase strength to >/= 4+/5 in quad and glute  to increase tolerance for ADL and work activities.(Progressing, not met)  4. Pt will report at CJ level (20-40% impaired) on FOTO knee to demonstrate increase in LE function with every day tasks.(Progressing, not met)      Plan     Plan of care Certification: 10/2/2024 to 4/2/2025.     Progress quad strength and ROM    Crescencio Amin, PT, DPT, OCS, FAAOMPT

## 2024-10-20 DIAGNOSIS — R23.9 RECENT SKIN CHANGES: ICD-10-CM

## 2024-10-20 NOTE — TELEPHONE ENCOUNTER
Refill Routing Note   Medication(s) are not appropriate for processing by [facility] for the following reason(s):        New or recently adjusted medication    [facility] action(s):  Defer               Appointments  past 12m or future 3m with PCP    Date Provider   Last Visit   10/10/2024 Baldomero Schwab MD   Next Visit   Visit date not found Baldomero Schwab MD   ED visits in past 90 days: 0        Note composed:4:25 PM 10/20/2024

## 2024-10-20 NOTE — TELEPHONE ENCOUNTER
No care due was identified.  [facility] Embedded Care Due Messages. Reference number: [id number].   10/20/2024 5:08:45 AM CDT

## 2024-10-21 RX ORDER — TRIAMCINOLONE ACETONIDE 1 MG/G
CREAM TOPICAL 2 TIMES DAILY PRN
Qty: 80 G | Refills: 1 | Status: SHIPPED | OUTPATIENT
Start: 2024-10-21

## 2024-11-05 ENCOUNTER — CLINICAL SUPPORT (OUTPATIENT)
Dept: REHABILITATION | Facility: HOSPITAL | Age: 59
End: 2024-11-05
Payer: COMMERCIAL

## 2024-11-05 DIAGNOSIS — M25.561 ACUTE PAIN OF RIGHT KNEE: Primary | ICD-10-CM

## 2024-11-05 PROCEDURE — 97140 MANUAL THERAPY 1/> REGIONS: CPT | Performed by: PHYSICAL THERAPIST

## 2024-11-06 NOTE — PROGRESS NOTES
"[facility]   Physical Therapy Treatment Note      Name: Corazon Maldonado [facility]  Clinic Number: [id number]    Therapy Diagnosis:   Encounter Diagnosis   Name Primary?    Acute pain of right knee Yes       Physician: Saran Zhao III, *    Visit Date: 11/5/2024    Physician Orders: PT Eval and Treat   Medical Diagnosis from Referral:   Diagnosis   S83.241A (ICD-10-CM) - Acute medial meniscus tear of right knee, initial encounter   M94.261 (ICD-10-CM) - Chondromalacia of right knee   M17.12 (ICD-10-CM) - Primary osteoarthritis of left knee      Evaluation Date: 10/2/2024Authorization Period Expiration: 9/6/2025  Plan of Care Expiration: 4/2/2025  Progress Note Due: 2/2/2025  Visit # / Visits authorized: 6/20   FOTO: 1/3    PTA Visit #: 0/5     Time In: 1630  Time Out: 1700  Total Billable Time: 30 minutes     Subjective     Pt reports: Just got back from [place] and notes the motion is coming along well. Hit his knee the other day. Notes the edema in the knee is better today but was bad after his flight. Notes knee flexion is his limiting factor, extension is good.     He was compliant with home exercise program.  Response to previous treatment: increased swelling and pain  Functional change: ambulating no AD    Pain: 6/10  Location: bilateral knee      Objective      Objective Measures updated at progress report unless specified.     Knee   R: at knee 16.75in / 4in above 18.5  L: at knee 16 in/ 4 in above 19.25    10/9/24:  Atrophy noted to the R quad on arrival  Hit head with scab on forehead - notes no loss of consciousness  Edema noted to the knee - addressed by PARadhac within session    Treatment     Corazon received the treatments listed below:      therapeutic exercises to develop strength, endurance, ROM, flexibility, posture, and core stabilization for 0 minutes including:  GSS strap 30" 5x    manual therapy techniques: Joint mobilizations, Manual traction, Myofacial release, Manual " Lymphatic Drainage, Soft tissue Mobilization, and Friction Massage were applied to the: R knee for 30 minutes, including:  Patellar mobs all planes  Gr III flexion with gapping  Gr IV AP femur  Medial/lateral gapping   Knee flexion quadruped with towel gapping  Inferior mob with knee flexion  HHR towel gapping    neuromuscular re-education activities to improve: Balance, Coordination, Kinesthetic, Sense, Proprioception, and Posture for 0 minutes. The following activities were included:  NMES LAQ 4# 10'  NMES SLR 10'    therapeutic activities to improve functional performance for 0 minutes, including:    Bike 10' level 8 for knee ROM, cardio endurance training, and swelling reduction in akanksha knees  Shuttle 3 bands DL 3  x 12  Shuttle 2 bands SL 3 x 12  Lateral walks GTB 3 laps ladder  TKE BTB 30x-nt    gait training to improve functional mobility and safety for 0  minutes, including:      cold pack for 0 minutes to R knee.    Patient Education and Home Exercises       Education provided:   - importance of restoring knee extension  - quad sets every hour  - s/s of infection and DVT    Written Home Exercises Provided: YES. Exercises were reviewed and Corazon was able to demonstrate them prior to the end of the session.  Corazon demonstrated good understanding of the education provided. See EMR under Patient Instructions for exercises provided during therapy sessions    Assessment     Presented today with limited patellar mobility and tibial IR. Improved with mobs but he reports fatigue from time change with flights. Patient flexion improved within session today.   Corazon Is progressing well towards his goals.   Pt prognosis is Excellent.     Pt will continue to benefit from skilled outpatient physical therapy to address the deficits listed in the problem list box on initial evaluation, provide pt/family education and to maximize pt's level of independence in the home and community environment.     Pt's spiritual, cultural  and educational needs considered and pt agreeable to plan of care and goals.     Anticipated barriers to physical therapy: work schedule    GOALS: Short Term Goals:  6 weeks  1.Report decreased knee pain  < / =  0/10  to increase tolerance for return to (Progressing, not met)  2. Increase knee ROM to 0-130 in order to be able to perform ADLs without difficulty.(Progressing, not met)  3. Increase strength by 1/3 MMT grade in quad  to increase tolerance for ADL and work activities.(Progressing, not met)  4. Pt to tolerate HEP to improve ROM and independence with ADL's(Progressing, not met)     Long Term Goals: 12 weeks  1.Report decreased knee pain < / = 0/10  to increase tolerance for return to gym/exercise(Progressing, not met)  2.Patient goal: return to work without knee pain(Progressing, not met)  3.Increase strength to >/= 4+/5 in quad and glute  to increase tolerance for ADL and work activities.(Progressing, not met)  4. Pt will report at CJ level (20-40% impaired) on FOTO knee to demonstrate increase in LE function with every day tasks.(Progressing, not met)      Plan     Plan of care Certification: 10/2/2024 to 4/2/2025.     Progress quad strength and ROM    Crescencio Amin, PT, DPT, OCS, FAAOMPT

## 2024-11-13 ENCOUNTER — CLINICAL SUPPORT (OUTPATIENT)
Dept: REHABILITATION | Facility: HOSPITAL | Age: 59
End: 2024-11-13
Payer: COMMERCIAL

## 2024-11-13 ENCOUNTER — OFFICE VISIT (OUTPATIENT)
Dept: SPORTS MEDICINE | Facility: CLINIC | Age: 59
End: 2024-11-13
Payer: COMMERCIAL

## 2024-11-13 DIAGNOSIS — M25.561 ACUTE PAIN OF RIGHT KNEE: Primary | ICD-10-CM

## 2024-11-13 DIAGNOSIS — Z98.890 S/P ARTHROSCOPIC SURGERY OF RIGHT KNEE: Primary | ICD-10-CM

## 2024-11-13 PROCEDURE — 97140 MANUAL THERAPY 1/> REGIONS: CPT | Performed by: PHYSICAL THERAPIST

## 2024-11-13 PROCEDURE — 97530 THERAPEUTIC ACTIVITIES: CPT | Performed by: PHYSICAL THERAPIST

## 2024-11-13 NOTE — PROGRESS NOTES
"[facility]   Physical Therapy Treatment Note      Name: Corazon Maldonado [facility]  Clinic Number: [id number]    Therapy Diagnosis:   Encounter Diagnosis   Name Primary?    Acute pain of right knee Yes       Physician: Saran Zhao III, *    Visit Date: 11/13/2024    Physician Orders: PT Eval and Treat   Medical Diagnosis from Referral:   Diagnosis   S83.241A (ICD-10-CM) - Acute medial meniscus tear of right knee, initial encounter   M94.261 (ICD-10-CM) - Chondromalacia of right knee   M17.12 (ICD-10-CM) - Primary osteoarthritis of left knee      Evaluation Date: 10/2/2024Authorization Period Expiration: 9/6/2025  Plan of Care Expiration: 4/2/2025  Progress Note Due: 2/2/2025  Visit # / Visits authorized: 7/20   FOTO: 1/3    PTA Visit #: 0/5     Time In: 710  Time Out: 805  Total Billable Time: 55 minutes     Subjective     Pt reports: Better this week, notes less swelling but it still causes him pain to sleep at night. Tender under the incisions    He was compliant with home exercise program.  Response to previous treatment: increased swelling and pain  Functional change: ambulating no AD    Pain: 6/10  Location: bilateral knee      Objective      Objective Measures updated at progress report unless specified.     Knee   R: at knee 16.75in / 4in above 18.5  L: at knee 16 in/ 4 in above 19.25    10/9/24:  Atrophy noted to the R quad on arrival  Hit head with scab on forehead - notes no loss of consciousness  Edema noted to the knee - addressed by PA-c within session    Treatment     Corazon received the treatments listed below:      therapeutic exercises to develop strength, endurance, ROM, flexibility, posture, and core stabilization for 0 minutes including:  GSS strap 30" 5x    manual therapy techniques: Joint mobilizations, Manual traction, Myofacial release, Manual Lymphatic Drainage, Soft tissue Mobilization, and Friction Massage were applied to the: R knee for 20 minutes, " including:  Patellar mobs all planes  Gr III flexion with gapping  Gr IV AP femur  Medial/lateral gapping   Knee flexion quadruped with towel gapping  Inferior mob with knee flexion  HHR towel gapping    neuromuscular re-education activities to improve: Balance, Coordination, Kinesthetic, Sense, Proprioception, and Posture for 0 minutes. The following activities were included:  NMES LAQ 4# 10'  NMES SLR 10'    therapeutic activities to improve functional performance for 35 minutes, including:    Bike 10' level 8 for knee ROM, cardio endurance training, and swelling reduction in akanksha knees-nt  Shuttle 6 bands DL 3  x 12  Shuttle 3 bands SL 3 x 12  Lateral walks BTB 3 laps ladder  TKE BTB 30x-nt    gait training to improve functional mobility and safety for 0  minutes, including:      cold pack for 0 minutes to R knee.    Patient Education and Home Exercises       Education provided:   - importance of restoring knee extension  - quad sets every hour  - s/s of infection and DVT    Written Home Exercises Provided: YES. Exercises were reviewed and Corazon was able to demonstrate them prior to the end of the session.  Corazon demonstrated good understanding of the education provided. See EMR under Patient Instructions for exercises provided during therapy sessions    Assessment     Continues to get some discomfort end range flexion in the knee but does well with manual gapping. Increased load on shuttle well tolerated and banded walks.   Corazon Is progressing well towards his goals.   Pt prognosis is Excellent.     Pt will continue to benefit from skilled outpatient physical therapy to address the deficits listed in the problem list box on initial evaluation, provide pt/family education and to maximize pt's level of independence in the home and community environment.     Pt's spiritual, cultural and educational needs considered and pt agreeable to plan of care and goals.     Anticipated barriers to physical therapy: work  schedule    GOALS: Short Term Goals:  6 weeks  1.Report decreased knee pain  < / =  0/10  to increase tolerance for return to (Progressing, not met)  2. Increase knee ROM to 0-130 in order to be able to perform ADLs without difficulty.(Progressing, not met)  3. Increase strength by 1/3 MMT grade in quad  to increase tolerance for ADL and work activities.(Progressing, not met)  4. Pt to tolerate HEP to improve ROM and independence with ADL's(Progressing, not met)     Long Term Goals: 12 weeks  1.Report decreased knee pain < / = 0/10  to increase tolerance for return to gym/exercise(Progressing, not met)  2.Patient goal: return to work without knee pain(Progressing, not met)  3.Increase strength to >/= 4+/5 in quad and glute  to increase tolerance for ADL and work activities.(Progressing, not met)  4. Pt will report at CJ level (20-40% impaired) on FOTO knee to demonstrate increase in LE function with every day tasks.(Progressing, not met)      Plan     Plan of care Certification: 10/2/2024 to 4/2/2025.     Progress quad strength and ROM    Crescencio Amin, PT, DPT, OCS, FAAOMPT

## 2024-11-13 NOTE — PROGRESS NOTES
CC: Right knee pain    History of present illness: Pt is here today for post-operative followup of knee arthroscopy.  he is doing well.  We have reviewed his findings and discussed plan of care and future treatment options.                                        He is doing well.   Pain at 1-2/10 today.   Doing PT here. No issues reported.       DATE OF PROCEDURE: 10/01/2024  SURGEON:  Laura Wisdom M.D  PROCEDURE PERFORMED:   right  1. knee arthroscopic chondroplasty (CPT 01053)  2. knee arthroscopic medial and lateral (CPT 71252) meniscectomy   3. knee arthroscopic partial synovectomy/debridement (CPT 65296).   4. knee arthroscopic plica excision(CPT 11935).    5. Knee arthroscopic lysis of adhesions and anterior interval release (CPT 13706)  6. Knee arthroscopic loose body removal (CPT 66431)     In the patellofemoral compartment, there was chondral damage to:  Trochlea 10 x 5 mm grade 3  Chondroplasty was performed using arthroscopic shaver.     There was chondral damage to:  Medial femoral condyle 30 x 30 mm grade 3  Chondroplasty was performed using arthroscopic shaver.     There was chondral damage to:  Lateral femoral condyle 10 x 20 mm grade 3  Chondroplasty was performed using arthroscopic shaver.                            PHYSICAL EXAMINATION:     Incision sites healed well  No evidence of any erythema, infection or induration  Range of motion 0-130 degrees  Mild effusion  2+ DP pulse  No swelling, no calf tenderness  - Valery's sign  + mild medial joint line tenderness  Moderate quad atrophy                                                                                 ASSESSMENT:                                                                                                                                               1. Status post above, doing well.                                                                                                                               PLAN:                                                                                                                                                      1. Continue with PT  2. Emphasized quad function.  3. I have discussed return to activity in detail.  4.Patient will see us back PRN                                5. Discussed case with PT.   All questions were answered, surgical technique was reviewed and interpreted, and patient should contact us with any questions or concerns in the interim.           6. Posssible HA injections in future vs PRP

## 2024-11-19 ENCOUNTER — OFFICE VISIT (OUTPATIENT)
Dept: INTERNAL MEDICINE | Facility: CLINIC | Age: 59
End: 2024-11-19
Payer: COMMERCIAL

## 2024-11-19 VITALS
BODY MASS INDEX: 33.92 KG/M2 | OXYGEN SATURATION: 95 % | HEART RATE: 75 BPM | DIASTOLIC BLOOD PRESSURE: 71 MMHG | WEIGHT: 242.31 LBS | HEIGHT: 71 IN | SYSTOLIC BLOOD PRESSURE: 122 MMHG

## 2024-11-19 DIAGNOSIS — J02.9 ACUTE VIRAL PHARYNGITIS: ICD-10-CM

## 2024-11-19 DIAGNOSIS — J02.9 SORE THROAT: Primary | ICD-10-CM

## 2024-11-19 LAB
CTP QC/QA: YES
POC MOLECULAR INFLUENZA A AGN: NEGATIVE
POC MOLECULAR INFLUENZA B AGN: NEGATIVE
S PYO RRNA THROAT QL PROBE: NEGATIVE
SARS-COV-2 RDRP RESP QL NAA+PROBE: NEGATIVE

## 2024-11-19 PROCEDURE — 87635 SARS-COV-2 COVID-19 AMP PRB: CPT | Mod: QW,S$GLB,,

## 2024-11-19 PROCEDURE — 3078F DIAST BP <80 MM HG: CPT | Mod: CPTII,S$GLB,,

## 2024-11-19 PROCEDURE — 3044F HG A1C LEVEL LT 7.0%: CPT | Mod: CPTII,S$GLB,,

## 2024-11-19 PROCEDURE — 87880 STREP A ASSAY W/OPTIC: CPT | Mod: QW,S$GLB,,

## 2024-11-19 PROCEDURE — 3008F BODY MASS INDEX DOCD: CPT | Mod: CPTII,S$GLB,,

## 2024-11-19 PROCEDURE — 99214 OFFICE O/P EST MOD 30 MIN: CPT | Mod: S$GLB,,,

## 2024-11-19 PROCEDURE — 99999 PR PBB SHADOW E&M-EST. PATIENT-LVL IV: CPT | Mod: PBBFAC,,,

## 2024-11-19 PROCEDURE — 1159F MED LIST DOCD IN RCRD: CPT | Mod: CPTII,S$GLB,,

## 2024-11-19 PROCEDURE — 4010F ACE/ARB THERAPY RXD/TAKEN: CPT | Mod: CPTII,S$GLB,,

## 2024-11-19 PROCEDURE — 87502 INFLUENZA DNA AMP PROBE: CPT | Mod: QW,S$GLB,,

## 2024-11-19 PROCEDURE — 3074F SYST BP LT 130 MM HG: CPT | Mod: CPTII,S$GLB,,

## 2024-11-19 NOTE — PROGRESS NOTES
INTERNAL MEDICINE  [facility]    Reason for visit:   Chief Complaint   Patient presents with    Sore Throat     Pt states over the last couple days he has been having a sore throat.    Mouth Lesions     HPI: Corazon Jones is a 59 y.o. male presenting today for sore throat and possible mouth injury.    Patient is an established patient of PCP, Dr. Baldomero Schwab MD. This patient is new to me.    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 He denies associated upper respiratory symptoms including cough, SOB, fever. The throat pain is localized to the right palate where he believes there is a lesion. He is unsure if this appeared after eating shellfish over the weekend. On exam, the lesion is an aphthous stomatitis about 3 mm in diameter. See form below for further details. POCT strep negative.    He also reports polyphagia and GI discomfort including loose, more frequent stools since returning from a trip to [place] at the beginning of month. He feels this is unrelated to his current sore throat and more related to his indulgent eating while in [place]. He plans to implement dietary changes with increased fiber.     Sore Throat   This is a new problem. The current episode started in the past 7 days. The problem has been unchanged. The pain is worse on the right side. There has been no fever. The pain is moderate. Associated symptoms include diarrhea. Pertinent negatives include no congestion, coughing, ear discharge, ear pain, shortness of breath, stridor or trouble swallowing. He has had no exposure to strep. He has tried acetaminophen and NSAIDs for the symptoms. The treatment provided mild relief.       Review of Systems   Constitutional:  Positive for malaise/fatigue. Negative for chills, diaphoresis and fever.   HENT:  Positive for sore throat. Negative for congestion, ear discharge, ear pain, sinus pain and trouble swallowing.    Respiratory:  Positive for sputum production.  Negative for cough, hemoptysis, shortness of breath, wheezing and stridor.    Cardiovascular:  Negative for chest pain.   Gastrointestinal:  Positive for diarrhea. Negative for constipation.       Social History     Social History Narrative    Intermittent exercise       ALLERGIES:   Review of patient's allergies indicates:   Allergen Reactions    Percocet [oxycodone-acetaminophen]     Suture, silk     Adhesive Rash       MEDS:   Current Outpatient Medications on File Prior to Visit   Medication Sig Dispense Refill Last Dose/Taking    celecoxib (CELEBREX) 200 MG capsule Take 1 capsule (200 mg total) by mouth once daily. 90 capsule 3 Taking Differently    aspirin (ECOTRIN) 81 MG EC tablet Take 1 tablet (81 mg total) by mouth once daily. (Patient not taking: Reported on 11/19/2024) 30 tablet 0 Not Taking    ergocalciferol (VITAMIN D2) 50,000 unit Cap Take 1 capsule (50,000 Units total) by mouth every 7 days. (Patient not taking: Reported on 11/19/2024) 12 capsule 1 Not Taking    losartan (COZAAR) 25 MG tablet Take 1 tablet (25 mg total) by mouth once daily. (Patient not taking: Reported on 11/19/2024) 90 tablet 3 Not Taking    omeprazole (PRILOSEC) 40 MG capsule TAKE 1 CAPSULE BY MOUTH EVERY MORNING AS DIRECTED (Patient not taking: Reported on 11/19/2024) 90 capsule 3 Not Taking    ondansetron (ZOFRAN-ODT) 4 MG TbDL Take 1 tablet (4 mg total) by mouth every 8 (eight) hours as needed (nausea). nausea. can worsen constipation (Patient not taking: Reported on 11/19/2024) 20 tablet 0 Not Taking    ondansetron (ZOFRAN-ODT) 4 MG TbDL Dissolve 1 tablet (4 mg total) by mouth every 8 (eight) hours as needed (nausea). (Patient not taking: Reported on 11/19/2024) 8 tablet 0 Not Taking    rosuvastatin (CRESTOR) 40 MG Tab Take 1 tablet (40 mg total) by mouth every evening. (Patient not taking: Reported on 11/19/2024) 90 tablet 3 Not Taking    sertraline (ZOLOFT) 50 MG tablet Take 1 tablet (50 mg total) by mouth once daily.  "(Patient not taking: Reported on 11/19/2024) 90 tablet 3 Not Taking    tadalafiL (CIALIS) 20 MG Tab Take 1 tablet (20 mg total) by mouth once daily. (Patient not taking: Reported on 11/19/2024) 30 tablet 5 Not Taking    traMADoL (ULTRAM) 50 mg tablet Take 1-2 tablets ( mg total) by mouth every 6 (six) hours as needed for Pain. (Patient not taking: Reported on 11/19/2024) 21 tablet 0 Not Taking    tretinoin (RETIN-A) 0.05 % cream Apply topically to the skin every night (Patient not taking: Reported on 7/3/2024) 20 g 0 Not Taking    triamcinolone acetonide 0.1% (KENALOG) 0.1 % cream Apply topically 2 (two) times daily as needed (skin changes). (Patient not taking: Reported on 11/19/2024) 80 g 1 Not Taking    zolpidem (AMBIEN) 10 mg Tab Take 1 tablet (10 mg total) by mouth nightly as needed (insomnia). (Patient not taking: Reported on 11/19/2024) 14 tablet 0 Not Taking    zonisamide (ZONEGRAN) 100 MG Cap Take 1 at bedtime for 3 days, then 2 at bedtime for 3 days, then 3 at bedtime for 3 days, then 4 at bedtime to follow. Stay at the most comfortable dose. (Patient not taking: Reported on 7/3/2024) 360 capsule 5 Not Taking       Vital signs:   Vitals:    11/19/24 0916   BP: 122/71   Patient Position: Sitting   Pulse: 75   SpO2: 95%   Weight: 109.9 kg (242 lb 4.6 oz)   Height: 5' 11" (1.803 m)     Body mass index is 33.79 kg/m².    PHYSICAL EXAM:     Physical Exam  Vitals and nursing note reviewed.   Constitutional:       Appearance: Normal appearance. He is not diaphoretic.   HENT:      Head: Normocephalic and atraumatic.      Right Ear: Tympanic membrane, ear canal and external ear normal. There is no impacted cerumen.      Left Ear: Tympanic membrane, ear canal and external ear normal. There is no impacted cerumen.      Mouth/Throat:      Tongue: No lesions. Tongue does not deviate from midline.      Palate: Lesions (right palate) present. No mass.      Pharynx: Uvula midline. Posterior oropharyngeal erythema " present. No pharyngeal swelling, oropharyngeal exudate or uvula swelling.      Tonsils: No tonsillar exudate or tonsillar abscesses. 1+ on the right. 0 on the left.        Comments: Round canker sore 3 mm diameter  Eyes:      Extraocular Movements: Extraocular movements intact.   Cardiovascular:      Rate and Rhythm: Normal rate.   Pulmonary:      Effort: Pulmonary effort is normal. No respiratory distress.      Breath sounds: Normal breath sounds. No wheezing, rhonchi or rales.   Musculoskeletal:         General: No signs of injury.      Cervical back: Normal range of motion.   Skin:     General: Skin is warm and dry.      Coloration: Skin is not pale.      Findings: No erythema or rash.   Neurological:      Mental Status: He is alert and oriented to person, place, and time.   Psychiatric:         Mood and Affect: Mood normal.         Behavior: Behavior normal.         Thought Content: Thought content normal.         Judgment: Judgment normal.         PERTINENT RESULTS:   Office Visit on 11/19/2024   Component Date Value Ref Range Status    Rapid Strep A Screen 11/19/2024 Negative  Negative Final     Acceptable 11/19/2024 Yes   Final    POC Rapid COVID 11/19/2024 Negative  Negative Final     Acceptable 11/19/2024 Yes   Final    POC Molecular Influenza A Ag 11/19/2024 Negative  Negative Final    POC Molecular Influenza B Ag 11/19/2024 Negative  Negative Final     Acceptable 11/19/2024 Yes   Final       ASSESSMENT/PLAN:    1. Sore throat  -     POCT rapid strep A  -     POCT COVID-19 Rapid Screening  -     Cancel: POCT Influenza A/B  -     POCT Influenza A/B Molecular    2. Acute viral pharyngitis  Assessment & Plan:  - Covid-19, flu, and strep testing negative.  - Recommend warm salt water gurgles, oral lozenges, and chloraseptic spray for symptom relief.  - Recommend tylenol 750-1000 mg TID PRN sore throat, not to exceed 3 grams in 24 hours.  - Continue celebrex at  current dose.  - Offered oral steroids for symptom relief but he declined.        Follow up if symptoms worsen or fail to improve.     MILTON Wooten-C   Internal Medicine

## 2024-11-19 NOTE — ASSESSMENT & PLAN NOTE
- Covid-19, flu, and strep testing negative.  - Recommend warm salt water gurgles, oral lozenges, and chloraseptic spray for symptom relief.  - Recommend tylenol 750-1000 mg TID PRN sore throat, not to exceed 3 grams in 24 hours.  - Continue celebrex at current dose.  - Offered oral steroids for symptom relief but he declined.

## 2024-11-21 ENCOUNTER — CLINICAL SUPPORT (OUTPATIENT)
Dept: REHABILITATION | Facility: HOSPITAL | Age: 59
End: 2024-11-21
Payer: COMMERCIAL

## 2024-11-21 DIAGNOSIS — M25.561 ACUTE PAIN OF RIGHT KNEE: Primary | ICD-10-CM

## 2024-11-21 PROCEDURE — 97530 THERAPEUTIC ACTIVITIES: CPT | Performed by: PHYSICAL THERAPIST

## 2024-11-21 PROCEDURE — 97140 MANUAL THERAPY 1/> REGIONS: CPT | Performed by: PHYSICAL THERAPIST

## 2024-11-22 NOTE — PROGRESS NOTES
"[facility]   Physical Therapy Treatment Note      Name: Corazon Maldonado [facility]  Clinic Number: [id number]    Therapy Diagnosis:   Encounter Diagnosis   Name Primary?    Acute pain of right knee Yes       Physician: Saran Zhao III, *    Visit Date: 11/21/2024    Physician Orders: PT Eval and Treat   Medical Diagnosis from Referral:   Diagnosis   S83.241A (ICD-10-CM) - Acute medial meniscus tear of right knee, initial encounter   M94.261 (ICD-10-CM) - Chondromalacia of right knee   M17.12 (ICD-10-CM) - Primary osteoarthritis of left knee      Evaluation Date: 10/2/2024Authorization Period Expiration: 9/6/2025  Plan of Care Expiration: 4/2/2025  Progress Note Due: 2/2/2025  Visit # / Visits authorized: 8/20   FOTO: 1/3    PTA Visit #: 0/5     Time In: 1551  Time Out: 1640  Total Billable Time: 49 minutes     Subjective     Pt reports: Notes swelling and increased pain with increased loading last session    He was compliant with home exercise program.  Response to previous treatment: increased swelling and pain  Functional change: ambulating no AD    Pain: 6/10  Location: bilateral knee      Objective      Objective Measures updated at progress report unless specified.     Knee   R: at knee 16.75in / 4in above 18.5  L: at knee 16 in/ 4 in above 19.25    10/9/24:  Atrophy noted to the R quad on arrival  Hit head with scab on forehead - notes no loss of consciousness  Edema noted to the knee - addressed by PA-c within session    Treatment     Corazon received the treatments listed below:      therapeutic exercises to develop strength, endurance, ROM, flexibility, posture, and core stabilization for 0 minutes including:  GSS strap 30" 5x    manual therapy techniques: Joint mobilizations, Manual traction, Myofacial release, Manual Lymphatic Drainage, Soft tissue Mobilization, and Friction Massage were applied to the: R knee for 15 minutes, including:  Patellar mobs all planes  Gr III flexion with " gapping  Gr IV AP femur  Medial/lateral gapping   Knee flexion quadruped with towel gapping  Inferior mob with knee flexion  HHR towel gapping on ice    neuromuscular re-education activities to improve: Balance, Coordination, Kinesthetic, Sense, Proprioception, and Posture for 0 minutes. The following activities were included:  NMES LAQ 4# 10'  NMES SLR 10'    therapeutic activities to improve functional performance for 34 minutes, including:    Shuttle 3 1/2 bands DL 3  x 12  Standing hip abd 3 x 20  DL RDL 10# DB 3 x 10    NT  Bike 10' level 8 for knee ROM, cardio endurance training, and swelling reduction in akanksha knees-nt  TKE BTB 30x-nt    gait training to improve functional mobility and safety for 0  minutes, including:      cold pack for 0 minutes to R knee.    Patient Education and Home Exercises       Education provided:   - importance of restoring knee extension  - quad sets every hour  - s/s of infection and DVT    Written Home Exercises Provided: YES. Exercises were reviewed and Corazon was able to demonstrate them prior to the end of the session.  Corazon demonstrated good understanding of the education provided. See EMR under Patient Instructions for exercises provided during therapy sessions    Assessment     Presented with increased edema into the anterior tib on R. Limited loading today to see how his knee would respond today.     Corazon Is progressing well towards his goals.   Pt prognosis is Excellent.     Pt will continue to benefit from skilled outpatient physical therapy to address the deficits listed in the problem list box on initial evaluation, provide pt/family education and to maximize pt's level of independence in the home and community environment.     Pt's spiritual, cultural and educational needs considered and pt agreeable to plan of care and goals.     Anticipated barriers to physical therapy: work schedule    GOALS: Short Term Goals:  6 weeks  1.Report decreased knee pain  < / =  0/10  to  increase tolerance for return to (Progressing, not met)  2. Increase knee ROM to 0-130 in order to be able to perform ADLs without difficulty.(Progressing, not met)  3. Increase strength by 1/3 MMT grade in quad  to increase tolerance for ADL and work activities.(Progressing, not met)  4. Pt to tolerate HEP to improve ROM and independence with ADL's(Progressing, not met)     Long Term Goals: 12 weeks  1.Report decreased knee pain < / = 0/10  to increase tolerance for return to gym/exercise(Progressing, not met)  2.Patient goal: return to work without knee pain(Progressing, not met)  3.Increase strength to >/= 4+/5 in quad and glute  to increase tolerance for ADL and work activities.(Progressing, not met)  4. Pt will report at CJ level (20-40% impaired) on FOTO knee to demonstrate increase in LE function with every day tasks.(Progressing, not met)      Plan     Plan of care Certification: 10/2/2024 to 4/2/2025.     Progress quad strength and ROM    Crescencio Amin, PT, DPT, OCS, FAAOMPT

## 2024-12-12 ENCOUNTER — TELEPHONE (OUTPATIENT)
Dept: SPORTS MEDICINE | Facility: CLINIC | Age: 59
End: 2024-12-12
Payer: COMMERCIAL

## 2024-12-26 DIAGNOSIS — F43.23 ADJUSTMENT DISORDER WITH MIXED ANXIETY AND DEPRESSED MOOD: ICD-10-CM

## 2024-12-26 DIAGNOSIS — K21.9 GASTROESOPHAGEAL REFLUX DISEASE, UNSPECIFIED WHETHER ESOPHAGITIS PRESENT: ICD-10-CM

## 2024-12-26 DIAGNOSIS — E78.2 MIXED HYPERLIPIDEMIA: ICD-10-CM

## 2024-12-26 RX ORDER — OMEPRAZOLE 40 MG/1
CAPSULE, DELAYED RELEASE ORAL
Qty: 90 CAPSULE | Refills: 3 | Status: SHIPPED | OUTPATIENT
Start: 2024-12-26

## 2024-12-26 RX ORDER — SERTRALINE HYDROCHLORIDE 50 MG/1
50 TABLET, FILM COATED ORAL DAILY
Qty: 90 TABLET | Refills: 3 | Status: SHIPPED | OUTPATIENT
Start: 2024-12-26

## 2024-12-26 RX ORDER — ROSUVASTATIN CALCIUM 40 MG/1
40 TABLET, COATED ORAL NIGHTLY
Qty: 90 TABLET | Refills: 3 | Status: SHIPPED | OUTPATIENT
Start: 2024-12-26

## 2024-12-26 NOTE — TELEPHONE ENCOUNTER
No care due was identified.  [facility] Embedded Care Due Messages. Reference number: [id number].   12/26/2024 12:23:47 PM CST

## 2024-12-26 NOTE — TELEPHONE ENCOUNTER
Refill Decision Note   Corazon Jones  is requesting a refill authorization.  Brief Assessment and Rationale for Refill:  Approve     Medication Therapy Plan:         Comments:     Note composed:12:24 PM 12/26/2024

## 2025-01-24 ENCOUNTER — PATIENT MESSAGE (OUTPATIENT)
Dept: INTERNAL MEDICINE | Facility: CLINIC | Age: 60
End: 2025-01-24
Payer: COMMERCIAL

## 2025-01-24 RX ORDER — TIRZEPATIDE 2.5 MG/.5ML
2.5 INJECTION, SOLUTION SUBCUTANEOUS
Qty: 4 PEN | Refills: 0 | Status: SHIPPED | OUTPATIENT
Start: 2025-01-24

## 2025-01-24 NOTE — TELEPHONE ENCOUNTER
No care due was identified.  [facility] Embedded Care Due Messages. Reference number: [id number].   1/24/2025 3:00:59 PM CST

## 2025-01-28 ENCOUNTER — TELEPHONE (OUTPATIENT)
Dept: INTERNAL MEDICINE | Facility: CLINIC | Age: 60
End: 2025-01-28
Payer: COMMERCIAL

## 2025-01-28 NOTE — TELEPHONE ENCOUNTER
----- Message from Linda sent at 1/28/2025  9:58 AM CST -----  Regarding: alternative medication to Zepbound  Goodmorning, the patient is requesting to have the medication eqv. to Zepbound that insurance wants him to try first sent to [facility] pharmacy.

## 2025-01-29 ENCOUNTER — OFFICE VISIT (OUTPATIENT)
Dept: INTERNAL MEDICINE | Facility: CLINIC | Age: 60
End: 2025-01-29
Payer: COMMERCIAL

## 2025-01-29 VITALS
DIASTOLIC BLOOD PRESSURE: 92 MMHG | SYSTOLIC BLOOD PRESSURE: 122 MMHG | HEART RATE: 73 BPM | WEIGHT: 250.44 LBS | BODY MASS INDEX: 35.06 KG/M2 | HEIGHT: 71 IN | OXYGEN SATURATION: 95 %

## 2025-01-29 DIAGNOSIS — I10 PRIMARY HYPERTENSION: ICD-10-CM

## 2025-01-29 DIAGNOSIS — G47.33 OSA (OBSTRUCTIVE SLEEP APNEA): Chronic | ICD-10-CM

## 2025-01-29 DIAGNOSIS — E66.811 CLASS 1 OBESITY DUE TO EXCESS CALORIES WITH SERIOUS COMORBIDITY AND BODY MASS INDEX (BMI) OF 34.0 TO 34.9 IN ADULT: Primary | ICD-10-CM

## 2025-01-29 DIAGNOSIS — E66.09 CLASS 1 OBESITY DUE TO EXCESS CALORIES WITH SERIOUS COMORBIDITY AND BODY MASS INDEX (BMI) OF 34.0 TO 34.9 IN ADULT: Primary | ICD-10-CM

## 2025-01-29 PROCEDURE — 99999 PR PBB SHADOW E&M-EST. PATIENT-LVL IV: CPT | Mod: PBBFAC,,, | Performed by: STUDENT IN AN ORGANIZED HEALTH CARE EDUCATION/TRAINING PROGRAM

## 2025-01-29 RX ORDER — METFORMIN HYDROCHLORIDE 500 MG/1
500 TABLET, EXTENDED RELEASE ORAL EVERY MORNING
Qty: 30 TABLET | Refills: 3 | Status: SHIPPED | OUTPATIENT
Start: 2025-01-29

## 2025-01-29 RX ORDER — LOSARTAN POTASSIUM 25 MG/1
25 TABLET ORAL DAILY
Qty: 90 TABLET | Refills: 3 | Status: SHIPPED | OUTPATIENT
Start: 2025-01-29 | End: 2026-01-29

## 2025-01-29 NOTE — PROGRESS NOTES
[facility]    Subjective:       Patient ID: Corzaon Jones is a 59 y.o. male.    Chief Complaint: New Med Request      History was obtained from the patient and supplemented through chart review.  This patient is known to me.     HPI:    Patient is a 59 y.o. male w/ pmhc HTN, HLD, prediabetes, presents for obesity    Obesity  Will attempt zepbound, other agents containdicated.  Pt not candidate due to CAD, from CT calcium score  Running through insurance  No contraindications  Precuations given  F/u planned    HTN  losartan 25 (high today?)  Stable, doing well  No CP/HA/SOB/Vision changes       Not addressed  6/23/2024 Transient global Amnesia  4/2024 post-concussive syndrome, viral syndrome with subsequent     Hx of Sarcoidosis diagnosis in 1991  -Resolved, potentially erroneous diagnosis  -Also was treated as tb at time time  -Environmental exposure in [place]  -Abn chest CT findings stable    Carpal tunnel resolved, some triggers    Prediabetes  -stable, counseled    Vit D insufficiency  -taking supplement 2000 mg  recheck    Hx of 5 mm Stone in left kidney  Hx of Prior imaging with possible hepatic steatosis    Recent bilat epicondylitis  S/p injections doing well    Medical History  Past Medical History:   Diagnosis Date    History of Clostridium difficile colitis     treated 2014    History of sarcoidosis     dx 1991, resolved    Hyperlipidemia     Hypertension     OTTONIEL (obstructive sleep apnea)        Review of Systems   Constitutional:  Positive for unexpected weight change. Negative for appetite change and diaphoresis.   HENT:  Negative for trouble swallowing.    Respiratory:  Negative for shortness of breath.    Cardiovascular:  Negative for chest pain.   Gastrointestinal:  Negative for diarrhea, nausea and vomiting.   Musculoskeletal:  Negative for gait problem.   Neurological:  Negative for dizziness and seizures.   Psychiatric/Behavioral:  Negative for hallucinations.          Surgical  hx, family hx, social hx   Have been reviewed      Current Outpatient Medications:     celecoxib (CELEBREX) 200 MG capsule, Take 1 capsule (200 mg total) by mouth once daily., Disp: 90 capsule, Rfl: 3    omeprazole (PRILOSEC) 40 MG capsule, TAKE 1 CAPSULE BY MOUTH EVERY MORNING AS DIRECTED, Disp: 90 capsule, Rfl: 3    ondansetron (ZOFRAN-ODT) 4 MG TbDL, Take 1 tablet (4 mg total) by mouth every 8 (eight) hours as needed (nausea). nausea. can worsen constipation, Disp: 20 tablet, Rfl: 0    ondansetron (ZOFRAN-ODT) 4 MG TbDL, Dissolve 1 tablet (4 mg total) by mouth every 8 (eight) hours as needed (nausea)., Disp: 8 tablet, Rfl: 0    rosuvastatin (CRESTOR) 40 MG Tab, Take 1 tablet (40 mg total) by mouth every evening., Disp: 90 tablet, Rfl: 3    sertraline (ZOLOFT) 50 MG tablet, Take 1 tablet (50 mg total) by mouth once daily., Disp: 90 tablet, Rfl: 3    tadalafiL (CIALIS) 20 MG Tab, Take 1 tablet (20 mg total) by mouth once daily., Disp: 30 tablet, Rfl: 5    tirzepatide, weight loss, (ZEPBOUND) 2.5 mg/0.5 mL PnIj, Inject 2.5 mg into the skin every 7 days., Disp: 4 Pen, Rfl: 0    aspirin (ECOTRIN) 81 MG EC tablet, Take 1 tablet (81 mg total) by mouth once daily. (Patient not taking: Reported on 1/29/2025), Disp: 30 tablet, Rfl: 0    ergocalciferol (VITAMIN D2) 50,000 unit Cap, Take 1 capsule (50,000 Units total) by mouth every 7 days. (Patient not taking: Reported on 1/29/2025), Disp: 12 capsule, Rfl: 1    losartan (COZAAR) 25 MG tablet, Take 1 tablet (25 mg total) by mouth once daily., Disp: 90 tablet, Rfl: 3    metFORMIN (GLUCOPHAGE-XR) 500 MG ER 24hr tablet, Take 1 tablet (500 mg total) by mouth every morning., Disp: 30 tablet, Rfl: 3    traMADoL (ULTRAM) 50 mg tablet, Take 1-2 tablets ( mg total) by mouth every 6 (six) hours as needed for Pain. (Patient not taking: Reported on 1/29/2025), Disp: 21 tablet, Rfl: 0    tretinoin (RETIN-A) 0.05 % cream, Apply topically to the skin every night (Patient not taking:  "Reported on 1/29/2025), Disp: 20 g, Rfl: 0    triamcinolone acetonide 0.1% (KENALOG) 0.1 % cream, Apply topically 2 (two) times daily as needed (skin changes). (Patient not taking: Reported on 1/29/2025), Disp: 80 g, Rfl: 1    triamcinolone acetonide 0.1% (KENALOG) 0.1 % paste, Apply to the affected area 3 times a day., Disp: 5 g, Rfl: 2    zolpidem (AMBIEN) 10 mg Tab, Take 1 tablet (10 mg total) by mouth nightly as needed (insomnia). (Patient not taking: Reported on 1/29/2025), Disp: 14 tablet, Rfl: 0    zonisamide (ZONEGRAN) 100 MG Cap, Take 1 at bedtime for 3 days, then 2 at bedtime for 3 days, then 3 at bedtime for 3 days, then 4 at bedtime to follow. Stay at the most comfortable dose. (Patient not taking: Reported on 1/29/2025), Disp: 360 capsule, Rfl: 5    Objective:        Body mass index is 34.93 kg/m².  Vitals:    01/29/25 1009 01/29/25 1048   BP: (!) 124/90 (!) 122/92   Pulse: 73    SpO2: 95%    Weight: 113.6 kg (250 lb 7.1 oz)    Height: 5' 11" (1.803 m)    PainSc: 0-No pain      Physical Exam  Vitals and nursing note reviewed.   Constitutional:       General: He is not in acute distress.     Appearance: Normal appearance. He is not ill-appearing.   HENT:      Head: Normocephalic and atraumatic.   Eyes:      General: No scleral icterus.  Cardiovascular:      Rate and Rhythm: Normal rate and regular rhythm.      Heart sounds: Normal heart sounds.   Pulmonary:      Effort: Pulmonary effort is normal.   Abdominal:      Palpations: There is no mass.      Tenderness: There is no abdominal tenderness.   Musculoskeletal:         General: No deformity.   Neurological:      Mental Status: He is alert and oriented to person, place, and time.   Psychiatric:         Behavior: Behavior normal.           Lab Results   Component Value Date    WBC 3.75 (L) 10/11/2024    HGB 14.8 10/11/2024    HCT 45.0 10/11/2024     10/11/2024    CHOL 154 10/11/2024    TRIG 104 10/11/2024    HDL 44 10/11/2024    ALT 23 10/11/2024 "    AST 22 10/11/2024     10/11/2024    K 4.5 10/11/2024     10/11/2024    CREATININE 0.9 10/11/2024    BUN 22 (H) 10/11/2024    CO2 25 10/11/2024    TSH 1.126 10/11/2024    PSA 0.21 03/15/2024    INR 1.1 06/24/2024    HGBA1C 5.6 10/11/2024       The 10-year ASCVD risk score (Davina BARILLAS, et al., 2019) is: 8%    Values used to calculate the score:      Age: 59 years      Sex: Male      Is Non- : No      Diabetic: No      Tobacco smoker: No      Systolic Blood Pressure: 122 mmHg      Is BP treated: Yes      HDL Cholesterol: 44 mg/dL      Total Cholesterol: 154 mg/dL    (Imaging have been independently reviewed)    none    Assessment:         1. Class 1 obesity due to excess calories with serious comorbidity and body mass index (BMI) of 34.0 to 34.9 in adult    2. Primary hypertension    3. OTTONIEL (obstructive sleep apnea)            Plan:     Corazon was seen today for new med request.    Diagnoses and all orders for this visit:    Class 1 obesity due to excess calories with serious comorbidity and body mass index (BMI) of 34.0 to 34.9 in adult  -     metFORMIN (GLUCOPHAGE-XR) 500 MG ER 24hr tablet; Take 1 tablet (500 mg total) by mouth every morning.    Primary hypertension  -     losartan (COZAAR) 25 MG tablet; Take 1 tablet (25 mg total) by mouth once daily.    OTTONIEL (obstructive sleep apnea)      Health Maintenance  - Lipids:   - A1C:   - Colon Ca Screen:  11/2020 [facility], repeat 5 years  - Immunizations: encouraged PNA, shingles, covid, flu, RSV. Pt will consider  - PSA normal : normal 3/2024    Follow up in about 4 months (around 5/29/2025) for weight and bp .        Visit today is associated with current or anticipated ongoing medical care related to this patient's single serious condition/complex condition of htn uncontrolled, weight frustrations, obesity. The patient will return to see me as these issues will be followed longitudinally.     All medications were reviewed including  potential side effects and risks/benefits.  Pt was counseled to call back if anything worsens or if questions arise.    Baldomero Schwab MD  Family Medicine  [facility]  [facility] [address] [place] [id number]  Phone [phone]  Fax [phone]

## 2025-02-25 DIAGNOSIS — N52.9 ERECTILE DYSFUNCTION, UNSPECIFIED ERECTILE DYSFUNCTION TYPE: ICD-10-CM

## 2025-02-25 RX ORDER — TADALAFIL 20 MG/1
20 TABLET ORAL DAILY
Qty: 30 TABLET | Refills: 11 | Status: SHIPPED | OUTPATIENT
Start: 2025-02-25

## 2025-02-25 NOTE — TELEPHONE ENCOUNTER
Provider Staff:  Action required for this patient    Requires labs      Please see care gap opportunities below in Care Due Message.    Thanks!  [facility]     Appointments      Date Provider   Last Visit   1/29/2025 Baldomero Schwab MD   Next Visit   5/23/2025 Baldomero Schwab MD     Refill Decision Note   Corazon Jones  is requesting a refill authorization.  Brief Assessment and Rationale for Refill:  Approve     Medication Therapy Plan:        Comments:     Note composed:9:23 AM 02/25/2025

## 2025-02-25 NOTE — TELEPHONE ENCOUNTER
Care Due:                  Date            Visit Type   Department     Provider  --------------------------------------------------------------------------------                                EP -                              PRIMARY      [facility] INTERNAL  Last Visit: 01-      CARE ([facility])   COLEMAN Schwab                              EP -                              PRIMARY      [facility] INTERNAL  Next Visit: 05-      CARE ([facility])   COLEMAN Schwab                                                            Last  Test          Frequency    Reason                     Performed    Due Date  --------------------------------------------------------------------------------    HBA1C.......  6 months...  metFORMIN, tirzepatide,..  10-   04-    Health [facility] Embedded Care Due Messages. Reference number: [id number].   2/25/2025 5:08:42 AM CST

## 2025-03-13 ENCOUNTER — RESULTS FOLLOW-UP (OUTPATIENT)
Dept: INTERNAL MEDICINE | Facility: CLINIC | Age: 60
End: 2025-03-13

## 2025-03-13 ENCOUNTER — PATIENT MESSAGE (OUTPATIENT)
Dept: INTERNAL MEDICINE | Facility: CLINIC | Age: 60
End: 2025-03-13
Payer: COMMERCIAL

## 2025-03-13 DIAGNOSIS — Z11.3 SCREENING EXAMINATION FOR STI: Primary | ICD-10-CM

## 2025-03-19 ENCOUNTER — PATIENT MESSAGE (OUTPATIENT)
Dept: INTERNAL MEDICINE | Facility: CLINIC | Age: 60
End: 2025-03-19
Payer: COMMERCIAL

## 2025-03-31 ENCOUNTER — PATIENT MESSAGE (OUTPATIENT)
Dept: ADMINISTRATIVE | Facility: HOSPITAL | Age: 60
End: 2025-03-31
Payer: COMMERCIAL

## 2025-05-08 ENCOUNTER — PATIENT MESSAGE (OUTPATIENT)
Dept: INTERNAL MEDICINE | Facility: CLINIC | Age: 60
End: 2025-05-08
Payer: COMMERCIAL

## 2025-05-12 RX ORDER — TIRZEPATIDE 2.5 MG/.5ML
2.5 INJECTION, SOLUTION SUBCUTANEOUS
Qty: 4 PEN | Refills: 0 | Status: SHIPPED | OUTPATIENT
Start: 2025-05-12

## 2025-05-12 NOTE — TELEPHONE ENCOUNTER
Care Due:                  Date            Visit Type   Department     Provider  --------------------------------------------------------------------------------                                EP -                              PRIMARY      [facility] INTERNAL  Last Visit: 01-      CARE ([facility])   COLEMAN Schwab                              EP -                              PRIMARY      [facility] INTERNAL  Next Visit: 09-      CARE ([facility])   COLEMAN Schwab                                                            Last  Test          Frequency    Reason                     Performed    Due Date  --------------------------------------------------------------------------------    HBA1C.......  6 months...  metFORMIN, tirzepatide,..  10-   04-    Health [facility] Embedded Care Due Messages. Reference number: [id number].   5/12/2025 12:34:27 PM CDT

## 2025-05-13 ENCOUNTER — PATIENT MESSAGE (OUTPATIENT)
Dept: INTERNAL MEDICINE | Facility: CLINIC | Age: 60
End: 2025-05-13
Payer: COMMERCIAL

## 2025-05-13 ENCOUNTER — LAB VISIT (OUTPATIENT)
Dept: LAB | Facility: OTHER | Age: 60
End: 2025-05-13
Attending: STUDENT IN AN ORGANIZED HEALTH CARE EDUCATION/TRAINING PROGRAM
Payer: COMMERCIAL

## 2025-05-13 DIAGNOSIS — R53.83 FATIGUE, UNSPECIFIED TYPE: Primary | ICD-10-CM

## 2025-05-13 DIAGNOSIS — B34.9 VIRAL SYNDROME: ICD-10-CM

## 2025-05-13 DIAGNOSIS — K92.2 GASTROINTESTINAL HEMORRHAGE, UNSPECIFIED GASTROINTESTINAL HEMORRHAGE TYPE: Primary | ICD-10-CM

## 2025-05-13 DIAGNOSIS — K92.2 GASTROINTESTINAL HEMORRHAGE, UNSPECIFIED GASTROINTESTINAL HEMORRHAGE TYPE: ICD-10-CM

## 2025-05-13 LAB
ALBUMIN SERPL BCP-MCNC: 4.2 G/DL (ref 3.5–5.2)
ALP SERPL-CCNC: 69 UNIT/L (ref 40–150)
ALT SERPL W/O P-5'-P-CCNC: 32 UNIT/L (ref 10–44)
ANION GAP (OHS): 11 MMOL/L (ref 8–16)
AST SERPL-CCNC: 23 UNIT/L (ref 11–45)
BILIRUB SERPL-MCNC: 0.5 MG/DL (ref 0.1–1)
BUN SERPL-MCNC: 12 MG/DL (ref 6–20)
CALCIUM SERPL-MCNC: 9.8 MG/DL (ref 8.7–10.5)
CHLORIDE SERPL-SCNC: 105 MMOL/L (ref 95–110)
CO2 SERPL-SCNC: 25 MMOL/L (ref 23–29)
CREAT SERPL-MCNC: 0.9 MG/DL (ref 0.5–1.4)
ERYTHROCYTE [DISTWIDTH] IN BLOOD BY AUTOMATED COUNT: 13.4 % (ref 11.5–14.5)
GFR SERPLBLD CREATININE-BSD FMLA CKD-EPI: >60 ML/MIN/1.73/M2
GLUCOSE SERPL-MCNC: 100 MG/DL (ref 70–110)
HCT VFR BLD AUTO: 47.9 % (ref 40–54)
HGB BLD-MCNC: 15.9 GM/DL (ref 14–18)
MCH RBC QN AUTO: 30.2 PG (ref 27–31)
MCHC RBC AUTO-ENTMCNC: 33.2 G/DL (ref 32–36)
MCV RBC AUTO: 91 FL (ref 82–98)
PLATELET # BLD AUTO: 247 K/UL (ref 150–450)
PMV BLD AUTO: 9.5 FL (ref 9.2–12.9)
POTASSIUM SERPL-SCNC: 4.4 MMOL/L (ref 3.5–5.1)
PROT SERPL-MCNC: 7.7 GM/DL (ref 6–8.4)
RBC # BLD AUTO: 5.26 M/UL (ref 4.6–6.2)
SODIUM SERPL-SCNC: 141 MMOL/L (ref 136–145)
WBC # BLD AUTO: 2.88 K/UL (ref 3.9–12.7)

## 2025-05-13 PROCEDURE — 82040 ASSAY OF SERUM ALBUMIN: CPT

## 2025-05-13 PROCEDURE — 85027 COMPLETE CBC AUTOMATED: CPT

## 2025-05-13 PROCEDURE — 36415 COLL VENOUS BLD VENIPUNCTURE: CPT

## 2025-06-09 ENCOUNTER — PATIENT MESSAGE (OUTPATIENT)
Dept: INTERNAL MEDICINE | Facility: CLINIC | Age: 60
End: 2025-06-09
Payer: COMMERCIAL

## 2025-06-09 ENCOUNTER — PATIENT MESSAGE (OUTPATIENT)
Dept: ADMINISTRATIVE | Facility: HOSPITAL | Age: 60
End: 2025-06-09
Payer: COMMERCIAL

## 2025-06-09 VITALS — DIASTOLIC BLOOD PRESSURE: 80 MMHG | SYSTOLIC BLOOD PRESSURE: 120 MMHG

## 2025-06-26 ENCOUNTER — PATIENT MESSAGE (OUTPATIENT)
Dept: SLEEP MEDICINE | Facility: CLINIC | Age: 60
End: 2025-06-26
Payer: COMMERCIAL

## 2025-06-26 DIAGNOSIS — G47.33 OSA (OBSTRUCTIVE SLEEP APNEA): Primary | Chronic | ICD-10-CM

## 2025-07-08 ENCOUNTER — HOSPITAL ENCOUNTER (EMERGENCY)
Facility: OTHER | Age: 60
Discharge: HOME | End: 2025-07-08
Attending: EMERGENCY MEDICINE | Admitting: EMERGENCY MEDICINE
Payer: COMMERCIAL

## 2025-07-08 VITALS
BODY MASS INDEX: 35 KG/M2 | HEART RATE: 69 BPM | RESPIRATION RATE: 18 BRPM | HEIGHT: 71 IN | OXYGEN SATURATION: 95 % | SYSTOLIC BLOOD PRESSURE: 142 MMHG | WEIGHT: 250 LBS | TEMPERATURE: 98 F | DIASTOLIC BLOOD PRESSURE: 82 MMHG

## 2025-07-08 DIAGNOSIS — R07.9 CHEST PAIN: ICD-10-CM

## 2025-07-08 DIAGNOSIS — R07.81 RIB PAIN ON LEFT SIDE: ICD-10-CM
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
## 2025-07-08 PROCEDURE — 99285 EMERGENCY DEPT VISIT HI MDM: CPT | Mod: 25

## 2025-07-08 RX ORDER — ONDANSETRON 4 MG/1
4 TABLET, ORALLY DISINTEGRATING ORAL EVERY 8 HOURS PRN
Qty: 12 TABLET | Refills: 0 | Status: SHIPPED | OUTPATIENT
Start: 2025-07-08

## 2025-07-08 RX ORDER — NAPROXEN 500 MG/1
500 TABLET ORAL 2 TIMES DAILY WITH MEALS
Qty: 14 TABLET | Refills: 0 | Status: SHIPPED | OUTPATIENT
Start: 2025-07-08

## 2025-07-08 NOTE — DISCHARGE INSTRUCTIONS
Be sure to follow up with your primary care provider for repeat wrist xray in 7-10 days (with dedicated navicular views).    You have been prescribed naprosyn (naproxen sodium), an anti-inflammatory.  Take this medication whether you feel you need it or not.  Do not take ibuprofen, naproxen or other NSAID's medications while taking this medication.   Zofran for nausea and vomiting should it recur, although if vomiting is repetitious you should return to the ED.    Return to the Emergency Department for any worsening, change in condition, or any emergent concerns.

## 2025-07-08 NOTE — ED NOTES
Pt called out and states he is beginning to have chest pain, states it feels like a muscle spasm. Will LPN at bedside obtaining an EKG. Provider notified.
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
## 2025-07-08 NOTE — ED NOTES
"Pt c/o right side chest pain, described as " possible muscle spasm".  Provider made aware and EKG done.  "

## 2025-07-09 ENCOUNTER — OFFICE VISIT (OUTPATIENT)
Dept: SLEEP MEDICINE | Facility: CLINIC | Age: 60
End: 2025-07-09
Payer: COMMERCIAL

## 2025-07-09 ENCOUNTER — HOSPITAL ENCOUNTER (OUTPATIENT)
Dept: RADIOLOGY | Facility: OTHER | Age: 60
Discharge: HOME | End: 2025-07-09
Attending: ORTHOPAEDIC SURGERY
Payer: COMMERCIAL

## 2025-07-09 ENCOUNTER — PATIENT MESSAGE (OUTPATIENT)
Facility: CLINIC | Age: 60
End: 2025-07-09
Payer: COMMERCIAL

## 2025-07-09 ENCOUNTER — PATIENT MESSAGE (OUTPATIENT)
Dept: SLEEP MEDICINE | Facility: CLINIC | Age: 60
End: 2025-07-09

## 2025-07-09 ENCOUNTER — OFFICE VISIT (OUTPATIENT)
Dept: SPORTS MEDICINE | Facility: CLINIC | Age: 60
End: 2025-07-09
Payer: COMMERCIAL

## 2025-07-09 VITALS
SYSTOLIC BLOOD PRESSURE: 130 MMHG | DIASTOLIC BLOOD PRESSURE: 80 MMHG | HEIGHT: 71 IN | WEIGHT: 233.69 LBS | BODY MASS INDEX: 32.72 KG/M2

## 2025-07-09 VITALS
DIASTOLIC BLOOD PRESSURE: 87 MMHG | WEIGHT: 234.81 LBS | HEART RATE: 74 BPM | SYSTOLIC BLOOD PRESSURE: 134 MMHG | BODY MASS INDEX: 32.75 KG/M2

## 2025-07-09 DIAGNOSIS — S63.641A RUPTURE OF ULNAR COLLATERAL LIGAMENT OF RIGHT THUMB, INITIAL ENCOUNTER: Primary | ICD-10-CM

## 2025-07-09 DIAGNOSIS — G47.33 OSA (OBSTRUCTIVE SLEEP APNEA): Primary | Chronic | ICD-10-CM

## 2025-07-09 DIAGNOSIS — G47.10 HYPERSOMNOLENCE: ICD-10-CM

## 2025-07-09 DIAGNOSIS — F51.09 OTHER INSOMNIA NOT DUE TO A SUBSTANCE OR KNOWN PHYSIOLOGICAL CONDITION: ICD-10-CM

## 2025-07-09 DIAGNOSIS — S06.0X0A CONCUSSION WITHOUT LOSS OF CONSCIOUSNESS, INITIAL ENCOUNTER: ICD-10-CM

## 2025-07-09 DIAGNOSIS — S63.641A RUPTURE OF ULNAR COLLATERAL LIGAMENT OF RIGHT THUMB, INITIAL ENCOUNTER: ICD-10-CM

## 2025-07-09 PROCEDURE — 3075F SYST BP GE 130 - 139MM HG: CPT | Mod: CPTII,S$GLB,, | Performed by: ORTHOPAEDIC SURGERY

## 2025-07-09 PROCEDURE — 4010F ACE/ARB THERAPY RXD/TAKEN: CPT | Mod: CPTII,S$GLB,, | Performed by: PHYSICIAN ASSISTANT

## 2025-07-09 PROCEDURE — 1160F RVW MEDS BY RX/DR IN RCRD: CPT | Mod: CPTII,S$GLB,, | Performed by: PHYSICIAN ASSISTANT

## 2025-07-09 PROCEDURE — 99999 PR PBB SHADOW E&M-EST. PATIENT-LVL IV: CPT | Mod: PBBFAC,,, | Performed by: ORTHOPAEDIC SURGERY

## 2025-07-09 PROCEDURE — 3008F BODY MASS INDEX DOCD: CPT | Mod: CPTII,S$GLB,, | Performed by: PHYSICIAN ASSISTANT

## 2025-07-09 PROCEDURE — 73218 MRI UPPER EXTREMITY W/O DYE: CPT | Mod: TC,RT

## 2025-07-09 PROCEDURE — 4010F ACE/ARB THERAPY RXD/TAKEN: CPT | Mod: CPTII,S$GLB,, | Performed by: ORTHOPAEDIC SURGERY

## 2025-07-09 PROCEDURE — 99214 OFFICE O/P EST MOD 30 MIN: CPT | Mod: S$GLB,,, | Performed by: ORTHOPAEDIC SURGERY

## 2025-07-09 PROCEDURE — 99214 OFFICE O/P EST MOD 30 MIN: CPT | Mod: S$GLB,,, | Performed by: PHYSICIAN ASSISTANT

## 2025-07-09 PROCEDURE — 99999 PR PBB SHADOW E&M-EST. PATIENT-LVL III: CPT | Mod: PBBFAC,,, | Performed by: PHYSICIAN ASSISTANT

## 2025-07-09 PROCEDURE — 73218 MRI UPPER EXTREMITY W/O DYE: CPT | Mod: 26,RT,, | Performed by: INTERNAL MEDICINE

## 2025-07-09 PROCEDURE — 3008F BODY MASS INDEX DOCD: CPT | Mod: CPTII,S$GLB,, | Performed by: ORTHOPAEDIC SURGERY

## 2025-07-09 PROCEDURE — 1159F MED LIST DOCD IN RCRD: CPT | Mod: CPTII,S$GLB,, | Performed by: PHYSICIAN ASSISTANT

## 2025-07-09 PROCEDURE — 1159F MED LIST DOCD IN RCRD: CPT | Mod: CPTII,S$GLB,, | Performed by: ORTHOPAEDIC SURGERY

## 2025-07-09 PROCEDURE — G2211 COMPLEX E/M VISIT ADD ON: HCPCS | Mod: S$GLB,,, | Performed by: PHYSICIAN ASSISTANT

## 2025-07-09 PROCEDURE — 3075F SYST BP GE 130 - 139MM HG: CPT | Mod: CPTII,S$GLB,, | Performed by: PHYSICIAN ASSISTANT

## 2025-07-09 PROCEDURE — 3079F DIAST BP 80-89 MM HG: CPT | Mod: CPTII,S$GLB,, | Performed by: ORTHOPAEDIC SURGERY

## 2025-07-09 PROCEDURE — 3079F DIAST BP 80-89 MM HG: CPT | Mod: CPTII,S$GLB,, | Performed by: PHYSICIAN ASSISTANT

## 2025-07-09 RX ORDER — TIRZEPATIDE 2.5 MG/.5ML
2.5 INJECTION, SOLUTION SUBCUTANEOUS
Qty: 4 PEN | Refills: 0 | Status: SHIPPED | OUTPATIENT
Start: 2025-07-09

## 2025-07-09 NOTE — TELEPHONE ENCOUNTER
Spoke to Pt. Pt was informed that Dr. Dorsey is no longer at [facility]. Pt is scheduled with Dr. Cao for July 29. Pt is requesting a sooner appt since he needs to get back to work. Pt was informed that I would send a message to our nurse navigator for a sooner appt.
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
## 2025-07-09 NOTE — TELEPHONE ENCOUNTER
Care Due:                  Date            Visit Type   Department     Provider  --------------------------------------------------------------------------------                                EP -                              PRIMARY      BAP INTERNAL  Last Visit: 01-      CARE ([facility])   COLEMAN Schwab                              EP -                              PRIMARY      BAP INTERNAL  Next Visit: 09-      CARE ([facility])   COLEMAN Schwab                                                            Last  Test          Frequency    Reason                     Performed    Due Date  --------------------------------------------------------------------------------    Lipid Panel.  12 months..  rosuvastatin.............  10-   10-    [employer] Embedded Care Due Messages. Reference number: [id number].   7/09/2025 10:53:16 AM CDT
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
## 2025-07-10 ENCOUNTER — PATIENT MESSAGE (OUTPATIENT)
Facility: CLINIC | Age: 60
End: 2025-07-10

## 2025-07-10 ENCOUNTER — TELEPHONE (OUTPATIENT)
Dept: ORTHOPEDICS | Facility: CLINIC | Age: 60
End: 2025-07-10
Payer: COMMERCIAL

## 2025-07-10 ENCOUNTER — HOSPITAL ENCOUNTER (OUTPATIENT)
Dept: RADIOLOGY | Facility: OTHER | Age: 60
Discharge: HOME | End: 2025-07-10
Attending: NEUROLOGICAL SURGERY
Payer: COMMERCIAL

## 2025-07-10 ENCOUNTER — OFFICE VISIT (OUTPATIENT)
Facility: CLINIC | Age: 60
End: 2025-07-10
Payer: COMMERCIAL

## 2025-07-10 DIAGNOSIS — R42 DIZZINESS AND GIDDINESS: ICD-10-CM

## 2025-07-10 DIAGNOSIS — M54.2 CERVICALGIA: ICD-10-CM

## 2025-07-10 DIAGNOSIS — S63.501A SPRAIN OF RIGHT WRIST, INITIAL ENCOUNTER: Primary | ICD-10-CM

## 2025-07-10 DIAGNOSIS — S63.044A CLOSED TRAUMATIC DISLOCATION OF CARPOMETACARPAL (CMC) JOINT OF RIGHT THUMB: ICD-10-CM

## 2025-07-10 DIAGNOSIS — S63.641A RUPTURE OF ULNAR COLLATERAL LIGAMENT OF RIGHT THUMB, INITIAL ENCOUNTER: ICD-10-CM

## 2025-07-10 DIAGNOSIS — R51.9 INTRACTABLE HEADACHE, UNSPECIFIED CHRONICITY PATTERN, UNSPECIFIED HEADACHE TYPE: ICD-10-CM

## 2025-07-10 DIAGNOSIS — M54.6 PAIN IN THORACIC SPINE: ICD-10-CM

## 2025-07-10 DIAGNOSIS — S06.0XAA CONCUSSION WITH UNKNOWN LOSS OF CONSCIOUSNESS STATUS, INITIAL ENCOUNTER: Primary | ICD-10-CM

## 2025-07-10 DIAGNOSIS — M79.644 PAIN OF RIGHT THUMB: ICD-10-CM

## 2025-07-10 PROCEDURE — 70551 MRI BRAIN STEM W/O DYE: CPT | Mod: TC

## 2025-07-10 PROCEDURE — 72146 MRI CHEST SPINE W/O DYE: CPT | Mod: 26,,, | Performed by: RADIOLOGY

## 2025-07-10 PROCEDURE — 70551 MRI BRAIN STEM W/O DYE: CPT | Mod: 26,,, | Performed by: RADIOLOGY

## 2025-07-10 PROCEDURE — 1160F RVW MEDS BY RX/DR IN RCRD: CPT | Mod: CPTII,S$GLB,, | Performed by: ORTHOPAEDIC SURGERY

## 2025-07-10 PROCEDURE — 72141 MRI NECK SPINE W/O DYE: CPT | Mod: TC

## 2025-07-10 PROCEDURE — 72146 MRI CHEST SPINE W/O DYE: CPT | Mod: TC

## 2025-07-10 PROCEDURE — 99999 PR PBB SHADOW E&M-EST. PATIENT-LVL II: CPT | Mod: PBBFAC,,, | Performed by: ORTHOPAEDIC SURGERY

## 2025-07-10 PROCEDURE — 1159F MED LIST DOCD IN RCRD: CPT | Mod: CPTII,S$GLB,, | Performed by: ORTHOPAEDIC SURGERY

## 2025-07-10 PROCEDURE — 4010F ACE/ARB THERAPY RXD/TAKEN: CPT | Mod: CPTII,S$GLB,, | Performed by: ORTHOPAEDIC SURGERY

## 2025-07-10 PROCEDURE — 99204 OFFICE O/P NEW MOD 45 MIN: CPT | Mod: S$GLB,,, | Performed by: ORTHOPAEDIC SURGERY

## 2025-07-10 PROCEDURE — 72141 MRI NECK SPINE W/O DYE: CPT | Mod: 26,,, | Performed by: RADIOLOGY

## 2025-07-10 PROCEDURE — 99999 PR PBB SHADOW E&M-EST. PATIENT-LVL II: CPT | Mod: PBBFAC,,, | Performed by: NEUROLOGICAL SURGERY

## 2025-07-10 RX ORDER — DIAZEPAM 10 MG/1
TABLET ORAL
Qty: 5 TABLET | Refills: 0 | Status: SHIPPED | OUTPATIENT
Start: 2025-07-10

## 2025-07-10 NOTE — TELEPHONE ENCOUNTER
Duplicate    Copied from CRM #[id number]. Topic: General Inquiry - Patient Advice  >> Jul 10, 2025  3:19 PM Ayah wrote:  Name of Caller: the PT      Nature of Call: returning a missed call to Tereritiffany Maddox Call Back Number: [phone]     Additional Information: Pt is returning a missed call about a MRI, but is at another appt getting a MRI now, PT is asking to get the orders in place for him to schedule the MRI before 11 am tomorrow. Please call to assist
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
## 2025-07-10 NOTE — TELEPHONE ENCOUNTER
Good afternoon Dr Jones,    The MRIs were ordered and they are all without contrast. Let us know if you have any other concerns!    Shaq Courtney MA

## 2025-07-10 NOTE — TELEPHONE ENCOUNTER
Notified pt of mri and appt with  on Monday    Copied from CRM #[id number]. Topic: General Inquiry - Patient Advice  >> Jul 10, 2025  4:35 PM Ayah wrote:  Name of Caller: the PT      Nature of Call: returning a missed call     Best Call Back Number: [phone]      Additional Information: the PT is returning a missed call to [facility] about MRI. Please call back   [Normocephalic] : normocephalic [Atraumatic] : atraumatic [EOMI] : extra ocular movement intact [PERRL] : pupils equal, round and reactive to light [Sclera nonicteric] : sclera nonicteric [Supple] : supple [No Supraclavicular Adenopathy] : no supraclavicular adenopathy [Examined in the supine and seated position] : examined in the supine and seated position [No dominant masses] : no dominant masses in right breast  [No dominant masses] : no dominant masses left breast [No Nipple Retraction] : no left nipple retraction [No Nipple Discharge] : no left nipple discharge [Breast Nipple Inversion] : nipples not inverted [Breast Nipple Retraction] : nipples not retracted [Breast Nipple Flattening] : nipples not flattened [Breast Nipple Fissures] : nipples not fissured [Breast Abnormal Lactation (Galactorrhea)] : no galactorrhea [Breast Abnormal Secretion Bloody Fluid] : no bloody discharge [Breast Abnormal Secretion Serous Fluid] : no serous discharge [Breast Abnormal Secretion Opalescent Fluid] : no milky discharge [No Axillary Lymphadenopathy] : no left axillary lymphadenopathy [No Edema] : no edema [No Rashes] : no rashes [No Ulceration] : no ulceration [de-identified] : Mastectomy flap well healed. [de-identified] : Mastectomy flap well healed.
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
## 2025-07-11 DIAGNOSIS — S63.641A RUPTURE OF ULNAR COLLATERAL LIGAMENT OF RIGHT THUMB, INITIAL ENCOUNTER: Primary | ICD-10-CM

## 2025-07-11 DIAGNOSIS — S63.501A SPRAIN OF RIGHT WRIST, INITIAL ENCOUNTER: ICD-10-CM

## 2025-07-11 DIAGNOSIS — S63.044A CLOSED TRAUMATIC DISLOCATION OF CARPOMETACARPAL (CMC) JOINT OF RIGHT THUMB: ICD-10-CM

## 2025-07-14 ENCOUNTER — PATIENT MESSAGE (OUTPATIENT)
Dept: PREADMISSION TESTING | Facility: HOSPITAL | Age: 60
End: 2025-07-14
Payer: COMMERCIAL

## 2025-07-14 ENCOUNTER — OFFICE VISIT (OUTPATIENT)
Facility: CLINIC | Age: 60
End: 2025-07-14
Payer: COMMERCIAL

## 2025-07-14 ENCOUNTER — HOSPITAL ENCOUNTER (OUTPATIENT)
Dept: RADIOLOGY | Facility: HOSPITAL | Age: 60
Discharge: HOME | End: 2025-07-14
Attending: ORTHOPAEDIC SURGERY
Payer: COMMERCIAL

## 2025-07-14 ENCOUNTER — ANESTHESIA EVENT (OUTPATIENT)
Dept: SURGERY | Facility: HOSPITAL | Age: 60
End: 2025-07-14
Payer: COMMERCIAL

## 2025-07-14 DIAGNOSIS — S63.641D RUPTURE OF ULNAR COLLATERAL LIGAMENT OF RIGHT THUMB, SUBSEQUENT ENCOUNTER: Primary | ICD-10-CM

## 2025-07-14 DIAGNOSIS — S63.501A SPRAIN OF RIGHT WRIST, INITIAL ENCOUNTER: ICD-10-CM

## 2025-07-14 DIAGNOSIS — S63.641A RUPTURE OF ULNAR COLLATERAL LIGAMENT OF RIGHT THUMB, INITIAL ENCOUNTER: Primary | ICD-10-CM

## 2025-07-14 DIAGNOSIS — S63.044A CLOSED TRAUMATIC DISLOCATION OF CARPOMETACARPAL (CMC) JOINT OF RIGHT THUMB: ICD-10-CM

## 2025-07-14 PROCEDURE — 1159F MED LIST DOCD IN RCRD: CPT | Mod: CPTII,S$GLB,, | Performed by: ORTHOPAEDIC SURGERY

## 2025-07-14 PROCEDURE — 99215 OFFICE O/P EST HI 40 MIN: CPT | Mod: S$GLB,,, | Performed by: ORTHOPAEDIC SURGERY

## 2025-07-14 PROCEDURE — 73221 MRI JOINT UPR EXTREM W/O DYE: CPT | Mod: 26,RT,, | Performed by: INTERNAL MEDICINE

## 2025-07-14 PROCEDURE — 73221 MRI JOINT UPR EXTREM W/O DYE: CPT | Mod: TC,RT

## 2025-07-14 PROCEDURE — 1160F RVW MEDS BY RX/DR IN RCRD: CPT | Mod: CPTII,S$GLB,, | Performed by: ORTHOPAEDIC SURGERY

## 2025-07-14 PROCEDURE — 99999 PR PBB SHADOW E&M-EST. PATIENT-LVL III: CPT | Mod: PBBFAC,,, | Performed by: ORTHOPAEDIC SURGERY

## 2025-07-14 PROCEDURE — 4010F ACE/ARB THERAPY RXD/TAKEN: CPT | Mod: CPTII,S$GLB,, | Performed by: ORTHOPAEDIC SURGERY

## 2025-07-14 RX ORDER — MUPIROCIN 20 MG/G
OINTMENT TOPICAL
Status: CANCELLED | OUTPATIENT
Start: 2025-07-14

## 2025-07-14 RX ORDER — ONDANSETRON 8 MG/1
8 TABLET, FILM COATED ORAL EVERY 8 HOURS PRN
Qty: 30 TABLET | Refills: 0 | Status: SHIPPED | OUTPATIENT
Start: 2025-07-14 | End: 2025-07-14 | Stop reason: CLARIF

## 2025-07-14 RX ORDER — ONDANSETRON 8 MG/1
8 TABLET, FILM COATED ORAL EVERY 6 HOURS PRN
Qty: 25 TABLET | Refills: 0 | Status: SHIPPED | OUTPATIENT
Start: 2025-07-14

## 2025-07-14 RX ORDER — HYDROCODONE BITARTRATE AND ACETAMINOPHEN 5; 325 MG/1; MG/1
1 TABLET ORAL EVERY 6 HOURS PRN
Qty: 30 TABLET | Refills: 0 | Status: SHIPPED | OUTPATIENT
Start: 2025-07-14 | End: 2025-07-14 | Stop reason: CLARIF

## 2025-07-14 RX ORDER — TRAMADOL HYDROCHLORIDE 50 MG/1
50 TABLET, FILM COATED ORAL EVERY 6 HOURS PRN
Qty: 25 TABLET | Refills: 0 | Status: SHIPPED | OUTPATIENT
Start: 2025-07-14

## 2025-07-14 RX ORDER — DOCUSATE SODIUM 100 MG/1
100 CAPSULE, LIQUID FILLED ORAL 2 TIMES DAILY
Qty: 30 CAPSULE | Refills: 1 | Status: SHIPPED | OUTPATIENT
Start: 2025-07-14 | End: 2025-07-14 | Stop reason: CLARIF

## 2025-07-14 NOTE — PLAN OF CARE
RN called pt [phone] to inquire about Zepbound.  He states he last took his Zepbound this past Thursday the 10th.  Pt instructed that his surgery will need to be postponed.  Pramod and Dr. Weinberg notified.    Carolyn Wolf, RN BSN

## 2025-07-14 NOTE — PATIENT INSTRUCTIONS
[facility]  HAND CLINIC SURGERY INSTRUCTIONS  Melissa Weinberg MD  Date of Surgery: 7/17/25    Location of Surgery:      [facility], [address] [place] [address]    PRE-OPERATIVE INSTRUCTIONS:    Dr. Weinberg's clinic staff will call you THE DAY BEFORE SURGERY with your arrival time. You will be notified in the afternoon between 11:00AM - 5:00pm. We will attempt to provide your arrival time earlier and will call you if this is at all possible.   Around 7:30Am    DO NOT eat or drink after midnight, this includes gum/hard candy, coffee.   You may drink gatorade and water only up to 2 hours prior to arrival, NOTHING ELSE.    You ARE NOT to drive or take an Uber/Lyft/taxi home from surgery. Please arrange a friend/family member to accompany you at the surgery center and drive you home.  Transportation: Friend    PLEASE REMOVE nail polish and/or artificial nails prior to your surgery date if applicable. N/A    [facility] Pre-[facility] and PACU Visitor Policy:    Each patient will be allowed 2 visitors with 1 visitor at a time. Only 1 swap out allowed.   Pediatric patients: Both parents are allowed if present.   Post-Op: If there is more than 1 visitor, the person that is the main caregiver will need to be available for d/c instructions should visit 2nd, and stay with the patient until d/c.  All visitors must be accompanied in and out with an employee.       PRE-OPERATIVE MEDICATION INSTRUCTIONS:    *The pre-operative center will send you a message via [facility] with your medication instructions the week prior to your surgery unless otherwise discussed*    If you are diabetic, DO NOT take any diabetic medication the night before surgery or morning of surgery. If you are type I diabetic on an insulin pump, please bring your monitor the morning of surgery.   DM2    MUST HOLD SEMAGLUTIDE MEDICATIONS 7 DAYS PRIOR TO SURGERY, examples: Mounjaro, Ozempic, Trulicity.   Zepbound     If you have high  blood pressure please take your medication in the morning like normal with a SIP OF WATER; with the exception of any Angiotensin receptor blocker (end in sartan) and ACE inhibitors (end in pril).    If you are taking blood thinners (Aspirin, Eliquis, Lovenox, Coumadin, etc), our office will contact the prescribing physician for guidance on when you are to stop/re-start your blood thinner medication.     Please HOLD Vitamins 5 days prior to surgery or sooner, CONTINUE Vitamin D up until the AM of your surgery.    Avoid anti-inflammatory medications 5 days before your surgery. This includes Aleve/Naproxen, Celebrex, Meloxicam/Mobic, Voltaren/Diclofenac.   You may dose ibuprofen/Advil/Motrin up until the day before your surgery.  You may take extra strength Tylenol/Acetaminophen.    HOLD ALL OTHER MEDICATIONS AM OF SURGERY THAT ARE NOT DISCUSSED ABOVE      POST-OPERATIVE MEDICATION INSTRUCTIONS:    Your post-operative pain medication will be DELIVERED BEDSIDE to you at the surgery center by the [facility]. You DO NOT need to pick this medication up from the [facility].     TAKE post-operative pain medication as directed.   You may also take over-the-counter extra strength Tylenol/acetaminophen as directed on the bottle for breakthrough pain between dosed of prescribed pain medication.   Please note, some pain medications contain Tylenol/acetaminophen.   DO NOT exceed 3000mg of Tylenol/acetaminophen in 1 day.  You may also use an over-the-counter anti-inflammatory medication also known as an NSAID,  (Aleve/Naproxen) as directed on the bottle for breakthrough pain between doses of prescribed pain medication.  DO NOT take NSAIDs if you have been previously instructed not to by a physician.     POST-OPERATIVE INSTRUCTIONS:    DO NOT let your operative area become wet, Your dressings/bandages/splints must remain dry.   Recommend waterproof arm cast cover to be worn when showering and bathing and can be purchased  on [employer]. Garbage bags, plastic shopping bags, or umbrella bags can also be used instead.    DO NOT remove any post-operative dressings/bandages/splints until you are seen by Bladimir Boone PA-C, Dr. Arreola or Occupational Therapy   These dressings/bandages/splints are in place to keep the operative site clean, dry, and in optimal healing position     ELEVATE your hand/arm above the level of your heart as much as possible to decrease post-operative swelling for first 48 hours.  Swelling after surgery is TO BE EXPECTED.      ICE the operative site following surgery for 20-30 minutes, 4-5 times per day.  Be sure to have a towel between your dressings/bandages/splint to keep from getting wet.    MOVE the parts of your hand/arm that are not immobilized in a sling or splint.   Make a gentle fist with your fingers, bend/straighten your elbow, etc.   DO NOT attempt any strengthening exercises (hand putty, exercise balls, etc) on your operative side as this can increase swelling.     *CALL the [facility] at [phone]*  If at any time following surgery your dressings/bandages/splints: get wet, come loose, feels tight, feels uncomfortable.  Or if you are concerned about possible infection, worsening pain, worsening swelling or have any other concerns regarding your surgery.   Signs of infection:  fever (over 100.3), chills, body aches, increased warmth, redness, significant increase in swelling & pain at surgical site.     OUTPATIENT FOLLOW UP:  YOU WILL BE SEEN FIRST BY OCCUPATIONAL THERAPY 7-10 DAYS AFTER YOUR SURGERY. *Your splint / soft dressing will be removed as well as your sutures if applicable.  YOU WILL THEN BE SEEN BY BLADIMIR BOONE PA-C IN CLINIC 13 days AFTER SURGERY AND DR. ARREOLA IN CLINIC 6 WEEKS AFTER SURGERY    Patient IQ (Survey)  A quick questionnaire (2-3 minutes) will be sent to you via text message or email in the next few hours. It will only take a few minutes to complete!  We care about  your health, answering the questionnaire allows us to track your progress through your recovery to provide the best outcome for your recovery.    FMLA or Disability paperwork can be sent to [facility]  If you feel you will need accommodations at work for more than 2 weeks please ask your HR department for paperwork so that they can accommodate your restrictions. * Only needed if you are going to be out of work 2 weeks or more*\  Please send the paperwork filled out with your information to: Fax: [phone] or Email [email or web address]   This process should take 8-10 business days. If you have any concerns or need to check the status of your request please contact HIM at the email address or phone number listed below as once it leaves this department we do not have access to the status of your request. Phone: [phone]    Cost of Procedure:  If your procedure is already scheduled the estimated cost with populate in your [facility] portal.  Finanical clearance call Center:   [phone] - Call if Pt. has a scheduled procedure and would marcie and estimate or make payment plan.     Patient accounts:  [phone]: questions about bill or make a payment. Option 0
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
## 2025-07-14 NOTE — PROGRESS NOTES
Corazon Jones presents for follow up evaluation of   Encounter Diagnoses   Name Primary?    Rupture of ulnar collateral ligament of right thumb, subsequent encounter Yes    Closed traumatic dislocation of carpometacarpal (CMC) joint of right thumb      History of Present Illness          Patient is here for follow up after his wrist MRI, this was reviewed with the patient. He continues to have right thumb pain and decreased ROM.    Vitals:    07/14/25 1631   PainSc: 0-No pain   PainLoc: Hand       PE:    AA&O x 4.  NAD  HEENT:  NCAT, sclera nonicteric  Lungs:  Respirations are equal and unlabored.  CV:  2+ bilateral upper and lower extremity pulses.  Physical Exam      Snuff box tenderness                          Pos right  TFCC Compression Test                    Neg  Decreased right thumb ROM, tender to palpation MP and CMC joints, swelling right hand, nontender to palpation TFCC  UCL unstable right compared to left uninjured thumb     left ROM hand/wrist/elbow full, nontender to palpation left thumb, hand and wrist    Diagnostic studies and other clinical records review:  MRI right wrist without contrast 7/14/25 Impression:  Impression:     Low-grade sprains of the short radiolunate and dorsal intercarpal ligaments.     Suspected tear at the peripheral attachment of the TFCC (Torres 1B injury).  Correlate for signs of DRUJ instability.     Redemonstrated sprain of the anterior oblique ligament and partial tear of the dorsal deltoid ligament at the thumb CMC joint.     Intramuscular edema around the thumb suggesting low-grade strains.     Other findings as described.    Assessment/Plan:   Encounter Diagnoses   Name Primary?    Rupture of ulnar collateral ligament of right thumb, subsequent encounter Yes    Closed traumatic dislocation of carpometacarpal (CMC) joint of right thumb      Plan: The patient and I had a thorough discussion today. We discussed the working diagnosis as well as several other potential  alternative diagnoses. Treatment options were discussed, both conservative and surgical. Conservative treatment options would include things such as activity modifications, workplace modifications, a period of rest, oral vs topical OTC and prescription anti-inflammatory medications, occupational therapy, splinting/bracing, immobilization, corticosteroid injections, and others. Surgical options were discussed as well.     At this time, the patient has exhausted conservative measures and would like to proceed with surgery. Surgery would include right thumb ulnar collateral ligament repair, right thumb cmc joint ORIF. Consent signed today in clinic. Light use of the hand will be indicated for the first 4-6 weeks     We have discussed risks, benefits and alternatives of hand surgery which include but are not limited to blood clots in the legs that can travel to the lungs (pulmonary embolism). Pulmonary embolism can cause shortness of breath, chest pain, and even shock. Other risks include urinary tract infection, nausea and vomiting (usually related to pain medication), chronic pain, bleeding, nerve damage, blood vessel injury, scarring and infection of the hand which can require re-operation. Furthermore, the risks of anesthesia include potential heart, lung, kidney, and liver damage.  Informed consent was obtained. he understands and would like to proceed with surgery in the near future.             Melissa Weinberg MD    Please be aware that this note has been generated with the assistance of [employer] voice-to-text.  Please excuse any spelling or grammatical errors.  This note was generated with the assistance of ambient listening technology. Verbal consent was obtained by the patient and accompanying visitor(s) for the recording of patient appointment to facilitate this note. I attest to having reviewed and edited the generated note for accuracy, though some syntax or spelling errors may persist. Please contact the  author of this note for any clarification.

## 2025-07-14 NOTE — H&P (VIEW-ONLY)
Corazon Jones presents for follow up evaluation of   Encounter Diagnoses   Name Primary?    Rupture of ulnar collateral ligament of right thumb, subsequent encounter Yes    Closed traumatic dislocation of carpometacarpal (CMC) joint of right thumb      History of Present Illness          Patient is here for follow up after his wrist MRI, this was reviewed with the patient. He continues to have right thumb pain and decreased ROM.    Vitals:    07/14/25 1631   PainSc: 0-No pain   PainLoc: Hand       PE:    AA&O x 4.  NAD  HEENT:  NCAT, sclera nonicteric  Lungs:  Respirations are equal and unlabored.  CV:  2+ bilateral upper and lower extremity pulses.  Physical Exam      Snuff box tenderness                          Pos right  TFCC Compression Test                    Neg  Decreased right thumb ROM, tender to palpation MP and CMC joints, swelling right hand, nontender to palpation TFCC  UCL unstable right compared to left uninjured thumb     left ROM hand/wrist/elbow full, nontender to palpation left thumb, hand and wrist    Diagnostic studies and other clinical records review:  MRI right wrist without contrast 7/14/25 Impression:  Impression:     Low-grade sprains of the short radiolunate and dorsal intercarpal ligaments.     Suspected tear at the peripheral attachment of the TFCC (Torres 1B injury).  Correlate for signs of DRUJ instability.     Redemonstrated sprain of the anterior oblique ligament and partial tear of the dorsal deltoid ligament at the thumb CMC joint.     Intramuscular edema around the thumb suggesting low-grade strains.     Other findings as described.    Assessment/Plan:   Encounter Diagnoses   Name Primary?    Rupture of ulnar collateral ligament of right thumb, subsequent encounter Yes    Closed traumatic dislocation of carpometacarpal (CMC) joint of right thumb      Plan: The patient and I had a thorough discussion today. We discussed the working diagnosis as well as several other potential  alternative diagnoses. Treatment options were discussed, both conservative and surgical. Conservative treatment options would include things such as activity modifications, workplace modifications, a period of rest, oral vs topical OTC and prescription anti-inflammatory medications, occupational therapy, splinting/bracing, immobilization, corticosteroid injections, and others. Surgical options were discussed as well.     At this time, the patient has exhausted conservative measures and would like to proceed with surgery. Surgery would include right thumb ulnar collateral ligament repair, right thumb cmc joint ORIF. Consent signed today in clinic. Light use of the hand will be indicated for the first 4-6 weeks     We have discussed risks, benefits and alternatives of hand surgery which include but are not limited to blood clots in the legs that can travel to the lungs (pulmonary embolism). Pulmonary embolism can cause shortness of breath, chest pain, and even shock. Other risks include urinary tract infection, nausea and vomiting (usually related to pain medication), chronic pain, bleeding, nerve damage, blood vessel injury, scarring and infection of the hand which can require re-operation. Furthermore, the risks of anesthesia include potential heart, lung, kidney, and liver damage.  Informed consent was obtained. he understands and would like to proceed with surgery in the near future.             Melissa Weinberg MD    Please be aware that this note has been generated with the assistance of [employer] voice-to-text.  Please excuse any spelling or grammatical errors.  This note was generated with the assistance of ambient listening technology. Verbal consent was obtained by the patient and accompanying visitor(s) for the recording of patient appointment to facilitate this note. I attest to having reviewed and edited the generated note for accuracy, though some syntax or spelling errors may persist. Please contact the  author of this note for any clarification.

## 2025-07-15 ENCOUNTER — ANESTHESIA (OUTPATIENT)
Dept: SURGERY | Facility: HOSPITAL | Age: 60
End: 2025-07-15
Payer: COMMERCIAL

## 2025-07-16 ENCOUNTER — TELEPHONE (OUTPATIENT)
Dept: ORTHOPEDICS | Facility: CLINIC | Age: 60
End: 2025-07-16
Payer: COMMERCIAL

## 2025-07-16 NOTE — TELEPHONE ENCOUNTER
Spoke to patient to inform them of their surgery arrival time (630 am), [place], [address] [facility] A:  Verbalized understanding of instructions for pre-wash, and reviewed NPO after midnight.   [x]  Confirmed [facility] message in regards to medication instructions from anesthesia.   [x]  Confirmed patient was NOT using a rideshare service for surgery arrival or  transportation.   [x] friend  Discussed removing any finger nail polish if applicable   [x]  Confirmed no new wounds or abrasions on operative extremity.  [x]  Confirmed Financial obligation has been met or will be payable day of surgery.   [x] already paid    [employer] call Center:   [phone] - Call if Pt. has a scheduled procedure and would marcie and estimate or make payment plan.     Patient accounts:  [phone]: questions about bill or make a payment. Option 0    [place] Pre-Op and PACU Visiting Policy  · Each patient will be allowed 2 visitors with 1 visitor at a time. Only 1 swap out allowed.  · Pediatric patients: Both parents are allowed if present.  · Post-Op: If there is more than 1 visitor, the person that is the main caregiver will need to be available for d/c instructions should visit 2nd, and stay with the patient until d/c.  All visitors must be accompanied in and out with an employee.

## 2025-07-17 ENCOUNTER — HOSPITAL ENCOUNTER (OUTPATIENT)
Facility: HOSPITAL | Age: 60
Discharge: HOME | End: 2025-07-17
Attending: ORTHOPAEDIC SURGERY | Admitting: ORTHOPAEDIC SURGERY
Payer: COMMERCIAL

## 2025-07-17 ENCOUNTER — PATIENT MESSAGE (OUTPATIENT)
Dept: ORTHOPEDICS | Facility: CLINIC | Age: 60
End: 2025-07-17
Payer: COMMERCIAL

## 2025-07-17 VITALS
HEART RATE: 76 BPM | WEIGHT: 233 LBS | HEIGHT: 71 IN | RESPIRATION RATE: 19 BRPM | TEMPERATURE: 98 F | OXYGEN SATURATION: 95 % | DIASTOLIC BLOOD PRESSURE: 68 MMHG | SYSTOLIC BLOOD PRESSURE: 120 MMHG | BODY MASS INDEX: 32.62 KG/M2

## 2025-07-17 DIAGNOSIS — S63.641A RUPTURE OF ULNAR COLLATERAL LIGAMENT OF RIGHT THUMB, INITIAL ENCOUNTER: Primary | ICD-10-CM

## 2025-07-17 DIAGNOSIS — S63.044A CLOSED TRAUMATIC DISLOCATION OF CARPOMETACARPAL (CMC) JOINT OF RIGHT THUMB: ICD-10-CM

## 2025-07-17 PROCEDURE — 26665 TREAT THUMB FRACTURE: CPT | Mod: 51,F5,, | Performed by: ORTHOPAEDIC SURGERY

## 2025-07-17 PROCEDURE — 25000003 PHARM REV CODE 250: Performed by: NURSE ANESTHETIST, CERTIFIED REGISTERED

## 2025-07-17 PROCEDURE — 99900035 HC TECH TIME PER 15 MIN (STAT)

## 2025-07-17 PROCEDURE — 25000003 PHARM REV CODE 250: Performed by: PHYSICIAN ASSISTANT

## 2025-07-17 PROCEDURE — 94761 N-INVAS EAR/PLS OXIMETRY MLT: CPT

## 2025-07-17 PROCEDURE — 63600175 PHARM REV CODE 636 W HCPCS: Performed by: ORTHOPAEDIC SURGERY

## 2025-07-17 PROCEDURE — 26541 REPAIR HAND JOINT WITH GRAFT: CPT | Mod: F5,,, | Performed by: ORTHOPAEDIC SURGERY

## 2025-07-17 PROCEDURE — 63600175 PHARM REV CODE 636 W HCPCS: Performed by: NURSE ANESTHETIST, CERTIFIED REGISTERED

## 2025-07-17 PROCEDURE — 36000708 HC OR TIME LEV III 1ST 15 MIN: Performed by: ORTHOPAEDIC SURGERY

## 2025-07-17 PROCEDURE — 37000009 HC ANESTHESIA EA ADD 15 MINS: Performed by: ORTHOPAEDIC SURGERY

## 2025-07-17 PROCEDURE — 26105 BIOPSY FINGER JOINT LINING: CPT | Mod: 51,F5,, | Performed by: ORTHOPAEDIC SURGERY

## 2025-07-17 PROCEDURE — 71000033 HC RECOVERY, INTIAL HOUR: Performed by: ORTHOPAEDIC SURGERY

## 2025-07-17 PROCEDURE — 36000709 HC OR TIME LEV III EA ADD 15 MIN: Performed by: ORTHOPAEDIC SURGERY

## 2025-07-17 PROCEDURE — 37000008 HC ANESTHESIA 1ST 15 MINUTES: Performed by: ORTHOPAEDIC SURGERY

## 2025-07-17 PROCEDURE — 27201423 OPTIME MED/SURG SUP & DEVICES STERILE SUPPLY: Performed by: ORTHOPAEDIC SURGERY

## 2025-07-17 PROCEDURE — 25000003 PHARM REV CODE 250: Performed by: ORTHOPAEDIC SURGERY

## 2025-07-17 PROCEDURE — C1713 ANCHOR/SCREW BN/BN,TIS/BN: HCPCS | Performed by: ORTHOPAEDIC SURGERY

## 2025-07-17 PROCEDURE — 25000003 PHARM REV CODE 250

## 2025-07-17 PROCEDURE — 71000015 HC POSTOP RECOV 1ST HR: Performed by: ORTHOPAEDIC SURGERY

## 2025-07-17 PROCEDURE — 71000039 HC RECOVERY, EACH ADD'L HOUR: Performed by: ORTHOPAEDIC SURGERY

## 2025-07-17 DEVICE — H/W INTERNALBRACE LGMNT AUGMNT REPR KIT
Type: IMPLANTABLE DEVICE | Site: HAND | Status: FUNCTIONAL
Brand: ARTHREX®

## 2025-07-17 DEVICE — .045" X 6" ST GUIDE WIRE
Type: IMPLANTABLE DEVICE | Site: HAND | Status: FUNCTIONAL
Brand: ACUMED

## 2025-07-17 RX ORDER — BACITRACIN ZINC 500 UNIT/G
OINTMENT (GRAM) TOPICAL
Status: DISCONTINUED | OUTPATIENT
Start: 2025-07-17 | End: 2025-07-17 | Stop reason: HOSPADM

## 2025-07-17 RX ORDER — HYDROCODONE BITARTRATE AND ACETAMINOPHEN 5; 325 MG/1; MG/1
1 TABLET ORAL EVERY 4 HOURS PRN
Status: DISCONTINUED | OUTPATIENT
Start: 2025-07-17 | End: 2025-07-17 | Stop reason: HOSPADM

## 2025-07-17 RX ORDER — DEXAMETHASONE SODIUM PHOSPHATE 4 MG/ML
INJECTION, SOLUTION INTRA-ARTICULAR; INTRALESIONAL; INTRAMUSCULAR; INTRAVENOUS; SOFT TISSUE
Status: DISCONTINUED | OUTPATIENT
Start: 2025-07-17 | End: 2025-07-17

## 2025-07-17 RX ORDER — CELECOXIB 200 MG/1
400 CAPSULE ORAL
Status: COMPLETED | OUTPATIENT
Start: 2025-07-17 | End: 2025-07-17

## 2025-07-17 RX ORDER — ONDANSETRON HYDROCHLORIDE 2 MG/ML
INJECTION, SOLUTION INTRAVENOUS
Status: DISCONTINUED | OUTPATIENT
Start: 2025-07-17 | End: 2025-07-17

## 2025-07-17 RX ORDER — ACETAMINOPHEN 500 MG
1000 TABLET ORAL
Status: COMPLETED | OUTPATIENT
Start: 2025-07-17 | End: 2025-07-17

## 2025-07-17 RX ORDER — FENTANYL CITRATE 50 UG/ML
100 INJECTION, SOLUTION INTRAMUSCULAR; INTRAVENOUS
Status: DISCONTINUED | OUTPATIENT
Start: 2025-07-17 | End: 2025-07-17 | Stop reason: HOSPADM

## 2025-07-17 RX ORDER — BUPIVACAINE HYDROCHLORIDE 2.5 MG/ML
INJECTION, SOLUTION EPIDURAL; INFILTRATION; INTRACAUDAL; PERINEURAL
Status: DISCONTINUED | OUTPATIENT
Start: 2025-07-17 | End: 2025-07-17 | Stop reason: HOSPADM

## 2025-07-17 RX ORDER — CEFAZOLIN 2 G/1
2 INJECTION, POWDER, FOR SOLUTION INTRAMUSCULAR; INTRAVENOUS
Status: DISCONTINUED | OUTPATIENT
Start: 2025-07-17 | End: 2025-07-17 | Stop reason: HOSPADM

## 2025-07-17 RX ORDER — CEFAZOLIN SODIUM 1 G/3ML
INJECTION, POWDER, FOR SOLUTION INTRAMUSCULAR; INTRAVENOUS
Status: DISCONTINUED | OUTPATIENT
Start: 2025-07-17 | End: 2025-07-17

## 2025-07-17 RX ORDER — MIDAZOLAM HYDROCHLORIDE 1 MG/ML
1 INJECTION, SOLUTION INTRAMUSCULAR; INTRAVENOUS
Status: DISCONTINUED | OUTPATIENT
Start: 2025-07-17 | End: 2025-07-17 | Stop reason: HOSPADM

## 2025-07-17 RX ORDER — MUPIROCIN 20 MG/G
OINTMENT TOPICAL 2 TIMES DAILY
Status: DISCONTINUED | OUTPATIENT
Start: 2025-07-17 | End: 2025-07-17 | Stop reason: HOSPADM

## 2025-07-17 RX ORDER — HALOPERIDOL LACTATE 5 MG/ML
0.5 INJECTION, SOLUTION INTRAMUSCULAR EVERY 10 MIN PRN
Status: DISCONTINUED | OUTPATIENT
Start: 2025-07-17 | End: 2025-07-17 | Stop reason: HOSPADM

## 2025-07-17 RX ORDER — EPHEDRINE SULFATE 50 MG/ML
INJECTION, SOLUTION INTRAVENOUS
Status: DISCONTINUED | OUTPATIENT
Start: 2025-07-17 | End: 2025-07-17

## 2025-07-17 RX ORDER — LIDOCAINE HYDROCHLORIDE 10 MG/ML
INJECTION, SOLUTION EPIDURAL; INFILTRATION; INTRACAUDAL; PERINEURAL
Status: DISCONTINUED | OUTPATIENT
Start: 2025-07-17 | End: 2025-07-17 | Stop reason: HOSPADM

## 2025-07-17 RX ORDER — PROPOFOL 10 MG/ML
VIAL (ML) INTRAVENOUS
Status: DISCONTINUED | OUTPATIENT
Start: 2025-07-17 | End: 2025-07-17

## 2025-07-17 RX ORDER — GLUCAGON 1 MG
1 KIT INJECTION
Status: DISCONTINUED | OUTPATIENT
Start: 2025-07-17 | End: 2025-07-17 | Stop reason: HOSPADM

## 2025-07-17 RX ORDER — FAMOTIDINE 10 MG/ML
INJECTION, SOLUTION INTRAVENOUS
Status: DISCONTINUED | OUTPATIENT
Start: 2025-07-17 | End: 2025-07-17

## 2025-07-17 RX ORDER — FENTANYL CITRATE 50 UG/ML
INJECTION, SOLUTION INTRAMUSCULAR; INTRAVENOUS
Status: DISCONTINUED | OUTPATIENT
Start: 2025-07-17 | End: 2025-07-17

## 2025-07-17 RX ORDER — MIDAZOLAM HYDROCHLORIDE 1 MG/ML
INJECTION INTRAMUSCULAR; INTRAVENOUS
Status: DISCONTINUED | OUTPATIENT
Start: 2025-07-17 | End: 2025-07-17

## 2025-07-17 RX ORDER — LIDOCAINE HYDROCHLORIDE 20 MG/ML
INJECTION INTRAVENOUS
Status: DISCONTINUED | OUTPATIENT
Start: 2025-07-17 | End: 2025-07-17

## 2025-07-17 RX ORDER — ONDANSETRON HYDROCHLORIDE 2 MG/ML
4 INJECTION, SOLUTION INTRAVENOUS DAILY PRN
Status: DISCONTINUED | OUTPATIENT
Start: 2025-07-17 | End: 2025-07-17 | Stop reason: HOSPADM

## 2025-07-17 RX ORDER — MUPIROCIN 20 MG/G
OINTMENT TOPICAL
Status: DISCONTINUED | OUTPATIENT
Start: 2025-07-17 | End: 2025-07-17 | Stop reason: HOSPADM

## 2025-07-17 RX ADMIN — MIDAZOLAM HYDROCHLORIDE 2 MG: 2 INJECTION, SOLUTION INTRAMUSCULAR; INTRAVENOUS at 09:07

## 2025-07-17 RX ADMIN — GLYCOPYRROLATE 0.2 MG: 0.2 INJECTION, SOLUTION INTRAMUSCULAR; INTRAVENOUS at 09:07

## 2025-07-17 RX ADMIN — FENTANYL CITRATE 50 MCG: 50 INJECTION, SOLUTION INTRAMUSCULAR; INTRAVENOUS at 09:07

## 2025-07-17 RX ADMIN — SODIUM CHLORIDE, SODIUM GLUCONATE, SODIUM ACETATE, POTASSIUM CHLORIDE, MAGNESIUM CHLORIDE, SODIUM PHOSPHATE, DIBASIC, AND POTASSIUM PHOSPHATE: .53; .5; .37; .037; .03; .012; .00082 INJECTION, SOLUTION INTRAVENOUS at 10:07

## 2025-07-17 RX ADMIN — CEFAZOLIN 2 G: 330 INJECTION, POWDER, FOR SOLUTION INTRAMUSCULAR; INTRAVENOUS at 09:07

## 2025-07-17 RX ADMIN — MUPIROCIN: 20 OINTMENT TOPICAL at 07:07

## 2025-07-17 RX ADMIN — FAMOTIDINE 20 MG: 10 INJECTION, SOLUTION INTRAVENOUS at 09:07

## 2025-07-17 RX ADMIN — EPHEDRINE SULFATE 10 MG: 50 INJECTION INTRAVENOUS at 10:07

## 2025-07-17 RX ADMIN — FENTANYL CITRATE 50 MCG: 50 INJECTION, SOLUTION INTRAMUSCULAR; INTRAVENOUS at 10:07

## 2025-07-17 RX ADMIN — FENTANYL CITRATE 50 MCG: 50 INJECTION, SOLUTION INTRAMUSCULAR; INTRAVENOUS at 11:07

## 2025-07-17 RX ADMIN — CELECOXIB 400 MG: 200 CAPSULE ORAL at 07:07

## 2025-07-17 RX ADMIN — ACETAMINOPHEN 1000 MG: 500 TABLET ORAL at 07:07

## 2025-07-17 RX ADMIN — ONDANSETRON 4 MG: 2 INJECTION INTRAMUSCULAR; INTRAVENOUS at 09:07

## 2025-07-17 RX ADMIN — SODIUM CHLORIDE: 9 INJECTION, SOLUTION INTRAVENOUS at 09:07

## 2025-07-17 RX ADMIN — DEXAMETHASONE SODIUM PHOSPHATE 4 MG: 4 INJECTION, SOLUTION INTRAMUSCULAR; INTRAVENOUS at 09:07

## 2025-07-17 RX ADMIN — LIDOCAINE HYDROCHLORIDE 100 MG: 20 INJECTION INTRAVENOUS at 09:07

## 2025-07-17 RX ADMIN — PROPOFOL 400 MG: 10 INJECTION, EMULSION INTRAVENOUS at 09:07

## 2025-07-17 NOTE — ANESTHESIA PREPROCEDURE EVALUATION
"                                                                                                             2025  Pre-operative evaluation for Procedure(s) (LRB):  ORIF, THUMB (Right)  REPAIR, COLLATERAL LIGAMENT, THUMB (Right)    Corazon Jones is a 59 y.o. male     Problem List[1]    Review of patient's allergies indicates:   Allergen Reactions    Percocet [oxycodone-acetaminophen]     Suture, silk     Adhesive Rash       Medications Ordered Prior to Encounter[2]    Past Surgical History:   Procedure Laterality Date    APPENDECTOMY      ARTHROSCOPY OF KNEE      BUNIONECTOMY Right     CHONDROPLASTY OF KNEE Right 10/1/2024    Procedure: CHONDROPLASTY, KNEE;  Surgeon: Laura Wisdom MD;  Location: [facility] OR;  Service: Orthopedics;  Laterality: Right;    EYE SURGERY  2006 Lasik    HERNIA REPAIR      KNEE ARTHROSCOPY W/ MENISCECTOMY Right 10/1/2024    Procedure: ARTHROSCOPY, KNEE, WITH MENISCECTOMY;  Surgeon: Laura Wisdom MD;  Location: [facility] OR;  Service: Orthopedics;  Laterality: Right;    KNEE ARTHROSCOPY W/ PLICA EXCISION Right 10/1/2024    Procedure: EXCISION, PLICA, KNEE, ARTHROSCOPIC;  Surgeon: Laura Wisdom MD;  Location: [facility] OR;  Service: Orthopedics;  Laterality: Right;    SHOULDER ARTHROSCOPY         Social History[3]      CBC: No results for input(s): "WBC", "RBC", "HGB", "HCT", "PLT", "MCV", "MCH", "MCHC" in the last 72 hours.    CMP: No results for input(s): "NA", "K", "CL", "CO2", "BUN", "CREATININE", "GLU", "MG", "PHOS", "CALCIUM", "ALBUMIN", "PROT", "ALKPHOS", "ALT", "AST", "BILITOT" in the last 72 hours.    INR  No results for input(s): "PT", "INR", "PROTIME", "APTT" in the last 72 hours.        Diagnostic Studies:      EKD Echo:  No results found for this or any previous visit.       Pre-op Assessment    I have reviewed the Patient Summary Reports.     I have reviewed the Nursing Notes. I have reviewed the NPO Status.   I have reviewed the Medications.     Review of " Systems  Cardiovascular:     Hypertension                                          Pulmonary:        Sleep Apnea                    Physical Exam  General: Well nourished and Cooperative    Airway:  Mallampati: II   Mouth Opening: Normal  TM Distance: Normal  Tongue: Normal  Neck ROM: Normal ROM    Chest/Lungs:  Clear to auscultation, Normal Respiratory Rate    Heart:  Rate: Normal  Rhythm: Regular Rhythm  Sounds: Normal        Anesthesia Plan  Type of Anesthesia, risks & benefits discussed:    Anesthesia Type: Gen Natural Airway  Intra-op Monitoring Plan: Standard ASA Monitors  Post Op Pain Control Plan: multimodal analgesia and IV/PO Opioids PRN  Induction:  IV  Airway Plan: Direct and Video, Post-Induction  Informed Consent: Informed consent signed with the Patient and all parties understand the risks and agree with anesthesia plan.  All questions answered.   ASA Score: 2    Ready For Surgery From Anesthesia Perspective.     .           [1]   Patient Active Problem List  Diagnosis    Lactose intolerance    Chronic familial neutropenia    OTTONIEL (obstructive sleep apnea)    Pure hypercholesterolemia    History of Clostridium difficile colitis    Tinnitus of both ears    Acute seasonal allergic rhinitis    Nasal septal deviation    Primary hypertension    Lung nodule    History of sarcoidosis    Prediabetes    Acute pain of right shoulder    Acute medial meniscus tear of right knee    Acute pain of right knee    Acute viral pharyngitis   [2]   No current facility-administered medications on file prior to encounter.     Current Outpatient Medications on File Prior to Encounter   Medication Sig Dispense Refill    [Paused] celecoxib (CELEBREX) 200 MG capsule Take 1 capsule (200 mg total) by mouth once daily. 90 capsule 3    diazePAM (VALIUM) 10 MG Tab Take 1-2 tablets by mouth prior to MRI as needed 5 tablet 0    losartan (COZAAR) 25 MG tablet Take 1 tablet (25 mg total) by mouth once daily. 90 tablet 3    omeprazole  (PRILOSEC) 40 MG capsule TAKE 1 CAPSULE BY MOUTH EVERY MORNING AS DIRECTED 90 capsule 3    rosuvastatin (CRESTOR) 40 MG Tab Take 1 tablet (40 mg total) by mouth every evening. 90 tablet 3    sertraline (ZOLOFT) 50 MG tablet Take 1 tablet (50 mg total) by mouth once daily. 90 tablet 3    tadalafiL (CIALIS) 20 MG Tab Take 1 tablet (20 mg total) by mouth once daily. 30 tablet 11    metFORMIN (GLUCOPHAGE-XR) 500 MG ER 24hr tablet Take 1 tablet (500 mg total) by mouth every morning. 30 tablet 3    naproxen (NAPROSYN) 500 MG tablet Take 1 tablet (500 mg total) by mouth 2 (two) times daily with meals. (Patient not taking: Reported on 7/9/2025) 14 tablet 0    neomycin-polymyxin-hydrocortisone (CORTISPORIN) 3.5-10,000-1 mg/mL-unit/mL-% otic suspension instill to affected ear as directed 10 mL 0    ondansetron (ZOFRAN-ODT) 4 MG TbDL dissolve 1 tablet (4 mg total) by mouth every 8 (eight) hours as needed (nausea/vomiting). (Patient not taking: Reported on 7/9/2025) 12 tablet 0    tirzepatide, weight loss, (ZEPBOUND) 2.5 mg/0.5 mL PnIj Inject 2.5 mg into the skin every 7 days. 4 Pen 0   [3]   Social History  Socioeconomic History    Marital status:     Number of children: 3   Occupational History    Occupation: physician     Employer: [employer]   Tobacco Use    Smoking status: Never    Smokeless tobacco: Never   Vaping Use    Vaping status: Never Used   Substance and Sexual Activity    Alcohol use: No    Drug use: No    Sexual activity: Yes     Partners: Female   Social History Narrative    Intermittent exercise     Social Drivers of Health     Financial Resource Strain: Low Risk  (5/12/2025)    Overall Financial Resource Strain (CARDIA)     Difficulty of Paying Living Expenses: Not hard at all   Food Insecurity: No Food Insecurity (5/12/2025)    Hunger Vital Sign     Worried About Running Out of Food in the Last Year: Never true     Ran Out of Food in the Last Year: Never true   Transportation  Needs: No Transportation Needs (5/12/2025)    PRAPARE - Transportation     Lack of Transportation (Medical): No     Lack of Transportation (Non-Medical): No   Physical Activity: Insufficiently Active (5/12/2025)    Exercise Vital Sign     Days of Exercise per Week: 1 day     Minutes of Exercise per Session: 30 min   Stress: Stress Concern Present (5/12/2025)    [employer] [place] [employer] - Occupational Stress Questionnaire     Feeling of Stress : To some extent   Housing Stability: Low Risk  (5/12/2025)    Housing Stability Vital Sign     Unable to Pay for Housing in the Last Year: No     Homeless in the Last Year: No

## 2025-07-17 NOTE — ANESTHESIA PROCEDURE NOTES
Intubation    Date/Time: 7/17/2025 9:17 AM    Performed by: Morro Austin CRNA  Authorized by: William Virk MD    Intubation:     Induction:  Intravenous    Intubated:  Postinduction    Mask Ventilation:  Easy mask    Attempts:  1    Attempted By:  CRNA    Method of Intubation:  Fast track LMA    Difficult Airway Encountered?: No      Complications:  None    Airway Device:  Supraglottic airway/LMA    Airway Device Size:  5.0    Style/Cuff Inflation:  Cuffed (inflated to minimal occlusive pressure)    Secured at:  The lips    Placement Verified By:  Capnometry    Complicating Factors:  None    Findings Post-Intubation:  BS equal bilateral and atraumatic/condition of teeth unchanged
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
## 2025-07-17 NOTE — DISCHARGE SUMMARY
[place] - Surgery (Hospital)  Discharge Note  Short Stay    Procedure(s) (LRB):  Reconstruction right thumb UCL  Open repair right thumb CMC dislocation with ligament repair    OUTCOME: Patient tolerated treatment/procedure well without complication and is now ready for discharge.    DISPOSITION: Home or Self Care    FINAL DIAGNOSIS:  right thumb ulnar collateral ligament tear, right thumb CMC joint dislocation    FOLLOWUP: In clinic    DISCHARGE INSTRUCTIONS:    Discharge Procedure Orders   Diet general     Keep surgical extremity elevated     Ice to affected area     Lifting restrictions     No driving, operating heavy equipment or signing legal documents while taking pain medication.     Leave dressing on - Keep it clean, dry, and intact until clinic visit     Call MD for:  temperature >100.4     Call MD for:  persistent nausea and vomiting     Call MD for:  severe uncontrolled pain     Call MD for:  difficulty breathing, headache or visual disturbances     Call MD for:  redness, tenderness, or signs of infection (pain, swelling, redness, odor or green/yellow discharge around incision site)     Call MD for:  hives     Call MD for:  persistent dizziness or light-headedness     Call MD for:  extreme fatigue

## 2025-07-17 NOTE — PLAN OF CARE
Patient is AAO and VSS.  Tolerating PO and states pain is tolerable.  Dressing CDI.  Patient states they are ready for d/c.  IV removed.  Catheter tip intact.  Friend at bedside.  Discharge instructions reviewed and copy given to the patient and friend.  Questions answered.  Both verbalized understanding.  Medication delivered to bedside.  Patient wheeled to car by Janice BAKER

## 2025-07-17 NOTE — DISCHARGE INSTRUCTIONS
HAND SURGERY - Care of the Hand after Surgery       Post-Op Care  It is important to follow your orthopaedic surgeon's instructions carefully after you return home. You should ask   someone to check on you that evening. The protocols described here are general in nature. Every person and   every surgery is different so the information given here is for guidance only. If you have questions you should   contact us.     Day of Surgery    You were place in a plaster splint today to protect the surgical area during healing. Do not remove the plaster splint until you are seen by Occupational Therapy, Meghana Boone PA-C or Dr. Weinberg for your first postop visit    The hand and fingers may be numb for quite some time after surgery. This is due to the anesthetic block used at the time of surgery for pain control.   If you have an arm sling you may remove the sling at your convenience once you regain control of your arm from the anesthetic block.     Begin taking liquids and food as soon as you can. You should always eat some solid food, a sandwich or light meal, a little while before taking your pain meds.     DO NOT let your operative area become wet, Your dressings/bandages/splints must remain dry.   Recommend waterproof arm cast cover to be worn when showering and bathing and can be purchased on [employer]. Garbage bags, plastic shopping bags, or umbrella bags can also be used instead.    Day 3  Things are much the same on the third day after your surgery. Usually you have less pain and feel like doing more.    Showering: It is important to keep the incision absolutely dry while showering or bathing (use two plastic bags over your hand) or a shower bag.   If you feel that the pain medication you were given after surgery is stronger than you really need you can reduce the dose, take it less frequently or switch to ibuprofen or Tylenol. If you received antibiotics they should be taken until the entire prescription is  completed.    Week 1-2  Occupational Therapy will see you between this time. Please let us know if you are not scheduled [phone]    Day 10-14  We will see you back after your surgery to review with you what was done in surgery and will talk about rehab and answer any questions you may have.       HAND SURGERY  Driving: You can drive if you are comfortable and have regained full finger movements and if you have sufficient power to control the vehicle.   Return to work: Timing of your return to work is variable according to your occupation and specific surgery. We should discuss this at your follow up visit if not already discussed prior.  Elevation: Hand elevation is important to prevent swelling and stiffness of the fingers. One minute of leaving your hand dangling negates four hours of keeping it elevated. Please remember not to walk with your hand hanging down or to sit with your hand resting in your lap. It is fine, however, to lower your hand for light use and you should get back to normal light activities as soon as possible as guided by common sense.     Post-operative exercises (PERFORM CHECKED EXERCISES ONLY)  [x] Bend your fingers  Relax your hand. Start with your fingers straight and close together. Bend the end and middle joints of your fingers. Keep your wrist and knuckles straight. Moving slowly and smoothly, return your hand to the starting position. Repeat with your other hand. If you can, perform multiple repetitions of this exercise on each hand.    [] Open your hand wide                       Spread your fingers apart as wide as you can and hold that position. Slowly relax your fingers and bring them together. Return to the open-wide position. Repeat with each hand and gradually add to the number of repetitions.    [] Make a fist  Start with your fingers straight and spread apart. Make a loose, gentle fist and wrap your thumb around the outside of your fingers. Be careful not to squeeze your  fingers together too tightly. Moving slowly and smoothly, return to the starting position. Repeat. Perform this exercise on both hands.    [] Touch your fingertips  Straighten your fingers and thumb. Bend your thumb across your palm, touching the tip of your thumb to the pad of your hand just below your pinky finger. If you can't make your thumb touch, just stretch as far as you can. Return your thumb to its starting position, as shown in images 1 through 3.  For the next exercise, form the letter O by touching your thumb to each fingertip, as shown in images 4 through 6. Moving slowly and smoothly, touch your index finger to your thumb, then straighten your fingers. Touch your middle finger to your thumb and straighten. Follow with your ring and pinky fingers.    [] Walk your fingers  Rest your hand on a flat surface, such as a tabletop, with your palm facing down and your fingers spread slightly apart. Moving one finger at a time, slowly walk your fingers toward your thumb. Start by lifting and moving your index finger toward your thumb. Follow by lifting and moving your middle finger toward your thumb. Proceed with moving your ring finger and then your pinky finger toward your thumb. Don't move your wrist or thumb while doing this exercise. Repeat with your other hand.      HAND SURGERY - Common Concerns and Frequently Asked Questions    When to Call the Doctor  Pain, burning, or numbness of the fingers or the back of the hand not relieved by elevation of the arm  Pale or cold finger; bluish nail beds  Red line or streak going up the arm  Excessive swelling  Fever over 100.3ºF  Pain unrelieved by pain medication    Tips for one armed living  It helps to have...   In the shower   Plastic bags and rubber bands to cover bandages - the bags that newspapers come in are good to cover the hand and wrist. Otherwise small trash can liners will do. Use two at a time.   Bottle sponge (soft sponge on a long stick) - for the  "armpit of your "good" hand.   Shower brush   A hair brush in the shower will help you to wash your hair.   Cotton arun cloth bathrobe - to dry your back.    In the bathroom   Toothpaste, shampoo, etc. in flip-top or pump (not screw top) dispensers.   Consider an electric razor.   Flossers (dental floss on a "Y" shaped handle).    In the kitchen   Dycem mat (rubber jar opener mat) - to help open jars, but also keep things from sliding around while you are working on them.    Double suction cup pads ("little Octopus") - to hold items while you use or wash them.   Electric can opener with a lid magnet strong enough to hold the can in the air - for one handed use.   In the bedroom   Back scratcher.   Large sleeve shirts and tops.   Put away clothing which buttons, fastens or snaps in the back or which uses drawstrings.    Sports bra or a camisole instead of a bra.   [employer] nylons can be pulled on one handed - most others can't.   A "wash and wear" haircut.     "

## 2025-07-17 NOTE — PLAN OF CARE
Patient resting comfortably with call light in reach. Belongings placed in locker. Waiting on anesthesia

## 2025-07-17 NOTE — BRIEF OP NOTE
[facility] ([facility])  Surgery Department  Operative Note    SUMMARY     Date of Procedure: 7/17/2025     Procedure:   Reconstruction right thumb UCL  Open repair right thumb CMC dislocation with ligament repair    Surgeons and Role:     * Melissa Weinberg MD - Primary    Assisting Surgeon: None    Pre-Operative Diagnosis: Rupture of ulnar collateral ligament of right thumb, initial encounter [S63.641A]  Closed traumatic dislocation of carpometacarpal (CMC) joint of right thumb [S63.044A]    Post-Operative Diagnosis: Post-Op Diagnosis Codes:     * Rupture of ulnar collateral ligament of right thumb, initial encounter [S63.641A]     * Closed traumatic dislocation of carpometacarpal (CMC) joint of right thumb [S63.044A]    Anesthesia: General/Regional    Estimated Blood Loss (EBL): minimal           Implants:   Implant Name Type Inv. Item Serial No.  Lot No. LRB No. Used Action   GUIDEWIRE ST SM BNE .[id number] - [id number]  GUIDEWIRE ST SM BNE .[id number]  [employer] [id number] Right 1 Implanted   KIT INTERNALBRACE HAND/WRIST - [id number]  KIT INTERNALBRACE HAND/WRIST  [employer] [id number] Right 1 Implanted       Specimens:   Specimen (24h ago, onward)      None           * No specimens in log *           Condition: Good    Disposition: PACU - hemodynamically stable.    Attestation: Op Note Attestation: I was physically present and scrubbed for the entire procedure.

## 2025-07-17 NOTE — TRANSFER OF CARE
"Anesthesia Transfer of Care Note    Patient: Corazon Jones    Procedure(s) Performed: Procedure(s) (LRB):  ORIF, THUMB (Right)  REPAIR, COLLATERAL LIGAMENT, THUMB (Right)    Patient location: PACU    Anesthesia Type: general    Transport from OR: Transported from OR on 6-10 L/min O2 by face mask with adequate spontaneous ventilation    Post pain: adequate analgesia    Post assessment: no apparent anesthetic complications and tolerated procedure well    Post vital signs: stable    Level of consciousness: awake    Nausea/Vomiting: no nausea/vomiting    Complications: none    Transfer of care protocol was followed      Last vitals: Visit Vitals  BP (!) 116/59 (BP Location: Left arm, Patient Position: Lying)   Pulse 71   Temp 37.2 °C (98.9 °F) (Tympanic)   Resp 15   Ht 5' 11" (1.803 m)   Wt 105.7 kg (233 lb)   SpO2 98%   BMI 32.50 kg/m²     "

## 2025-07-18 NOTE — ANESTHESIA POSTPROCEDURE EVALUATION
Anesthesia Post Evaluation    Patient: Corazon Jones    Procedure(s) Performed: Procedure(s) (LRB):  OPEN REPAIR OF RIGHT THUMB CMC DISLOCATION WITH LIGAMENT REPAIR (Right)  RECONSTRUCTION, LIGAMENT, COLLATERAL, MCP JOINT, 1 JOINT, USING TENDON OR FASCIAL GRAFT (Right)    Final Anesthesia Type: general      Patient location during evaluation: PACU  Patient participation: Yes- Able to Participate  Level of consciousness: awake and alert  Post-procedure vital signs: reviewed and stable  Pain management: adequate  Airway patency: patent  OTTONIEL mitigation strategies: Multimodal analgesia  PONV status at discharge: No PONV  Anesthetic complications: no      Cardiovascular status: blood pressure returned to baseline and hemodynamically stable  Respiratory status: unassisted  Hydration status: euvolemic  Follow-up not needed.              Vitals Value Taken Time   /68 07/17/25 13:01   Temp 36.7 °C (98 °F) 07/17/25 12:15   Pulse 76 07/17/25 13:15   Resp 19 07/17/25 13:15   SpO2 95 % 07/17/25 13:15         Event Time   Out of Recovery 13:00:00         Pain/Chris Score: Pain Rating Prior to Med Admin: 0 (7/17/2025  7:31 AM)  Chris Score: 10 (7/17/2025 12:15 PM)

## 2025-07-21 ENCOUNTER — PATIENT MESSAGE (OUTPATIENT)
Facility: CLINIC | Age: 60
End: 2025-07-21
Payer: COMMERCIAL

## 2025-07-24 ENCOUNTER — CLINICAL SUPPORT (OUTPATIENT)
Dept: REHABILITATION | Facility: HOSPITAL | Age: 60
End: 2025-07-24
Payer: COMMERCIAL

## 2025-07-24 DIAGNOSIS — M25.541 PAIN IN THUMB JOINT WITH MOVEMENT OF RIGHT HAND: Primary | ICD-10-CM

## 2025-07-24 PROCEDURE — 97166 OT EVAL MOD COMPLEX 45 MIN: CPT

## 2025-07-24 PROCEDURE — L3808 WHFO, RIGID W/O JOINTS: HCPCS

## 2025-07-24 PROCEDURE — 97760 ORTHOTIC MGMT&TRAING 1ST ENC: CPT

## 2025-07-24 NOTE — PATIENT INSTRUCTIONS
[facility]  Orthosis Instructions and Proof of Delivery        Date: 7/24/2025  Name: Corazon Jones  MRN: [id number]  YOB: 1965  Referring Provider: Meilssa Weinberg MD    [facility] Level II Code and Description      Orthosis Information  custom fabricated  Right  Static    Orthosis Instructions  Wear the orthosis at all times    Cleaning and Maintenance  Keep your orthosis away from any heat sources. Do not leave in your car.  Keep your orthosis away from pets.  You may clean your orthosis with cool water and soap or a mild cleanser.  Your orthosis may need adjustments due to changes in your medical condition (swelling, dressing size, additional surgery, etc.)  Monitor your skin color and integrity.  Do not try to adjust the orthosis on your own.     If you have any questions or concerns, please contact the therapy office at [phone].     I, Corazon Jones , have personally received the above described orthosis along with instructions on the wear, care, and precautions related to this orthosis. I understand that I am responsible for payment to [facility] of my deductible, coinsurance, or other portion of my charges not paid in full by my insurance plans, including any item that is not covered under the insurance plan.     Signature: _________________________________ Date: __________________    Therapist:Lisa FERNÁNDEZ, OTR/L, CHT   Occupational therapist, Certified Hand Therapist

## 2025-07-25 ENCOUNTER — PATIENT MESSAGE (OUTPATIENT)
Dept: REHABILITATION | Facility: HOSPITAL | Age: 60
End: 2025-07-25
Payer: COMMERCIAL

## 2025-07-28 ENCOUNTER — PATIENT MESSAGE (OUTPATIENT)
Dept: ORTHOPEDICS | Facility: CLINIC | Age: 60
End: 2025-07-28
Payer: COMMERCIAL

## 2025-07-28 DIAGNOSIS — M15.0 PRIMARY OSTEOARTHRITIS INVOLVING MULTIPLE JOINTS: ICD-10-CM

## 2025-07-28 DIAGNOSIS — S63.641D RUPTURE OF ULNAR COLLATERAL LIGAMENT OF RIGHT THUMB, SUBSEQUENT ENCOUNTER: Primary | ICD-10-CM

## 2025-07-28 DIAGNOSIS — M79.18 MUSCULOSKELETAL PAIN: ICD-10-CM

## 2025-07-28 NOTE — TELEPHONE ENCOUNTER
Refill Encounter    PCP Visits: Recent Visits  Date Type Provider Dept   01/29/25 Office Visit Baldomero Schwab MD [facility]   11/19/24 Office Visit Chrystal Chatterjee FNP-C [facility]   10/10/24 Office Visit Baldomero Schwab MD [facility]   08/22/24 Office Visit Baldomero Schwab MD [facility]   Showing recent visits within past 360 days and meeting all other requirements  Future Appointments  Date Type Provider Dept   09/05/25 Appointment Baldomero Schwab MD [facility]   Showing future appointments within next 720 days and meeting all other requirements      Last 3 Blood Pressure:   BP Readings from Last 3 Encounters:   07/17/25 120/68   07/09/25 130/80   07/09/25 134/87     Preferred Pharmacy:   [facility]  [address] [place] [address]  Phone: [phone] Fax: [phone]    Requested RX:  Requested Prescriptions     Pending Prescriptions Disp Refills    celecoxib (CELEBREX) 200 MG capsule 90 capsule 3     Sig: Take 1 capsule (200 mg total) by mouth once daily.    tirzepatide, weight loss, (ZEPBOUND) 2.5 mg/0.5 mL PnIj 4 Pen 0     Sig: Inject 2.5 mg into the skin every 7 days.      RX Route: Normal

## 2025-07-28 NOTE — TELEPHONE ENCOUNTER
Care Due:                  Date            Visit Type   Department     Provider  --------------------------------------------------------------------------------                                EP -                              PRIMARY      [facility] INTERNAL  Last Visit: 01-      CARE ([facility])   COLEMAN Schwab                              EP -                              PRIMARY      [facility] INTERNAL  Next Visit: 09-      CARE ([facility])   COLEMAN Schwab                                                            Last  Test          Frequency    Reason                     Performed    Due Date  --------------------------------------------------------------------------------    HBA1C.......  6 months...  metFORMIN, tirzepatide,..  10-   04-    Health [facility] Embedded Care Due Messages. Reference number: [id number].   7/28/2025 1:35:00 PM CDT

## 2025-07-29 PROBLEM — M25.561 ACUTE PAIN OF RIGHT KNEE: Status: RESOLVED | Noted: 2024-10-02 | Resolved: 2025-07-29

## 2025-07-29 PROBLEM — M25.541 PAIN IN THUMB JOINT WITH MOVEMENT OF RIGHT HAND: Status: ACTIVE | Noted: 2025-07-29

## 2025-07-29 RX ORDER — TIRZEPATIDE 2.5 MG/.5ML
2.5 INJECTION, SOLUTION SUBCUTANEOUS
Qty: 4 PEN | Refills: 0 | Status: SHIPPED | OUTPATIENT
Start: 2025-07-29

## 2025-07-29 RX ORDER — CELECOXIB 200 MG/1
200 CAPSULE ORAL DAILY
Qty: 30 CAPSULE | Refills: 0 | Status: SHIPPED | OUTPATIENT
Start: 2025-07-29

## 2025-07-29 NOTE — PROGRESS NOTES
Outpatient Rehab    Occupational Therapy Evaluation    Patient Name: Corazon Jones  MRN: [id number]  YOB: 1965  Encounter Date: 7/24/2025    Therapy Diagnosis:   Encounter Diagnosis   Name Primary?    Pain in thumb joint with movement of right hand Yes     Physician: Melissa Weinberg MD    Physician Orders: Eval and Treat  Medical Diagnosis: Rupture of ulnar collateral ligament of right thumb, initial encounter  Surgical Diagnosis: Procedure:   1. Right thumb ulnar collateral ligament reconstruction, cpt 46372  2. Right thumb CMC joint open reduction with ligament repair, cpt 23710  3.  Right thumb MP joint arthrotomy, cpt 03242   Surgical Date: 7/17/2025  Days Since Last Surgery: 12    Visit # / Visits Authorized: 1 / 1  Insurance Authorization Period: 7/17/2025 to 7/17/2026  Date of Evaluation: 7/24/2025/  Plan of Care Certification: 7/24/2025 to 11/18/2025     Time In: 1500   Time Out: 1620  Total Time (in minutes): 80   Total Billable Time (in minutes): 80    Intake Outcome Measure for FOTO Survey    Therapist reviewed FOTO scores for Corazon Jones on 7/24/2025.   FOTO report - see Media section or FOTO account episode details.     Intake Score (%): Not applicable for this Episode    Precautions:  Right Upper Extremity Weight-Bearing Status: Non-weight-bearing  Range of Motion Restrictions: only limited IP motion or block MP motiion. NO CMC motion until cleared by MD    Subjective   History of Present Illness  Corazon is a 59 y.o. male who reports to occupational therapy with a chief concern of Pain in right thumb.     The patient reports a medical diagnosis of S63.641A (ICD-10-CM) - Rupture of ulnar collateral ligament of right thumb, initial encounter.      Patient reports a surgery of Procedure:   1. Right thumb ulnar collateral ligament reconstruction, cpt 22856  2. Right thumb CMC joint open reduction with ligament repair, cpt 20136  3.  Right thumb MP joint arthrotomy, cpt 43074.  Surgery occurred on 07/17/25.               Dominant Hand: Right  History of Present Condition/Illness: Pt. Is 7 days post op following and open repair to the CMC with a UCL repair to his right dominant thumb. He presents today for custom orthotic and evaluation. He has minimal pain but reports some lightheadedness. MD present during session and addressed those symptoms with pt.     Activities of Daily Living  Social history was obtained from Patient.    General Prior Level of Function Comments: independent in ADL          Previously independent with activities of daily living? Yes     Currently independent with activities of daily living? No  Activities currently needing assistance include Dressing - lower body, Bathing, Dressing - upper body, Feeding, and Grooming.        Previously independent with instrumental activities of daily living? Yes     Currently independent with instrumental activities of daily living? No  Activities currently needing assistance include: Meal prep, Grocery/shopping, and Home establishment and management.            Pain     Patient reports a current pain level of 5/10. Pain at best is reported as 5/10. Pain at worst is reported as 8/10.   Location: Right hand  Clinical Progression (since onset): Improved  Pain Qualities: Dull, Aching  Pain-Relieving Factors: Rest  Pain-Aggravating Factors: Movement         Treatment History  Treatments  Previously Received Treatments: No  Currently Receiving Treatments: No    Living Arrangements  Living Situation  Housing: Home independently  Living Arrangements: Alone  Support Systems: Children, Friends/neighbors, Family members        Employment  Does the patient's condition impact their ability to work?: Yes  Employment Status: Employed full-time  On leave from Pain Management Specialist MD      Past Medical History/Physical Systems Review:   Corazon Jones  has a past medical history of History of Clostridium difficile colitis, History of  sarcoidosis, Hyperlipidemia, Hypertension, and OTTONIEL (obstructive sleep apnea).    Corazon Jones  has a past surgical history that includes Appendectomy; Hernia repair; Eye surgery (2006 Lasik); Arthroscopy of knee; Shoulder arthroscopy; Bunionectomy (Right); Knee arthroscopy w/ meniscectomy (Right, 10/1/2024); Chondroplasty of knee (Right, 10/1/2024); Knee arthroscopy w/ plica excision (Right, 10/1/2024); Open reduction and internal fixation (ORIF) of injury of thumb (Right, 7/17/2025); and reconstruction, ligament, collateral, mcp joint, 1 joint, using tendon or fascial graft (Right, 7/17/2025).    Corazon has a current medication list which includes the following prescription(s): [Paused] celecoxib, diazepam, losartan, metformin, naproxen, neomycin-polymyxin-hydrocortisone, omeprazole, ondansetron, ondansetron, rosuvastatin, sertraline, tadalafil, zepbound, and tramadol.    Review of patient's allergies indicates:   Allergen Reactions    Percocet [oxycodone-acetaminophen]     Suture, silk     Adhesive Rash        Objective   Orthosis Details  In this visit, actions taken with the first orthosis variety included Dressing change, Fabrication, Orthosis education, and Skin check. Number issued of this variety was 1.         Orthosis Laterality: Right  Fabrication Status: Custom  Orthosis Type: Static  Orthosis Design: Forearm-based     - Long opponens/thumb spica               Orthosis Purpose  Purpose of the patient's orthosis is to Protect injured or repaired area.      Orthosis Education  Orthosis education was provided to Patient. The education recipient's understanding level was Demonstrates understanding and Verbalizes understanding. Provided instruction to wear the orthosis At all times.   Also provided education regarding Clinic contact information, Don/doff, Edema management, Home exercise program, Hygiene with orthosis, Orthosis care, Orthosis purpose, Precautions, Scar management, Skin checks, and Wound  care. Keep incision clean and dry        Wrist Range of Motion     Not able to measure due to protocol       Not able to measure due to protocol                 Treatment:       Time Entry(in minutes):  OT Evaluation (Moderate) Time Entry: 45  Orthotic Management Training Time Entry: 8    Assessment & Plan   Assessment  Corazon presents with a condition of Moderate complexity.   Presentation of Symptoms: Evolving  Will Comorbidities Impact Care: No       ADL Limitations : Bathing/showering, Dressing, Feeding, Personal hygiene and grooming  IADL Limitations: Grocery/shopping, Meal preparation and cleanup, Home establishment and management, Driving  Functional Limitations: Carrying objects, Fine motor coordination, Manipulating objects, Pain with ADLs/IADLs, Range of motion, Proprioception                 Evaluation/Treatment Response: Patient responded to treatment well, Patient limited by pain  Patient Goal for Therapy (OT): To return to work full time, needs right hand and thumb for some procedures  Prognosis: Good      The patient's spiritual, cultural, and educational needs were considered, and the patient is agreeable to the plan of care and goals.     Education  Education was done with Patient. The patient's learning style includes Demonstration and Listening. The patient Demonstrates understanding and Verbalizes understanding.         Orthosis wear, care, and precautions       Goals:   Active       Long term goals       Pt will report a pain level of 0 out of 10 with ADLs          Start:  07/29/25    Expected End:  11/18/25            Pt will demo improved FOTO score by 20 points.         Start:  07/29/25    Expected End:  11/18/25            Pt will return to prior level of function for ADLs and household management.          Start:  07/29/25    Expected End:  11/18/25               Short Term goals       Pt will report 5 out of 10 pain level with ADLs.        Start:  07/29/25    Expected End:  09/23/25             Pt will report/demo [place] with pulling pants or socks on with use of right thumb.         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         Start:  07/29/25    Expected End:  09/23/25            Pt will demonstrate independence with issued HEP.          Start:  07/29/25    Expected End:  09/23/25            ROM measurements taken when appropriate       Start:  07/29/25    Expected End:  09/23/25                ANDREA Jefferson/FRANKY,CHT

## 2025-07-30 ENCOUNTER — HOSPITAL ENCOUNTER (OUTPATIENT)
Dept: RADIOLOGY | Facility: OTHER | Age: 60
Discharge: HOME | End: 2025-07-30
Attending: ORTHOPAEDIC SURGERY
Payer: COMMERCIAL

## 2025-07-30 ENCOUNTER — OFFICE VISIT (OUTPATIENT)
Dept: ORTHOPEDICS | Facility: CLINIC | Age: 60
End: 2025-07-30
Payer: COMMERCIAL

## 2025-07-30 ENCOUNTER — CLINICAL SUPPORT (OUTPATIENT)
Dept: REHABILITATION | Facility: HOSPITAL | Age: 60
End: 2025-07-30
Payer: COMMERCIAL

## 2025-07-30 ENCOUNTER — TELEPHONE (OUTPATIENT)
Dept: SPORTS MEDICINE | Facility: CLINIC | Age: 60
End: 2025-07-30
Payer: COMMERCIAL

## 2025-07-30 DIAGNOSIS — S63.641D RUPTURE OF ULNAR COLLATERAL LIGAMENT OF RIGHT THUMB, SUBSEQUENT ENCOUNTER: Primary | ICD-10-CM

## 2025-07-30 DIAGNOSIS — S63.641D RUPTURE OF ULNAR COLLATERAL LIGAMENT OF RIGHT THUMB, SUBSEQUENT ENCOUNTER: ICD-10-CM

## 2025-07-30 DIAGNOSIS — M25.541 PAIN IN THUMB JOINT WITH MOVEMENT OF RIGHT HAND: Primary | ICD-10-CM

## 2025-07-30 DIAGNOSIS — S63.044A CLOSED TRAUMATIC DISLOCATION OF CARPOMETACARPAL (CMC) JOINT OF RIGHT THUMB: ICD-10-CM

## 2025-07-30 PROCEDURE — 73130 X-RAY EXAM OF HAND: CPT | Mod: TC,RT

## 2025-07-30 PROCEDURE — 4010F ACE/ARB THERAPY RXD/TAKEN: CPT | Mod: CPTII,S$GLB,, | Performed by: ORTHOPAEDIC SURGERY

## 2025-07-30 PROCEDURE — 73130 X-RAY EXAM OF HAND: CPT | Mod: 26,RT,, | Performed by: RADIOLOGY

## 2025-07-30 PROCEDURE — 1160F RVW MEDS BY RX/DR IN RCRD: CPT | Mod: CPTII,S$GLB,, | Performed by: ORTHOPAEDIC SURGERY

## 2025-07-30 PROCEDURE — 97110 THERAPEUTIC EXERCISES: CPT

## 2025-07-30 PROCEDURE — 99999 PR PBB SHADOW E&M-EST. PATIENT-LVL III: CPT | Mod: PBBFAC,,, | Performed by: ORTHOPAEDIC SURGERY

## 2025-07-30 PROCEDURE — 1159F MED LIST DOCD IN RCRD: CPT | Mod: CPTII,S$GLB,, | Performed by: ORTHOPAEDIC SURGERY

## 2025-07-30 PROCEDURE — 99024 POSTOP FOLLOW-UP VISIT: CPT | Mod: S$GLB,,, | Performed by: ORTHOPAEDIC SURGERY
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
## 2025-08-01 NOTE — PROGRESS NOTES
Outpatient Rehab    Occupational Therapy Visit    Patient Name: Corazon Jones  MRN: [id number]  YOB: 1965  Encounter Date: 7/30/2025    Therapy Diagnosis:   Encounter Diagnosis   Name Primary?    Pain in thumb joint with movement of right hand Yes     Physician: Melissa Weinberg MD    Physician Orders: Eval and Treat  Medical Diagnosis: Sprain of metacarpophalangeal joint of right thumb, initial encounter  Surgical Diagnosis: Procedure:   1. Right thumb ulnar collateral ligament reconstruction, cpt 04606  2. Right thumb CMC joint open reduction with ligament repair, cpt 74776  3.  Right thumb MP joint arthrotomy, cpt 70428   Surgical Date: 7/17/2025  Days Since Last Surgery: 15    Visit # / Visits Authorized: 1 / 20  Insurance Authorization Period: 7/30/2025 to 12/31/2025  Date of Evaluation: 7/24/2025  Plan of Care Certification: 7/29/2025 to 11/18/2025      Time In: 1415   Time Out: 1440  Total Time (in minutes): 25   Total Billable Time (in minutes): 25    FOTO:  Intake Score (%): Not applicable for this Episode  Survey Score 2 (%): Not applicable for this Episode  Survey Score 3 (%): Not applicable for this Episode    Precautions:  Right Upper Extremity Weight-Bearing Status: Non-weight-bearing  Range of Motion Restrictions: only limited IP motion or block MP motiion. NO CMC motion until cleared by MD    Subjective   Pt. reports he was compliant with the splint wear. Some velcro coming off..  Pain reported as 3/10.      Objective            Treatment:  Therapeutic Exercise  TE 1: Splint adjustment for velcro straps, digit 2-5 AROM , thumb IP gentle flexion extension with therapist blocking, gentle short arc MP flexion extension with therapist stabilizing MC for no CMC movement.    Time Entry(in minutes):  Therapeutic Exercise Time Entry: 25    Assessment & Plan   Assessment: Pt. Reports pain in IP of thumb during movement, especially extension. Pt. Reports compliance with orthotic wear full  time and no CMC movement.   Evaluation/Treatment Tolerance: Patient tolerated treatment well    The patient will continue to benefit from skilled outpatient occupational therapy in order to address the deficits listed in the problem list on the initial evaluation, provide patient and family education, and maximize the patients level of independence in the home and community environments.     The patient's spiritual, cultural, and educational needs were considered, and the patient is agreeable to the plan of care and goals.           Plan: Cont per OT POC with focus on return to functional independence- skilled therapy required for protocol and progression per protocol and to ensure proper healing and outcome of surgical interventions.    Goals:   Active       Long term goals       Pt will report a pain level of 0 out of 10 with ADLs          Start:  07/29/25    Expected End:  11/18/25            Pt will demo improved FOTO score by 20 points.         Start:  07/29/25    Expected End:  11/18/25            Pt will return to prior level of function for ADLs and household management.          Start:  07/29/25    Expected End:  11/18/25               Short Term goals       Pt will report 5 out of 10 pain level with ADLs.        Start:  07/29/25    Expected End:  09/23/25            Pt will report/demo [place] with pulling pants or socks on with use of right thumb.         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         Start:  07/29/25    Expected End:  09/23/25            Pt will demonstrate independence with issued HEP.          Start:  07/29/25    Expected End:  09/23/25            ROM measurements taken when appropriate       Start:  07/29/25    Expected End:  09/23/25                ANDREA Jefferson/FRANKY,CHT

## 2025-08-06 ENCOUNTER — CLINICAL SUPPORT (OUTPATIENT)
Dept: REHABILITATION | Facility: HOSPITAL | Age: 60
End: 2025-08-06
Payer: COMMERCIAL

## 2025-08-06 DIAGNOSIS — M25.541 PAIN IN THUMB JOINT WITH MOVEMENT OF RIGHT HAND: Primary | ICD-10-CM

## 2025-08-06 PROCEDURE — 97110 THERAPEUTIC EXERCISES: CPT

## 2025-08-06 PROCEDURE — 97140 MANUAL THERAPY 1/> REGIONS: CPT

## 2025-08-06 NOTE — PROGRESS NOTES
Outpatient Rehab    Occupational Therapy Visit    Patient Name: Corazon Jones  MRN: [id number]  YOB: 1965  Encounter Date: 8/6/2025    Therapy Diagnosis:   Encounter Diagnosis   Name Primary?    Pain in thumb joint with movement of right hand Yes     Physician: Melissa Weinberg MD    Physician Orders: Eval and Treat  Medical Diagnosis: Sprain of metacarpophalangeal joint of right thumb, initial encounter  Surgical Diagnosis: Procedure:   1. Right thumb ulnar collateral ligament reconstruction, cpt 44328  2. Right thumb CMC joint open reduction with ligament repair, cpt 07610  3.  Right thumb MP joint arthrotomy, cpt 54764   Surgical Date: 7/17/2025  Days Since Last Surgery: 20    Visit # / Visits Authorized: 2 / 20  Insurance Authorization Period: 7/30/2025 to 12/31/2025  Date of Evaluation: 7/24/2025  Plan of Care Certification: 7/29/2025 to 11/18/2025      Time In: 1430   Time Out: 1500  Total Time (in minutes): 30   Total Billable Time (in minutes): 30    FOTO:  Intake Score (%): 30  Survey Score 2 (%): Not applicable for this Episode  Survey Score 3 (%): Not applicable for this Episode    Precautions:  Right Upper Extremity Weight-Bearing Status: Non-weight-bearing  Range of Motion Restrictions: only limited IP motion or block MP motiion. NO CMC motion until cleared by MD    Subjective   Pt. reports he was compliant with the splint wear. Some velcro coming off again.States it does hurt some at the thumb with movement (blocking short arc HEP) and it is still numb..  Pain reported as 3/10. Thumb    Objective            Treatment:  Therapeutic Exercise  TE 1: Splint adjustment for velcro straps, digit 2-5 AROM , thumb IP gentle flexion extension with therapist blocking, gentle short arc MP flexion extension with therapist stabilizing MC for no CMC movement.  Manual Therapy  MT 1: MLD and scar massage , use of chip clip in webspace to decrease neural tension and decrease pain with movement.    Time  Entry(in minutes):  Manual Therapy Time Entry: 15  Therapeutic Exercise Time Entry: 15    Assessment & Plan   Assessment: Pt. Reports pain in IP of thumb during movement, especially extension. Pt. Reports compliance with orthotic wear full time and no CMC movement.        The patient will continue to benefit from skilled outpatient occupational therapy in order to address the deficits listed in the problem list on the initial evaluation, provide patient and family education, and maximize the patients level of independence in the home and community environments.     The patient's spiritual, cultural, and educational needs were considered, and the patient is agreeable to the plan of care and goals.           Plan: Cont per OT POC with focus on return to functional independence- skilled therapy required for protocol and progression per protocol and to ensure proper healing and outcome of surgical interventions.Pt. out of town until the 20th and will return then to see therapist.    Goals:   Active       Long term goals       Pt will report a pain level of 0 out of 10 with ADLs          Start:  07/29/25    Expected End:  11/18/25            Pt will demo improved FOTO score by 20 points.         Start:  07/29/25    Expected End:  11/18/25            Pt will return to prior level of function for ADLs and household management.          Start:  07/29/25    Expected End:  11/18/25               Short Term goals       Pt will report 5 out of 10 pain level with ADLs.        Start:  07/29/25    Expected End:  09/23/25            Pt will report/demo [place] with pulling pants or socks on with use of right thumb.         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         Start:  07/29/25    Expected End:  09/23/25            Pt will demonstrate independence with issued HEP.          Start:  07/29/25    Expected End:  09/23/25            ROM  measurements taken when appropriate       Start:  07/29/25    Expected End:  09/23/25                ANDREA Jeffersno/FRANKY,EULOGIOT

## 2025-08-14 ENCOUNTER — PATIENT MESSAGE (OUTPATIENT)
Dept: SPORTS MEDICINE | Facility: CLINIC | Age: 60
End: 2025-08-14
Payer: COMMERCIAL

## 2025-08-21 ENCOUNTER — CLINICAL SUPPORT (OUTPATIENT)
Dept: REHABILITATION | Facility: HOSPITAL | Age: 60
End: 2025-08-21
Payer: COMMERCIAL

## 2025-08-21 DIAGNOSIS — M25.541 PAIN IN THUMB JOINT WITH MOVEMENT OF RIGHT HAND: Primary | ICD-10-CM

## 2025-08-21 PROCEDURE — 97140 MANUAL THERAPY 1/> REGIONS: CPT

## 2025-08-21 PROCEDURE — 97760 ORTHOTIC MGMT&TRAING 1ST ENC: CPT

## 2025-08-25 ENCOUNTER — CLINICAL SUPPORT (OUTPATIENT)
Dept: REHABILITATION | Facility: HOSPITAL | Age: 60
End: 2025-08-25
Attending: FAMILY MEDICINE
Payer: COMMERCIAL

## 2025-08-25 DIAGNOSIS — M54.2 NECK PAIN: Primary | ICD-10-CM

## 2025-08-25 PROCEDURE — 97162 PT EVAL MOD COMPLEX 30 MIN: CPT

## 2025-08-25 PROCEDURE — 97530 THERAPEUTIC ACTIVITIES: CPT

## 2025-08-26 ENCOUNTER — TELEPHONE (OUTPATIENT)
Facility: CLINIC | Age: 60
End: 2025-08-26
Payer: COMMERCIAL

## 2025-08-27 ENCOUNTER — PATIENT MESSAGE (OUTPATIENT)
Dept: INTERNAL MEDICINE | Facility: CLINIC | Age: 60
End: 2025-08-27
Payer: COMMERCIAL

## 2025-08-27 ENCOUNTER — HOSPITAL ENCOUNTER (OUTPATIENT)
Dept: RADIOLOGY | Facility: OTHER | Age: 60
Discharge: HOME | End: 2025-08-27
Attending: ORTHOPAEDIC SURGERY
Payer: COMMERCIAL

## 2025-08-27 ENCOUNTER — OFFICE VISIT (OUTPATIENT)
Dept: ORTHOPEDICS | Facility: CLINIC | Age: 60
End: 2025-08-27
Payer: COMMERCIAL

## 2025-08-27 DIAGNOSIS — Z91.89 AT RISK FOR BLEEDING: ICD-10-CM

## 2025-08-27 DIAGNOSIS — E55.9 VITAMIN D INSUFFICIENCY: ICD-10-CM

## 2025-08-27 DIAGNOSIS — I10 PRIMARY HYPERTENSION: Chronic | ICD-10-CM

## 2025-08-27 DIAGNOSIS — N52.9 ERECTILE DYSFUNCTION, UNSPECIFIED ERECTILE DYSFUNCTION TYPE: ICD-10-CM

## 2025-08-27 DIAGNOSIS — E66.09 CLASS 1 OBESITY DUE TO EXCESS CALORIES WITH SERIOUS COMORBIDITY AND BODY MASS INDEX (BMI) OF 34.0 TO 34.9 IN ADULT: Primary | ICD-10-CM

## 2025-08-27 DIAGNOSIS — Z00.00 ANNUAL PHYSICAL EXAM: ICD-10-CM

## 2025-08-27 DIAGNOSIS — S63.641D RUPTURE OF ULNAR COLLATERAL LIGAMENT OF RIGHT THUMB, SUBSEQUENT ENCOUNTER: ICD-10-CM

## 2025-08-27 DIAGNOSIS — E78.00 PURE HYPERCHOLESTEROLEMIA: Chronic | ICD-10-CM

## 2025-08-27 DIAGNOSIS — E66.811 CLASS 1 OBESITY DUE TO EXCESS CALORIES WITH SERIOUS COMORBIDITY AND BODY MASS INDEX (BMI) OF 34.0 TO 34.9 IN ADULT: Primary | ICD-10-CM

## 2025-08-27 DIAGNOSIS — Z12.5 SCREENING FOR MALIGNANT NEOPLASM OF PROSTATE: Primary | ICD-10-CM

## 2025-08-27 DIAGNOSIS — S63.044A CLOSED TRAUMATIC DISLOCATION OF CARPOMETACARPAL (CMC) JOINT OF RIGHT THUMB: ICD-10-CM

## 2025-08-27 DIAGNOSIS — S63.641D RUPTURE OF ULNAR COLLATERAL LIGAMENT OF RIGHT THUMB, SUBSEQUENT ENCOUNTER: Primary | ICD-10-CM

## 2025-08-27 DIAGNOSIS — M25.511 ACUTE PAIN OF RIGHT SHOULDER: ICD-10-CM

## 2025-08-27 DIAGNOSIS — R73.03 PREDIABETES: ICD-10-CM

## 2025-08-27 PROCEDURE — 99024 POSTOP FOLLOW-UP VISIT: CPT | Mod: S$GLB,,, | Performed by: ORTHOPAEDIC SURGERY

## 2025-08-27 PROCEDURE — 73030 X-RAY EXAM OF SHOULDER: CPT | Mod: 26,RT,, | Performed by: RADIOLOGY

## 2025-08-27 PROCEDURE — 73030 X-RAY EXAM OF SHOULDER: CPT | Mod: TC,RT

## 2025-08-27 PROCEDURE — 4010F ACE/ARB THERAPY RXD/TAKEN: CPT | Mod: CPTII,S$GLB,, | Performed by: ORTHOPAEDIC SURGERY

## 2025-08-27 PROCEDURE — 1159F MED LIST DOCD IN RCRD: CPT | Mod: CPTII,S$GLB,, | Performed by: ORTHOPAEDIC SURGERY

## 2025-08-27 PROCEDURE — 73130 X-RAY EXAM OF HAND: CPT | Mod: TC,RT

## 2025-08-27 PROCEDURE — 3044F HG A1C LEVEL LT 7.0%: CPT | Mod: CPTII,S$GLB,, | Performed by: ORTHOPAEDIC SURGERY

## 2025-08-27 PROCEDURE — 99999 PR PBB SHADOW E&M-EST. PATIENT-LVL III: CPT | Mod: PBBFAC,,, | Performed by: ORTHOPAEDIC SURGERY

## 2025-08-27 PROCEDURE — 1160F RVW MEDS BY RX/DR IN RCRD: CPT | Mod: CPTII,S$GLB,, | Performed by: ORTHOPAEDIC SURGERY

## 2025-08-27 PROCEDURE — 73130 X-RAY EXAM OF HAND: CPT | Mod: 26,RT,, | Performed by: RADIOLOGY

## 2025-08-27 RX ORDER — TIRZEPATIDE 2.5 MG/.5ML
2.5 INJECTION, SOLUTION SUBCUTANEOUS
Qty: 4 PEN | Refills: 0 | Status: CANCELLED | OUTPATIENT
Start: 2025-08-27

## 2025-08-28 ENCOUNTER — LAB VISIT (OUTPATIENT)
Dept: LAB | Facility: HOSPITAL | Age: 60
End: 2025-08-28
Attending: STUDENT IN AN ORGANIZED HEALTH CARE EDUCATION/TRAINING PROGRAM
Payer: COMMERCIAL

## 2025-08-28 ENCOUNTER — CLINICAL SUPPORT (OUTPATIENT)
Dept: REHABILITATION | Facility: HOSPITAL | Age: 60
End: 2025-08-28
Payer: COMMERCIAL

## 2025-08-28 ENCOUNTER — TELEPHONE (OUTPATIENT)
Dept: INTERNAL MEDICINE | Facility: CLINIC | Age: 60
End: 2025-08-28
Payer: COMMERCIAL

## 2025-08-28 DIAGNOSIS — I10 PRIMARY HYPERTENSION: Chronic | ICD-10-CM

## 2025-08-28 DIAGNOSIS — M25.541 PAIN IN THUMB JOINT WITH MOVEMENT OF RIGHT HAND: Primary | ICD-10-CM

## 2025-08-28 DIAGNOSIS — E66.811 CLASS 1 OBESITY DUE TO EXCESS CALORIES WITH SERIOUS COMORBIDITY AND BODY MASS INDEX (BMI) OF 34.0 TO 34.9 IN ADULT: ICD-10-CM

## 2025-08-28 DIAGNOSIS — R73.03 PREDIABETES: ICD-10-CM

## 2025-08-28 DIAGNOSIS — Z91.89 AT RISK FOR BLEEDING: ICD-10-CM

## 2025-08-28 DIAGNOSIS — E66.09 CLASS 1 OBESITY DUE TO EXCESS CALORIES WITH SERIOUS COMORBIDITY AND BODY MASS INDEX (BMI) OF 34.0 TO 34.9 IN ADULT: ICD-10-CM

## 2025-08-28 DIAGNOSIS — Z12.5 SCREENING FOR MALIGNANT NEOPLASM OF PROSTATE: ICD-10-CM

## 2025-08-28 DIAGNOSIS — I10 PRIMARY HYPERTENSION: ICD-10-CM

## 2025-08-28 DIAGNOSIS — E55.9 VITAMIN D INSUFFICIENCY: ICD-10-CM

## 2025-08-28 DIAGNOSIS — Z00.00 ANNUAL PHYSICAL EXAM: ICD-10-CM

## 2025-08-28 DIAGNOSIS — G47.33 OSA (OBSTRUCTIVE SLEEP APNEA): Primary | Chronic | ICD-10-CM

## 2025-08-28 DIAGNOSIS — E78.00 PURE HYPERCHOLESTEROLEMIA: Chronic | ICD-10-CM

## 2025-08-28 DIAGNOSIS — M54.2 NECK PAIN: Primary | ICD-10-CM

## 2025-08-28 LAB
25(OH)D3+25(OH)D2 SERPL-MCNC: 47 NG/ML (ref 30–96)
ALBUMIN SERPL BCP-MCNC: 4 G/DL (ref 3.5–5.2)
ALP SERPL-CCNC: 78 UNIT/L (ref 40–150)
ALT SERPL W/O P-5'-P-CCNC: 38 UNIT/L (ref 0–55)
ANION GAP (OHS): 8 MMOL/L (ref 8–16)
AST SERPL-CCNC: 32 UNIT/L (ref 0–50)
BILIRUB SERPL-MCNC: 0.4 MG/DL (ref 0.1–1)
BUN SERPL-MCNC: 16 MG/DL (ref 6–20)
CALCIUM SERPL-MCNC: 9.1 MG/DL (ref 8.7–10.5)
CHLORIDE SERPL-SCNC: 108 MMOL/L (ref 95–110)
CHOLEST SERPL-MCNC: 141 MG/DL (ref 120–199)
CHOLEST/HDLC SERPL: 3.5 {RATIO} (ref 2–5)
CO2 SERPL-SCNC: 23 MMOL/L (ref 23–29)
CREAT SERPL-MCNC: 0.8 MG/DL (ref 0.5–1.4)
EAG (OHS): 111 MG/DL (ref 68–131)
ERYTHROCYTE [DISTWIDTH] IN BLOOD BY AUTOMATED COUNT: 13 % (ref 11.5–14.5)
GFR SERPLBLD CREATININE-BSD FMLA CKD-EPI: >60 ML/MIN/1.73/M2
GLUCOSE SERPL-MCNC: 100 MG/DL (ref 70–110)
HBA1C MFR BLD: 5.5 % (ref 4–5.6)
HCT VFR BLD AUTO: 44.7 % (ref 40–54)
HDLC SERPL-MCNC: 40 MG/DL (ref 40–75)
HDLC SERPL: 28.4 % (ref 20–50)
HGB BLD-MCNC: 14.9 GM/DL (ref 14–18)
LDLC SERPL CALC-MCNC: 86.6 MG/DL (ref 63–159)
MCH RBC QN AUTO: 29.6 PG (ref 27–31)
MCHC RBC AUTO-ENTMCNC: 33.3 G/DL (ref 32–36)
MCV RBC AUTO: 89 FL (ref 82–98)
NONHDLC SERPL-MCNC: 101 MG/DL
PLATELET # BLD AUTO: 219 K/UL (ref 150–450)
PMV BLD AUTO: 10.4 FL (ref 9.2–12.9)
POTASSIUM SERPL-SCNC: 4.5 MMOL/L (ref 3.5–5.1)
PROT SERPL-MCNC: 7 GM/DL (ref 6–8.4)
PSA SERPL-MCNC: 0.25 NG/ML
RBC # BLD AUTO: 5.04 M/UL (ref 4.6–6.2)
SODIUM SERPL-SCNC: 139 MMOL/L (ref 136–145)
TRIGL SERPL-MCNC: 72 MG/DL (ref 30–150)
TSH SERPL-ACNC: 0.77 UIU/ML (ref 0.4–4)
WBC # BLD AUTO: 3 K/UL (ref 3.9–12.7)

## 2025-08-28 PROCEDURE — 36415 COLL VENOUS BLD VENIPUNCTURE: CPT | Mod: PN

## 2025-08-28 PROCEDURE — 85027 COMPLETE CBC AUTOMATED: CPT

## 2025-08-28 PROCEDURE — 84153 ASSAY OF PSA TOTAL: CPT

## 2025-08-28 PROCEDURE — 97530 THERAPEUTIC ACTIVITIES: CPT

## 2025-08-28 PROCEDURE — 97112 NEUROMUSCULAR REEDUCATION: CPT

## 2025-08-28 PROCEDURE — 97140 MANUAL THERAPY 1/> REGIONS: CPT

## 2025-08-28 PROCEDURE — 82306 VITAMIN D 25 HYDROXY: CPT

## 2025-08-28 PROCEDURE — 84075 ASSAY ALKALINE PHOSPHATASE: CPT

## 2025-08-28 PROCEDURE — 80061 LIPID PANEL: CPT

## 2025-08-28 PROCEDURE — 83036 HEMOGLOBIN GLYCOSYLATED A1C: CPT

## 2025-08-28 PROCEDURE — 84443 ASSAY THYROID STIM HORMONE: CPT

## 2025-08-28 PROCEDURE — 97110 THERAPEUTIC EXERCISES: CPT

## 2025-08-28 RX ORDER — TIRZEPATIDE 5 MG/.5ML
5 INJECTION, SOLUTION SUBCUTANEOUS
Qty: 4 PEN | Refills: 1 | Status: SHIPPED | OUTPATIENT
Start: 2025-08-28

## 2025-09-03 ENCOUNTER — OFFICE VISIT (OUTPATIENT)
Dept: INTERNAL MEDICINE | Facility: CLINIC | Age: 60
End: 2025-09-03
Payer: COMMERCIAL

## 2025-09-03 VITALS
SYSTOLIC BLOOD PRESSURE: 118 MMHG | WEIGHT: 239 LBS | BODY MASS INDEX: 33.46 KG/M2 | HEIGHT: 71 IN | DIASTOLIC BLOOD PRESSURE: 78 MMHG | HEART RATE: 69 BPM | OXYGEN SATURATION: 96 %

## 2025-09-03 DIAGNOSIS — Z00.00 ANNUAL PHYSICAL EXAM: Primary | ICD-10-CM

## 2025-09-03 DIAGNOSIS — E78.00 PURE HYPERCHOLESTEROLEMIA: Chronic | ICD-10-CM
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
## 2025-09-03 DIAGNOSIS — F07.81 POST CONCUSSION SYNDROME: ICD-10-CM

## 2025-09-03 DIAGNOSIS — R73.03 PREDIABETES: Chronic | ICD-10-CM

## 2025-09-03 DIAGNOSIS — M51.24 HERNIATED THORACIC DISC WITHOUT MYELOPATHY: ICD-10-CM

## 2025-09-03 DIAGNOSIS — D70.0: ICD-10-CM

## 2025-09-03 DIAGNOSIS — G47.33 OSA (OBSTRUCTIVE SLEEP APNEA): Chronic | ICD-10-CM

## 2025-09-03 DIAGNOSIS — I10 PRIMARY HYPERTENSION: Chronic | ICD-10-CM

## 2025-09-03 DIAGNOSIS — F43.23 ADJUSTMENT DISORDER WITH MIXED ANXIETY AND DEPRESSED MOOD: ICD-10-CM

## 2025-09-03 PROCEDURE — 99999 PR PBB SHADOW E&M-EST. PATIENT-LVL IV: CPT | Mod: PBBFAC,,, | Performed by: STUDENT IN AN ORGANIZED HEALTH CARE EDUCATION/TRAINING PROGRAM

## 2025-09-03 RX ORDER — CYCLOBENZAPRINE HCL 5 MG
5 TABLET ORAL 3 TIMES DAILY PRN
Qty: 30 TABLET | Refills: 0 | Status: SHIPPED | OUTPATIENT
Start: 2025-09-03 | End: 2025-09-13

## 2025-09-03 RX ORDER — SERTRALINE HYDROCHLORIDE 50 MG/1
50 TABLET, FILM COATED ORAL DAILY
Qty: 90 TABLET | Refills: 3 | Status: SHIPPED | OUTPATIENT
Start: 2025-09-03

## 2025-09-04 ENCOUNTER — CLINICAL SUPPORT (OUTPATIENT)
Dept: REHABILITATION | Facility: HOSPITAL | Age: 60
End: 2025-09-04
Payer: COMMERCIAL

## 2025-09-04 DIAGNOSIS — M25.541 PAIN IN THUMB JOINT WITH MOVEMENT OF RIGHT HAND: Primary | ICD-10-CM

## 2025-09-04 PROCEDURE — 97530 THERAPEUTIC ACTIVITIES: CPT

## 2025-09-04 PROCEDURE — 97140 MANUAL THERAPY 1/> REGIONS: CPT

## 2025-09-04 PROCEDURE — 97112 NEUROMUSCULAR REEDUCATION: CPT

## (undated) DEVICE — GLOVE BIOGEL PI MICRO INDIC 7

## (undated) DEVICE — TOWEL OR DISP STRL BLUE 4/PK

## (undated) DEVICE — COVER CAMERA OPERATING ROOM

## (undated) DEVICE — GLOVE BIOGEL SKINSENSE PI 7.0

## (undated) DEVICE — SLING ARM LARGE FOAM STRAP

## (undated) DEVICE — BNDG COFLEX FOAM LF2 ST 6X5YD

## (undated) DEVICE — WRAP KNEE ACCU THERM GEL PACK

## (undated) DEVICE — GLOVE ORTHO PF SZ 8.5

## (undated) DEVICE — SOL NACL IRR 3000ML

## (undated) DEVICE — SUT MCRYL PLUS 4-0 PS2 27IN

## (undated) DEVICE — SYR B-D DISP CONTROL 10CC100/C

## (undated) DEVICE — DRAPE ARTHSCP FLD CTRL POUCH

## (undated) DEVICE — CLOSURE SKIN STERI STRIP 1/2X4

## (undated) DEVICE — PAD ELECTRODE STER 1.5X3

## (undated) DEVICE — SUT BLU BR 4-0 W/DMD PT 3/8

## (undated) DEVICE — PROBE ARTHO ENERGY 90 DEG

## (undated) DEVICE — SUT 4/0 18IN PDS II CLR MO

## (undated) DEVICE — GLOVE SENSICARE PI ALOE 7.5

## (undated) DEVICE — COVER MAYO STAND REINFRCD 30

## (undated) DEVICE — DRESSING N ADH OIL EMUL 3X3

## (undated) DEVICE — SUT FIBERWIRE 18IN BLUE 3-0

## (undated) DEVICE — ADHESIVE MASTISOL VIAL 48/BX

## (undated) DEVICE — BLADE 4.2MM PREBENT ULTRACUT

## (undated) DEVICE — GLOVE SURGEON SYN PF SZ 9

## (undated) DEVICE — NDL SAFETY 22G X 1.5 ECLIPSE

## (undated) DEVICE — SOL NACL IRR 1000ML BTL

## (undated) DEVICE — PAD CAST SPECIALIST STRL 6

## (undated) DEVICE — PAD ABDOMINAL STERILE 8X10IN

## (undated) DEVICE — GOWN ECLIPSE REINF LV4 TWL 2XL

## (undated) DEVICE — GLOVE SENSICARE PI GRN 7.5

## (undated) DEVICE — SLEEVE PROTECTIVE 6X9 NON STER

## (undated) DEVICE — TOURNIQUET SB QC DP 18X4IN

## (undated) DEVICE — DRAPE C-ARM MINI DISP

## (undated) DEVICE — GOWN ECLIPSE REINF LV4 XLNG XL

## (undated) DEVICE — SPONGE COTTON TRAY 4X4IN

## (undated) DEVICE — DRESSING XEROFORM NONADH 1X8IN

## (undated) DEVICE — GLOVE BIOGEL PI MICRO SZ 7

## (undated) DEVICE — Device

## (undated) DEVICE — GOWN ECLIPSE REINF LVL4 TWL XL